# Patient Record
Sex: MALE | Race: WHITE | NOT HISPANIC OR LATINO
[De-identification: names, ages, dates, MRNs, and addresses within clinical notes are randomized per-mention and may not be internally consistent; named-entity substitution may affect disease eponyms.]

---

## 2018-03-15 ENCOUNTER — APPOINTMENT (OUTPATIENT)
Dept: GASTROENTEROLOGY | Facility: HOSPITAL | Age: 78
End: 2018-03-15

## 2018-03-15 ENCOUNTER — OUTPATIENT (OUTPATIENT)
Dept: OUTPATIENT SERVICES | Facility: HOSPITAL | Age: 78
LOS: 1 days | Discharge: HOME | End: 2018-03-15
Payer: MEDICARE

## 2018-03-15 DIAGNOSIS — D13.9 BENIGN NEOPLASM OF ILL-DEFINED SITES WITHIN THE DIGESTIVE SYSTEM: ICD-10-CM

## 2018-03-15 PROCEDURE — 45378 DIAGNOSTIC COLONOSCOPY: CPT | Mod: 52,GC

## 2018-03-15 PROCEDURE — 45378 DIAGNOSTIC COLONOSCOPY: CPT

## 2018-03-22 ENCOUNTER — FORM ENCOUNTER (OUTPATIENT)
Age: 78
End: 2018-03-22

## 2018-03-23 ENCOUNTER — APPOINTMENT (OUTPATIENT)
Dept: CT IMAGING | Facility: CLINIC | Age: 78
End: 2018-03-23
Payer: MEDICARE

## 2018-03-23 ENCOUNTER — OUTPATIENT (OUTPATIENT)
Dept: OUTPATIENT SERVICES | Facility: HOSPITAL | Age: 78
LOS: 1 days | End: 2018-03-23
Payer: MEDICARE

## 2018-03-23 ENCOUNTER — TRANSCRIPTION ENCOUNTER (OUTPATIENT)
Age: 78
End: 2018-03-23

## 2018-03-23 DIAGNOSIS — Z12.11 ENCOUNTER FOR SCREENING FOR MALIGNANT NEOPLASM OF COLON: ICD-10-CM

## 2018-03-23 PROCEDURE — 74261 CT COLONOGRAPHY DX: CPT | Mod: 26

## 2018-03-23 PROCEDURE — 74261 CT COLONOGRAPHY DX: CPT

## 2018-03-29 ENCOUNTER — MESSAGE (OUTPATIENT)
Age: 78
End: 2018-03-29

## 2018-07-11 ENCOUNTER — APPOINTMENT (OUTPATIENT)
Dept: GASTROENTEROLOGY | Facility: CLINIC | Age: 78
End: 2018-07-11
Payer: MEDICARE

## 2018-07-11 VITALS
BODY MASS INDEX: 29.09 KG/M2 | DIASTOLIC BLOOD PRESSURE: 72 MMHG | TEMPERATURE: 98.5 F | HEIGHT: 66 IN | RESPIRATION RATE: 16 BRPM | WEIGHT: 181 LBS | HEART RATE: 74 BPM | OXYGEN SATURATION: 98 % | SYSTOLIC BLOOD PRESSURE: 130 MMHG

## 2018-07-11 DIAGNOSIS — R10.13 EPIGASTRIC PAIN: ICD-10-CM

## 2018-07-11 PROCEDURE — 99214 OFFICE O/P EST MOD 30 MIN: CPT

## 2018-07-11 RX ORDER — RANITIDINE 150 MG/1
150 TABLET ORAL
Qty: 60 | Refills: 5 | Status: ACTIVE | COMMUNITY
Start: 2018-07-11 | End: 1900-01-01

## 2018-07-11 RX ORDER — SODIUM SULFATE, POTASSIUM SULFATE, MAGNESIUM SULFATE 17.5; 3.13; 1.6 G/ML; G/ML; G/ML
17.5-3.13-1.6 SOLUTION, CONCENTRATE ORAL
Qty: 1 | Refills: 0 | Status: DISCONTINUED | COMMUNITY
Start: 2018-01-22 | End: 2018-07-11

## 2023-09-27 ENCOUNTER — EMERGENCY (EMERGENCY)
Facility: HOSPITAL | Age: 83
LOS: 1 days | Discharge: HOME | End: 2023-09-27
Attending: EMERGENCY MEDICINE | Admitting: EMERGENCY MEDICINE
Payer: SELF-PAY

## 2023-09-27 VITALS
OXYGEN SATURATION: 99 % | HEIGHT: 67 IN | SYSTOLIC BLOOD PRESSURE: 127 MMHG | HEART RATE: 74 BPM | RESPIRATION RATE: 18 BRPM | TEMPERATURE: 98 F | DIASTOLIC BLOOD PRESSURE: 57 MMHG | WEIGHT: 179.9 LBS

## 2023-09-27 PROCEDURE — 70450 CT HEAD/BRAIN W/O DYE: CPT | Mod: MG

## 2023-09-27 PROCEDURE — 99284 EMERGENCY DEPT VISIT MOD MDM: CPT

## 2023-09-27 PROCEDURE — G1004: CPT

## 2023-09-27 PROCEDURE — 70450 CT HEAD/BRAIN W/O DYE: CPT | Mod: 26,MG

## 2023-09-27 PROCEDURE — 99284 EMERGENCY DEPT VISIT MOD MDM: CPT | Mod: 25

## 2023-09-27 PROCEDURE — 72125 CT NECK SPINE W/O DYE: CPT | Mod: 26,MG

## 2023-09-27 PROCEDURE — 72125 CT NECK SPINE W/O DYE: CPT | Mod: MG

## 2023-09-27 NOTE — ED ADULT NURSE NOTE - OBJECTIVE STATEMENT
Pt received in 7B, AOx3 and breathing unlabored on room air. Pt c/o hematoma to left sided forehead s/p mechanical fall at work. Pt went to urgent care after incident and was referred to ED due to pt being on eloquis. No neurological deficiencies noted. Pending dispo.

## 2023-09-27 NOTE — ED PROVIDER NOTE - PATIENT PORTAL LINK FT
You can access the FollowMyHealth Patient Portal offered by [facility] by registering at the following website: [email or web address]. By joining [employer]’s FollowMyHealth portal, you will also be able to view your health information using other applications (apps) compatible with our system.

## 2023-09-27 NOTE — ED PROVIDER NOTE - CLINICAL SUMMARY MEDICAL DECISION MAKING FREE TEXT BOX
MARCYATEL: 81 y/o male on ASA s/p fall earlier at work today, seen by [facility], and given tetanus and abrasion cleaned, pt here for CT scan, trip and fell at work over curb, no LOC, no neck pain, no visual changes, no numbness, no weakness, no extremity pain, ambulating without difficulty pertinent on exam , pt AxOx3, VSS, + hemtoma L side of forehead, PERRLA, neck: no midline tenderness, no back tenderness, no chest wall tenderness, Lungs CTA b/l, Cardiac RRR, Abdomen soft NT/ND, no focal neurologic deficit  Will get Head Ct, reassess, dispo pending on results of head CT

## 2023-09-27 NOTE — ED PROVIDER NOTE - OBJECTIVE STATEMENT
82 M on ASA 81 qd referred from urgent care for CT scan due to head injury. Had trip and fall today, hit forehead. No LOC. Denies neck/back pain, cp, sob, n/v, numbness, weakness. Ambulatory afterwards. Received tetanus at urgent care.

## 2023-09-27 NOTE — ED PROVIDER NOTE - NS ED ATTENDING STATEMENT MOD
This was a shared visit with the YADI. I reviewed and verified the documentation and independently performed the documented:

## 2023-09-27 NOTE — ED ADULT TRIAGE NOTE - CHIEF COMPLAINT QUOTE
82 yr old male c/o fall with head injury today at work at 1615 today. Was seen at urgent care and advised to come to the ED for CT scan. On ASA 81 daily. Was administered Tetanus booster at urgent care today.

## 2023-09-27 NOTE — ED PROVIDER NOTE - ATTENDING APP SHARED VISIT CONTRIBUTION OF CARE
Attending MD Barba;:   I personally have seen and examined this patient.  Physician assistant note reviewed and agree on plan of care and except where noted.  See MDM for details.

## 2023-09-27 NOTE — ED ADULT NURSE NOTE - NSFALLRISKINTERV_ED_ALL_ED

## 2025-01-02 ENCOUNTER — INPATIENT (INPATIENT)
Facility: HOSPITAL | Age: 85
LOS: 19 days | Discharge: HOME | DRG: 603 | End: 2025-01-22
Attending: HOSPITALIST | Admitting: HOSPITALIST
Payer: MEDICARE

## 2025-01-02 VITALS
SYSTOLIC BLOOD PRESSURE: 176 MMHG | HEART RATE: 71 BPM | DIASTOLIC BLOOD PRESSURE: 65 MMHG | WEIGHT: 169.98 LBS | OXYGEN SATURATION: 100 % | HEIGHT: 66 IN | TEMPERATURE: 97 F | RESPIRATION RATE: 16 BRPM

## 2025-01-02 DIAGNOSIS — I10 ESSENTIAL (PRIMARY) HYPERTENSION: ICD-10-CM

## 2025-01-02 DIAGNOSIS — L08.9 LOCAL INFECTION OF THE SKIN AND SUBCUTANEOUS TISSUE, UNSPECIFIED: ICD-10-CM

## 2025-01-02 DIAGNOSIS — S91.109A UNSPECIFIED OPEN WOUND OF UNSPECIFIED TOE(S) WITHOUT DAMAGE TO NAIL, INITIAL ENCOUNTER: ICD-10-CM

## 2025-01-02 DIAGNOSIS — R60.0 LOCALIZED EDEMA: ICD-10-CM

## 2025-01-02 DIAGNOSIS — H40.9 UNSPECIFIED GLAUCOMA: ICD-10-CM

## 2025-01-02 DIAGNOSIS — Z98.890 OTHER SPECIFIED POSTPROCEDURAL STATES: Chronic | ICD-10-CM

## 2025-01-02 DIAGNOSIS — F32.9 MAJOR DEPRESSIVE DISORDER, SINGLE EPISODE, UNSPECIFIED: ICD-10-CM

## 2025-01-02 DIAGNOSIS — D64.9 ANEMIA, UNSPECIFIED: ICD-10-CM

## 2025-01-02 DIAGNOSIS — Z29.9 ENCOUNTER FOR PROPHYLACTIC MEASURES, UNSPECIFIED: ICD-10-CM

## 2025-01-02 DIAGNOSIS — C50.919 MALIGNANT NEOPLASM OF UNSPECIFIED SITE OF UNSPECIFIED FEMALE BREAST: ICD-10-CM

## 2025-01-02 LAB
ALBUMIN SERPL ELPH-MCNC: 2.9 G/DL — LOW (ref 3.3–5)
ALP SERPL-CCNC: 65 U/L — SIGNIFICANT CHANGE UP (ref 40–120)
ALT FLD-CCNC: 19 U/L — SIGNIFICANT CHANGE UP (ref 12–78)
ANION GAP SERPL CALC-SCNC: 5 MMOL/L — SIGNIFICANT CHANGE UP (ref 5–17)
APPEARANCE UR: CLEAR — SIGNIFICANT CHANGE UP
APTT BLD: 34.4 SEC — SIGNIFICANT CHANGE UP (ref 24.5–35.6)
AST SERPL-CCNC: 23 U/L — SIGNIFICANT CHANGE UP (ref 15–37)
BASOPHILS # BLD AUTO: 0.02 K/UL — SIGNIFICANT CHANGE UP (ref 0–0.2)
BASOPHILS NFR BLD AUTO: 0.3 % — SIGNIFICANT CHANGE UP (ref 0–2)
BILIRUB SERPL-MCNC: 0.2 MG/DL — SIGNIFICANT CHANGE UP (ref 0.2–1.2)
BILIRUB UR-MCNC: NEGATIVE — SIGNIFICANT CHANGE UP
BUN SERPL-MCNC: 35 MG/DL — HIGH (ref 7–23)
CALCIUM SERPL-MCNC: 9.3 MG/DL — SIGNIFICANT CHANGE UP (ref 8.5–10.1)
CHLORIDE SERPL-SCNC: 117 MMOL/L — HIGH (ref 96–108)
CO2 SERPL-SCNC: 26 MMOL/L — SIGNIFICANT CHANGE UP (ref 22–31)
COLOR SPEC: YELLOW — SIGNIFICANT CHANGE UP
CREAT SERPL-MCNC: 1.7 MG/DL — HIGH (ref 0.5–1.3)
DIFF PNL FLD: NEGATIVE — SIGNIFICANT CHANGE UP
EGFR: 39 ML/MIN/1.73M2 — LOW
EOSINOPHIL # BLD AUTO: 0.12 K/UL — SIGNIFICANT CHANGE UP (ref 0–0.5)
EOSINOPHIL NFR BLD AUTO: 2 % — SIGNIFICANT CHANGE UP (ref 0–6)
ERYTHROCYTE [SEDIMENTATION RATE] IN BLOOD: 75 MM/HR — HIGH (ref 0–20)
GLUCOSE SERPL-MCNC: 93 MG/DL — SIGNIFICANT CHANGE UP (ref 70–99)
GLUCOSE UR QL: NEGATIVE MG/DL — SIGNIFICANT CHANGE UP
HCT VFR BLD CALC: 29.6 % — LOW (ref 39–50)
HGB BLD-MCNC: 9.2 G/DL — LOW (ref 13–17)
IMM GRANULOCYTES NFR BLD AUTO: 0.5 % — SIGNIFICANT CHANGE UP (ref 0–0.9)
INR BLD: 0.98 RATIO — SIGNIFICANT CHANGE UP (ref 0.85–1.16)
KETONES UR-MCNC: NEGATIVE MG/DL — SIGNIFICANT CHANGE UP
LACTATE SERPL-SCNC: 1.2 MMOL/L — SIGNIFICANT CHANGE UP (ref 0.7–2)
LEUKOCYTE ESTERASE UR-ACNC: ABNORMAL
LYMPHOCYTES # BLD AUTO: 0.62 K/UL — LOW (ref 1–3.3)
LYMPHOCYTES # BLD AUTO: 10.5 % — LOW (ref 13–44)
MCHC RBC-ENTMCNC: 28.8 PG — SIGNIFICANT CHANGE UP (ref 27–34)
MCHC RBC-ENTMCNC: 31.1 G/DL — LOW (ref 32–36)
MCV RBC AUTO: 92.8 FL — SIGNIFICANT CHANGE UP (ref 80–100)
MONOCYTES # BLD AUTO: 0.88 K/UL — SIGNIFICANT CHANGE UP (ref 0–0.9)
MONOCYTES NFR BLD AUTO: 14.9 % — HIGH (ref 2–14)
NEUTROPHILS # BLD AUTO: 4.22 K/UL — SIGNIFICANT CHANGE UP (ref 1.8–7.4)
NEUTROPHILS NFR BLD AUTO: 71.8 % — SIGNIFICANT CHANGE UP (ref 43–77)
NITRITE UR-MCNC: NEGATIVE — SIGNIFICANT CHANGE UP
NRBC # BLD: 0 /100 WBCS — SIGNIFICANT CHANGE UP (ref 0–0)
NRBC BLD-RTO: 0 /100 WBCS — SIGNIFICANT CHANGE UP (ref 0–0)
PH UR: 5 — SIGNIFICANT CHANGE UP (ref 5–8)
PLATELET # BLD AUTO: 151 K/UL — SIGNIFICANT CHANGE UP (ref 150–400)
POTASSIUM SERPL-MCNC: 3.7 MMOL/L — SIGNIFICANT CHANGE UP (ref 3.5–5.3)
POTASSIUM SERPL-SCNC: 3.7 MMOL/L — SIGNIFICANT CHANGE UP (ref 3.5–5.3)
PROT SERPL-MCNC: 7.1 G/DL — SIGNIFICANT CHANGE UP (ref 6–8.3)
PROT UR-MCNC: NEGATIVE MG/DL — SIGNIFICANT CHANGE UP
PROTHROM AB SERPL-ACNC: 11.5 SEC — SIGNIFICANT CHANGE UP (ref 9.9–13.4)
RBC # BLD: 3.19 M/UL — LOW (ref 4.2–5.8)
RBC # FLD: 16.3 % — HIGH (ref 10.3–14.5)
SODIUM SERPL-SCNC: 148 MMOL/L — HIGH (ref 135–145)
SP GR SPEC: 1.02 — SIGNIFICANT CHANGE UP (ref 1–1.03)
UROBILINOGEN FLD QL: 0.2 MG/DL — SIGNIFICANT CHANGE UP (ref 0.2–1)
WBC # BLD: 5.89 K/UL — SIGNIFICANT CHANGE UP (ref 3.8–10.5)
WBC # FLD AUTO: 5.89 K/UL — SIGNIFICANT CHANGE UP (ref 3.8–10.5)

## 2025-01-02 PROCEDURE — 93925 LOWER EXTREMITY STUDY: CPT | Mod: 26

## 2025-01-02 PROCEDURE — 93970 EXTREMITY STUDY: CPT | Mod: 26

## 2025-01-02 PROCEDURE — 76770 US EXAM ABDO BACK WALL COMP: CPT | Mod: 26

## 2025-01-02 PROCEDURE — 71045 X-RAY EXAM CHEST 1 VIEW: CPT | Mod: 26

## 2025-01-02 PROCEDURE — 99285 EMERGENCY DEPT VISIT HI MDM: CPT | Mod: FS

## 2025-01-02 PROCEDURE — 73630 X-RAY EXAM OF FOOT: CPT | Mod: 26,RT

## 2025-01-02 RX ORDER — METOPROLOL SUCCINATE 25 MG
50 TABLET, EXTENDED RELEASE 24 HR ORAL DAILY
Refills: 0 | Status: DISCONTINUED | OUTPATIENT
Start: 2025-01-02 | End: 2025-01-06

## 2025-01-02 RX ORDER — ESCITALOPRAM 10 MG/1
1 TABLET, FILM COATED ORAL
Refills: 0 | DISCHARGE

## 2025-01-02 RX ORDER — VANCOMYCIN HYDROCHLORIDE 50 MG/ML
1000 KIT ORAL ONCE
Refills: 0 | Status: COMPLETED | OUTPATIENT
Start: 2025-01-02 | End: 2025-01-02

## 2025-01-02 RX ORDER — PIPERACILLIN SODIUM AND TAZOBACTAM SODIUM 2; 250 G/50ML; MG/50ML
3.38 INJECTION, POWDER, FOR SOLUTION INTRAVENOUS ONCE
Refills: 0 | Status: COMPLETED | OUTPATIENT
Start: 2025-01-02 | End: 2025-01-02

## 2025-01-02 RX ORDER — CYANOCOBALAMIN (VITAMIN B-12) 1000MCG/ML
1 VIAL (ML) INJECTION
Refills: 0 | DISCHARGE

## 2025-01-02 RX ORDER — ACETAMINOPHEN, DIPHENHYDRAMINE HCL, PHENYLEPHRINE HCL 325; 25; 5 MG/1; MG/1; MG/1
3 TABLET ORAL AT BEDTIME
Refills: 0 | Status: DISCONTINUED | OUTPATIENT
Start: 2025-01-02 | End: 2025-01-06

## 2025-01-02 RX ORDER — ESCITALOPRAM 10 MG/1
10 TABLET, FILM COATED ORAL DAILY
Refills: 0 | Status: DISCONTINUED | OUTPATIENT
Start: 2025-01-02 | End: 2025-01-06

## 2025-01-02 RX ORDER — SODIUM CHLORIDE 9 G/ML
1000 INJECTION, SOLUTION INTRAVENOUS
Refills: 0 | Status: DISCONTINUED | OUTPATIENT
Start: 2025-01-02 | End: 2025-01-17

## 2025-01-02 RX ORDER — HEPARIN SODIUM,PORCINE 10000/ML
5000 VIAL (ML) INJECTION EVERY 8 HOURS
Refills: 0 | Status: DISCONTINUED | OUTPATIENT
Start: 2025-01-02 | End: 2025-01-05

## 2025-01-02 RX ORDER — FOLIC ACID 1 MG
1 TABLET ORAL DAILY
Refills: 0 | Status: DISCONTINUED | OUTPATIENT
Start: 2025-01-02 | End: 2025-01-06

## 2025-01-02 RX ORDER — TAMOXIFEN CITRATE 10 MG/1
20 TABLET, FILM COATED ORAL DAILY
Refills: 0 | Status: DISCONTINUED | OUTPATIENT
Start: 2025-01-02 | End: 2025-01-06

## 2025-01-02 RX ORDER — LATANOPROST 50 UG/ML
1 SOLUTION OPHTHALMIC AT BEDTIME
Refills: 0 | Status: DISCONTINUED | OUTPATIENT
Start: 2025-01-02 | End: 2025-01-06

## 2025-01-02 RX ORDER — METOPROLOL SUCCINATE 25 MG
1 TABLET, EXTENDED RELEASE 24 HR ORAL
Refills: 0 | DISCHARGE

## 2025-01-02 RX ORDER — CYANOCOBALAMIN (VITAMIN B-12) 1000MCG/ML
1000 VIAL (ML) INJECTION DAILY
Refills: 0 | Status: DISCONTINUED | OUTPATIENT
Start: 2025-01-02 | End: 2025-01-06

## 2025-01-02 RX ORDER — ACETAMINOPHEN 160 MG/5ML
650 SUSPENSION ORAL EVERY 6 HOURS
Refills: 0 | Status: DISCONTINUED | OUTPATIENT
Start: 2025-01-02 | End: 2025-01-06

## 2025-01-02 RX ORDER — AMMONIUM LACTATE 12 %
1 LOTION (GRAM) TOPICAL
Refills: 0 | Status: DISCONTINUED | OUTPATIENT
Start: 2025-01-02 | End: 2025-01-06

## 2025-01-02 RX ORDER — CEFEPIME HCL 1 G
2000 IV SOLUTION, PIGGYBACK, BOTTLE (EA) INTRAVENOUS EVERY 12 HOURS
Refills: 0 | Status: DISCONTINUED | OUTPATIENT
Start: 2025-01-02 | End: 2025-01-06

## 2025-01-02 RX ORDER — TAMOXIFEN CITRATE 10 MG/1
1 TABLET, FILM COATED ORAL
Refills: 0 | DISCHARGE

## 2025-01-02 RX ORDER — BACTERIOSTATIC SODIUM CHLORIDE 0.9 %
1000 VIAL (ML) INJECTION ONCE
Refills: 0 | Status: COMPLETED | OUTPATIENT
Start: 2025-01-02 | End: 2025-01-02

## 2025-01-02 RX ORDER — MUPIROCIN 2 G/100G
1 CREAM TOPICAL
Refills: 0 | Status: DISCONTINUED | OUTPATIENT
Start: 2025-01-02 | End: 2025-01-06

## 2025-01-02 RX ORDER — LATANOPROST 50 UG/ML
1 SOLUTION OPHTHALMIC
Refills: 0 | DISCHARGE

## 2025-01-02 RX ORDER — FOLIC ACID 1 MG
1 TABLET ORAL
Refills: 0 | DISCHARGE

## 2025-01-02 RX ADMIN — Medication 1000 MILLILITER(S): at 17:19

## 2025-01-02 RX ADMIN — Medication 5000 UNIT(S): at 23:01

## 2025-01-02 RX ADMIN — LATANOPROST 1 DROP(S): 50 SOLUTION OPHTHALMIC at 23:02

## 2025-01-02 RX ADMIN — PIPERACILLIN SODIUM AND TAZOBACTAM SODIUM 200 GRAM(S): 2; 250 INJECTION, POWDER, FOR SOLUTION INTRAVENOUS at 17:19

## 2025-01-02 RX ADMIN — VANCOMYCIN HYDROCHLORIDE 250 MILLIGRAM(S): KIT at 17:19

## 2025-01-02 NOTE — H&P ADULT - HISTORY OF PRESENT ILLNESS
Patient is an 85yo M with a PMH of L sided breast cancer (s/p lumpectomy, on Tamoxifen), HTN, Anemia, Aortic insufficiency, Carotid artery disease, GERD, Glaucoma, Hepatitis (2014),  who presents to the ED from Dr. Niko Jacques's office for a R toe infection. Patient notes that he noticed a R toe wound a few weeks ago. He completed a 10d course of Amoxicillin 500mg, but noticed worsening of the wound. He went ot his podiatrist today, as the wound was draining a pus like liquid. Podiatry rec he come to the hospital for IV abx and osteomyelitis workup. Patient feeling well in ED.    ED Course:  Vitals: /65, HR 71, T 97.3, RR 16 SpO2 100% on RA  Labs: ESR 75, Hgb 9.2, Na 148, Cl 117, BUN/Cr 35/1.70, eGFR 39  Given: IV Zosyn, IV Vancomycin, 1L NS bolus     Imaging:  Chest Xray: No acute chest finding.   R foot Xray: Possible erosion of the distal phalanx of the right great toe with associated soft tissue swelling suspicious for osteomyelitis. Patient is an 85yo M with a PMH of L sided breast cancer (s/p lumpectomy, on Tamoxifen), HTN, Anemia, Aortic insufficiency, Carotid artery disease, GERD, Glaucoma, Hepatitis (2014), Lower ext edema ,PVD who presents to the ED from Dr. Niko Jacques's office for a R toe infection. Patient notes that he noticed a R toe wound a few weeks ago. He completed a 10d course of Amoxicillin 500mg, but noticed worsening of the wound. He went ot his podiatrist today, as the wound was draining a pus like liquid. Podiatry rec he come to the hospital for IV abx and osteomyelitis workup. Patient feeling well in ED.    ED Course:  Vitals: /65, HR 71, T 97.3, RR 16 SpO2 100% on RA  Labs: ESR 75, Hgb 9.2, Na 148, Cl 117, BUN/Cr 35/1.70, eGFR 39  Given: IV Zosyn, IV Vancomycin, 1L NS bolus     Imaging:  Chest Xray: No acute chest finding.   R foot Xray: Possible erosion of the distal phalanx of the right great toe with associated soft tissue swelling suspicious for osteomyelitis.

## 2025-01-02 NOTE — H&P ADULT - MUSCULOSKELETAL
+R toe wound/ROM intact/no calf tenderness/normal gait details… +R toe wound/ROM intact/no joint swelling/no calf tenderness/normal gait/strength 5/5 bilateral upper extremities/strength 5/5 bilateral lower extremities

## 2025-01-02 NOTE — CONSULT NOTE ADULT - PROBLEM SELECTOR RECOMMENDATION 9
Chart reviewed and Patient evaluated  Discussed diagnosis and treatment with patient  There is concern for right hallux ulcer with infection, r/o OM  Obtained wound culture to be sent to Pathology  Xray reviewed, no obvious or acute fracture pathology, noted arthritic changes  MRI right forefoot ordered  Bactroban ordered to be applied to wound daily  Wound flushed with normal saline  Applied dry sterile dressing  Rec IV antibiotics, ID consult  Rec vascular consult  Weight bearing as tolerated b/l  Patient understands he may require surgical intervention, and is at risk to lose part of the toe, all of the toe, part of the foot, all of the foot.  Will allow demarcation of infective process  Podiatry will follow while in house.  Will discuss care plan  with all  Attendings   Thank you for consult Chart reviewed and Patient evaluated  Discussed diagnosis and treatment with patient  There is concern for right hallux ulcer with infection, r/o OM  Obtained wound culture to be sent to Pathology  Xray reviewed, no obvious or acute fracture pathology, noted arthritic changes  MRI right forefoot ordered  Bactroban ordered to be applied to wound daily  Wound flushed with normal saline  Applied dry sterile dressing  Rec IV antibiotics, ID consult  Rec vascular consult  Rec admit under hospitalist team  Weight bearing as tolerated b/l  Patient understands he may require surgical intervention, and is at risk to lose part of the toe, all of the toe, part of the foot, all of the foot.  Will allow demarcation of infective process  Podiatry will follow while in house.  Will discuss care plan  with all  Attendings   Thank you for consult

## 2025-01-02 NOTE — ED ADULT TRIAGE NOTE - PAIN RATING/NUMBER SCALE (0-10): ACTIVITY
Detail Level: Detailed Add 49209 Cpt? (Important Note: In 2017 The Use Of 22056 Is Being Tracked By Cms To Determine Future Global Period Reimbursement For Global Periods): yes 0 (no pain/absence of nonverbal indicators of pain)

## 2025-01-02 NOTE — H&P ADULT - PROBLEM SELECTOR PLAN 3
Patient with history of kidney disease. Unknown baseline. Follows Nephrologist from [place].   - BUN/Cr 35/1.70, eGFR 39  - Na 148, Cl 117  - S/p 1L NS bolus in ED   - F/u urine lytes   - Monitor daily CMP   - Avoid nephrotoxic meds when able   - Nephro Dr. Quiros consulted, f/u recs Patient with history of kidney disease. Possible STEPHANY on admission. Unknown baseline Cr. Follows Nephrologist from [place].   - BUN/Cr 35/1.70, eGFR 39  - Na 148, Cl 117  - S/p 1L NS bolus in ED   - Start D5 + 1/2NS IVF  - F/u urine lytes   - F/u renal sonogram  - Monitor daily CMP   - Avoid nephrotoxic meds when able   - Nephro Dr. Quiros consulted, f/u recs Patient with history of kidney disease. Possible STEPHANY on admission. Unknown baseline Cr. Follows Nephrologist from [place].   - BUN/Cr 35/1.70, eGFR 39  - Na 148, Cl 117  - S/p 1L NS bolus in ED   - Start D5 + 1/2NS IVF  - F/u urine lytes   - F/u renal sonogram  - Hold home Lasix in setting of Hypernatremia and possible STEPHANY   - Monitor daily CMP   - Avoid nephrotoxic meds when able   - Nephro Dr. Quiros consulted, f/u recs Patient with history of kidney disease. Possible STEPHANY on admission. Unknown baseline Cr. Follows Nephrologist from [place].   - BUN/Cr 35/1.70, eGFR 39  - Na 148, Cl 117  - S/p 1L NS bolus in ED   - Start D5 + 1/2NS IVF  - F/u urine lytes   - F/u renal sonogram  - Hold home Lasix in setting of Hypernatremia and possible STEPHANY -Renal dose meds   - Monitor daily CMP   - Avoid nephrotoxic meds when able   - Nephro Dr. Quiros consulted, f/u recs

## 2025-01-02 NOTE — ED PROVIDER NOTE - OBJECTIVE STATEMENT
84-year-old male with past medical history of breast cancer presents today from Dr. Niko Jacques's office due to a right toe infection.  Patient really simply just finished a course of antibiotics but is advised to come to the hospital due to a toe infection.  Patient has an ulcer to the right first toe.  Patient is experiencing redness and swelling.  Patient denies fever, numbness, weakness, diabetic history, chest pain, or any other complaints. PCP Eliud Sutherland.

## 2025-01-02 NOTE — H&P ADULT - PROBLEM SELECTOR PLAN 1
Patient with R toe wound suspicious for Osteomyelitis  - R foot Xray: Possible erosion of the distal phalanx of the right great toe with associated soft tissue swelling suspicious for osteomyelitis  - ESR 75  - S/p IV Zosyn, IV Vancomycin, 1L NS bolus x1 in ED  - Start IV Cefepime   - F/u MRI R foot  - F/u blood and wound cultures, CRP   - F/u MRSA/MSSA PCR --> if positive would continue IV Vancomycin   - Continue topical Bactroban to surrounding wound area, daily wound dressing changes   - Continue weight bearing as tolerated   - Podiatry Dr. Garcia consulted, f/u recs   - ID Dr. Castro consulted, f/u recs   - Vascular consulted, f/u recs Patient with R toe wound suspicious for Osteomyelitis  - R foot Xray: Possible erosion of the distal phalanx of the right great toe with associated soft tissue swelling suspicious for osteomyelitis  - ESR 75  - S/p IV Zosyn, IV Vancomycin, 1L NS bolus x1 in ED  - Start IV Cefepime 2 gm q 12 hrs -Renal dose   - F/u MRI R foot as per podiatry  - F/u blood and wound cultures, CRP   - F/u MRSA/MSSA PCR --> if positive would continue IV Vancomycin   - Continue topical Bactroban to surrounding wound area, daily wound dressing changes   - Continue weight bearing as tolerated   - Podiatry Dr. Garcia consulted, f/u recs   - ID Dr. Castro consulted, f/u recs -Abx   - Vascular consulted, f/u recs

## 2025-01-02 NOTE — H&P ADULT - NSICDXPASTMEDICALHX_GEN_ALL_CORE_FT
PAST MEDICAL HISTORY:  Anemia     Breast cancer     Glaucoma     HTN (hypertension)     Major depression      PAST MEDICAL HISTORY:  Anemia     Breast cancer     Chronic GERD     Glaucoma     H/O: glaucoma     HTN (hypertension)     Lower extremity edema     Major depression     PVD (peripheral vascular disease)

## 2025-01-02 NOTE — H&P ADULT - NEUROLOGICAL
negative normal/cranial nerves II-XII intact/sensation intact AAOx 4/normal/cranial nerves II-XII intact/sensation intact/deep reflexes intact/superficial reflexes intact

## 2025-01-02 NOTE — CONSULT NOTE ADULT - SUBJECTIVE AND OBJECTIVE BOX
S : 84y year old Male seen at bedside for right toe infection.  Pt states he cancelled his last two appointments with his podiatrist, and has been on antibiotics for 10 days with worsening infection.    Chief Complaint : Patient is a 84y old  Male who presents with a chief complaint of right toe infection  HPI : 84-year-old male with past medical history of breast cancer presents today from Dr. Niko Jacques's office due to a right toe infection.  Patient really simply just finished a course of antibiotics but is advised to come to the hospital due to a toe infection.  Patient has an ulcer to the right first toe.  Patient is experiencing redness and swelling.  Patient denies fever, numbness, weakness, diabetic history, chest pain, or any other complaints. PCP Eliud Sutherland.      Patient admits to  (-) Fevers, (-) Chills, (-) Nausea, (-) Vomiting, (-) Shortness of Breath      PMH:   PSH:    Allergies:No Known Allergies      Labs:                          9.2    5.89  )-----------( 151      ( 02 Jan 2025 17:20 )             29.6     WBC Trend  5.89 Date (01-02 @ 17:20)      Chem  01-02    148[H]  |  117[H]  |  35[H]  ----------------------------<  93  3.7   |  26  |  1.70[H]    Ca    9.3      02 Jan 2025 17:20    TPro  7.1  /  Alb  2.9[L]  /  TBili  0.2  /  DBili  x   /  AST  23  /  ALT  19  /  AlkPhos  65  01-02          T(F): 97.3 (01-02-25 @ 15:16), Max: 97.3 (01-02-25 @ 15:16)  HR: 71 (01-02-25 @ 15:16) (71 - 71)  BP: 176/65 (01-02-25 @ 15:16) (176/65 - 176/65)  RR: 16 (01-02-25 @ 15:16) (16 - 16)  SpO2: 100% (01-02-25 @ 15:16) (100% - 100%)  Wt(kg): --    O:   General: Pleasant  male NAD & AOX3.    Integument:  Skin warm, dry and supple bilateral.    Noted plantar right hallux ulcer, fibro-granular base, apx [id number], + edema, + erythema, minimal drainage, - hyperkeratotic base, +tracking, with noted cellulitic changes, clinical concern of OM  Vascular: Dorsalis Pedis and Posterior Tibial pulses non-palpable.  Capillary re-fill time less then 3 seconds digits 1-5 bilateral.  noted b/l lower leg edema, noted venous stasis b/l lower extremity  Neuro: Protective sensation intact to the level of the digits bilateral.  MSK: Muscle strength 4/5 all major muscle groups bilateral.

## 2025-01-02 NOTE — ED ADULT NURSE NOTE - NSFALLHARMRISKINTERV_ED_ALL_ED

## 2025-01-02 NOTE — H&P ADULT - GASTROINTESTINAL
negative normal/soft/nontender/nondistended/normal active bowel sounds details… normal/soft/nontender/nondistended/normal active bowel sounds/no guarding/no rigidity/no organomegaly/no masses palpable

## 2025-01-02 NOTE — ED ADULT TRIAGE NOTE - CHIEF COMPLAINT QUOTE
84 yr old male arrives to ED c/o right toe infection, edema/redness noted to leg. pt denies fever chills, chest pain or sob at this time. Karan NUNEZ

## 2025-01-02 NOTE — ED PROVIDER NOTE - CLINICAL SUMMARY MEDICAL DECISION MAKING FREE TEXT BOX
Patient is a 84-year-old male who presents to the emergency room with a chief complaint of toe pain.  Past medical history of breast cancer.  Presents from outside office with infection to the right toe first digit.  Patient notes ulcer redness and swelling to the toe progressively worsening.  Denies any fever nausea vomiting chest pain or shortness of breath.  On exam patient is lying in bed no acute distress.  Right foot first digit there is a 1 cm ulceration noted to the toe with surrounding redness and erythema.  Positive pedal pulse cap refill 3 to 4 seconds.  Patient is presenting to the emergency room with an infected right toe.  Will obtain screening septic workup imaging begin antibiotics consult with podiatry and monitor.  Patient will require admission for further workup and evaluation.  Independent review of chest x-ray reveals no acute infiltrate.  Independent review of right foot x-ray reveals erosive changes of the distal first toe concerning for possible osteomyelitis

## 2025-01-02 NOTE — PROGRESS NOTE ADULT - SUBJECTIVE AND OBJECTIVE BOX
Consulted for STEPHANY  Chart reviewed  Check urine indices  Renal sono  IVF trial with hypotonic fluid  Full consult note to follow, thank you

## 2025-01-02 NOTE — H&P ADULT - PROBLEM SELECTOR PLAN 2
No
Patient with a hx of Anemia of Chronic disease. On Folic acid, B12, and Procrit injections every other week (for Hgb <10.6). No active signs of bleeding. Unknown baseline Hgb  - Hgb 9.2  - Continue Folic acid and B12  - F/u iron studies in AM   - Monitor daily CBC  - Maintain active Type and screen   - Transfuse for Hgb <7

## 2025-01-02 NOTE — H&P ADULT - CARDIOVASCULAR
normal/regular rate and rhythm/S1 S2 present/no gallops/no rub/peripheral edema details… normal/regular rate and rhythm/S1 S2 present/no gallops/no rub/no murmur/no JVD/peripheral edema/pedal edema/vascular

## 2025-01-02 NOTE — H&P ADULT - PROBLEM SELECTOR PLAN 4
Chronic. On home Metoprolol 50mg and Lasix 80mg daily  - /65 on admission  - Continue home Metoprolol and Lasix daily   - Monitor routine hemodynamics Chronic. On home Metoprolol 50mg and Lasix 80mg daily  - /65 on admission  - Continue home Metoprolol   - Monitor routine hemodynamics Chronic. On home Metoprolol  XL 50mg and Lasix 80mg daily  - /65 on admission  - Continue home Metoprolol XL daily -Lasix on HOLD   - Monitor routine hemodynamics

## 2025-01-02 NOTE — H&P ADULT - NEGATIVE MUSCULOSKELETAL SYMPTOMS
no arthralgia/no myalgia/no muscle weakness no arthralgia/no joint swelling/no myalgia/no muscle weakness

## 2025-01-02 NOTE — H&P ADULT - ATTENDING COMMENTS
Patient is an 83yo M with a PMH of L sided breast cancer, HTN, Anemia, Aortic insufficiency, Carotid artery disease, GERD, Glaucoma, Hepatitis (2014) admitted for Rt foot big toe  wound & osteomyelitis workup.   Pt seen, examined, case & care plan d/w pt ,residents at detail.  Consults-  ID-DR Castro  Podiatry- Dr Garcia  Renal-DR Quiros  Hematology--Dr Denton  PO diet  AM labs   DVT ppx

## 2025-01-02 NOTE — H&P ADULT - ASSESSMENT
Patient is an 85yo M with a PMH of L sided breast cancer, HTN, Anemia, Aortic insufficiency, Carotid artery disease, GERD, Glaucoma, Hepatitis (2014) admitted for osteomyelitis workup.  Patient is an 83yo M with a PMH of L sided breast cancer, HTN, Anemia, Aortic insufficiency, Carotid artery disease, GERD, Glaucoma, Hepatitis (2014) admitted for Rt foot big toe  wound & osteomyelitis workup.

## 2025-01-02 NOTE — H&P ADULT - NSHPSOCIALHISTORY_GEN_ALL_CORE
Lives: At home with wife and son   ADLs: Mostly dependent  Ambulation: Independent   Diet: Regular   Alcohol Use: Socially, a few times a month   Tobacco Use: Former. Quit >60yrs ago   Recreational Drug Use: None

## 2025-01-02 NOTE — ED PROVIDER NOTE - DIFFERENTIAL DIAGNOSIS
Patient is presenting to the emergency room with an infected right toe.  Will obtain screening septic workup imaging begin antibiotics consult with podiatry and monitor.  Patient will require admission for further workup and evaluation. Differential Diagnosis

## 2025-01-02 NOTE — ED PROVIDER NOTE - WHICH SHOWED
Independent review of chest x-ray reveals no acute infiltrate.  Independent review of right foot x-ray reveals erosive changes of the distal first toe concerning for possible osteomyelitis

## 2025-01-02 NOTE — ED PROVIDER NOTE - PHYSICAL EXAMINATION
Constitutional: Awake, Alert, non-toxic. NAD. Well appearing, well nourished.   HEAD: Normocephalic, atraumatic.   EYES: EOM intact, conjunctiva and sclera are clear bilaterally.   ENT: No rhinorrhea, patent, mucous membranes pink/moist, no drooling or stridor.   NECK: Supple, non-tender  CARDIOVASCULAR: Normal S1, S2; regular rate and rhythm.  RESPIRATORY: Normal respiratory effort; breath sounds CTAB, no wheezes, rhonchi, or rales. Speaking in full sentences. No accessory muscle use.   EXTREMITIES: Full passive and active ROM in all extremities; (+) right toe erythema and swelling, (+) right 1st toe 1 cm ulcer, ankle non-tender to palpation; distal pulses palpable and symmetric  SKIN: Warm, dry; good skin turgor, no apparent lesions or rashes, no ecchymosis, brisk capillary refill.  NEURO: A&O x3. Sensory and motor functions are grossly intact. Speech is normal. Appearance and judgement seem appropriate for gender and age.

## 2025-01-02 NOTE — H&P ADULT - PROBLEM SELECTOR PLAN 5
Chronic. On Lasix 80mg daily. Patient denies any hx of CHF. No evidence of volume overload on exam. Follows Cardio Dr. Bland  - Chest Xray: No acute chest finding.  - Continue home Lasix   - F/u TTE   - F/u b/l LE doppler Chronic. On Lasix 80mg daily. Patient denies any hx of CHF. No evidence of volume overload on exam. Follows Cardio Dr. Bland  - Chest Xray: No acute chest finding.  - Hold home Lasix in setting of Hypernatremia and possible STEPHANY --> if Cr improves, or patient develops fluid overload, can consider restarting Lasix   - F/u TTE   - F/u b/l LE doppler Chronic. On Lasix 80mg daily. Patient denies any hx of CHF. No evidence of volume overload on exam. Follows Cardio Dr. Bland  - Chest Xray: No acute chest finding.  - Hold home Lasix in setting of Hypernatremia and possible STEPHANY --> if Cr improves, or patient develops fluid overload, can consider restarting Lasix  for b/l lower ext edema   - F/u TTE   - F/u b/l LE doppler

## 2025-01-02 NOTE — H&P ADULT - PROBLEM SELECTOR PLAN 6
Patient with recent diagnosis of L sided breast cancer, s/p lumpectomy. On Tamoxifen 20mg daily. Follows Heme/Onc Dr. Barba  - Continue Tamoxifen

## 2025-01-02 NOTE — H&P ADULT - SKIN
+b/l LE vascular skin changes/warm and dry +b/l LE vascular  stasis skin changes/warm and dry/no rashes

## 2025-01-03 ENCOUNTER — RESULT REVIEW (OUTPATIENT)
Age: 85
End: 2025-01-03

## 2025-01-03 DIAGNOSIS — M86.179 OTHER ACUTE OSTEOMYELITIS, UNSPECIFIED ANKLE AND FOOT: ICD-10-CM

## 2025-01-03 LAB
A1C WITH ESTIMATED AVERAGE GLUCOSE RESULT: 5.2 % — SIGNIFICANT CHANGE UP (ref 4–5.6)
ALBUMIN SERPL ELPH-MCNC: 2.3 G/DL — LOW (ref 3.3–5)
ALP SERPL-CCNC: 59 U/L — SIGNIFICANT CHANGE UP (ref 40–120)
ALT FLD-CCNC: 14 U/L — SIGNIFICANT CHANGE UP (ref 12–78)
ANION GAP SERPL CALC-SCNC: 3 MMOL/L — LOW (ref 5–17)
AST SERPL-CCNC: 15 U/L — SIGNIFICANT CHANGE UP (ref 15–37)
BASOPHILS # BLD AUTO: 0 K/UL — SIGNIFICANT CHANGE UP (ref 0–0.2)
BASOPHILS NFR BLD AUTO: 0 % — SIGNIFICANT CHANGE UP (ref 0–2)
BILIRUB SERPL-MCNC: 0.3 MG/DL — SIGNIFICANT CHANGE UP (ref 0.2–1.2)
BUN SERPL-MCNC: 30 MG/DL — HIGH (ref 7–23)
CALCIUM SERPL-MCNC: 8.5 MG/DL — SIGNIFICANT CHANGE UP (ref 8.5–10.1)
CHLORIDE SERPL-SCNC: 119 MMOL/L — HIGH (ref 96–108)
CO2 SERPL-SCNC: 25 MMOL/L — SIGNIFICANT CHANGE UP (ref 22–31)
CREAT SERPL-MCNC: 1.6 MG/DL — HIGH (ref 0.5–1.3)
CRP SERPL-MCNC: 26 MG/L — HIGH
EGFR: 42 ML/MIN/1.73M2 — LOW
EOSINOPHIL # BLD AUTO: 0.32 K/UL — SIGNIFICANT CHANGE UP (ref 0–0.5)
EOSINOPHIL NFR BLD AUTO: 7 % — HIGH (ref 0–6)
ESTIMATED AVERAGE GLUCOSE: 103 MG/DL — SIGNIFICANT CHANGE UP (ref 68–114)
FERRITIN SERPL-MCNC: 129 NG/ML — SIGNIFICANT CHANGE UP (ref 30–400)
FOLATE SERPL-MCNC: 12.9 NG/ML — SIGNIFICANT CHANGE UP
GLUCOSE SERPL-MCNC: 106 MG/DL — HIGH (ref 70–99)
HCT VFR BLD CALC: 25.6 % — LOW (ref 39–50)
HGB BLD-MCNC: 7.9 G/DL — LOW (ref 13–17)
IRON SATN MFR SERPL: 13 % — LOW (ref 16–55)
IRON SATN MFR SERPL: 27 UG/DL — LOW (ref 45–165)
LYMPHOCYTES # BLD AUTO: 0.69 K/UL — LOW (ref 1–3.3)
LYMPHOCYTES # BLD AUTO: 15 % — SIGNIFICANT CHANGE UP (ref 13–44)
MAGNESIUM SERPL-MCNC: 2.2 MG/DL — SIGNIFICANT CHANGE UP (ref 1.6–2.6)
MCHC RBC-ENTMCNC: 28.8 PG — SIGNIFICANT CHANGE UP (ref 27–34)
MCHC RBC-ENTMCNC: 30.9 G/DL — LOW (ref 32–36)
MCV RBC AUTO: 93.4 FL — SIGNIFICANT CHANGE UP (ref 80–100)
MONOCYTES # BLD AUTO: 0.73 K/UL — SIGNIFICANT CHANGE UP (ref 0–0.9)
MONOCYTES NFR BLD AUTO: 16 % — HIGH (ref 2–14)
NEUTROPHILS # BLD AUTO: 2.83 K/UL — SIGNIFICANT CHANGE UP (ref 1.8–7.4)
NEUTROPHILS NFR BLD AUTO: 62 % — SIGNIFICANT CHANGE UP (ref 43–77)
NRBC # BLD: SIGNIFICANT CHANGE UP /100 WBCS (ref 0–0)
NRBC BLD-RTO: SIGNIFICANT CHANGE UP /100 WBCS (ref 0–0)
PHOSPHATE SERPL-MCNC: 2.1 MG/DL — LOW (ref 2.5–4.5)
PLATELET # BLD AUTO: 141 K/UL — LOW (ref 150–400)
POTASSIUM SERPL-MCNC: 3.9 MMOL/L — SIGNIFICANT CHANGE UP (ref 3.5–5.3)
POTASSIUM SERPL-SCNC: 3.9 MMOL/L — SIGNIFICANT CHANGE UP (ref 3.5–5.3)
PROT SERPL-MCNC: 5.9 G/DL — LOW (ref 6–8.3)
RBC # BLD: 2.74 M/UL — LOW (ref 4.2–5.8)
RBC # FLD: 16.6 % — HIGH (ref 10.3–14.5)
SODIUM SERPL-SCNC: 147 MMOL/L — HIGH (ref 135–145)
TIBC SERPL-MCNC: 210 UG/DL — LOW (ref 220–430)
TRANSFERRIN SERPL-MCNC: 156 MG/DL — LOW (ref 200–360)
UIBC SERPL-MCNC: 183 UG/DL — SIGNIFICANT CHANGE UP (ref 110–370)
VIT B12 SERPL-MCNC: 502 PG/ML — SIGNIFICANT CHANGE UP (ref 232–1245)
WBC # BLD: 4.57 K/UL — SIGNIFICANT CHANGE UP (ref 3.8–10.5)
WBC # FLD AUTO: 4.57 K/UL — SIGNIFICANT CHANGE UP (ref 3.8–10.5)

## 2025-01-03 PROCEDURE — 99222 1ST HOSP IP/OBS MODERATE 55: CPT

## 2025-01-03 PROCEDURE — 93922 UPR/L XTREMITY ART 2 LEVELS: CPT | Mod: 26

## 2025-01-03 PROCEDURE — G0545: CPT

## 2025-01-03 PROCEDURE — 73718 MRI LOWER EXTREMITY W/O DYE: CPT | Mod: 26,RT

## 2025-01-03 PROCEDURE — 93010 ELECTROCARDIOGRAM REPORT: CPT

## 2025-01-03 RX ORDER — TIMOLOL MALEATE 5 MG/ML
1 SOLUTION OPHTHALMIC
Refills: 0 | Status: DISCONTINUED | OUTPATIENT
Start: 2025-01-03 | End: 2025-01-06

## 2025-01-03 RX ORDER — SODIUM CHLORIDE 9 G/ML
1000 INJECTION, SOLUTION INTRAVENOUS
Refills: 0 | Status: DISCONTINUED | OUTPATIENT
Start: 2025-01-03 | End: 2025-01-04

## 2025-01-03 RX ADMIN — SODIUM CHLORIDE 75 MILLILITER(S): 9 INJECTION, SOLUTION INTRAVENOUS at 00:57

## 2025-01-03 RX ADMIN — Medication 50 MILLIGRAM(S): at 06:55

## 2025-01-03 RX ADMIN — Medication 5000 UNIT(S): at 21:26

## 2025-01-03 RX ADMIN — TAMOXIFEN CITRATE 20 MILLIGRAM(S): 10 TABLET, FILM COATED ORAL at 11:27

## 2025-01-03 RX ADMIN — Medication 100 MILLIGRAM(S): at 06:56

## 2025-01-03 RX ADMIN — ESCITALOPRAM 10 MILLIGRAM(S): 10 TABLET, FILM COATED ORAL at 11:26

## 2025-01-03 RX ADMIN — Medication 1000 MICROGRAM(S): at 11:26

## 2025-01-03 RX ADMIN — Medication 1 MILLIGRAM(S): at 11:26

## 2025-01-03 RX ADMIN — SODIUM CHLORIDE 75 MILLILITER(S): 9 INJECTION, SOLUTION INTRAVENOUS at 17:20

## 2025-01-03 RX ADMIN — Medication 1 APPLICATION(S): at 06:56

## 2025-01-03 RX ADMIN — Medication 100 MILLIGRAM(S): at 17:20

## 2025-01-03 RX ADMIN — Medication 1 APPLICATION(S): at 17:21

## 2025-01-03 RX ADMIN — LATANOPROST 1 DROP(S): 50 SOLUTION OPHTHALMIC at 21:26

## 2025-01-03 RX ADMIN — Medication 5000 UNIT(S): at 13:34

## 2025-01-03 RX ADMIN — MUPIROCIN 1 APPLICATION(S): 2 CREAM TOPICAL at 11:26

## 2025-01-03 RX ADMIN — Medication 5000 UNIT(S): at 06:55

## 2025-01-03 NOTE — CARE COORDINATION ASSESSMENT. - OTHER PERTINENT DISCHARGE PLANNING INFORMATION:
CM met with the patient at the bedside and explained role of CM and transition planning. Patient verbalized understanding. Patient stated he lives with his spouse in a townhouse. No stairs outside; flight of stairs to the bedroom. Patient stated he is independent with ADL's/ambulation/medication management PTA. Patient stated he does not own DME. Denies home care services PTA. CM provided direct contact/resource folder and remains available.

## 2025-01-03 NOTE — PROGRESS NOTE ADULT - ASSESSMENT
Briefly this is a 85yo M with a PMH of L sided breast cancer (s/p lumpectomy, on Tamoxifen), HTN, Anemia, Aortic insufficiency, Carotid artery disease, GERD, Glaucoma, Hepatitis (2014), Lower ext edema ,PVD, who presented to the ED from Dr. Niko Jacques's office for a R toe infection. MRI concerning for osteomyelitis of R 1st toe. He has no fevers and no leukocytosis. Skin changes on lower legs appear concerning for venous stasis. Podiatry following.     R 1st toe ulcer w/ underlying osteomyelitis  Plan to continue w/ cefepime pending cultures    Will resume care from [facility] tomorrow    Dr. Samuel More covering service 1/4-1/5; I will resume care 1/6  Infectious Diseases will follow. Please call with any questions.  Yen Glez M.D.  [facility] [phone]  For after 5 P.M. and weekends, please call [phone]  Available on [employer] TEAMS -- *PREFERRED*

## 2025-01-03 NOTE — PROGRESS NOTE ADULT - PROBLEM SELECTOR PLAN 1
Chart reviewed and Patient evaluated  Discussed diagnosis and treatment with patient  There is concern for right hallux ulcer with infection possible OM  Pending wound cx  MRI shows OM right great toe  Bactroban ordered to be applied to wound daily  Wound flushed with normal saline  Applied dry sterile dressing  Appreciates vascular consult, will follow  Continue IV abx and follow ID  Rec admit under hospitalist team  Weight bearing as tolerated b/l  Patient understands he may require surgical intervention, and is at risk to lose part of the toe, all of the toe, part of the foot, all of the foot.  Will allow demarcation of infective process  Podiatry will follow while in house.  Will discuss care plan  with all  Attendings

## 2025-01-03 NOTE — CONSULT NOTE ADULT - SUBJECTIVE AND OBJECTIVE BOX
[facility]                             Nephrology and Hypertension                             Niles Montano                                          [phone]     Patient is a 84y old  Male who presents with a chief complaint of Osteomyelitis (2025 13:05)       HPI:  Patient is an 83yo M with a PMH of L sided breast cancer (s/p lumpectomy, on Tamoxifen), HTN, Anemia, Aortic insufficiency, Carotid artery disease, GERD, Glaucoma, Hepatitis (), Lower ext edema ,PVD who presents to the ED from Dr. Niko Jacques's office for a R toe infection. Patient notes that he noticed a R toe wound a few weeks ago. He completed a 10d course of Amoxicillin 500mg, but noticed worsening of the wound. He went ot his podiatrist today, as the wound was draining a pus like liquid. Podiatry rec he come to the hospital for IV abx and osteomyelitis workup. Patient feeling well in ED.  States he is urinating well.  No N/V/SOB.  Has not seen nephrologist in the past.        PAST MEDICAL & SURGICAL HISTORY:  Anemia      HTN (hypertension)      Breast cancer      Glaucoma      Major depression      PVD (peripheral vascular disease)      Lower extremity edema      Chronic GERD      H/O: glaucoma      S/P lumpectomy, left breast           FAMILY HISTORY:  [place]    Social History:Non smoker    MEDICATIONS  (STANDING):  ammonium lactate 12% Lotion 1 Application(s) Topical two times a day  cefepime   IVPB 2000 milliGRAM(s) IV Intermittent every 12 hours  cyanocobalamin 1000 MICROGram(s) Oral daily  dextrose 5% + sodium chloride 0.45%. 1000 milliLiter(s) (75 mL/Hr) IV Continuous <Continuous>  escitalopram 10 milliGRAM(s) Oral daily  folic acid 1 milliGRAM(s) Oral daily  heparin   Injectable 5000 Unit(s) SubCutaneous every 8 hours  influenza  Vaccine (HIGH DOSE) 0.5 milliLiter(s) IntraMuscular once  latanoprost 0.005% Ophthalmic Solution 1 Drop(s) Both EYES at bedtime  metoprolol succinate ER 50 milliGRAM(s) Oral daily  mupirocin 2% Ointment 1 Application(s) Topical <User Schedule>  tamoxifen 20 milliGRAM(s) Oral daily    MEDICATIONS  (PRN):  acetaminophen     Tablet .. 650 milliGRAM(s) Oral every 6 hours PRN Temp greater or equal to 38C (100.4F), Mild Pain (1 - 3)  melatonin 3 milliGRAM(s) Oral at bedtime PRN Insomnia   Meds reviewed    Allergies    No Known Allergies    Intolerances         REVIEW OF SYSTEMS:    Review of Systems:   Constitutional: Denies fatigue  HEENT: Denies headaches and dizziness  Respiratory: denies SOB, cough, or wheezing  Cardiovascular: denies CP, palpitations  Gastrointestinal: Denies nausea, denies vomiting, diarrhea, constipation, abdominal pain, or bloody stools  Genitourinary: denies painful urination, increased frequency, urgency, or bloody urine  Skin: denies rashes or itching  Musculoskeletal: denies muscle aches, joint swelling  Neurologic: Denies generalized weakness, denies loss of sensation, numbness, or tingling      Vital Signs Last 24 Hrs  T(C): 36.3 (2025 11:34), Max: 36.5 (2025 00:21)  T(F): 97.3 (2025 11:34), Max: 97.7 (2025 00:21)  HR: 59 (2025 11:34) (59 - 71)  BP: 165/73 (2025 11:34) (149/73 - 176/65)  BP(mean): --  RR: 18 (2025 11:34) (16 - 18)  SpO2: 97% (2025 11:34) (96% - 100%)    Parameters below as of 2025 11:34  Patient On (Oxygen Delivery Method): room air      Daily Height in cm: 167.64 (2025 15:16)    Daily Weight in k (2025 04:59)    PHYSICAL EXAM:    GENERAL: NAD  HEAD:  Atraumatic, Normocephalic  EYES: EOMI, conjunctiva and sclera clear  ENMT: No Drainage from nares, No drainage from ears  NERVOUS SYSTEM:  Awake and Alert  CHEST/LUNG: Clear to percussion bilaterally  EXTREMITIES:  No Edema  SKIN: No rashes No obvious ecchymosis      LABS:                        7.9    4.57  )-----------( 141      ( 2025 07:25 )             25.6     01-03    147[H]  |  119[H]  |  30[H]  ----------------------------<  106[H]  3.9   |  25  |  1.60[H]    Ca    8.5      2025 07:25  Phos  2.1       Mg     2.2         TPro  5.9[L]  /  Alb  2.3[L]  /  TBili  0.3  /  DBili  x   /  AST  15  /  ALT  14  /  AlkPhos  59      PT/INR - ( 2025 17:20 )   PT: 11.5 sec;   INR: 0.98 ratio         PTT - ( 2025 17:20 )  PTT:34.4 sec  Urinalysis Basic - ( 2025 07:25 )    Color: x / Appearance: x / SG: x / pH: x  Gluc: 106 mg/dL / Ketone: x  / Bili: x / Urobili: x   Blood: x / Protein: x / Nitrite: x   Leuk Esterase: x / RBC: x / WBC x   Sq Epi: x / Non Sq Epi: x / Bacteria: x      Magnesium: 2.2 mg/dL ( @ 07:25)  Phosphorus: 2.1 mg/dL ( @ 07:25)          RADIOLOGY & ADDITIONAL TESTS:

## 2025-01-03 NOTE — CONSULT NOTE ADULT - SUBJECTIVE AND OBJECTIVE BOX
[facility]  INFECTIOUS DISEASES - [address], [place] [address]  Tel: [phone]     Fax: [phone]  =======================================================    [facility]-[id number]  DAYA MATHIS     CC: Patient is a 84y old  Male who presents with a chief complaint of R toe ulcer    HPI:  Patient is an 83yo M with a PMH of L sided breast cancer (s/p lumpectomy, on Tamoxifen), HTN, Anemia, Aortic insufficiency, Carotid artery disease, GERD, Glaucoma, Hepatitis (2014), Lower ext edema ,PVD, who presented to the ED from Dr. Niko Jacques's office for a R toe infection. Patient notes that he noticed a R toe wound a few weeks ago. He completed a 10d course of Amoxicillin 500mg, but noticed worsening of the wound. He went ot his podiatrist today, as the wound was draining a pus like liquid. Podiatry rec he come to the hospital for IV abx and osteomyelitis workup. Patient feeling well in ED. He denies any pain, fevers, chills, SOB, nausea or diarrhea.      PAST MEDICAL & SURGICAL HISTORY:  Anemia      HTN (hypertension)      Breast cancer      Glaucoma      Major depression      PVD (peripheral vascular disease)      Lower extremity edema      Chronic GERD      H/O: glaucoma      S/P lumpectomy, left breast          Social Hx:     FAMILY HISTORY:      Allergies    No Known Allergies    Intolerances        Antibiotics:  MEDICATIONS  (STANDING):  ammonium lactate 12% Lotion 1 Application(s) Topical two times a day  cefepime   IVPB 2000 milliGRAM(s) IV Intermittent every 12 hours  cyanocobalamin 1000 MICROGram(s) Oral daily  dextrose 5% + sodium chloride 0.45%. 1000 milliLiter(s) (75 mL/Hr) IV Continuous <Continuous>  escitalopram 10 milliGRAM(s) Oral daily  folic acid 1 milliGRAM(s) Oral daily  heparin   Injectable 5000 Unit(s) SubCutaneous every 8 hours  influenza  Vaccine (HIGH DOSE) 0.5 milliLiter(s) IntraMuscular once  latanoprost 0.005% Ophthalmic Solution 1 Drop(s) Both EYES at bedtime  metoprolol succinate ER 50 milliGRAM(s) Oral daily  mupirocin 2% Ointment 1 Application(s) Topical <User Schedule>  tamoxifen 20 milliGRAM(s) Oral daily    MEDICATIONS  (PRN):  acetaminophen     Tablet .. 650 milliGRAM(s) Oral every 6 hours PRN Temp greater or equal to 38C (100.4F), Mild Pain (1 - 3)  melatonin 3 milliGRAM(s) Oral at bedtime PRN Insomnia       REVIEW OF SYSTEMS:  CONSTITUTIONAL:  No Fever or chills  HEENT:  No sore throat or runny nose.  CARDIOVASCULAR:  No chest pain or SOB.  RESPIRATORY:  No cough, shortness of breath  GASTROINTESTINAL:  No nausea, vomiting or diarrhea.  GENITOURINARY:  No dysuria, frequency or urgency  MUSCULOSKELETAL:  no joint aches, no muscle pain  SKIN: + R 1st toe wound  NEUROLOGIC:  No headache or dizziness  PSYCHIATRIC:  No disorder of thought or mood.    Physical Exam:  Vital Signs Last 24 Hrs  T(C): 36.3 (03 Jan 2025 11:34), Max: 36.5 (03 Jan 2025 00:21)  T(F): 97.3 (03 Jan 2025 11:34), Max: 97.7 (03 Jan 2025 00:21)  HR: 59 (03 Jan 2025 11:34) (59 - 71)  BP: 165/73 (03 Jan 2025 11:34) (149/73 - 176/65)  BP(mean): --  RR: 18 (03 Jan 2025 11:34) (16 - 18)  SpO2: 97% (03 Jan 2025 11:34) (96% - 100%)    Parameters below as of 03 Jan 2025 11:34  Patient On (Oxygen Delivery Method): room air      Height (cm): 167.6 (01-02 @ 15:16)  Weight (kg): 77.1 (01-02 @ 15:16)  BMI (kg/m2): 27.4 (01-02 @ 15:16)  BSA (m2): 1.87 (01-02 @ 15:16)  GEN: NAD  HEENT: normocephalic and atraumatic.   NECK: Supple.   LUNGS: Normal respriatory effort  HEART: Regular rate and rhythm   ABDOMEN: Soft, nontender, and nondistended.    EXTREMITIES: bilateral lower leg edema R>L, chronic appearing redness and skin changes on both lower legs  NEUROLOGIC: Answering questions appropriately  PSYCHIATRIC: Appropriate affect .  SKIN: No ulceration or induration present.    Labs:  01-03    147[H]  |  119[H]  |  30[H]  ----------------------------<  106[H]  3.9   |  25  |  1.60[H]    Ca    8.5      03 Jan 2025 07:25  Phos  2.1     01-03  Mg     2.2     01-03    TPro  5.9[L]  /  Alb  2.3[L]  /  TBili  0.3  /  DBili  x   /  AST  15  /  ALT  14  /  AlkPhos  59  01-03                          7.9    4.57  )-----------( 141      ( 03 Jan 2025 07:25 )             25.6     PT/INR - ( 02 Jan 2025 17:20 )   PT: 11.5 sec;   INR: 0.98 ratio         PTT - ( 02 Jan 2025 17:20 )  PTT:34.4 sec  Urinalysis Basic - ( 03 Jan 2025 07:25 )    Color: x / Appearance: x / SG: x / pH: x  Gluc: 106 mg/dL / Ketone: x  / Bili: x / Urobili: x   Blood: x / Protein: x / Nitrite: x   Leuk Esterase: x / RBC: x / WBC x   Sq Epi: x / Non Sq Epi: x / Bacteria: x      LIVER FUNCTIONS - ( 03 Jan 2025 07:25 )  Alb: 2.3 g/dL / Pro: 5.9 g/dL / ALK PHOS: 59 U/L / ALT: 14 U/L / AST: 15 U/L / GGT: x                   C-Reactive Protein: 26 mg/L (01-02-25 @ 17:20)    Sedimentation Rate, Erythrocyte: 75 mm/hr (01-02-25 @ 17:20)        RECENT CULTURES:        All imaging and other data have been reviewed.    < from: MR Foot No Cont, Right (01.03.25 @ 10:43) >  FINDINGS:    Soft tissues: There is irregularity of the skin at the plantar surface of   the great toe at the level of the IP joint with generalized adjacent soft   tissue edema. Subcutaneous edema is present at the dorsal surface of the   foot at the level of the metatarsals and phalanges. There is no drainable   fluid collection.    Osseous structures: Prominent bone marrow edema pattern is present   throughout the proximal phalanx and distal phalanx of the great toe with   corresponding there are degrees of abnormal signal on T1-weighted   imaging. More patchy bone marrow edema is present at the head of the   first metatarsal with corresponding low [id number] which may represent   arthritic changes versus erosions. There is a focal area of high T2 and   intermediate to low T1 signal involving the base of the third and second   metatarsals (series 5, images 10 through 21). There are additional   scattered subchondral edematous changes involving the navicular cuneiform   and cuboid are felt to be secondary to arthritis.    Muscles and tendons: There is scattered feathery muscle edema throughout   the forefoot. There is no evidence of tendon tear      IMPRESSION:    MRI of the right foot demonstrates ulceration at the plantar surface of   the great toe distally with underlying bone marrow edema pattern and   abnormal T1 signal within the distal and proximal phalanx consistent with   osteomyelitis. Edematous changes at the head of the first metatarsal is   indeterminate and may represent osteoarthrosis versus reactive changes,   although underlying osteomyelitis is not excluded. There is no soft   tissue drainable fluid collection.    Bone marrow edema pattern at the base of the third through fifth   metatarsals with corresponding abnormal T1 signal is indeterminate.   Findings may be related to reactive/stress changes versus arthritic   changes versus infectious process.          --- End of Report ---    < end of copied text >

## 2025-01-03 NOTE — CARE COORDINATION ASSESSMENT. - ASSESSMENT CONCERNS TO BE ADDRESSED
Per EMR: 83yo M with a PMH HTN, Anemia, Aortic insufficiency, Carotid artery disease, GERD, Glaucoma, Hepatitis (2014) admitted for right foot big toe wound & osteomyelitis workup./discharge planning

## 2025-01-03 NOTE — CARE COORDINATION ASSESSMENT. - TRANSPORTATION UTILIZED PRIOR TO ADMISSION
Caller: Dorothy Zavala    Relationship: Self    Best call back number:      Requested Prescriptions:   Requested Prescriptions     Pending Prescriptions Disp Refills    QUEtiapine (SEROquel) 25 MG tablet 90 tablet 0     Sig: Take 1 tablet by mouth Every Night.    meloxicam (Mobic) 7.5 MG tablet 30 tablet 3     Sig: Take 1 tablet by mouth Daily.        Pharmacy where request should be sent: [facility] #[id number] - [place], [place] - [address] [place] [address] - [phone] [facility] [phone] FX     Last office visit with prescribing clinician: Visit date not found   Last telemedicine visit with prescribing clinician: Visit date not found   Next office visit with prescribing clinician: 10/10/2023     Additional details provided by patient: PATIENT IS OUT OF BOTH MEDICATIONS    Does the patient have less than a 3 day supply:  [x] Yes  [] No      Esteban Huggins Rep   10/02/23 10:41 EDT         
Patient stated his wife or son transports him to medical appointment.

## 2025-01-03 NOTE — PROGRESS NOTE ADULT - ASSESSMENT
84M with HTN, PAD, breast ca, CKD admitted with rt great toe osteomyelitis  MRI noted  IV ABX  plan for angio on monday  cardio consulted for clearance  podiatry, vascular and ID following    CKD/Hypernatremia  creatinine at about baseline  nephrology following  IVF    HTN  continue metoprolol    Breast ca  continue tamoxifen    edema  elevate

## 2025-01-03 NOTE — CARE COORDINATION ASSESSMENT. - NSCAREPROVIDERS_GEN_ALL_CORE_FT
CARE PROVIDERS:  Accepting Physician: Sukh Glez  Administration: Feliz Taylor  Admitting: Sukh Glez  Attending: Sukh Glez  Cardiology Technician: Denise Shelton  Case Management: Erika Romano  Consultant: Balwinder Quiros  Consultant: Milo Del Toro  Consultant: Herman Garcia  Consultant: Kalpana Hardy  Consultant: Zach Soliz  Consultant: Kristofer Retana  Consultant: Venkata Perez  Consultant: Marina Infante  Consultant: Tammy Littlejohn  ED ACP: Filippo Whittaker  ED Attending: Dee Judge ED Nurse: Jolly Ayon  Nurse: Kapil Wood  Nurse: Manohar Cedillo  Nurse: Feliz Mireles  Ordered: Doctor, Unknown  Outpatient Provider: Obed Avery  Override: Cassandra Dunaway  Override: Feliz Mireles  PCA/Nursing Assistant: Iraida Arrieta  Physical Therapy: Lawrence Tabor  Primary Team: Sukh Glez  Primary Team: Filippo Whittaker  Primary Team: Lula Kaur  Primary Team: Ivette Mcallister  Registered Dietitian: Maude Jane  Respiratory Therapy: Marina Fitzpatrick  Respiratory Therapy: Gaetano Oliva  : Malaika Aleman

## 2025-01-03 NOTE — CONSULT NOTE ADULT - NS ATTEND AMEND GEN_ALL_CORE FT
84-year-old male with nonhealing wound of the right great toe.  Patient has been seen by podiatry.  He had ABIs showing mild disease bilaterally.  On physical exam, he has audible right DP and PT signals however nonpalpable pulses.  He is on antibiotics for lower extremity cellulitis as well.  He has elevated creatinine.  Given presence of nonhealing wound with nonpalpable pedal pulses, my recommendation would be to proceed with a right lower extremity angiogram with possible intervention.  The patient will need medical optimization and possible renal consult.  We discussed all risks and benefits associated with the procedure and the patient is agreeable to proceed.  Plan to proceed with right lower extremity angiogram on Monday.

## 2025-01-03 NOTE — CONSULT NOTE ADULT - ASSESSMENT
85yo M with a PMH of L sided breast cancer (s/p lumpectomy, on Tamoxifen), HTN, Anemia, Aortic insufficiency, Carotid artery disease, GERD, Glaucoma, Hepatitis (2014), Lower ext edema ,PVD, who presented to the ED from Dr. Niko Jacques's office for a R toe infection. MRI concerning for osteomyelitis of R 1st toe. He has no fevers and no leukocytosis. Skin changes on lower legs appear concerning for venous stasis. Podiatry following.     #R 1st toe osteomyelitis  #R 1st toe ulcer    -continue cefepime 2g IV q12h (renally dosed)  -blood and wound cultures pending  -Vascular evaluation  -wound care  -leg elevation    Thank you for courtesy of this consult. I will be covered by Dr. Prem Wheatley this weekend, 1/4-1/5/25.    Discussed with the primary team.     Tammy Littlejohn MD  Division of Infectious Diseases   Cell [phone] between 8am and 6pm   After 6pm and weekends please call ID service at [phone].     55 minutes spent on total encounter assessing patient, examination, chart review, counseling and coordinating care by the attending physician/nurse/care manager.    83yo M with a PMH of L sided breast cancer (s/p lumpectomy, on Tamoxifen), HTN, Anemia, Aortic insufficiency, Carotid artery disease, GERD, Glaucoma, Hepatitis (2014), Lower ext edema ,PVD, who presented to the ED from Dr. Niko Jacques's office for a R toe infection. MRI concerning for osteomyelitis of R 1st toe. He has no fevers and no leukocytosis. Skin changes on lower legs appear concerning for venous stasis. Podiatry following.     #R 1st toe osteomyelitis  #R 1st toe ulcer    -continue cefepime 2g IV q12h (renally dosed)  -blood and wound cultures pending  -Vascular evaluation  -wound care  -leg elevation    Thank you for courtesy of this consult. [facility] will take over starting tomorrow, 1/4/25.    Discussed with the primary team.     Tammy Littlejohn MD  Division of Infectious Diseases   Cell [phone] between 8am and 6pm   After 6pm and weekends please call ID service at [phone].     55 minutes spent on total encounter assessing patient, examination, chart review, counseling and coordinating care by the attending physician/nurse/care manager.

## 2025-01-03 NOTE — CONSULT NOTE ADULT - SUBJECTIVE AND OBJECTIVE BOX
HPI:  Patient is an 83yo M with a PMH of L sided breast cancer (s/p lumpectomy, on Tamoxifen), HTN, Anemia, Aortic insufficiency, Carotid artery disease, GERD, Glaucoma, Hepatitis (), Lower ext edema ,PVD who presents to the ED from Dr. Niko Jacques's office for a R toe infection. Patient notes that he noticed a R toe wound a few weeks ago. He completed a 10d course of Amoxicillin 500mg, but noticed worsening of the wound. He went ot his podiatrist today, as the wound was draining a pus like liquid. Podiatry rec he come to the hospital for IV abx and osteomyelitis workup. Patient feeling well in ED.    Interval HPI:  Patient seen and examined at bedside, history confirmed as written above.     ED Course:  Vitals: /65, HR 71, T 97.3, RR 16 SpO2 100% on RA  Labs: ESR 75, Hgb 9.2, Na 148, Cl 117, BUN/Cr 35/1.70, eGFR 39  Given: IV Zosyn, IV Vancomycin, 1L NS bolus     Imaging:  Chest Xray: No acute chest finding.   R foot Xray: Possible erosion of the distal phalanx of the right great toe with associated soft tissue swelling suspicious for osteomyelitis. (2025 19:34)      PAST MEDICAL & SURGICAL HISTORY:  Anemia      HTN (hypertension)      Breast cancer      Glaucoma      Major depression      PVD (peripheral vascular disease)      Lower extremity edema      Chronic GERD      H/O: glaucoma      S/P lumpectomy, left breast          REVIEW OF SYSTEMS      General:	    Skin/Breast:  	  Ophthalmologic:  	  ENMT:	    Respiratory and Thorax:  	  Cardiovascular:	    Gastrointestinal:	    Genitourinary:	    Musculoskeletal:	    Neurological:	    Psychiatric:	    Hematology/Lymphatics:	    Endocrine:	    Allergic/Immunologic:	    MEDICATIONS  (STANDING):  ammonium lactate 12% Lotion 1 Application(s) Topical two times a day  cefepime   IVPB 2000 milliGRAM(s) IV Intermittent every 12 hours  cyanocobalamin 1000 MICROGram(s) Oral daily  dextrose 5% + sodium chloride 0.45%. 1000 milliLiter(s) (75 mL/Hr) IV Continuous <Continuous>  escitalopram 10 milliGRAM(s) Oral daily  folic acid 1 milliGRAM(s) Oral daily  heparin   Injectable 5000 Unit(s) SubCutaneous every 8 hours  latanoprost 0.005% Ophthalmic Solution 1 Drop(s) Both EYES at bedtime  metoprolol succinate ER 50 milliGRAM(s) Oral daily  mupirocin 2% Ointment 1 Application(s) Topical <User Schedule>  tamoxifen 20 milliGRAM(s) Oral daily    MEDICATIONS  (PRN):  acetaminophen     Tablet .. 650 milliGRAM(s) Oral every 6 hours PRN Temp greater or equal to 38C (100.4F), Mild Pain (1 - 3)  melatonin 3 milliGRAM(s) Oral at bedtime PRN Insomnia      Allergies    No Known Allergies    Intolerances        SOCIAL HISTORY:    FAMILY HISTORY:      Vital Signs Last 24 Hrs  T(C): 36.3 (2025 23:09), Max: 36.3 (2025 15:16)  T(F): 97.4 (2025 23:09), Max: 97.4 (2025 23:09)  HR: 70 (2025 23:09) (70 - 71)  BP: 160/65 (2025 23:09) (160/65 - 176/65)  BP(mean): --  RR: 18 (2025 23:09) (16 - 18)  SpO2: 97% (2025 23:09) (97% - 100%)    Parameters below as of 2025 23:09  Patient On (Oxygen Delivery Method): room air        PHYSICAL EXAM:      Constitutional:    Eyes:    ENMT:    Neck:    Breasts:    Back:    Respiratory:    Cardiovascular:    Gastrointestinal:    Genitourinary:    Rectal:    Extremities:    Vascular:    Neurological:    Skin:    Lymph Nodes:    Musculoskeletal:    Psychiatric:        LABS:                        9.2    5.89  )-----------( 151      ( 2025 17:20 )             29.6     01-02    148[H]  |  117[H]  |  35[H]  ----------------------------<  93  3.7   |  26  |  1.70[H]    Ca    9.3      2025 17:20    TPro  7.1  /  Alb  2.9[L]  /  TBili  0.2  /  DBili  x   /  AST  23  /  ALT  19  /  AlkPhos  65  01-02    PT/INR - ( 2025 17:20 )   PT: 11.5 sec;   INR: 0.98 ratio         PTT - ( 2025 17:20 )  PTT:34.4 sec  Urinalysis Basic - ( 2025 23:20 )    Color: Yellow / Appearance: Clear / S.016 / pH: x  Gluc: x / Ketone: Negative mg/dL  / Bili: Negative / Urobili: 0.2 mg/dL   Blood: x / Protein: Negative mg/dL / Nitrite: Negative   Leuk Esterase: Small / RBC: 2 /HPF / WBC 4 /HPF   Sq Epi: x / Non Sq Epi: x / Bacteria: x        RADIOLOGY & ADDITIONAL STUDIES: HPI:  Patient is an 85yo M with a PMH of L sided breast cancer (s/p lumpectomy, on Tamoxifen), HTN, Anemia, Aortic insufficiency, Carotid artery disease, GERD, Glaucoma, Hepatitis (), Lower ext edema ,PVD who presents to the ED from Dr. Niko Jacques's office for a R toe infection. Patient notes that he noticed a R toe wound a few weeks ago. He completed a 10d course of Amoxicillin 500mg, but noticed worsening of the wound. He went ot his podiatrist today, as the wound was draining a pus like liquid. Podiatry rec he come to the hospital for IV abx and osteomyelitis workup. Patient feeling well in ED.    Interval HPI:  Patient seen and examined at bedside, history confirmed as written above. Patient reports that he noticed an injury a few weeks ago and was sent to the hospital today from his podiatrist for non healing wound s/p oral abx. He denies any fever or chills, pain to the wound site but ambulatory. Reports that he does not see a vascular doctor though he was supposed to make an appointment per his PCP. Denies any history of vascular intervention.       PAST MEDICAL & SURGICAL HISTORY:  Anemia    HTN (hypertension)    Breast cancer    Glaucoma    Major depression    PVD (peripheral vascular disease)    Lower extremity edema    Chronic GERD    H/O: glaucoma    S/P lumpectomy, left breast      REVIEW OF SYSTEMS  Head: denies headaches, dizziness & lightheadedness  Eyes: denies changes in vision, eye pain, double vision & eye discharge  Ears: denies changes in hearing & ear discharge  Nose: denies rhinorrhea  Mouth: denies bleeding gums & sore tongue & sore throat  Neck: denies swollen lymph nodes   Respiratory: denies SOB, cough, sputum production, wheezing  Cardiac: denies CP & irregular heart beat  Abdominal: denies abdominal pain, + in bowel movements  : denies dysuria, frequent urination, hematuria  Musculoskeletal: denies joint pain & muscle pain  Neuro: denies involuntary muscle movements  Psych: no depression, no anxiety     MEDICATIONS  (STANDING):  ammonium lactate 12% Lotion 1 Application(s) Topical two times a day  cefepime   IVPB 2000 milliGRAM(s) IV Intermittent every 12 hours  cyanocobalamin 1000 MICROGram(s) Oral daily  dextrose 5% + sodium chloride 0.45%. 1000 milliLiter(s) (75 mL/Hr) IV Continuous <Continuous>  escitalopram 10 milliGRAM(s) Oral daily  folic acid 1 milliGRAM(s) Oral daily  heparin   Injectable 5000 Unit(s) SubCutaneous every 8 hours  latanoprost 0.005% Ophthalmic Solution 1 Drop(s) Both EYES at bedtime  metoprolol succinate ER 50 milliGRAM(s) Oral daily  mupirocin 2% Ointment 1 Application(s) Topical <User Schedule>  tamoxifen 20 milliGRAM(s) Oral daily    MEDICATIONS  (PRN):  acetaminophen     Tablet .. 650 milliGRAM(s) Oral every 6 hours PRN Temp greater or equal to 38C (100.4F), Mild Pain (1 - 3)  melatonin 3 milliGRAM(s) Oral at bedtime PRN Insomnia      Allergies  No Known Allergies      Vital Signs Last 24 Hrs  T(C): 36.3 (2025 23:09), Max: 36.3 (2025 15:16)  T(F): 97.4 (2025 23:09), Max: 97.4 (2025 23:09)  HR: 70 (2025 23:09) (70 - 71)  BP: 160/65 (2025 23:09) (160/65 - 176/65)  BP(mean): --  RR: 18 (2025 23:09) (16 - 18)  SpO2: 97% (2025 23:09) (97% - 100%)    Parameters below as of 2025 23:09  Patient On (Oxygen Delivery Method): room air        PHYSICAL EXAM:  Constitutional: AAOx3, no acute distress  HEENT: NCAT, airway patent  Cardiovascular: RRR, pulses present bilaterally  Respiratory: nonlabored breathing  Gastrointestinal: abdomen soft, nontender, non distended  Neuro: no focal deficits  Extremities: Bilateral edema with trophic changes, palpable DP pulses, R hallux with weeping purulent wound       LABS:                        9.2    5.89  )-----------( 151      ( 2025 17:20 )             29.6     01-02    148[H]  |  117[H]  |  35[H]  ----------------------------<  93  3.7   |  26  |  1.70[H]    Ca    9.3      2025 17:20    TPro  7.1  /  Alb  2.9[L]  /  TBili  0.2  /  DBili  x   /  AST  23  /  ALT  19  /  AlkPhos  65      PT/INR - ( 2025 17:20 )   PT: 11.5 sec;   INR: 0.98 ratio         PTT - ( 2025 17:20 )  PTT:34.4 sec  Urinalysis Basic - ( 2025 23:20 )    Color: Yellow / Appearance: Clear / S.016 / pH: x  Gluc: x / Ketone: Negative mg/dL  / Bili: Negative / Urobili: 0.2 mg/dL   Blood: x / Protein: Negative mg/dL / Nitrite: Negative   Leuk Esterase: Small / RBC: 2 /HPF / WBC 4 /HPF   Sq Epi: x / Non Sq Epi: x / Bacteria: x      RADIOLOGY & ADDITIONAL STUDIES:  < from: US Duplex Venous Lower Ext Complete, Bilateral (25 @ 22:55) >  ACC: [id number] EXAM:  US DPLX LWR EXT VEINS COMPL BI   ORDERED BY:   GUILLERMINA OCAMPO     PROCEDURE DATE:  2025          INTERPRETATION:  CLINICAL HISTORY: Bilateral lower extremity edema    COMPARISON: None.    TECHNIQUE: Grayscale color and Doppler examination of the bilateral lower   extremity deep venous systems.    FINDINGS:    Sonographic evaluation of the bilateral common femoral veins, superficial   femoral veins and popliteal veins shows normal flow, compressibility,   respiratory variation and augmentation.  No calf vein thrombosis.    IMPRESSION:    No evidence for acute DVT in the bilateral lower extremity deep venous   systems.    --- End of Report ---            MAMADOU STEVENSON MD; Attending Radiologist  This document has been electronically signed. 2025 11:03PM    < end of copied text >

## 2025-01-03 NOTE — PATIENT PROFILE ADULT - FALL HARM RISK - HARM RISK INTERVENTIONS
Assistance with ambulation/Assistance OOB with selected safe patient handling equipment/Communicate Risk of Fall with Harm to all staff/Discuss with provider need for PT consult/Monitor gait and stability/Reinforce activity limits and safety measures with patient and family/Tailored Fall Risk Interventions/Visual Cue: Yellow wristband and red socks/Bed in lowest position, wheels locked, appropriate side rails in place/Call bell, personal items and telephone in reach/Instruct patient to call for assistance before getting out of bed or chair/Non-slip footwear when patient is out of bed/[place] to call system/Physically safe environment - no spills, clutter or unnecessary equipment/Purposeful Proactive Rounding/Room/bathroom lighting operational, light cord in reach

## 2025-01-03 NOTE — PROGRESS NOTE ADULT - SUBJECTIVE AND OBJECTIVE BOX
Patient is a 84y old  Male who presents with a chief complaint of Osteomyelitis (03 Jan 2025 15:21)        INTERVAL HPI/OVERNIGHT EVENTS:   no complaints  pt seen and examined         Vital Signs Last 24 Hrs  T(C): 36.3 (03 Jan 2025 11:34), Max: 36.5 (03 Jan 2025 00:21)  T(F): 97.3 (03 Jan 2025 11:34), Max: 97.7 (03 Jan 2025 00:21)  HR: 59 (03 Jan 2025 11:34) (59 - 70)  BP: 165/73 (03 Jan 2025 11:34) (149/73 - 165/73)  BP(mean): --  RR: 18 (03 Jan 2025 11:34) (18 - 18)  SpO2: 97% (03 Jan 2025 11:34) (96% - 97%)    Parameters below as of 03 Jan 2025 11:34  Patient On (Oxygen Delivery Method): room air        acetaminophen     Tablet .. 650 milliGRAM(s) Oral every 6 hours PRN  ammonium lactate 12% Lotion 1 Application(s) Topical two times a day  cefepime   IVPB 2000 milliGRAM(s) IV Intermittent every 12 hours  cyanocobalamin 1000 MICROGram(s) Oral daily  dextrose 5% + sodium chloride 0.45%. 1000 milliLiter(s) IV Continuous <Continuous>  dextrose 5%. 1000 milliLiter(s) IV Continuous <Continuous>  escitalopram 10 milliGRAM(s) Oral daily  folic acid 1 milliGRAM(s) Oral daily  heparin   Injectable 5000 Unit(s) SubCutaneous every 8 hours  influenza  Vaccine (HIGH DOSE) 0.5 milliLiter(s) IntraMuscular once  latanoprost 0.005% Ophthalmic Solution 1 Drop(s) Both EYES at bedtime  melatonin 3 milliGRAM(s) Oral at bedtime PRN  metoprolol succinate ER 50 milliGRAM(s) Oral daily  mupirocin 2% Ointment 1 Application(s) Topical <User Schedule>  tamoxifen 20 milliGRAM(s) Oral daily      PHYSICAL EXAM:  GENERAL: NAD   EYES: conjunctiva and sclera clear  ENMT: Moist mucous membranes  NECK: Supple, No JVD, Normal thyroid  CHEST/LUNG: non labored, cta b/l  HEART: Regular rate and rhythm; No murmurs, rubs, or gallops  ABDOMEN: Soft, Nontender, Nondistended; Bowel sounds present  EXTREMITIES:  No clubbing, no cyanosis, + edema  LYMPH: No lymphadenopathy noted  SKIN: No rashes or lesions  NEURO: no new focal deficits    Consultant(s) Notes Reviewed:  [x ] YES  [ ] NO  Care Discussed with Consultants/Other Providers [ x] YES  [ ] NO    LABS:                        7.9    4.57  )-----------( 141      ( 03 Jan 2025 07:25 )             25.6     01-03    147[H]  |  119[H]  |  30[H]  ----------------------------<  106[H]  3.9   |  25  |  1.60[H]    Ca    8.5      03 Jan 2025 07:25  Phos  2.1     01-03  Mg     2.2     01-03    TPro  5.9[L]  /  Alb  2.3[L]  /  TBili  0.3  /  DBili  x   /  AST  15  /  ALT  14  /  AlkPhos  59  01-03    PT/INR - ( 02 Jan 2025 17:20 )   PT: 11.5 sec;   INR: 0.98 ratio         PTT - ( 02 Jan 2025 17:20 )  PTT:34.4 sec  Urinalysis Basic - ( 03 Jan 2025 07:25 )    Color: x / Appearance: x / SG: x / pH: x  Gluc: 106 mg/dL / Ketone: x  / Bili: x / Urobili: x   Blood: x / Protein: x / Nitrite: x   Leuk Esterase: x / RBC: x / WBC x   Sq Epi: x / Non Sq Epi: x / Bacteria: x      CAPILLARY BLOOD GLUCOSE            Urinalysis Basic - ( 03 Jan 2025 07:25 )    Color: x / Appearance: x / SG: x / pH: x  Gluc: 106 mg/dL / Ketone: x  / Bili: x / Urobili: x   Blood: x / Protein: x / Nitrite: x   Leuk Esterase: x / RBC: x / WBC x   Sq Epi: x / Non Sq Epi: x / Bacteria: x          RADIOLOGY & ADDITIONAL TESTS:    Imaging Personally Reviewed  Reviewed consultants input

## 2025-01-03 NOTE — CONSULT NOTE ADULT - ASSESSMENT
STEPHANY on Likely CKD  Hypernatermia  Renal Mass  HTN  PAD    -Baseline Creatinine Unknown but given age and co-morbidities likely CKD  -STEPHANY Likely 2/2 Decreased EABV  -Check Urine lytes  -Check UA  -Renal US with solid mass,  will need CT or MRI, at this time favor MRI with elevated creatinine   -Change IVF to D5W, hypernatremia improving  -Vascular consult-right lower extremity angiogram planned for monday, mod risk for VLADIMIR

## 2025-01-03 NOTE — PROGRESS NOTE ADULT - SUBJECTIVE AND OBJECTIVE BOX
PROGRESS NOTE   Patient is a 84y old  Male who presents with a chief complaint of Osteomyelitis (03 Jan 2025 00:05)      HPI:  Patient is an 85yo M with a PMH of L sided breast cancer (s/p lumpectomy, on Tamoxifen), HTN, Anemia, Aortic insufficiency, Carotid artery disease, GERD, Glaucoma, Hepatitis (2014), Lower ext edema ,PVD who presents to the ED from Dr. Niko Jacques's office for a R toe infection. Patient notes that he noticed a R toe wound a few weeks ago. He completed a 10d course of Amoxicillin 500mg, but noticed worsening of the wound. He went ot his podiatrist today, as the wound was draining a pus like liquid. Podiatry rec he come to the hospital for IV abx and osteomyelitis workup. Patient feeling well in ED.    ED Course:  Vitals: /65, HR 71, T 97.3, RR 16 SpO2 100% on RA  Labs: ESR 75, Hgb 9.2, Na 148, Cl 117, BUN/Cr 35/1.70, eGFR 39  Given: IV Zosyn, IV Vancomycin, 1L NS bolus     Imaging:  Chest Xray: No acute chest finding.   R foot Xray: Possible erosion of the distal phalanx of the right great toe with associated soft tissue swelling suspicious for osteomyelitis. (02 Jan 2025 19:34)      Vital Signs Last 24 Hrs  T(C): 36.3 (03 Jan 2025 11:34), Max: 36.5 (03 Jan 2025 00:21)  T(F): 97.3 (03 Jan 2025 11:34), Max: 97.7 (03 Jan 2025 00:21)  HR: 59 (03 Jan 2025 11:34) (59 - 71)  BP: 165/73 (03 Jan 2025 11:34) (149/73 - 176/65)  BP(mean): --  RR: 18 (03 Jan 2025 11:34) (16 - 18)  SpO2: 97% (03 Jan 2025 11:34) (96% - 100%)    Parameters below as of 03 Jan 2025 11:34  Patient On (Oxygen Delivery Method): room air                              7.9    4.57  )-----------( 141      ( 03 Jan 2025 07:25 )             25.6               01-03    147[H]  |  119[H]  |  30[H]  ----------------------------<  106[H]  3.9   |  25  |  1.60[H]    Ca    8.5      03 Jan 2025 07:25  Phos  2.1     01-03  Mg     2.2     01-03    TPro  5.9[L]  /  Alb  2.3[L]  /  TBili  0.3  /  DBili  x   /  AST  15  /  ALT  14  /  AlkPhos  59  01-03    Physical examination  General: Pleasant  male NAD & AOX3.    Integument:  Skin warm, dry and supple bilateral.    Noted plantar right hallux ulcer, fibro-granular base, apx [id number], + edema, + erythema, minimal drainage, - hyperkeratotic base, +tracking, with noted cellulitic changes, clinical concern of OM  Vascular: Dorsalis Pedis and Posterior Tibial pulses non-palpable.  Capillary re-fill time less then 3 seconds digits 1-5 bilateral.  noted b/l lower leg edema, noted venous stasis b/l lower extremity  Neuro: Protective sensation intact to the level of the digits bilateral.  MSK: Muscle strength 4/5 all major muscle groups bilateral.

## 2025-01-03 NOTE — CARE COORDINATION ASSESSMENT. - NSDCPLANREVIEW_GEN_ALL_CORE
Spoke to the patient's daughter and explained role of CM and transition planning. CM provided direct contact information/Caregiver/Patient

## 2025-01-03 NOTE — CARE COORDINATION ASSESSMENT. - NSPASTMEDSURGHISTORY_GEN_ALL_CORE_FT
PAST MEDICAL & SURGICAL HISTORY:  H/O: glaucoma      Chronic GERD      Lower extremity edema      PVD (peripheral vascular disease)      Major depression      Glaucoma      Breast cancer      HTN (hypertension)      Anemia      S/P lumpectomy, left breast

## 2025-01-03 NOTE — PATIENT PROFILE ADULT - NSPROHMSYMPCOND_GEN_A_NUR
depression, HTN, anemia, Hx of L sided Breast CA/behavioral health/cancer/cardiovascular/hematologic

## 2025-01-04 LAB
-  AMOXICILLIN/CLAVULANIC ACID: SIGNIFICANT CHANGE UP
-  AMPICILLIN/SULBACTAM: SIGNIFICANT CHANGE UP
-  AMPICILLIN: SIGNIFICANT CHANGE UP
-  AZTREONAM: SIGNIFICANT CHANGE UP
-  AZTREONAM: SIGNIFICANT CHANGE UP
-  CEFAZOLIN: SIGNIFICANT CHANGE UP
-  CEFEPIME: SIGNIFICANT CHANGE UP
-  CEFEPIME: SIGNIFICANT CHANGE UP
-  CEFOXITIN: SIGNIFICANT CHANGE UP
-  CEFTAZIDIME: SIGNIFICANT CHANGE UP
-  CEFTRIAXONE: SIGNIFICANT CHANGE UP
-  CIPROFLOXACIN: SIGNIFICANT CHANGE UP
-  CIPROFLOXACIN: SIGNIFICANT CHANGE UP
-  ERTAPENEM: SIGNIFICANT CHANGE UP
-  GENTAMICIN: SIGNIFICANT CHANGE UP
-  IMIPENEM: SIGNIFICANT CHANGE UP
-  LEVOFLOXACIN: SIGNIFICANT CHANGE UP
-  LEVOFLOXACIN: SIGNIFICANT CHANGE UP
-  MEROPENEM: SIGNIFICANT CHANGE UP
-  MEROPENEM: SIGNIFICANT CHANGE UP
-  PIPERACILLIN/TAZOBACTAM: SIGNIFICANT CHANGE UP
-  PIPERACILLIN/TAZOBACTAM: SIGNIFICANT CHANGE UP
-  TOBRAMYCIN: SIGNIFICANT CHANGE UP
-  TRIMETHOPRIM/SULFAMETHOXAZOLE: SIGNIFICANT CHANGE UP
ANION GAP SERPL CALC-SCNC: 7 MMOL/L — SIGNIFICANT CHANGE UP (ref 5–17)
BUN SERPL-MCNC: 28 MG/DL — HIGH (ref 7–23)
CALCIUM SERPL-MCNC: 8.5 MG/DL — SIGNIFICANT CHANGE UP (ref 8.5–10.1)
CHLORIDE SERPL-SCNC: 117 MMOL/L — HIGH (ref 96–108)
CO2 SERPL-SCNC: 21 MMOL/L — LOW (ref 22–31)
CREAT SERPL-MCNC: 1.7 MG/DL — HIGH (ref 0.5–1.3)
CULTURE RESULTS: ABNORMAL
EGFR: 39 ML/MIN/1.73M2 — LOW
GLUCOSE SERPL-MCNC: 116 MG/DL — HIGH (ref 70–99)
HCT VFR BLD CALC: 25.9 % — LOW (ref 39–50)
HGB BLD-MCNC: 7.9 G/DL — LOW (ref 13–17)
MCHC RBC-ENTMCNC: 29 PG — SIGNIFICANT CHANGE UP (ref 27–34)
MCHC RBC-ENTMCNC: 30.5 G/DL — LOW (ref 32–36)
MCV RBC AUTO: 95.2 FL — SIGNIFICANT CHANGE UP (ref 80–100)
METHOD TYPE: SIGNIFICANT CHANGE UP
METHOD TYPE: SIGNIFICANT CHANGE UP
NRBC # BLD: 0 /100 WBCS — SIGNIFICANT CHANGE UP (ref 0–0)
NRBC BLD-RTO: 0 /100 WBCS — SIGNIFICANT CHANGE UP (ref 0–0)
ORGANISM # SPEC MICROSCOPIC CNT: ABNORMAL
ORGANISM # SPEC MICROSCOPIC CNT: ABNORMAL
ORGANISM # SPEC MICROSCOPIC CNT: SIGNIFICANT CHANGE UP
PLATELET # BLD AUTO: 159 K/UL — SIGNIFICANT CHANGE UP (ref 150–400)
POTASSIUM SERPL-MCNC: 3.8 MMOL/L — SIGNIFICANT CHANGE UP (ref 3.5–5.3)
POTASSIUM SERPL-SCNC: 3.8 MMOL/L — SIGNIFICANT CHANGE UP (ref 3.5–5.3)
RBC # BLD: 2.72 M/UL — LOW (ref 4.2–5.8)
RBC # FLD: 16.8 % — HIGH (ref 10.3–14.5)
SODIUM SERPL-SCNC: 145 MMOL/L — SIGNIFICANT CHANGE UP (ref 135–145)
SPECIMEN SOURCE: SIGNIFICANT CHANGE UP
WBC # BLD: 5.9 K/UL — SIGNIFICANT CHANGE UP (ref 3.8–10.5)
WBC # FLD AUTO: 5.9 K/UL — SIGNIFICANT CHANGE UP (ref 3.8–10.5)

## 2025-01-04 RX ADMIN — Medication 5000 UNIT(S): at 05:01

## 2025-01-04 RX ADMIN — Medication 50 MILLIGRAM(S): at 05:07

## 2025-01-04 RX ADMIN — MUPIROCIN 1 APPLICATION(S): 2 CREAM TOPICAL at 12:37

## 2025-01-04 RX ADMIN — Medication 1000 MICROGRAM(S): at 12:38

## 2025-01-04 RX ADMIN — TAMOXIFEN CITRATE 20 MILLIGRAM(S): 10 TABLET, FILM COATED ORAL at 12:37

## 2025-01-04 RX ADMIN — TIMOLOL MALEATE 1 DROP(S): 5 SOLUTION OPHTHALMIC at 05:01

## 2025-01-04 RX ADMIN — SODIUM CHLORIDE 75 MILLILITER(S): 9 INJECTION, SOLUTION INTRAVENOUS at 12:37

## 2025-01-04 RX ADMIN — Medication 1 APPLICATION(S): at 05:01

## 2025-01-04 RX ADMIN — LATANOPROST 1 DROP(S): 50 SOLUTION OPHTHALMIC at 21:23

## 2025-01-04 RX ADMIN — Medication 100 MILLIGRAM(S): at 05:02

## 2025-01-04 RX ADMIN — Medication 1 MILLIGRAM(S): at 12:37

## 2025-01-04 RX ADMIN — TIMOLOL MALEATE 1 DROP(S): 5 SOLUTION OPHTHALMIC at 18:02

## 2025-01-04 RX ADMIN — SODIUM CHLORIDE 75 MILLILITER(S): 9 INJECTION, SOLUTION INTRAVENOUS at 21:30

## 2025-01-04 RX ADMIN — Medication 5000 UNIT(S): at 21:22

## 2025-01-04 RX ADMIN — Medication 1 APPLICATION(S): at 18:01

## 2025-01-04 RX ADMIN — ESCITALOPRAM 10 MILLIGRAM(S): 10 TABLET, FILM COATED ORAL at 12:37

## 2025-01-04 RX ADMIN — Medication 100 MILLIGRAM(S): at 18:02

## 2025-01-04 RX ADMIN — Medication 5000 UNIT(S): at 14:10

## 2025-01-04 NOTE — CONSULT NOTE ADULT - ASSESSMENT
83y/o seen at [facility]. Sitting in chair. Grandson at bedside  History HTN, anemia, AI, PVD, GERD, glaucoma, edema, CRI, depression  S/P left breast cancer treated with lumpectomy and tamoxifen    Admitted for right great toe infection-rule out osteomyelitis  No recent, significant cardiac symptoms    Impression  Right great toe infection-osteomyelitis    Plan:  - Continue Furosemide-80mg OD                  Metoprolol succinate-50mg OD  - 1/3/25 Echocardiogram with normal LV. Mild AS. Mild Pulmonary HTN-see above  - Lower arterial doppler study compatible with B/L stenosis-see above  - Right foot MRI consistent with osteomyelitis  - On Cefepime  - Follow labs  - Limit salt   - Keep legs elevated  - Monitor I&O and daily weights  - Consider support hose  - Patient is considered at intermediate risk for cardiac events for proposed lower extremity arteriogram scheduled for 1/6/25 and possible podiatric surgery deemed necessary  - Being followed by ID, Podiatry, C-V surgery, Nephrology

## 2025-01-04 NOTE — PROGRESS NOTE ADULT - SUBJECTIVE AND OBJECTIVE BOX
a/w CONRAD HART    SUBJECTIVE:  Patient seen and examined at bedside this morning, denies any acute complaints, Denies any fever, chills, nausea, vomiting, pending angio on Monday.     MEDICATIONS  (STANDING):  ammonium lactate 12% Lotion 1 Application(s) Topical two times a day  cefepime   IVPB 2000 milliGRAM(s) IV Intermittent every 12 hours  cyanocobalamin 1000 MICROGram(s) Oral daily  dextrose 5% + sodium chloride 0.45%. 1000 milliLiter(s) (75 mL/Hr) IV Continuous <Continuous>  dextrose 5%. 1000 milliLiter(s) (75 mL/Hr) IV Continuous <Continuous>  escitalopram 10 milliGRAM(s) Oral daily  folic acid 1 milliGRAM(s) Oral daily  heparin   Injectable 5000 Unit(s) SubCutaneous every 8 hours  influenza  Vaccine (HIGH DOSE) 0.5 milliLiter(s) IntraMuscular once  latanoprost 0.005% Ophthalmic Solution 1 Drop(s) Both EYES at bedtime  metoprolol succinate ER 50 milliGRAM(s) Oral daily  mupirocin 2% Ointment 1 Application(s) Topical <User Schedule>  tamoxifen 20 milliGRAM(s) Oral daily  timolol 0.5% Solution 1 Drop(s) Both EYES two times a day    MEDICATIONS  (PRN):  acetaminophen     Tablet .. 650 milliGRAM(s) Oral every 6 hours PRN Temp greater or equal to 38C (100.4F), Mild Pain (1 - 3)  melatonin 3 milliGRAM(s) Oral at bedtime PRN Insomnia      Vital Signs Last 24 Hrs  T(C): 37.7 (04 Jan 2025 05:05), Max: 37.7 (04 Jan 2025 05:05)  T(F): 99.8 (04 Jan 2025 05:05), Max: 99.8 (04 Jan 2025 05:05)  HR: 70 (04 Jan 2025 05:05) (59 - 70)  BP: 159/67 (04 Jan 2025 05:05) (141/54 - 165/73)  BP(mean): --  RR: 18 (04 Jan 2025 05:05) (18 - 18)  SpO2: 94% (04 Jan 2025 05:05) (94% - 97%)    Parameters below as of 04 Jan 2025 05:05  Patient On (Oxygen Delivery Method): room air        PHYSICAL EXAM:  Constitutional: AAOx3, no acute distress  HEENT: NCAT, airway patent  Cardiovascular: RRR, pulses present bilaterally  Respiratory: nonlabored breathing  Gastrointestinal: abdomen soft, nontender, non distended  Neuro: no focal deficits  Extremities: Bilateral edema with trophic changes, palpable DP pulses, R hallux with weeping purulent wound       I&O's Detail      LABS:  [pending]      Radiology:  < from: [facility] Physiol Extremity Lower 1-2 Level, BI (01.03.25 @ 10:09) >  CC: [id number] EXAM:  PHYSIOL EXTREM LOW 1-2 LEV BI   ORDERED BY: GUILLERMINA OCAMPO     PROCEDURE DATE:  01/03/2025          INTERPRETATION:  Clinical indication: Vascular workup    COMPARISON: 1/2/2025    FINDINGS: There are biphasic waveforms at the ankles. Segmental pressures   and pulse volume recordings were not obtained at the thighs and calves.    Right SUMMER = 0.90  Left SUMMER = 0.96    IMPRESSION: Limited exam. ABIs compatible with mild bilateral peripheral   arterial disease.    --- End ofReport ---            CARLITOS DE LA ROSA MD; Attending Radiologist  This document has been electronically signed. Marlo  3 2025  1:01PM    < end of copied text >

## 2025-01-04 NOTE — PROGRESS NOTE ADULT - SUBJECTIVE AND OBJECTIVE BOX
[facility]                             Nephrology and Hypertension                             Rusosweta Montano                                          [phone]     Patient is a 84y old  Male who presents with a chief complaint of Osteomyelitis (03 Jan 2025 13:05)       HPI:  Patient is an 85yo M with a PMH of L sided breast cancer (s/p lumpectomy, on Tamoxifen), HTN, Anemia, Aortic insufficiency, Carotid artery disease, GERD, Glaucoma, Hepatitis (2014), Lower ext edema ,PVD who presents to the ED from Dr. Niko Jacques's office for a R toe infection. Patient notes that he noticed a R toe wound a few weeks ago. He completed a 10d course of Amoxicillin 500mg, but noticed worsening of the wound. He went ot his podiatrist today, as the wound was draining a pus like liquid. Podiatry rec he come to the hospital for IV abx and osteomyelitis workup. Patient feeling well in ED.  States he is urinating well.  No N/V/SOB.  Has not seen nephrologist in the past.      NO N/V/SOB    PAST MEDICAL & SURGICAL HISTORY:  Anemia      HTN (hypertension)      Breast cancer      Glaucoma      Major depression      PVD (peripheral vascular disease)      Lower extremity edema      Chronic GERD      H/O: glaucoma      S/P lumpectomy, left breast           FAMILY HISTORY:  [place]    Social History:Non smoker    MEDICATIONS  (STANDING):  ammonium lactate 12% Lotion 1 Application(s) Topical two times a day  cefepime   IVPB 2000 milliGRAM(s) IV Intermittent every 12 hours  cyanocobalamin 1000 MICROGram(s) Oral daily  dextrose 5% + sodium chloride 0.45%. 1000 milliLiter(s) (75 mL/Hr) IV Continuous <Continuous>  escitalopram 10 milliGRAM(s) Oral daily  folic acid 1 milliGRAM(s) Oral daily  heparin   Injectable 5000 Unit(s) SubCutaneous every 8 hours  influenza  Vaccine (HIGH DOSE) 0.5 milliLiter(s) IntraMuscular once  latanoprost 0.005% Ophthalmic Solution 1 Drop(s) Both EYES at bedtime  metoprolol succinate ER 50 milliGRAM(s) Oral daily  mupirocin 2% Ointment 1 Application(s) Topical <User Schedule>  tamoxifen 20 milliGRAM(s) Oral daily    MEDICATIONS  (PRN):  acetaminophen     Tablet .. 650 milliGRAM(s) Oral every 6 hours PRN Temp greater or equal to 38C (100.4F), Mild Pain (1 - 3)  melatonin 3 milliGRAM(s) Oral at bedtime PRN Insomnia   Meds reviewed    Allergies    No Known Allergies    Intolerances         REVIEW OF SYSTEMS:    as above      ICU Vital Signs Last 24 Hrs  T(C): 36.9 (04 Jan 2025 19:37), Max: 37.7 (04 Jan 2025 05:05)  T(F): 98.4 (04 Jan 2025 19:37), Max: 99.8 (04 Jan 2025 05:05)  HR: 62 (04 Jan 2025 19:37) (62 - 70)  BP: 165/69 (04 Jan 2025 19:37) (136/52 - 165/69)  BP(mean): --  ABP: --  ABP(mean): --  RR: 18 (04 Jan 2025 19:37) (18 - 18)  SpO2: 95% (04 Jan 2025 19:37) (94% - 95%)    O2 Parameters below as of 04 Jan 2025 19:37  Patient On (Oxygen Delivery Method): room air            PHYSICAL EXAM:    GENERAL: NAD  HEAD:  Atraumatic, Normocephalic  EYES: EOMI, conjunctiva and sclera clear  ENMT: No Drainage from nares, No drainage from ears  NERVOUS SYSTEM:  Awake and Alert  CHEST/LUNG: Clear to percussion bilaterally  EXTREMITIES:  No Edema  SKIN: No rashes No obvious ecchymosis      LABS:                                   7.9    5.90  )-----------( 159      ( 04 Jan 2025 09:16 )             25.9     01-04    145  |  117[H]  |  28[H]  ----------------------------<  116[H]  3.8   |  21[L]  |  1.70[H]    Ca    8.5      04 Jan 2025 09:16  Phos  2.1     01-03  Mg     2.2     01-03    TPro  5.9[L]  /  Alb  2.3[L]  /  TBili  0.3  /  DBili  x   /  AST  15  /  ALT  14  /  AlkPhos  59  01-03      Urinalysis Basic - ( 04 Jan 2025 09:16 )    Color: x / Appearance: x / SG: x / pH: x  Gluc: 116 mg/dL / Ketone: x  / Bili: x / Urobili: x   Blood: x / Protein: x / Nitrite: x   Leuk Esterase: x / RBC: x / WBC x   Sq Epi: x / Non Sq Epi: x / Bacteria: x                RADIOLOGY & ADDITIONAL TESTS:

## 2025-01-04 NOTE — PROGRESS NOTE ADULT - ASSESSMENT
STEPHANY on Likely CKD  Hypernatermia  Renal Mass  HTN  PAD    -Baseline Creatinine Unknown but given age and co-morbidities likely CKD  -STEPHANY Likely 2/2 Decreased EABV  -Check Urine lytes  -Check UA  -Renal US with solid mass,  will need CT or MRI, at this time favor MRI with elevated creatinine   -IVF to D5W, hypernatremia improving, Will DC   -Vascular consult-right lower extremity angiogram planned for monday, mod risk for VLADIMIR

## 2025-01-04 NOTE — PROGRESS NOTE ADULT - PROBLEM SELECTOR PLAN 1
Chart reviewed and Patient evaluated  Discussed diagnosis and treatment with patient  There is concern for right hallux ulcer with infection and OM  Wound culture results reveal Pseudo. A  MRI shows OM right great toe  Bactroban to be applied daily with dressing  Wound flushed with normal saline  Applied dry sterile dressing  Appreciates vascular consult, plan for Angio Monday  Continue IV abx and follow ID  Weight bearing as tolerated b/l  Patient understands he may require surgical intervention, and is at risk to lose part of the toe, all of the toe, part of the foot, all of the foot. Tentatively plan for surgical intervention pending results from Angiogram  Will cont to allow demarcation of infective process  Podiatry will follow while in house.  Will discuss care plan  with all  Attendings.

## 2025-01-04 NOTE — PROGRESS NOTE ADULT - SUBJECTIVE AND OBJECTIVE BOX
Patient is a 84y old  Male who presents with a chief complaint of Osteomyelitis (04 Jan 2025 14:18)        INTERVAL HPI/OVERNIGHT EVENTS:   no complaints  pt seen and examined         Vital Signs Last 24 Hrs  T(C): 36.8 (04 Jan 2025 13:01), Max: 37.7 (04 Jan 2025 05:05)  T(F): 98.3 (04 Jan 2025 13:01), Max: 99.8 (04 Jan 2025 05:05)  HR: 68 (04 Jan 2025 13:01) (65 - 70)  BP: 136/52 (04 Jan 2025 13:01) (136/52 - 159/67)  BP(mean): --  RR: 18 (04 Jan 2025 13:01) (18 - 18)  SpO2: 95% (04 Jan 2025 13:01) (94% - 97%)    Parameters below as of 04 Jan 2025 13:01  Patient On (Oxygen Delivery Method): room air        acetaminophen     Tablet .. 650 milliGRAM(s) Oral every 6 hours PRN  ammonium lactate 12% Lotion 1 Application(s) Topical two times a day  cefepime   IVPB 2000 milliGRAM(s) IV Intermittent every 12 hours  cyanocobalamin 1000 MICROGram(s) Oral daily  dextrose 5% + sodium chloride 0.45%. 1000 milliLiter(s) IV Continuous <Continuous>  dextrose 5%. 1000 milliLiter(s) IV Continuous <Continuous>  escitalopram 10 milliGRAM(s) Oral daily  folic acid 1 milliGRAM(s) Oral daily  heparin   Injectable 5000 Unit(s) SubCutaneous every 8 hours  influenza  Vaccine (HIGH DOSE) 0.5 milliLiter(s) IntraMuscular once  latanoprost 0.005% Ophthalmic Solution 1 Drop(s) Both EYES at bedtime  melatonin 3 milliGRAM(s) Oral at bedtime PRN  metoprolol succinate ER 50 milliGRAM(s) Oral daily  mupirocin 2% Ointment 1 Application(s) Topical <User Schedule>  tamoxifen 20 milliGRAM(s) Oral daily  timolol 0.5% Solution 1 Drop(s) Both EYES two times a day      PHYSICAL EXAM:  GENERAL: NAD   EYES: conjunctiva and sclera clear  ENMT: Moist mucous membranes  NECK: Supple, No JVD, Normal thyroid  CHEST/LUNG: non labored, cta b/l  HEART: Regular rate and rhythm; No murmurs, rubs, or gallops  ABDOMEN: Soft, Nontender, Nondistended; Bowel sounds present  EXTREMITIES:  No clubbing, no cyanosis, no edema  LYMPH: No lymphadenopathy noted  SKIN: No rashes or lesions  NEURO: no new focal deficits    Consultant(s) Notes Reviewed:  [x ] YES  [ ] NO  Care Discussed with Consultants/Other Providers [ x] YES  [ ] NO    LABS:                        7.9    5.90  )-----------( 159      ( 04 Jan 2025 09:16 )             25.9     01-04    145  |  117[H]  |  28[H]  ----------------------------<  116[H]  3.8   |  21[L]  |  1.70[H]    Ca    8.5      04 Jan 2025 09:16  Phos  2.1     01-03  Mg     2.2     01-03    TPro  5.9[L]  /  Alb  2.3[L]  /  TBili  0.3  /  DBili  x   /  AST  15  /  ALT  14  /  AlkPhos  59  01-03      Urinalysis Basic - ( 04 Jan 2025 09:16 )    Color: x / Appearance: x / SG: x / pH: x  Gluc: 116 mg/dL / Ketone: x  / Bili: x / Urobili: x   Blood: x / Protein: x / Nitrite: x   Leuk Esterase: x / RBC: x / WBC x   Sq Epi: x / Non Sq Epi: x / Bacteria: x      CAPILLARY BLOOD GLUCOSE            Urinalysis Basic - ( 04 Jan 2025 09:16 )    Color: x / Appearance: x / SG: x / pH: x  Gluc: 116 mg/dL / Ketone: x  / Bili: x / Urobili: x   Blood: x / Protein: x / Nitrite: x   Leuk Esterase: x / RBC: x / WBC x   Sq Epi: x / Non Sq Epi: x / Bacteria: x        Culture - Wound Aerobic (collected 02 Jan 2025 18:30)  Source: .Other  Final Report (04 Jan 2025 15:14):    Few Pseudomonas aeruginosa    Rare Serratia marcescens    Commensal aleyda consistent with body site  Organism: Pseudomonas aeruginosa  Serratia marcescens (04 Jan 2025 15:14)  Organism: Serratia marcescens (04 Jan 2025 15:14)  Organism: Pseudomonas aeruginosa (04 Jan 2025 15:14)    Culture - Blood (collected 02 Jan 2025 17:20)  Source: .Blood BLOOD  Preliminary Report (04 Jan 2025 01:01):    No growth at 24 hours    Culture - Blood (collected 02 Jan 2025 17:20)  Source: .Blood BLOOD  Preliminary Report (04 Jan 2025 01:01):    No growth at 24 hours        RADIOLOGY & ADDITIONAL TESTS:    Imaging Personally Reviewed  Reviewed consultants input

## 2025-01-04 NOTE — PROGRESS NOTE ADULT - ASSESSMENT
85 yo male, presenting with a non healing R toe wound, refractory to oral antibiotics, concerning for osteomyelitis. May require podiatric intervention, vascular consulted for adequate perfusion.    Plan:  - IV abx  - daily DSD with local wound care  - rest of care per primary team   - Angiogram on Monday

## 2025-01-04 NOTE — CONSULT NOTE ADULT - SUBJECTIVE AND OBJECTIVE BOX
CARDIOLOGY CONSULT NOTE    Patient is a 84y Male with a known history of :  Toe infection [L08.9]    Open wound of toe of right foot [S91.109A]    Anemia [D64.9]    Peripheral edema [R60.0]    Breast cancer [C50.919]    Glaucoma [H40.9]    Need for prophylactic measure [Z29.9]    Major depression [F32.9]    HTN (hypertension) [I10]    Chronic kidney disease (CKD) [N18.9]    Osteomyelitis of ankle or foot, acute [M86.179]      HPI:  Patient is an 83yo M with a PMH of L sided breast cancer (s/p lumpectomy, on Tamoxifen), HTN, Anemia, Aortic insufficiency, Carotid artery disease, GERD, Glaucoma, Hepatitis (2014), Lower ext edema ,PVD who presents to the ED from Dr. Niko Jacques's office for a R toe infection. Patient notes that he noticed a R toe wound a few weeks ago. He completed a 10d course of Amoxicillin 500mg, but noticed worsening of the wound. He went ot his podiatrist today, as the wound was draining a pus like liquid. Podiatry rec he come to the hospital for IV abx and osteomyelitis workup. Patient feeling well in ED.    ED Course:  Vitals: /65, HR 71, T 97.3, RR 16 SpO2 100% on RA  Labs: ESR 75, Hgb 9.2, Na 148, Cl 117, BUN/Cr 35/1.70, eGFR 39  Given: IV Zosyn, IV Vancomycin, 1L NS bolus     Imaging:  Chest Xray: No acute chest finding.   R foot Xray: Possible erosion of the distal phalanx of the right great toe with associated soft tissue swelling suspicious for osteomyelitis. (02 Jan 2025 19:34)      REVIEW OF SYSTEMS:    CONSTITUTIONAL: No fever, weight loss, or fatigue  EYES: No eye pain, visual disturbances, or discharge  ENMT:  No difficulty hearing, tinnitus, vertigo; No sinus or throat pain  NECK: No pain or stiffness  BREASTS: No pain, masses, or nipple discharge  RESPIRATORY: No cough, wheezing, chills or hemoptysis; No shortness of breath  CARDIOVASCULAR: No chest pain, palpitations, dizziness, or leg swelling  GASTROINTESTINAL: No abdominal or epigastric pain. No nausea, vomiting, or hematemesis; No diarrhea or constipation. No melena or hematochezia.  GENITOURINARY: No dysuria, frequency, hematuria, or incontinence  NEUROLOGICAL: No headaches, memory loss, loss of strength, numbness, or tremors  SKIN: No itching, burning, rashes, or lesions   LYMPH NODES: No enlarged glands  ENDOCRINE: No heat or cold intolerance; No hair loss  MUSCULOSKELETAL: No joint pain or swelling; No muscle, back, or extremity pain  PSYCHIATRIC: No depression, anxiety, mood swings, or difficulty sleeping  HEME/LYMPH: No easy bruising, or bleeding gums  ALLERGY AND IMMUNOLOGIC: No hives or eczema    MEDICATIONS  (STANDING):  ammonium lactate 12% Lotion 1 Application(s) Topical two times a day  cefepime   IVPB 2000 milliGRAM(s) IV Intermittent every 12 hours  cyanocobalamin 1000 MICROGram(s) Oral daily  dextrose 5% + sodium chloride 0.45%. 1000 milliLiter(s) (75 mL/Hr) IV Continuous <Continuous>  dextrose 5%. 1000 milliLiter(s) (75 mL/Hr) IV Continuous <Continuous>  escitalopram 10 milliGRAM(s) Oral daily  folic acid 1 milliGRAM(s) Oral daily  heparin   Injectable 5000 Unit(s) SubCutaneous every 8 hours  influenza  Vaccine (HIGH DOSE) 0.5 milliLiter(s) IntraMuscular once  latanoprost 0.005% Ophthalmic Solution 1 Drop(s) Both EYES at bedtime  metoprolol succinate ER 50 milliGRAM(s) Oral daily  mupirocin 2% Ointment 1 Application(s) Topical <User Schedule>  tamoxifen 20 milliGRAM(s) Oral daily  timolol 0.5% Solution 1 Drop(s) Both EYES two times a day    MEDICATIONS  (PRN):  acetaminophen     Tablet .. 650 milliGRAM(s) Oral every 6 hours PRN Temp greater or equal to 38C (100.4F), Mild Pain (1 - 3)  melatonin 3 milliGRAM(s) Oral at bedtime PRN Insomnia      ALLERGIES: No Known Allergies      FAMILY HISTORY:      Social History:  Alochol:   Smoking:   Drug Use:   Marital Status:     I&O's Detail      PHYSICAL EXAMINATION:  -----------------------------  T(C): 37.7 (01-04-25 @ 05:05), Max: 37.7 (01-04-25 @ 05:05)  HR: 70 (01-04-25 @ 05:05) (59 - 70)  BP: 159/67 (01-04-25 @ 05:05) (141/54 - 165/73)  RR: 18 (01-04-25 @ 05:05) (18 - 18)  SpO2: 94% (01-04-25 @ 05:05) (94% - 97%)  Wt(kg): --        Constitutional: well developed, normal appearance, well groomed, well nourished, no deformities and no acute distress.   Eyes: the conjunctiva exhibited no abnormalities and the eyelids demonstrated no xanthelasmas.   HEENT: normal oral mucosa, no oral pallor and no oral cyanosis.   Neck: normal jugular venous A waves present, normal jugular venous V waves present and no jugular venous najera A waves.   Pulmonary: no respiratory distress, normal respiratory rhythm and effort, no accessory muscle use and lungs were clear to auscultation bilaterally.   Cardiovascular: heart rate and rhythm were normal, decreased S1 and S2 and no gallop, rub, heave or thrill are present. II/VI systolic murmur, loudest at mitral area   Musculoskeletal: the gait could not be assessed.   Extremities: no clubbing of the fingernails, no localized cyanosis, no petechial hemorrhages and no ischemic changes. B/L 1-2 Plus edema R>L  Skin: normal skin color and pigmentation, no rash, no venous stasis, no skin lesions, no skin ulcer and no xanthoma was observed.   Psychiatric: oriented to person, place, and time, the affect was normal, the mood was normal and not feeling anxious.     LABS:   --------  01-03    147[H]  |  119[H]  |  30[H]  ----------------------------<  106[H]  3.9   |  25  |  1.60[H]    Ca    8.5      03 Jan 2025 07:25  Phos  2.1     01-03  Mg     2.2     01-03    TPro  5.9[L]  /  Alb  2.3[L]  /  TBili  0.3  /  DBili  x   /  AST  15  /  ALT  14  /  AlkPhos  59  01-03                         7.9    5.90  )-----------( 159      ( 04 Jan 2025 09:16 )             25.9     PT/INR - ( 02 Jan 2025 17:20 )   PT: 11.5 sec;   INR: 0.98 ratio         PTT - ( 02 Jan 2025 17:20 )  PTT:34.4 sec        Culture Results:   Few Pseudomonas aeruginosa  Rare Serratia marcescens  Commensal aleyda consistent with body site *!* (01-02 @ 18:30)  Culture Results:   No growth at 24 hours (01-02 @ 17:20)  Culture Results:   No growth at 24 hours (01-02 @ 17:20)    01-02 @ 18:30    Organism --   Gram Stain Blood -- Gram Stain --  Specimen Source .Other  Culture-Blood --    01-02 @ 17:20    Organism --   Gram Stain Blood -- Gram Stain --  Specimen Source .Blood BLOOD  Culture-Blood --        RADIOLOGY:  -----------------    < from: TTE W or WO Ultrasound Enhancing Agent (01.03.25 @ 20:31) >    TRANSTHORACIC ECHOCARDIOGRAM REPORT  ________________________________________________________________________________                                      _______       Pt. Name:       DAYA MATHIS Study Date:    1/3/2025  MRN:            [id number]        YOB: 1940  Accession #:    [id number]       Age:           84 years  Account#:       [id number]      Gender:        M  Heart Rate:                     Height:        66.14 in (168.00 cm)  Rhythm:                         Weight:        169.75 lb (77.00 kg)  Blood Pressure: 165/73 mmHg     BSA/BMI:       1.87 m² / 27.28 kg/m²  ________________________________________________________________________________________  Referring Physician:    [id number] Sukh Glez  Interpreting Physician: Marin Covington  Primary Sonographer:    Fazal Maciel    CPT:               ECHO TTE WO CON COMP W DOPP - 55948.m  Indication(s):     Edema, unspecified - R60.9  Procedure:         Transthoracic echocardiogram with 2-D, M-mode and complete                  spectral and color flow Doppler.  Ordering Location: [facility]  Admission Status:  Inpatient  Study Information: Image quality for this study is good.    _______________________________________________________________________________________     CONCLUSIONS:      1. Left ventricular wall thickness is mildly increased. Left ventricular systolic function is normal with an ejection fraction of 70 % by Alvarez's method of disks.   2. Mild aortic stenosis.   3. Estimated pulmonary artery systolic pressure is 45 mmHg, consistent with mild pulmonary hypertension.    ________________________________________________________________________________________  FINDINGS:     Left Ventricle:  The left ventricular cavity is normal in size. Left ventricular wall thickness is mildly increased. Left ventricular systolic function is normal with a calculated ejection fraction of 70 % by the Alvarez's biplane method of disks. There is mild (grade 1) left ventricular diastolic dysfunction.     Right Ventricle:  The right ventricular cavity is normal in size and right ventricular systolic function is normal.     Left Atrium:  The left atrium is mildly dilated with an indexed volume of 34.09 ml/m².     Right Atrium:  The right atrium is normal in size.     Interatrial Septum:  The interatrial septum appears intact.     Aortic Valve:  There is mild aortic stenosis. The peak transaortic velocity is 2.19 m/s, peak transaortic gradient is 19.2 mmHg and mean transaortic gradient is 10.0 mmHg with anLVOT/aortic valve VTI ratio of 0.52. The aortic valve area is estimated at 2.74 cm² by the continuity equation. There is mild aortic regurgitation. AI VMax is 3.71 m/s. AI pressure half time is 403 msec. AI slope is 2.69 m/s².     Mitral Valve:  There is calcification of the mitral valve annulus. There is mild mitral regurgitation.     Tricuspid Valve:  Structurally normal tricuspid valve with normal leaflet excursion. There is mild tricuspid regurgitation. Estimated pulmonary artery systolic pressure is 45 mmHg, consistent with mild pulmonary hypertension.     Pulmonic Valve:  The pulmonic valve was not well visualized. There is trace pulmonic regurgitation.     Aorta:  The aortic root at the sinuses of Valsalva is normal in size, measuring 3.95 cm (indexed 2.11 cm/m²).     Pericardium:  No pericardial effusion seen.     Systemic Veins:  The inferior vena cava is dilated measuring 2.22 cm in diameter, (dilated >2.1cm) with normal inspiratory collapse (normal >50%) consistent with mildly elevated right atrial pressure (~8, range 5-10mmHg).  ____________________________________________________________________  QUANTITATIVE DATA:  Left Ventricle Measurements: (Indexed to BSA)     IVSd (2D):   1.3 cm  LVPWd (2D):  1.2 cm  LVIDd (2D):  4.4 cm  LVIDs (2D):  2.6 cm  LV Mass:     202 g  108.2 g/m²  LV Vol d, MOD A2C: 70.0 ml  37.46 ml/m²  LV Vol d, MOD A4C: 101.0 ml 54.06 ml/m²  LV Vol d, MOD BP:  84.8 ml  45.38 ml/m²  LV Vol s, MOD A2C: 24.0 ml  12.85 ml/m²  LV Vol s, MOD A4C: 25.7 ml  13.75 ml/m²  LV Vol s, MOD BP:  25.4 ml  13.57 ml/m²  LVOT SV MOD BP:    59.4 ml  LV EF% MOD BP:     70 %     MV E Vmax:    0.82 m/s  MV A Vmax:    1.08 m/s  MV E/A:       0.76  e' lateral:   7.07 cm/s  e' medial:    7.18 cm/s  E/e' lateral: 11.57  E/e' medial:  11.39  E/e' Average: 11.48  MV DT:        311 msec    Aorta Measurements: (Normal range) (Indexed to BSA)     Ao Root d 3.95 cm (3.1 - 3.7 cm) 2.11 cm/m²            Left Atrium Measurements: (Indexed to BSA)  LA Diam 2D: 4.00 cm         Right Ventricle Measurements: Right Atrial Measurements:     RV Base (RVID1):  4.3 cm      RA Vol s, MOD A4C  47.2 ml  RV Mid (RVID2):   3.8 cm      RA Area s, MOD A4C 17.9 cm²  RV Major (RVID3): 7.5 cm       LVOT / RVOT/ Qp/Qs Data: (Indexed to BSA)  LVOT Diameter:  2.60 cm  LVOT Area:      5.31 cm²  LVOT Vmax:      1.06 m/s  LVOT Vmn:       0.747 m/s  LVOT VTI:       28.10 cm  LVOT peak grad: 4 mmHg  LVOT mean grad: 2.5 mmHg  LVOT SV:        149.2 ml  79.85 ml/m²    Aortic Valve Measurements:  AV Vmax:                2.2 m/s  AV Peak Gradient:       19.2 mmHg  AV Mean Gradient:       10.0 mmHg  AV VTI:                 54.5 cm  AV VTI Ratio:           0.52  AoV EOA, Contin:        2.74 cm²  AoV EOA, Contin i:      1.47 cm²/m²  AoV DimensionlessIndex 0.52  AR Vmax                 3.71 m/s  AR PHT                  403 msec  AR Dale                2.69 m/s²    Mitral Valve Measurements:     MV E Vmax: 0.8 m/s  MV A Vmax: 1.1 m/s  MV E/A:    0.8       Tricuspid Valve Measurements:     TR Vmax:          3.1 m/s  TR Peak Gradient: 37.5 mmHg  RA Pressure:      8 mmHg  PASP:             45 mmHg    ________________________________________________________________________________________  Electronically signed on 1/4/2025 at 9:09:29 AM by Marin Covington         *** Final ***    < end of copied text >    < from: US Duplex Arterial Lower Ext Compl, Bilateral (01.02.25 @ 22:54) >    ACC: [id number] EXAM:  US DPLX LWR EXT ARTS COMPL BI   ORDERED BY: GUILLERMINA OCAMPO     PROCEDURE DATE:  01/02/2025          INTERPRETATION:  US LOWER EXTREMITY ARTERIAL DUPLEX COMPLETE BILATERAL    CLINICAL INDICATION:  Peripheral artery disease with bilateral leg   swelling    COMPARISON: None    Real-time sonography of the bilateral lower extremity arterial system was   performed using a high-resolution linear array transducer and including   color and spectral Doppler.    Mild to moderate bilateral multifocal plaque. No soft tissue   abnormalities are demonstrated in either leg. Flow phase patterns and   peak systolic velocity measurements (in cm/s) were observed as follows:    RIGHT:  CFA: Biphasic; 126  Proximal SFA: Monophasic; 220  Mid SFA: Monophasic; 188  Distal SFA: Monophasic;  241  Popliteal: Monophasic;  156, 108  Anterior tibial: Monophasic; 152, 74, 70  Posterior tibial: Monophasic; 37, 36, 72  Peroneal: Monophasic;  168, 73, 86  Dorsalis pedis: Tardus parvus;  35    LEFT:  CFA: Biphasic; 131  Proximal SFA: Biphasic; 333  Mid SFA: Biphasic; 133  Distal SFA: Biphasic; 175  Popliteal: Monophasic;  105, 148  Anterior tibial: Monophasic; 172, 158, 79  Posterior tibial: Monophasic; 57, 23, 16  Peroneal: Monophasic; 142, 74, 103  Dorsalis pedis: Monophasic;  58    IMPRESSION:  *  50-75% stenosis of the proximal and distal right SFA.  *  Mild right proximal anterior tibial and peroneal stenosis.  *  50-75% left proximal SFA stenosis  *  Stenoses suggested at the proximalleft anterior tibial and peroneal   arteries.    --- End of Report ---            JACKSON LOPEZ MD; Attending Radiologist  This document has been electronically signed. Marlo  3 2025 10:20PM    < end of copied text >    ECG: Sinus bradycardia-57/minute, RBBB-no acute changes

## 2025-01-04 NOTE — PROGRESS NOTE ADULT - SUBJECTIVE AND OBJECTIVE BOX
PROGRESS NOTE   Patient is a 84y old  Male who presents with a chief complaint of Osteomyelitis (04 Jan 2025 10:44)      HPI:  Patient is an 85yo M with a PMH of L sided breast cancer (s/p lumpectomy, on Tamoxifen), HTN, Anemia, Aortic insufficiency, Carotid artery disease, GERD, Glaucoma, Hepatitis (2014), Lower ext edema ,PVD who presents to the ED from Dr. Niko Jacques's office for a R toe infection. Patient notes that he noticed a R toe wound a few weeks ago. He completed a 10d course of Amoxicillin 500mg, but noticed worsening of the wound. He went ot his podiatrist today, as the wound was draining a pus like liquid. Podiatry rec he come to the hospital for IV abx and osteomyelitis workup. Patient feeling well in ED.    ED Course:  Vitals: /65, HR 71, T 97.3, RR 16 SpO2 100% on RA  Labs: ESR 75, Hgb 9.2, Na 148, Cl 117, BUN/Cr 35/1.70, eGFR 39  Given: IV Zosyn, IV Vancomycin, 1L NS bolus     Imaging:  Chest Xray: No acute chest finding.   R foot Xray: Possible erosion of the distal phalanx of the right great toe with associated soft tissue swelling suspicious for osteomyelitis. (02 Jan 2025 19:34)      Vital Signs Last 24 Hrs  T(C): 36.8 (04 Jan 2025 13:01), Max: 37.7 (04 Jan 2025 05:05)  T(F): 98.3 (04 Jan 2025 13:01), Max: 99.8 (04 Jan 2025 05:05)  HR: 68 (04 Jan 2025 13:01) (65 - 70)  BP: 136/52 (04 Jan 2025 13:01) (136/52 - 159/67)  BP(mean): --  RR: 18 (04 Jan 2025 13:01) (18 - 18)  SpO2: 95% (04 Jan 2025 13:01) (94% - 97%)    Parameters below as of 04 Jan 2025 13:01  Patient On (Oxygen Delivery Method): room air                              7.9    5.90  )-----------( 159      ( 04 Jan 2025 09:16 )             25.9               01-04    145  |  117[H]  |  28[H]  ----------------------------<  116[H]  3.8   |  21[L]  |  1.70[H]    Ca    8.5      04 Jan 2025 09:16  Phos  2.1     01-03  Mg     2.2     01-03    TPro  5.9[L]  /  Alb  2.3[L]  /  TBili  0.3  /  DBili  x   /  AST  15  /  ALT  14  /  AlkPhos  59  01-03      Physical examination  General: Pleasant  male NAD & AOX3.    Integument:  Skin warm, dry and supple bilateral.    Noted plantar right hallux ulcer, fibro-granular base, apx [id number], reduced edema, reduced erythema, minimal drainage, - hyperkeratotic base, +tracking, with noted cellulitic changes, Consistent with OM  Vascular: Dorsalis Pedis and Posterior Tibial pulses non-palpable.  Capillary re-fill time less then 3 seconds digits 1-5 bilateral.  noted b/l lower leg edema, noted venous stasis b/l lower extremity  Neuro: Protective sensation intact to the level of the digits bilateral.  MSK: Muscle strength 4/5 all major muscle groups bilateral.    MRI Foot    IMPRESSION:  MRI of the right foot demonstrates ulceration at the plantar surface of the great toe distally with underlying bone marrow edema pattern and abnormal T1 signal within the distal and proximal phalanx consistent with osteomyelitis. Edematous changes at the head of the first metatarsal is indeterminate and may represent osteoarthrosis versus reactive changes, although underlying osteomyelitis is not excluded. There is no soft tissue drainable fluid collection.  Bone marrow edema pattern at the base of the third through fifth metatarsals with corresponding abnormal T1 signal is indeterminate. Findings may be related to reactive/stress changes versus arthritic changes versus infectious process.

## 2025-01-04 NOTE — PROGRESS NOTE ADULT - ASSESSMENT
84M with HTN, PAD, breast ca, CKD admitted with rt great toe osteomyelitis  MRI noted  IV ABX  plan for angio on monday  cardio consulted for clearance  podiatry, vascular and ID following  medically acceptable for procedure    CKD/Hypernatremia  creatinine at about baseline  nephrology following  IVF    HTN  continue metoprolol    Breast ca  continue tamoxifen    edema  elevate

## 2025-01-05 LAB
ANION GAP SERPL CALC-SCNC: 4 MMOL/L — LOW (ref 5–17)
BUN SERPL-MCNC: 28 MG/DL — HIGH (ref 7–23)
CALCIUM SERPL-MCNC: 8.2 MG/DL — LOW (ref 8.5–10.1)
CHLORIDE SERPL-SCNC: 116 MMOL/L — HIGH (ref 96–108)
CO2 SERPL-SCNC: 25 MMOL/L — SIGNIFICANT CHANGE UP (ref 22–31)
CREAT ?TM UR-MCNC: 63 MG/DL — SIGNIFICANT CHANGE UP
CREAT SERPL-MCNC: 1.6 MG/DL — HIGH (ref 0.5–1.3)
EGFR: 42 ML/MIN/1.73M2 — LOW
GLUCOSE SERPL-MCNC: 95 MG/DL — SIGNIFICANT CHANGE UP (ref 70–99)
HCT VFR BLD CALC: 24.7 % — LOW (ref 39–50)
HGB BLD-MCNC: 7.6 G/DL — LOW (ref 13–17)
MCHC RBC-ENTMCNC: 28.7 PG — SIGNIFICANT CHANGE UP (ref 27–34)
MCHC RBC-ENTMCNC: 30.8 G/DL — LOW (ref 32–36)
MCV RBC AUTO: 93.2 FL — SIGNIFICANT CHANGE UP (ref 80–100)
NRBC # BLD: 0 /100 WBCS — SIGNIFICANT CHANGE UP (ref 0–0)
NRBC BLD-RTO: 0 /100 WBCS — SIGNIFICANT CHANGE UP (ref 0–0)
OSMOLALITY UR: 491 MOSM/KG — SIGNIFICANT CHANGE UP (ref 50–1200)
PLATELET # BLD AUTO: 143 K/UL — LOW (ref 150–400)
POTASSIUM SERPL-MCNC: 4.1 MMOL/L — SIGNIFICANT CHANGE UP (ref 3.5–5.3)
POTASSIUM SERPL-SCNC: 4.1 MMOL/L — SIGNIFICANT CHANGE UP (ref 3.5–5.3)
POTASSIUM UR-SCNC: 29.3 MMOL/L — SIGNIFICANT CHANGE UP
PROT ?TM UR-MCNC: 11 MG/DL — SIGNIFICANT CHANGE UP (ref 0–12)
PROT/CREAT UR-RTO: 0.2 RATIO — SIGNIFICANT CHANGE UP (ref 0–0.2)
RBC # BLD: 2.65 M/UL — LOW (ref 4.2–5.8)
RBC # FLD: 16.8 % — HIGH (ref 10.3–14.5)
SODIUM SERPL-SCNC: 145 MMOL/L — SIGNIFICANT CHANGE UP (ref 135–145)
SODIUM UR-SCNC: 117 MMOL/L — SIGNIFICANT CHANGE UP
UUN UR-MCNC: 532 MG/DL — SIGNIFICANT CHANGE UP
WBC # BLD: 5.95 K/UL — SIGNIFICANT CHANGE UP (ref 3.8–10.5)
WBC # FLD AUTO: 5.95 K/UL — SIGNIFICANT CHANGE UP (ref 3.8–10.5)

## 2025-01-05 RX ORDER — BACTERIOSTATIC SODIUM CHLORIDE 0.9 %
1000 VIAL (ML) INJECTION
Refills: 0 | Status: DISCONTINUED | OUTPATIENT
Start: 2025-01-06 | End: 2025-01-06

## 2025-01-05 RX ADMIN — Medication 100 MILLIGRAM(S): at 05:55

## 2025-01-05 RX ADMIN — Medication 5000 UNIT(S): at 13:30

## 2025-01-05 RX ADMIN — Medication 1 APPLICATION(S): at 18:04

## 2025-01-05 RX ADMIN — Medication 1 APPLICATION(S): at 05:56

## 2025-01-05 RX ADMIN — Medication 5000 UNIT(S): at 21:16

## 2025-01-05 RX ADMIN — TIMOLOL MALEATE 1 DROP(S): 5 SOLUTION OPHTHALMIC at 18:04

## 2025-01-05 RX ADMIN — MUPIROCIN 1 APPLICATION(S): 2 CREAM TOPICAL at 12:43

## 2025-01-05 RX ADMIN — Medication 1 MILLIGRAM(S): at 12:42

## 2025-01-05 RX ADMIN — Medication 50 MILLIGRAM(S): at 05:55

## 2025-01-05 RX ADMIN — Medication 5000 UNIT(S): at 05:55

## 2025-01-05 RX ADMIN — TIMOLOL MALEATE 1 DROP(S): 5 SOLUTION OPHTHALMIC at 05:56

## 2025-01-05 RX ADMIN — TAMOXIFEN CITRATE 20 MILLIGRAM(S): 10 TABLET, FILM COATED ORAL at 12:42

## 2025-01-05 RX ADMIN — ESCITALOPRAM 10 MILLIGRAM(S): 10 TABLET, FILM COATED ORAL at 12:43

## 2025-01-05 RX ADMIN — Medication 1000 MICROGRAM(S): at 12:42

## 2025-01-05 RX ADMIN — LATANOPROST 1 DROP(S): 50 SOLUTION OPHTHALMIC at 21:16

## 2025-01-05 RX ADMIN — Medication 100 MILLIGRAM(S): at 18:05

## 2025-01-05 NOTE — PROGRESS NOTE ADULT - SUBJECTIVE AND OBJECTIVE BOX
[facility]                             Nephrology and Hypertension                             [place]                                          [phone]     Patient is a 84y old  Male who presents with a chief complaint of Osteomyelitis (03 Jan 2025 13:05)       HPI:  Patient is an 85yo M with a PMH of L sided breast cancer (s/p lumpectomy, on Tamoxifen), HTN, Anemia, Aortic insufficiency, Carotid artery disease, GERD, Glaucoma, Hepatitis (2014), Lower ext edema ,PVD who presents to the ED from Dr. Niko Jacques's office for a R toe infection. Patient notes that he noticed a R toe wound a few weeks ago. He completed a 10d course of Amoxicillin 500mg, but noticed worsening of the wound. He went ot his podiatrist today, as the wound was draining a pus like liquid. Podiatry rec he come to the hospital for IV abx and osteomyelitis workup. Patient feeling well in ED.  States he is urinating well.  No N/V/SOB.  Has not seen nephrologist in the past.      NO N/V/SOB    PAST MEDICAL & SURGICAL HISTORY:  Anemia      HTN (hypertension)      Breast cancer      Glaucoma      Major depression      PVD (peripheral vascular disease)      Lower extremity edema      Chronic GERD      H/O: glaucoma      S/P lumpectomy, left breast           FAMILY HISTORY:  [place]    Social History:Non smoker    MEDICATIONS  (STANDING):  ammonium lactate 12% Lotion 1 Application(s) Topical two times a day  cefepime   IVPB 2000 milliGRAM(s) IV Intermittent every 12 hours  cyanocobalamin 1000 MICROGram(s) Oral daily  dextrose 5% + sodium chloride 0.45%. 1000 milliLiter(s) (75 mL/Hr) IV Continuous <Continuous>  escitalopram 10 milliGRAM(s) Oral daily  folic acid 1 milliGRAM(s) Oral daily  heparin   Injectable 5000 Unit(s) SubCutaneous every 8 hours  influenza  Vaccine (HIGH DOSE) 0.5 milliLiter(s) IntraMuscular once  latanoprost 0.005% Ophthalmic Solution 1 Drop(s) Both EYES at bedtime  metoprolol succinate ER 50 milliGRAM(s) Oral daily  mupirocin 2% Ointment 1 Application(s) Topical <User Schedule>  tamoxifen 20 milliGRAM(s) Oral daily    MEDICATIONS  (PRN):  acetaminophen     Tablet .. 650 milliGRAM(s) Oral every 6 hours PRN Temp greater or equal to 38C (100.4F), Mild Pain (1 - 3)  melatonin 3 milliGRAM(s) Oral at bedtime PRN Insomnia   Meds reviewed    Allergies    No Known Allergies    Intolerances         REVIEW OF SYSTEMS:    as above    ICU Vital Signs Last 24 Hrs  T(C): 36.7 (05 Jan 2025 19:12), Max: 37 (05 Jan 2025 04:17)  T(F): 98 (05 Jan 2025 19:12), Max: 98.6 (05 Jan 2025 04:17)  HR: 66 (05 Jan 2025 19:12) (58 - 66)  BP: 135/62 (05 Jan 2025 19:12) (115/53 - 165/69)  BP(mean): --  ABP: --  ABP(mean): --  RR: 19 (05 Jan 2025 19:12) (18 - 19)  SpO2: 97% (05 Jan 2025 19:12) (95% - 97%)    O2 Parameters below as of 05 Jan 2025 19:12  Patient On (Oxygen Delivery Method): room air                PHYSICAL EXAM:    GENERAL: NAD  HEAD:  Atraumatic, Normocephalic  EYES: EOMI, conjunctiva and sclera clear  ENMT: No Drainage from nares, No drainage from ears  NERVOUS SYSTEM:  Awake and Alert  CHEST/LUNG: Clear to percussion bilaterally  EXTREMITIES:  No Edema  SKIN: No rashes No obvious ecchymosis      LABS:                                    7.6    5.95  )-----------( 143      ( 05 Jan 2025 07:15 )             24.7     01-05    145  |  116[H]  |  28[H]  ----------------------------<  95  4.1   |  25  |  1.60[H]    Ca    8.2[L]      05 Jan 2025 07:15        Urinalysis Basic - ( 05 Jan 2025 07:15 )    Color: x / Appearance: x / SG: x / pH: x  Gluc: 95 mg/dL / Ketone: x  / Bili: x / Urobili: x   Blood: x / Protein: x / Nitrite: x   Leuk Esterase: x / RBC: x / WBC x   Sq Epi: x / Non Sq Epi: x / Bacteria: x              RADIOLOGY & ADDITIONAL TESTS:

## 2025-01-05 NOTE — PROGRESS NOTE ADULT - ASSESSMENT
85y/o seen at [facility]. Sitting in chair. Grandson at bedside  History HTN, anemia, AI, PVD, GERD, glaucoma, edema, CRI, depression  S/P left breast cancer treated with lumpectomy and tamoxifen    Admitted for right great toe infection-rule out osteomyelitis  No recent, significant cardiac symptoms    1/5/25  Seen at [facility]  Lying flat, sleeping comfortably    Impression  Right great toe infection-osteomyelitis    Plan:  - Continue Furosemide-80mg OD                  Metoprolol succinate-50mg OD  - 1/3/25 Echocardiogram with normal LV. Mild AS. Mild Pulmonary HTN  - Lower arterial doppler study compatible with B/L stenosis  - Right foot MRI consistent with osteomyelitis  - On Cefepime  - Follow labs  - Limit salt   - Keep legs elevated  - Monitor I&O and daily weights  - Consider support hose  - Patient is considered at intermediate risk for cardiac events for proposed lower extremity arteriogram scheduled for 1/6/25 and possible podiatric surgery deemed necessary  - Being followed by ID, Podiatry, C-V surgery, Nephrology

## 2025-01-05 NOTE — PROGRESS NOTE ADULT - SUBJECTIVE AND OBJECTIVE BOX
PROGRESS NOTE   Patient is a 84y old  Male who presents with a chief complaint of Osteomyelitis (05 Jan 2025 10:26)      HPI:  Patient is an 83yo M with a PMH of L sided breast cancer (s/p lumpectomy, on Tamoxifen), HTN, Anemia, Aortic insufficiency, Carotid artery disease, GERD, Glaucoma, Hepatitis (2014), Lower ext edema ,PVD who presents to the ED from Dr. Niko Jacques's office for a R toe infection. Patient notes that he noticed a R toe wound a few weeks ago. He completed a 10d course of Amoxicillin 500mg, but noticed worsening of the wound. He went ot his podiatrist today, as the wound was draining a pus like liquid. Podiatry rec he come to the hospital for IV abx and osteomyelitis workup. Patient feeling well in ED.    ED Course:  Vitals: /65, HR 71, T 97.3, RR 16 SpO2 100% on RA  Labs: ESR 75, Hgb 9.2, Na 148, Cl 117, BUN/Cr 35/1.70, eGFR 39  Given: IV Zosyn, IV Vancomycin, 1L NS bolus     Imaging:  Chest Xray: No acute chest finding.   R foot Xray: Possible erosion of the distal phalanx of the right great toe with associated soft tissue swelling suspicious for osteomyelitis. (02 Jan 2025 19:34)      Vital Signs Last 24 Hrs  T(C): 36.9 (05 Jan 2025 13:48), Max: 37 (05 Jan 2025 04:17)  T(F): 98.4 (05 Jan 2025 13:48), Max: 98.6 (05 Jan 2025 04:17)  HR: 58 (05 Jan 2025 13:48) (58 - 62)  BP: 115/53 (05 Jan 2025 13:48) (115/53 - 165/69)  BP(mean): --  RR: 19 (05 Jan 2025 13:48) (18 - 19)  SpO2: 96% (05 Jan 2025 13:48) (95% - 96%)    Parameters below as of 05 Jan 2025 13:48  Patient On (Oxygen Delivery Method): room air                              7.6    5.95  )-----------( 143      ( 05 Jan 2025 07:15 )             24.7               01-05    145  |  116[H]  |  28[H]  ----------------------------<  95  4.1   |  25  |  1.60[H]    Ca    8.2[L]      05 Jan 2025 07:15        PHYSICAL EXAM  General: Pleasant  male NAD & AOX3.    Integument:  Skin warm, dry and supple bilateral.    Noted plantar right hallux ulcer, fibro-granular base, apx [id number], reduced edema, reduced erythema, minimal drainage, - hyperkeratotic base, +tracking, with noted cellulitic changes, Consistent with OM  Vascular: Dorsalis Pedis and Posterior Tibial pulses non-palpable.  Capillary re-fill time less then 3 seconds digits 1-5 bilateral.  noted b/l lower leg edema, noted venous stasis b/l lower extremity  Neuro: Protective sensation intact to the level of the digits bilateral.  MSK: Muscle strength 4/5 all major muscle groups bilateral.    MRI Foot    IMPRESSION:  MRI of the right foot demonstrates ulceration at the plantar surface of the great toe distally with underlying bone marrow edema pattern and abnormal T1 signal within the distal and proximal phalanx consistent with osteomyelitis. Edematous changes at the head of the first metatarsal is indeterminate and may represent osteoarthrosis versus reactive changes, although underlying osteomyelitis is not excluded. There is no soft tissue drainable fluid collection.  Bone marrow edema pattern at the base of the third through fifth metatarsals with corresponding abnormal T1 signal is indeterminate. Findings may be related to reactive/stress changes versus arthritic changes versus infectious process.

## 2025-01-05 NOTE — PROGRESS NOTE ADULT - SUBJECTIVE AND OBJECTIVE BOX
Patient is a 84y Male with a known history of :  Toe infection [L08.9]    Open wound of toe of right foot [S91.109A]    Anemia [D64.9]    Peripheral edema [R60.0]    Breast cancer [C50.919]    Glaucoma [H40.9]    Need for prophylactic measure [Z29.9]    Major depression [F32.9]    HTN (hypertension) [I10]    Chronic kidney disease (CKD) [N18.9]    Osteomyelitis of ankle or foot, acute [M86.179]      HPI:  Patient is an 83yo M with a PMH of L sided breast cancer (s/p lumpectomy, on Tamoxifen), HTN, Anemia, Aortic insufficiency, Carotid artery disease, GERD, Glaucoma, Hepatitis (2014), Lower ext edema ,PVD who presents to the ED from Dr. Niko Jacques's office for a R toe infection. Patient notes that he noticed a R toe wound a few weeks ago. He completed a 10d course of Amoxicillin 500mg, but noticed worsening of the wound. He went ot his podiatrist today, as the wound was draining a pus like liquid. Podiatry rec he come to the hospital for IV abx and osteomyelitis workup. Patient feeling well in ED.    ED Course:  Vitals: /65, HR 71, T 97.3, RR 16 SpO2 100% on RA  Labs: ESR 75, Hgb 9.2, Na 148, Cl 117, BUN/Cr 35/1.70, eGFR 39  Given: IV Zosyn, IV Vancomycin, 1L NS bolus     Imaging:  Chest Xray: No acute chest finding.   R foot Xray: Possible erosion of the distal phalanx of the right great toe with associated soft tissue swelling suspicious for osteomyelitis. (02 Jan 2025 19:34)      REVIEW OF SYSTEMS:    CONSTITUTIONAL: No fever, weight loss, or fatigue  EYES: No eye pain, visual disturbances, or discharge  ENMT:  No difficulty hearing, tinnitus, vertigo; No sinus or throat pain  NECK: No pain or stiffness  BREASTS: No pain, masses, or nipple discharge  RESPIRATORY: No cough, wheezing, chills or hemoptysis; No shortness of breath  CARDIOVASCULAR: No chest pain, palpitations, dizziness, or leg swelling  GASTROINTESTINAL: No abdominal or epigastric pain. No nausea, vomiting, or hematemesis; No diarrhea or constipation. No melena or hematochezia.  GENITOURINARY: No dysuria, frequency, hematuria, or incontinence  NEUROLOGICAL: No headaches, memory loss, loss of strength, numbness, or tremors  SKIN: No itching, burning, rashes, or lesions   LYMPH NODES: No enlarged glands  ENDOCRINE: No heat or cold intolerance; No hair loss  MUSCULOSKELETAL: No joint pain or swelling; No muscle, back, or extremity pain  PSYCHIATRIC: No depression, anxiety, mood swings, or difficulty sleeping  HEME/LYMPH: No easy bruising, or bleeding gums  ALLERGY AND IMMUNOLOGIC: No hives or eczema    MEDICATIONS  (STANDING):  ammonium lactate 12% Lotion 1 Application(s) Topical two times a day  cefepime   IVPB 2000 milliGRAM(s) IV Intermittent every 12 hours  cyanocobalamin 1000 MICROGram(s) Oral daily  dextrose 5% + sodium chloride 0.45%. 1000 milliLiter(s) (75 mL/Hr) IV Continuous <Continuous>  escitalopram 10 milliGRAM(s) Oral daily  folic acid 1 milliGRAM(s) Oral daily  heparin   Injectable 5000 Unit(s) SubCutaneous every 8 hours  influenza  Vaccine (HIGH DOSE) 0.5 milliLiter(s) IntraMuscular once  latanoprost 0.005% Ophthalmic Solution 1 Drop(s) Both EYES at bedtime  metoprolol succinate ER 50 milliGRAM(s) Oral daily  mupirocin 2% Ointment 1 Application(s) Topical <User Schedule>  tamoxifen 20 milliGRAM(s) Oral daily  timolol 0.5% Solution 1 Drop(s) Both EYES two times a day    MEDICATIONS  (PRN):  acetaminophen     Tablet .. 650 milliGRAM(s) Oral every 6 hours PRN Temp greater or equal to 38C (100.4F), Mild Pain (1 - 3)  melatonin 3 milliGRAM(s) Oral at bedtime PRN Insomnia      ALLERGIES: No Known Allergies      FAMILY HISTORY:      Social history:  Alochol:   Smoking:   Drug Use:   Marital Status:     PHYSICAL EXAMINATION:  -----------------------------  T(C): 37 (01-05-25 @ 04:17), Max: 37 (01-05-25 @ 04:17)  HR: 62 (01-05-25 @ 04:17) (62 - 68)  BP: 162/70 (01-05-25 @ 04:17) (136/52 - 165/69)  RR: 19 (01-05-25 @ 04:17) (18 - 19)  SpO2: 96% (01-05-25 @ 04:17) (95% - 96%)  Wt(kg): --    01-04 @ 07:01  -  01-05 @ 07:00  --------------------------------------------------------  IN:    dextrose 5%: 300 mL  Total IN: 300 mL    OUT:  Total OUT: 0 mL    Total NET: 300 mL            Constitutional: well developed, normal appearance, well groomed, well nourished, no deformities and no acute distress.   Eyes: the conjunctiva exhibited no abnormalities and the eyelids demonstrated no xanthelasmas.   HEENT: normal oral mucosa, no oral pallor and no oral cyanosis.   Neck: normal jugular venous A waves present, normal jugular venous V waves present and no jugular venous najera A waves.   Pulmonary: no respiratory distress, normal respiratory rhythm and effort, no accessory muscle use and lungs were clear to auscultation bilaterally. Anteriorly  Cardiovascular: heart rate and rhythm were normal, decreased S1 and S2 and no gallop, rub, heave or thrill are present. II/VI systolic murmur  Musculoskeletal: the gait could not be assessed.   Extremities: no clubbing of the fingernails, no localized cyanosis, no petechial hemorrhages and no ischemic changes. B/L 1-Plus edema, red R>L  Skin: normal skin color and pigmentation, no rash, no venous stasis, no skin lesions, no skin ulcer and no xanthoma was observed.       LABS:   --------  01-04    145  |  117[H]  |  28[H]  ----------------------------<  116[H]  3.8   |  21[L]  |  1.70[H]    Ca    8.5      04 Jan 2025 09:16                           7.9    5.90  )-----------( 159      ( 04 Jan 2025 09:16 )             25.9                   Radiology:

## 2025-01-05 NOTE — PROGRESS NOTE ADULT - ASSESSMENT
85 yo male, presenting with a non healing R toe wound, refractory to oral antibiotics, concerning for osteomyelitis. May require podiatric intervention, vascular consulted for adequate perfusion.    Plan:  - RLE angio on Monday 1/6  - appreciate Cardio/medical clearances preop  - appreciate Nephrology input for Cr optimization preop  - IV abx  - daily DSD with local wound care  - rest of care per primary team

## 2025-01-05 NOTE — PROGRESS NOTE ADULT - ASSESSMENT
STEPHANY on Likely CKD  Hypernatermia  Renal Mass  HTN  PAD    -Baseline Creatinine Unknown but given age and co-morbidities likely CKD  -STEPHANY Likely 2/2 Decreased EABV  -Renal US with solid mass,  will need CT or MRI, at this time favor MRI with elevated creatinine   -S/P IVF with D5W for hypernatremia   -Vascular consult-right lower extremity angiogram planned for monday, mod risk for VLADIMIR  -Will restart IVF to help prevent VLADIMIR  -Creatinine improving

## 2025-01-05 NOTE — CASE MANAGEMENT PROGRESS NOTE - NSCMPROGRESSNOTE_GEN_ALL_CORE
Pt remains acute receiving IV antibiotics. Physical Therapist recommended a Walker, CM met with patient, pt is in agreement with CM ordering Walker pending insurance authorization. CM notified MD script left on chart for him to sign.

## 2025-01-05 NOTE — PROGRESS NOTE ADULT - SUBJECTIVE AND OBJECTIVE BOX
Patient is a 84y old  Male who presents with a chief complaint of Osteomyelitis (05 Jan 2025 15:31)        INTERVAL HPI/OVERNIGHT EVENTS:   no complaints  pt seen and examined         Vital Signs Last 24 Hrs  T(C): 36.9 (05 Jan 2025 13:48), Max: 37 (05 Jan 2025 04:17)  T(F): 98.4 (05 Jan 2025 13:48), Max: 98.6 (05 Jan 2025 04:17)  HR: 58 (05 Jan 2025 13:48) (58 - 62)  BP: 115/53 (05 Jan 2025 13:48) (115/53 - 165/69)  BP(mean): --  RR: 19 (05 Jan 2025 13:48) (18 - 19)  SpO2: 96% (05 Jan 2025 13:48) (95% - 96%)    Parameters below as of 05 Jan 2025 13:48  Patient On (Oxygen Delivery Method): room air        acetaminophen     Tablet .. 650 milliGRAM(s) Oral every 6 hours PRN  ammonium lactate 12% Lotion 1 Application(s) Topical two times a day  cefepime   IVPB 2000 milliGRAM(s) IV Intermittent every 12 hours  cyanocobalamin 1000 MICROGram(s) Oral daily  dextrose 5% + sodium chloride 0.45%. 1000 milliLiter(s) IV Continuous <Continuous>  escitalopram 10 milliGRAM(s) Oral daily  folic acid 1 milliGRAM(s) Oral daily  heparin   Injectable 5000 Unit(s) SubCutaneous every 8 hours  influenza  Vaccine (HIGH DOSE) 0.5 milliLiter(s) IntraMuscular once  latanoprost 0.005% Ophthalmic Solution 1 Drop(s) Both EYES at bedtime  melatonin 3 milliGRAM(s) Oral at bedtime PRN  metoprolol succinate ER 50 milliGRAM(s) Oral daily  mupirocin 2% Ointment 1 Application(s) Topical <User Schedule>  tamoxifen 20 milliGRAM(s) Oral daily  timolol 0.5% Solution 1 Drop(s) Both EYES two times a day      PHYSICAL EXAM:  GENERAL: NAD   EYES: conjunctiva and sclera clear  ENMT: Moist mucous membranes  NECK: Supple, No JVD, Normal thyroid  CHEST/LUNG: non labored, cta b/l  HEART: Regular rate and rhythm; No murmurs, rubs, or gallops  ABDOMEN: Soft, Nontender, Nondistended; Bowel sounds present  EXTREMITIES:  No clubbing, no cyanosis, no edema  LYMPH: No lymphadenopathy noted  SKIN: No rashes or lesions  NEURO: no new focal deficits    Consultant(s) Notes Reviewed:  [x ] YES  [ ] NO  Care Discussed with Consultants/Other Providers [ x] YES  [ ] NO    LABS:                        7.6    5.95  )-----------( 143      ( 05 Jan 2025 07:15 )             24.7     01-05    145  |  116[H]  |  28[H]  ----------------------------<  95  4.1   |  25  |  1.60[H]    Ca    8.2[L]      05 Jan 2025 07:15        Urinalysis Basic - ( 05 Jan 2025 07:15 )    Color: x / Appearance: x / SG: x / pH: x  Gluc: 95 mg/dL / Ketone: x  / Bili: x / Urobili: x   Blood: x / Protein: x / Nitrite: x   Leuk Esterase: x / RBC: x / WBC x   Sq Epi: x / Non Sq Epi: x / Bacteria: x      CAPILLARY BLOOD GLUCOSE            Urinalysis Basic - ( 05 Jan 2025 07:15 )    Color: x / Appearance: x / SG: x / pH: x  Gluc: 95 mg/dL / Ketone: x  / Bili: x / Urobili: x   Blood: x / Protein: x / Nitrite: x   Leuk Esterase: x / RBC: x / WBC x   Sq Epi: x / Non Sq Epi: x / Bacteria: x        Culture - Wound Aerobic (collected 02 Jan 2025 18:30)  Source: .Other  Final Report (04 Jan 2025 15:14):    Few Pseudomonas aeruginosa    Rare Serratia marcescens    Commensal aleyda consistent with body site  Organism: Pseudomonas aeruginosa  Serratia marcescens (04 Jan 2025 15:14)  Organism: Serratia marcescens (04 Jan 2025 15:14)  Organism: Pseudomonas aeruginosa (04 Jan 2025 15:14)        RADIOLOGY & ADDITIONAL TESTS:    Imaging Personally Reviewed  Reviewed consultants input

## 2025-01-05 NOTE — PROGRESS NOTE ADULT - SUBJECTIVE AND OBJECTIVE BOX
VASCULAR SURGERY PA NOTE ON BEHALF OF DR. Macias::    S: Patient seen and examined at bedside.   No acute events overnight.  Patient has no new complaints this AM.  Denies fevers, chills, chest pain, SOB, palpitations.      MEDICATIONS:  acetaminophen     Tablet .. 650 milliGRAM(s) Oral every 6 hours PRN  ammonium lactate 12% Lotion 1 Application(s) Topical two times a day  cefepime   IVPB 2000 milliGRAM(s) IV Intermittent every 12 hours  cyanocobalamin 1000 MICROGram(s) Oral daily  dextrose 5% + sodium chloride 0.45%. 1000 milliLiter(s) IV Continuous <Continuous>  escitalopram 10 milliGRAM(s) Oral daily  folic acid 1 milliGRAM(s) Oral daily  heparin   Injectable 5000 Unit(s) SubCutaneous every 8 hours  influenza  Vaccine (HIGH DOSE) 0.5 milliLiter(s) IntraMuscular once  latanoprost 0.005% Ophthalmic Solution 1 Drop(s) Both EYES at bedtime  melatonin 3 milliGRAM(s) Oral at bedtime PRN  metoprolol succinate ER 50 milliGRAM(s) Oral daily  mupirocin 2% Ointment 1 Application(s) Topical <User Schedule>  tamoxifen 20 milliGRAM(s) Oral daily  timolol 0.5% Solution 1 Drop(s) Both EYES two times a day      O:  Vital Signs Last 24 Hrs  T(C): 37 (05 Jan 2025 04:17), Max: 37 (05 Jan 2025 04:17)  T(F): 98.6 (05 Jan 2025 04:17), Max: 98.6 (05 Jan 2025 04:17)  HR: 62 (05 Jan 2025 04:17) (62 - 68)  BP: 162/70 (05 Jan 2025 04:17) (136/52 - 165/69)  BP(mean): --  RR: 19 (05 Jan 2025 04:17) (18 - 19)  SpO2: 96% (05 Jan 2025 04:17) (95% - 96%)    Parameters below as of 05 Jan 2025 04:17  Patient On (Oxygen Delivery Method): room air        I&O SUMMARY:    01-04-25 @ 07:01  -  01-05-25 @ 07:00  --------------------------------------------------------  IN: 300 mL / OUT: 0 mL / NET: 300 mL        PHYSICAL EXAM:  Lungs: CTA bilat without W/R/R  Card: S1S2  Abd: Soft, NT, ND.  +BS x 4.  No rebound/guarding.    Ext: Calves soft, NT, without edema bilat.  Legs warm  touch, grossly move LE.      LABS:                        7.6    5.95  )-----------( 143      ( 05 Jan 2025 07:15 )             24.7     01-05    145  |  116[H]  |  28[H]  ----------------------------<  95  4.1   |  25  |  1.60[H]    Ca    8.2[L]      05 Jan 2025 07:15

## 2025-01-06 ENCOUNTER — TRANSCRIPTION ENCOUNTER (OUTPATIENT)
Age: 85
End: 2025-01-06

## 2025-01-06 LAB
ANION GAP SERPL CALC-SCNC: 3 MMOL/L — LOW (ref 5–17)
ANION GAP SERPL CALC-SCNC: 4 MMOL/L — LOW (ref 5–17)
BUN SERPL-MCNC: 29 MG/DL — HIGH (ref 7–23)
BUN SERPL-MCNC: 30 MG/DL — HIGH (ref 7–23)
CALCIUM SERPL-MCNC: 8.2 MG/DL — LOW (ref 8.5–10.1)
CALCIUM SERPL-MCNC: 8.5 MG/DL — SIGNIFICANT CHANGE UP (ref 8.5–10.1)
CHLORIDE SERPL-SCNC: 117 MMOL/L — HIGH (ref 96–108)
CHLORIDE SERPL-SCNC: 119 MMOL/L — HIGH (ref 96–108)
CO2 SERPL-SCNC: 23 MMOL/L — SIGNIFICANT CHANGE UP (ref 22–31)
CO2 SERPL-SCNC: 25 MMOL/L — SIGNIFICANT CHANGE UP (ref 22–31)
CREAT SERPL-MCNC: 1.4 MG/DL — HIGH (ref 0.5–1.3)
CREAT SERPL-MCNC: 1.7 MG/DL — HIGH (ref 0.5–1.3)
EGFR: 39 ML/MIN/1.73M2 — LOW
EGFR: 49 ML/MIN/1.73M2 — LOW
GLUCOSE SERPL-MCNC: 120 MG/DL — HIGH (ref 70–99)
GLUCOSE SERPL-MCNC: 125 MG/DL — HIGH (ref 70–99)
HCT VFR BLD CALC: 28.3 % — LOW (ref 39–50)
HCT VFR BLD CALC: 29.1 % — LOW (ref 39–50)
HGB BLD-MCNC: 8.8 G/DL — LOW (ref 13–17)
HGB BLD-MCNC: 9 G/DL — LOW (ref 13–17)
INR BLD: 1.03 RATIO — SIGNIFICANT CHANGE UP (ref 0.85–1.16)
MAGNESIUM SERPL-MCNC: 2.1 MG/DL — SIGNIFICANT CHANGE UP (ref 1.6–2.6)
MCHC RBC-ENTMCNC: 28.8 PG — SIGNIFICANT CHANGE UP (ref 27–34)
MCHC RBC-ENTMCNC: 28.9 PG — SIGNIFICANT CHANGE UP (ref 27–34)
MCHC RBC-ENTMCNC: 30.9 G/DL — LOW (ref 32–36)
MCHC RBC-ENTMCNC: 31.1 G/DL — LOW (ref 32–36)
MCV RBC AUTO: 93.1 FL — SIGNIFICANT CHANGE UP (ref 80–100)
MCV RBC AUTO: 93.3 FL — SIGNIFICANT CHANGE UP (ref 80–100)
MRSA PCR RESULT.: SIGNIFICANT CHANGE UP
NRBC # BLD: 0 /100 WBCS — SIGNIFICANT CHANGE UP (ref 0–0)
NRBC # BLD: 0 /100 WBCS — SIGNIFICANT CHANGE UP (ref 0–0)
NRBC BLD-RTO: 0 /100 WBCS — SIGNIFICANT CHANGE UP (ref 0–0)
NRBC BLD-RTO: 0 /100 WBCS — SIGNIFICANT CHANGE UP (ref 0–0)
PHOSPHATE SERPL-MCNC: 2.6 MG/DL — SIGNIFICANT CHANGE UP (ref 2.5–4.5)
PLATELET # BLD AUTO: 167 K/UL — SIGNIFICANT CHANGE UP (ref 150–400)
PLATELET # BLD AUTO: 172 K/UL — SIGNIFICANT CHANGE UP (ref 150–400)
POTASSIUM SERPL-MCNC: 3.8 MMOL/L — SIGNIFICANT CHANGE UP (ref 3.5–5.3)
POTASSIUM SERPL-MCNC: 4.4 MMOL/L — SIGNIFICANT CHANGE UP (ref 3.5–5.3)
POTASSIUM SERPL-SCNC: 3.8 MMOL/L — SIGNIFICANT CHANGE UP (ref 3.5–5.3)
POTASSIUM SERPL-SCNC: 4.4 MMOL/L — SIGNIFICANT CHANGE UP (ref 3.5–5.3)
PROTHROM AB SERPL-ACNC: 12 SEC — SIGNIFICANT CHANGE UP (ref 9.9–13.4)
RBC # BLD: 3.04 M/UL — LOW (ref 4.2–5.8)
RBC # BLD: 3.12 M/UL — LOW (ref 4.2–5.8)
RBC # FLD: 17.2 % — HIGH (ref 10.3–14.5)
RBC # FLD: 17.3 % — HIGH (ref 10.3–14.5)
S AUREUS DNA NOSE QL NAA+PROBE: DETECTED
SODIUM SERPL-SCNC: 145 MMOL/L — SIGNIFICANT CHANGE UP (ref 135–145)
SODIUM SERPL-SCNC: 146 MMOL/L — HIGH (ref 135–145)
WBC # BLD: 5.76 K/UL — SIGNIFICANT CHANGE UP (ref 3.8–10.5)
WBC # BLD: 8.78 K/UL — SIGNIFICANT CHANGE UP (ref 3.8–10.5)
WBC # FLD AUTO: 5.76 K/UL — SIGNIFICANT CHANGE UP (ref 3.8–10.5)
WBC # FLD AUTO: 8.78 K/UL — SIGNIFICANT CHANGE UP (ref 3.8–10.5)

## 2025-01-06 PROCEDURE — 75625 CONTRAST EXAM ABDOMINL AORTA: CPT | Mod: 26

## 2025-01-06 PROCEDURE — 36140 INTRO NDL ICATH UPR/LXTR ART: CPT | Mod: 59,RT

## 2025-01-06 PROCEDURE — 37232: CPT | Mod: RT

## 2025-01-06 PROCEDURE — 37228: CPT | Mod: RT

## 2025-01-06 PROCEDURE — 75710 ARTERY X-RAYS ARM/LEG: CPT | Mod: 26,59,RT

## 2025-01-06 DEVICE — GUIDEWIRE ADVANTAGE .014INX300CM: Type: IMPLANTABLE DEVICE | Site: RIGHT | Status: FUNCTIONAL

## 2025-01-06 DEVICE — CATH SUPP TRAILBLAZER .035X135CM: Type: IMPLANTABLE DEVICE | Site: RIGHT | Status: FUNCTIONAL

## 2025-01-06 DEVICE — GWIRE BENT STRT 0.035INX150CM: Type: IMPLANTABLE DEVICE | Site: RIGHT | Status: FUNCTIONAL

## 2025-01-06 DEVICE — INTRO SHEATH ANSEL 5FR 90CM: Type: IMPLANTABLE DEVICE | Site: RIGHT | Status: FUNCTIONAL

## 2025-01-06 DEVICE — CATH DIAG TRIALBLAZER .014IN 150CM: Type: IMPLANTABLE DEVICE | Site: RIGHT | Status: FUNCTIONAL

## 2025-01-06 DEVICE — GWIRE ROSEN 0.035INX260CM: Type: IMPLANTABLE DEVICE | Site: RIGHT | Status: FUNCTIONAL

## 2025-01-06 DEVICE — MICRO-INTRODUCER KIT 5FR 40CM NITINOL TC TIP 7CM ECHOGENIC REGULAR: Type: IMPLANTABLE DEVICE | Site: RIGHT | Status: FUNCTIONAL

## 2025-01-06 DEVICE — IMPLANTABLE DEVICE: Type: IMPLANTABLE DEVICE | Site: RIGHT | Status: FUNCTIONAL

## 2025-01-06 DEVICE — GUIDEWIRE RADIFOCUS GLIDEWIRE STANDARD ANGLED TIP 0.035" X 260CM: Type: IMPLANTABLE DEVICE | Site: RIGHT | Status: FUNCTIONAL

## 2025-01-06 DEVICE — CATH OMNI FLSH 0.035IN 5FRX65: Type: IMPLANTABLE DEVICE | Site: RIGHT | Status: FUNCTIONAL

## 2025-01-06 RX ORDER — ONDANSETRON 4 MG/1
4 TABLET, ORALLY DISINTEGRATING ORAL ONCE
Refills: 0 | Status: DISCONTINUED | OUTPATIENT
Start: 2025-01-06 | End: 2025-01-06

## 2025-01-06 RX ORDER — ACETAMINOPHEN 160 MG/5ML
1000 SUSPENSION ORAL ONCE
Refills: 0 | Status: DISCONTINUED | OUTPATIENT
Start: 2025-01-06 | End: 2025-01-06

## 2025-01-06 RX ORDER — ROSUVASTATIN CALCIUM 10 MG/1
20 TABLET, FILM COATED ORAL AT BEDTIME
Refills: 0 | Status: DISCONTINUED | OUTPATIENT
Start: 2025-01-06 | End: 2025-01-06

## 2025-01-06 RX ORDER — SODIUM CHLORIDE 9 G/ML
1000 INJECTION, SOLUTION INTRAVENOUS
Refills: 0 | Status: DISCONTINUED | OUTPATIENT
Start: 2025-01-06 | End: 2025-01-07

## 2025-01-06 RX ORDER — ROSUVASTATIN CALCIUM 10 MG/1
20 TABLET, FILM COATED ORAL AT BEDTIME
Refills: 0 | Status: DISCONTINUED | OUTPATIENT
Start: 2025-01-06 | End: 2025-01-15

## 2025-01-06 RX ORDER — FOLIC ACID 1 MG
1 TABLET ORAL DAILY
Refills: 0 | Status: DISCONTINUED | OUTPATIENT
Start: 2025-01-06 | End: 2025-01-15

## 2025-01-06 RX ORDER — TRIMETHOBENZAMIDE HCL 250 MG
200 CAPSULE ORAL ONCE
Refills: 0 | Status: COMPLETED | OUTPATIENT
Start: 2025-01-06 | End: 2025-01-06

## 2025-01-06 RX ORDER — ACETAMINOPHEN, DIPHENHYDRAMINE HCL, PHENYLEPHRINE HCL 325; 25; 5 MG/1; MG/1; MG/1
3 TABLET ORAL AT BEDTIME
Refills: 0 | Status: DISCONTINUED | OUTPATIENT
Start: 2025-01-06 | End: 2025-01-15

## 2025-01-06 RX ORDER — HYDROMORPHONE HYDROCHLORIDE 4 MG/ML
0.5 INJECTION, SOLUTION INTRAMUSCULAR; INTRAVENOUS; SUBCUTANEOUS
Refills: 0 | Status: DISCONTINUED | OUTPATIENT
Start: 2025-01-06 | End: 2025-01-06

## 2025-01-06 RX ORDER — ACETAMINOPHEN 160 MG/5ML
1000 SUSPENSION ORAL ONCE
Refills: 0 | Status: COMPLETED | OUTPATIENT
Start: 2025-01-07 | End: 2025-01-07

## 2025-01-06 RX ORDER — HYDROMORPHONE HYDROCHLORIDE 4 MG/ML
0.5 INJECTION, SOLUTION INTRAMUSCULAR; INTRAVENOUS; SUBCUTANEOUS EVERY 6 HOURS
Refills: 0 | Status: DISCONTINUED | OUTPATIENT
Start: 2025-01-06 | End: 2025-01-06

## 2025-01-06 RX ORDER — ACETAMINOPHEN 160 MG/5ML
1000 SUSPENSION ORAL ONCE
Refills: 0 | Status: COMPLETED | OUTPATIENT
Start: 2025-01-06 | End: 2025-01-06

## 2025-01-06 RX ORDER — METOPROLOL SUCCINATE 25 MG
50 TABLET, EXTENDED RELEASE 24 HR ORAL DAILY
Refills: 0 | Status: DISCONTINUED | OUTPATIENT
Start: 2025-01-07 | End: 2025-01-15

## 2025-01-06 RX ORDER — CYANOCOBALAMIN (VITAMIN B-12) 1000MCG/ML
1000 VIAL (ML) INJECTION DAILY
Refills: 0 | Status: DISCONTINUED | OUTPATIENT
Start: 2025-01-06 | End: 2025-01-15

## 2025-01-06 RX ORDER — LATANOPROST 50 UG/ML
1 SOLUTION OPHTHALMIC AT BEDTIME
Refills: 0 | Status: DISCONTINUED | OUTPATIENT
Start: 2025-01-06 | End: 2025-01-15

## 2025-01-06 RX ORDER — SODIUM CHLORIDE 9 G/ML
1000 INJECTION, SOLUTION INTRAVENOUS
Refills: 0 | Status: DISCONTINUED | OUTPATIENT
Start: 2025-01-06 | End: 2025-01-06

## 2025-01-06 RX ORDER — CEFEPIME HCL 1 G
2000 IV SOLUTION, PIGGYBACK, BOTTLE (EA) INTRAVENOUS EVERY 12 HOURS
Refills: 0 | Status: DISCONTINUED | OUTPATIENT
Start: 2025-01-06 | End: 2025-01-15

## 2025-01-06 RX ORDER — MUPIROCIN 2 G/100G
1 CREAM TOPICAL
Refills: 0 | Status: DISCONTINUED | OUTPATIENT
Start: 2025-01-06 | End: 2025-01-15

## 2025-01-06 RX ORDER — ESCITALOPRAM 10 MG/1
10 TABLET, FILM COATED ORAL DAILY
Refills: 0 | Status: DISCONTINUED | OUTPATIENT
Start: 2025-01-06 | End: 2025-01-15

## 2025-01-06 RX ORDER — HYDROMORPHONE HYDROCHLORIDE 4 MG/ML
0.2 INJECTION, SOLUTION INTRAMUSCULAR; INTRAVENOUS; SUBCUTANEOUS EVERY 6 HOURS
Refills: 0 | Status: DISCONTINUED | OUTPATIENT
Start: 2025-01-06 | End: 2025-01-06

## 2025-01-06 RX ORDER — TIMOLOL MALEATE 5 MG/ML
1 SOLUTION OPHTHALMIC
Refills: 0 | Status: DISCONTINUED | OUTPATIENT
Start: 2025-01-06 | End: 2025-01-15

## 2025-01-06 RX ORDER — AMMONIUM LACTATE 12 %
1 LOTION (GRAM) TOPICAL
Refills: 0 | Status: DISCONTINUED | OUTPATIENT
Start: 2025-01-06 | End: 2025-01-15

## 2025-01-06 RX ADMIN — MUPIROCIN 1 APPLICATION(S): 2 CREAM TOPICAL at 21:51

## 2025-01-06 RX ADMIN — Medication 200 MILLIGRAM(S): at 03:40

## 2025-01-06 RX ADMIN — Medication 50 MILLIGRAM(S): at 05:57

## 2025-01-06 RX ADMIN — Medication 75 MILLIGRAM(S): at 15:41

## 2025-01-06 RX ADMIN — TIMOLOL MALEATE 1 DROP(S): 5 SOLUTION OPHTHALMIC at 17:44

## 2025-01-06 RX ADMIN — ACETAMINOPHEN, DIPHENHYDRAMINE HCL, PHENYLEPHRINE HCL 3 MILLIGRAM(S): 325; 25; 5 TABLET ORAL at 21:51

## 2025-01-06 RX ADMIN — Medication 1 APPLICATION(S): at 17:45

## 2025-01-06 RX ADMIN — Medication 100 MILLIGRAM(S): at 17:43

## 2025-01-06 RX ADMIN — ACETAMINOPHEN 1000 MILLIGRAM(S): 160 SUSPENSION ORAL at 22:30

## 2025-01-06 RX ADMIN — TIMOLOL MALEATE 1 DROP(S): 5 SOLUTION OPHTHALMIC at 06:22

## 2025-01-06 RX ADMIN — ROSUVASTATIN CALCIUM 20 MILLIGRAM(S): 10 TABLET, FILM COATED ORAL at 21:51

## 2025-01-06 RX ADMIN — Medication 100 MILLIGRAM(S): at 05:56

## 2025-01-06 RX ADMIN — ACETAMINOPHEN 400 MILLIGRAM(S): 160 SUSPENSION ORAL at 21:50

## 2025-01-06 RX ADMIN — SODIUM CHLORIDE 75 MILLILITER(S): 9 INJECTION, SOLUTION INTRAVENOUS at 15:38

## 2025-01-06 RX ADMIN — LATANOPROST 1 DROP(S): 50 SOLUTION OPHTHALMIC at 21:51

## 2025-01-06 RX ADMIN — Medication 1 APPLICATION(S): at 06:22

## 2025-01-06 RX ADMIN — ACETAMINOPHEN 400 MILLIGRAM(S): 160 SUSPENSION ORAL at 18:01

## 2025-01-06 NOTE — PROGRESS NOTE ADULT - SUBJECTIVE AND OBJECTIVE BOX
PROGRESS NOTE   Patient is a 84y old  Male who presents with a chief complaint of Osteomyelitis (06 Jan 2025 08:20)      HPI:  Patient is an 85yo M with a PMH of L sided breast cancer (s/p lumpectomy, on Tamoxifen), HTN, Anemia, Aortic insufficiency, Carotid artery disease, GERD, Glaucoma, Hepatitis (2014), Lower ext edema ,PVD who presents to the ED from Dr. Niko Jacques's office for a R toe infection. Patient notes that he noticed a R toe wound a few weeks ago. He completed a 10d course of Amoxicillin 500mg, but noticed worsening of the wound. He went ot his podiatrist today, as the wound was draining a pus like liquid. Podiatry rec he come to the hospital for IV abx and osteomyelitis workup. Patient feeling well in ED.    ED Course:  Vitals: /65, HR 71, T 97.3, RR 16 SpO2 100% on RA  Labs: ESR 75, Hgb 9.2, Na 148, Cl 117, BUN/Cr 35/1.70, eGFR 39  Given: IV Zosyn, IV Vancomycin, 1L NS bolus     Imaging:  Chest Xray: No acute chest finding.   R foot Xray: Possible erosion of the distal phalanx of the right great toe with associated soft tissue swelling suspicious for osteomyelitis. (02 Jan 2025 19:34)      Vital Signs Last 24 Hrs  T(C): 36.4 (06 Jan 2025 06:35), Max: 36.9 (05 Jan 2025 13:48)  T(F): 97.5 (06 Jan 2025 06:35), Max: 98.4 (05 Jan 2025 13:48)  HR: 61 (06 Jan 2025 06:35) (58 - 66)  BP: 163/57 (06 Jan 2025 06:35) (115/53 - 166/69)  BP(mean): --  RR: 18 (06 Jan 2025 06:35) (18 - 20)  SpO2: 90% (06 Jan 2025 06:35) (90% - 97%)    Parameters below as of 06 Jan 2025 06:35  Patient On (Oxygen Delivery Method): room air                              9.0    8.78  )-----------( 172      ( 06 Jan 2025 03:40 )             29.1               01-06    145  |  117[H]  |  29[H]  ----------------------------<  120[H]  4.4   |  25  |  1.70[H]    Ca    8.5      06 Jan 2025 03:40  Phos  2.6     01-06  Mg     2.1     01-06        PHYSICAL EXAM  General: Pleasant  male NAD & AOX3.    Integument:  Skin warm, dry and supple bilateral.    Noted plantar right hallux ulcer, fibro-granular base, apx [id number], reduced edema, reduced erythema, minimal drainage, - hyperkeratotic base, +tracking, with noted cellulitic changes, Consistent with OM  Vascular: Dorsalis Pedis and Posterior Tibial pulses non-palpable.  Capillary re-fill time less then 3 seconds digits 1-5 bilateral.  noted b/l lower leg edema, noted venous stasis b/l lower extremity  Neuro: Protective sensation intact to the level of the digits bilateral.  MSK: Muscle strength 4/5 all major muscle groups bilateral.    MRI Foot    IMPRESSION:  MRI of the right foot demonstrates ulceration at the plantar surface of the great toe distally with underlying bone marrow edema pattern and abnormal T1 signal within the distal and proximal phalanx consistent with osteomyelitis. Edematous changes at the head of the first metatarsal is indeterminate and may represent osteoarthrosis versus reactive changes, although underlying osteomyelitis is not excluded. There is no soft tissue drainable fluid collection.  Bone marrow edema pattern at the base of the third through fifth metatarsals with corresponding abnormal T1 signal is indeterminate. Findings may be related to reactive/stress changes versus arthritic changes versus infectious process.

## 2025-01-06 NOTE — PROGRESS NOTE ADULT - SUBJECTIVE AND OBJECTIVE BOX
Post Operative Note  Patient: DAYA MATHIS 84y (1940) Male   MRN: [id number]  Location: [facility] 308 D1  Visit: 01-02-25 Inpatient  Date: 01-06-25 @ 18:04    Procedure: S/P Right angioplasty, peroneal and anterior tibial plasty    Subjective:   Patient seen and examined four hours after procedure, which he tolerated well. He denies pain, nausea, vomiting, chest pain or shortness of breath. His only complaint is of thirst. He remains NPO at this time.    Objective:  Vitals: T(F): 98.1 (01-06-25 @ 17:28), Max: 98.7 (01-06-25 @ 10:30)  HR: 68 (01-06-25 @ 17:58)  BP: 131/56 (01-06-25 @ 17:58) (115/54 - 166/69)  RR: 18 (01-06-25 @ 17:58)  SpO2: 97% (01-06-25 @ 17:58)  Vent Settings:     In:   01-05-25 @ 07:01  -  01-06-25 @ 07:00  --------------------------------------------------------  IN: 290 mL    01-06-25 @ 07:01  -  01-06-25 @ 18:04  --------------------------------------------------------  IN: 275 mL      IV Fluids: cyanocobalamin 1000 MICROGram(s) Oral daily  dextrose 5% + sodium chloride 0.45%. 1000 milliLiter(s) (75 mL/Hr) IV Continuous <Continuous>  folic acid 1 milliGRAM(s) Oral daily  lactated ringers. 1000 milliLiter(s) (75 mL/Hr) IV Continuous <Continuous>  lactated ringers. 1000 milliLiter(s) (75 mL/Hr) IV Continuous <Continuous>      Out:   01-05-25 @ 07:01  -  01-06-25 @ 07:00  --------------------------------------------------------  OUT: 0 mL    01-06-25 @ 07:01  -  01-06-25 @ 18:04  --------------------------------------------------------  OUT: 0 mL      EBL:     Voided Urine:   01-05-25 @ 07:01  -  01-06-25 @ 07:00  --------------------------------------------------------  OUT: 0 mL    01-06-25 @ 07:01  -  01-06-25 @ 18:04  --------------------------------------------------------  OUT: 0 mL          GENERAL:  Well-nourished, well-developed Male lying comfortably in bed in NAD.  CARDIO:  Regular rate and rhythm.  RESPIRATORY:  Nonlabored breathing, no accessory muscle use.  ABDOMEN:  Soft, nondistended, nontender. Bilateral inguinal masses are present, left greater than right. The incision site is soft, clean dry and intact.  EXTREMITIES: Dopplerable PT and DP signals bilaterally. Feet are warm and well perfused.  SKIN:  No jaundice, pallor, or cyanosis  NEURO:  A&O x 3  Surgical dressings clean, dry, intact.    Medications: [Standing]  acetaminophen   IVPB .. 1000 milliGRAM(s) IV Intermittent once  acetaminophen   IVPB .. 1000 milliGRAM(s) IV Intermittent once  ammonium lactate 12% Lotion 1 Application(s) Topical two times a day  cefepime   IVPB 2000 milliGRAM(s) IV Intermittent every 12 hours  clopidogrel Tablet 75 milliGRAM(s) Oral daily  cyanocobalamin 1000 MICROGram(s) Oral daily  dextrose 5% + sodium chloride 0.45%. 1000 milliLiter(s) IV Continuous <Continuous>  escitalopram 10 milliGRAM(s) Oral daily  folic acid 1 milliGRAM(s) Oral daily  HYDROmorphone  Injectable 0.5 milliGRAM(s) IV Push every 10 minutes PRN  HYDROmorphone  Injectable 0.2 milliGRAM(s) IV Push every 6 hours PRN  HYDROmorphone  Injectable 0.5 milliGRAM(s) IV Push every 6 hours PRN  influenza  Vaccine (HIGH DOSE) 0.5 milliLiter(s) IntraMuscular once  lactated ringers. 1000 milliLiter(s) IV Continuous <Continuous>  lactated ringers. 1000 milliLiter(s) IV Continuous <Continuous>  latanoprost 0.005% Ophthalmic Solution 1 Drop(s) Both EYES at bedtime  melatonin 3 milliGRAM(s) Oral at bedtime PRN  metoprolol succinate ER 50 milliGRAM(s) Oral daily  mupirocin 2% Ointment 1 Application(s) Topical <User Schedule>  ondansetron Injectable 4 milliGRAM(s) IV Push once PRN  rosuvastatin 20 milliGRAM(s) Oral at bedtime  timolol 0.5% Solution 1 Drop(s) Both EYES two times a day    Medications: [PRN]  acetaminophen   IVPB .. 1000 milliGRAM(s) IV Intermittent once  acetaminophen   IVPB .. 1000 milliGRAM(s) IV Intermittent once  ammonium lactate 12% Lotion 1 Application(s) Topical two times a day  cefepime   IVPB 2000 milliGRAM(s) IV Intermittent every 12 hours  clopidogrel Tablet 75 milliGRAM(s) Oral daily  cyanocobalamin 1000 MICROGram(s) Oral daily  dextrose 5% + sodium chloride 0.45%. 1000 milliLiter(s) IV Continuous <Continuous>  escitalopram 10 milliGRAM(s) Oral daily  folic acid 1 milliGRAM(s) Oral daily  HYDROmorphone  Injectable 0.5 milliGRAM(s) IV Push every 10 minutes PRN  HYDROmorphone  Injectable 0.2 milliGRAM(s) IV Push every 6 hours PRN  HYDROmorphone  Injectable 0.5 milliGRAM(s) IV Push every 6 hours PRN  influenza  Vaccine (HIGH DOSE) 0.5 milliLiter(s) IntraMuscular once  lactated ringers. 1000 milliLiter(s) IV Continuous <Continuous>  lactated ringers. 1000 milliLiter(s) IV Continuous <Continuous>  latanoprost 0.005% Ophthalmic Solution 1 Drop(s) Both EYES at bedtime  melatonin 3 milliGRAM(s) Oral at bedtime PRN  metoprolol succinate ER 50 milliGRAM(s) Oral daily  mupirocin 2% Ointment 1 Application(s) Topical <User Schedule>  ondansetron Injectable 4 milliGRAM(s) IV Push once PRN  rosuvastatin 20 milliGRAM(s) Oral at bedtime  timolol 0.5% Solution 1 Drop(s) Both EYES two times a day    Labs:                        8.8    5.76  )-----------( 167      ( 06 Jan 2025 17:00 )             28.3     01-06    145  |  117[H]  |  29[H]  ----------------------------<  120[H]  4.4   |  25  |  1.70[H]    Ca    8.5      06 Jan 2025 03:40  Phos  2.6     01-06  Mg     2.1     01-06      PT/INR - ( 06 Jan 2025 03:40 )   PT: 12.0 sec;   INR: 1.03 ratio               Imaging:  No post-op imaging studies

## 2025-01-06 NOTE — PROGRESS NOTE ADULT - SUBJECTIVE AND OBJECTIVE BOX
Patient is a 84y old  Male who presents with a chief complaint of Osteomyelitis (06 Jan 2025 18:04)        INTERVAL HPI/OVERNIGHT EVENTS:   no complaints  pt seen and examined         Vital Signs Last 24 Hrs  T(C): 36.6 (06 Jan 2025 20:28), Max: 37.1 (06 Jan 2025 10:30)  T(F): 97.9 (06 Jan 2025 20:28), Max: 98.7 (06 Jan 2025 10:30)  HR: 56 (06 Jan 2025 20:28) (56 - 70)  BP: 121/55 (06 Jan 2025 20:28) (115/54 - 166/69)  BP(mean): --  RR: 16 (06 Jan 2025 20:28) (15 - 20)  SpO2: 99% (06 Jan 2025 20:28) (90% - 100%)    Parameters below as of 06 Jan 2025 19:28  Patient On (Oxygen Delivery Method): room air        acetaminophen   IVPB .. 1000 milliGRAM(s) IV Intermittent once  acetaminophen   IVPB .. 1000 milliGRAM(s) IV Intermittent once  ammonium lactate 12% Lotion 1 Application(s) Topical two times a day  cefepime   IVPB 2000 milliGRAM(s) IV Intermittent every 12 hours  clopidogrel Tablet 75 milliGRAM(s) Oral daily  cyanocobalamin 1000 MICROGram(s) Oral daily  dextrose 5% + sodium chloride 0.45%. 1000 milliLiter(s) IV Continuous <Continuous>  escitalopram 10 milliGRAM(s) Oral daily  folic acid 1 milliGRAM(s) Oral daily  HYDROmorphone  Injectable 0.5 milliGRAM(s) IV Push every 10 minutes PRN  HYDROmorphone  Injectable 0.2 milliGRAM(s) IV Push every 6 hours PRN  HYDROmorphone  Injectable 0.5 milliGRAM(s) IV Push every 6 hours PRN  influenza  Vaccine (HIGH DOSE) 0.5 milliLiter(s) IntraMuscular once  lactated ringers. 1000 milliLiter(s) IV Continuous <Continuous>  lactated ringers. 1000 milliLiter(s) IV Continuous <Continuous>  latanoprost 0.005% Ophthalmic Solution 1 Drop(s) Both EYES at bedtime  melatonin 3 milliGRAM(s) Oral at bedtime PRN  metoprolol succinate ER 50 milliGRAM(s) Oral daily  mupirocin 2% Ointment 1 Application(s) Topical <User Schedule>  ondansetron Injectable 4 milliGRAM(s) IV Push once PRN  rosuvastatin 20 milliGRAM(s) Oral at bedtime  timolol 0.5% Solution 1 Drop(s) Both EYES two times a day      PHYSICAL EXAM:  GENERAL: NAD   EYES: conjunctiva and sclera clear  ENMT: Moist mucous membranes  NECK: Supple, No JVD, Normal thyroid  CHEST/LUNG: non labored, cta b/l  HEART: Regular rate and rhythm; No murmurs, rubs, or gallops  ABDOMEN: Soft, Nontender, Nondistended; Bowel sounds present  EXTREMITIES:  No clubbing, no cyanosis, no edema  LYMPH: No lymphadenopathy noted  SKIN: No rashes or lesions  NEURO: no new focal deficits    Consultant(s) Notes Reviewed:  [x ] YES  [ ] NO  Care Discussed with Consultants/Other Providers [ x] YES  [ ] NO    LABS:                        8.8    5.76  )-----------( 167      ( 06 Jan 2025 17:00 )             28.3     01-06    146[H]  |  119[H]  |  30[H]  ----------------------------<  125[H]  3.8   |  23  |  1.40[H]    Ca    8.2[L]      06 Jan 2025 17:00  Phos  2.6     01-06  Mg     2.1     01-06      PT/INR - ( 06 Jan 2025 03:40 )   PT: 12.0 sec;   INR: 1.03 ratio           Urinalysis Basic - ( 06 Jan 2025 17:00 )    Color: x / Appearance: x / SG: x / pH: x  Gluc: 125 mg/dL / Ketone: x  / Bili: x / Urobili: x   Blood: x / Protein: x / Nitrite: x   Leuk Esterase: x / RBC: x / WBC x   Sq Epi: x / Non Sq Epi: x / Bacteria: x      CAPILLARY BLOOD GLUCOSE            Urinalysis Basic - ( 06 Jan 2025 17:00 )    Color: x / Appearance: x / SG: x / pH: x  Gluc: 125 mg/dL / Ketone: x  / Bili: x / Urobili: x   Blood: x / Protein: x / Nitrite: x   Leuk Esterase: x / RBC: x / WBC x   Sq Epi: x / Non Sq Epi: x / Bacteria: x          RADIOLOGY & ADDITIONAL TESTS:    Imaging Personally Reviewed  Reviewed consultants input

## 2025-01-06 NOTE — PROGRESS NOTE ADULT - ASSESSMENT
Assessment:  84yMale patient S/P RLE angiogram, anterior tibial and peroneal plasty for peripheral arterial disease. Recovering well. His pain is controlled. He has dopplerable signals in his PT and DP bilaterally. There is a large inguinal mass just below the site of vascular access in the R groin, however the site itself is c/d/i and soft to palpation. This mass is favored more likely to be chronic inguinal hernia than hematoma, however will continue to monitor for signs of expansion.    Plan:  - Keep NPO until hematoma is definitively ruled out  - Continue to monitor for expansion of L groin mass  - Continue to monitor lower extremity vascular exam  - Pain control  - Zofran PRN  - Incentive spirometry  - OOB as tolerated  - SCDs  - Follow up AM labs  - Will continue to monitor

## 2025-01-06 NOTE — PROGRESS NOTE ADULT - ASSESSMENT
STEPHANY on Likely CKD  Hypernatermia  Renal Mass  HTN  PAD    -Baseline Creatinine Unknown but given age and co-morbidities likely CKD  -STEPHANY Likely 2/2 Decreased EABV  -Renal US with solid mass,  will need CT or MRI, at this time favor MRI with elevated creatinine   -S/P IVF with D5W for hypernatremia   -Vascular consult-right lower extremity angiogram planned for monday, mod risk for VLADIMIR  -Discussed risks of contrast upto and including dialysis with patient, he understands risks and willing to proceed  -Will restart IVF to help prevent VLADIMIR  -Creatinine stabilizing

## 2025-01-06 NOTE — PRE-OP CHECKLIST - PATIENT'S PERSONAL PROPERTY REMOVED
1 ring, 1 watch, 1 necklace -->son; 1 pair eyeglasses/glasses/jewelry 1 ring, 1 watch, 1 necklace -->son; 1 pair eyeglasses / pt unable to remove wedding band, waiver given by OR RN/glasses/jewelry

## 2025-01-06 NOTE — CHART NOTE - NSCHARTNOTEFT_GEN_A_CORE
Called by RN for patient complaining of loose stools. Patient seen and examined at bedside. Pt has had uncontrollable diarrhea (loose stools) for past hour.       T(C): 36.3 (01-06-25 @ 05:03), Max: 36.9 (01-05-25 @ 13:48)  HR: 62 (01-06-25 @ 05:03) (58 - 66)  BP: 151/62 (01-06-25 @ 05:03) (115/53 - 166/69)  RR: 18 (01-06-25 @ 05:03) (18 - 20)  SpO2: 93% (01-06-25 @ 05:03) (93% - 97%)  Wt(kg): --    Physical Exam:  Gen: well appearing, NAD  Cardio: regular rate and rhythm, +s1s2, no murmurs, rubs, or gallops  Pulm: CTA b/l, no wheezes, rales or rhonchi  Abdomen: soft, nontender, nondistended, +BS x4 quadrants, no guarding      Assessment/Plan  83yo Male with uncontrollable diarrhea  -Repeat vitals ordered  -F/u GI PCR  -F/u Stool culture  Continue to monitor.  RN to notify with any changes.

## 2025-01-06 NOTE — PROVIDER CONTACT NOTE (OTHER) - ASSESSMENT
pt was nauseous, refused medication to relieve nausea. pt had brown emesis with food noted. pt scheduled for angiogram today.

## 2025-01-06 NOTE — PROGRESS NOTE ADULT - SUBJECTIVE AND OBJECTIVE BOX
Chief Complaint: Toe infection    Interval Events: No events overnight.    Review of Systems:  General: No fevers, chills, weight gain  Skin: No rashes, color changes  Cardiovascular: No chest pain, orthopnea  Respiratory: No shortness of breath, cough  Gastrointestinal: No nausea, abdominal pain  Genitourinary: No incontinence, pain with urination  Musculoskeletal: No pain, swelling, decreased range of motion  Neurological: No headache, weakness  Psychiatric: No depression, anxiety  Endocrine: No weight gain, increased thirst  All other systems are comprehensively negative.    Physical Exam:  Vitals:        Vital Signs Last 24 Hrs  T(C): 37.1 (06 Jan 2025 10:30), Max: 37.1 (06 Jan 2025 10:30)  T(F): 98.7 (06 Jan 2025 10:30), Max: 98.7 (06 Jan 2025 10:30)  HR: 70 (06 Jan 2025 10:30) (58 - 70)  BP: 147/65 (06 Jan 2025 10:30) (115/53 - 166/69)  BP(mean): --  RR: 18 (06 Jan 2025 10:30) (18 - 20)  SpO2: 93% (06 Jan 2025 10:30) (90% - 97%)  Parameters below as of 06 Jan 2025 10:30  Patient On (Oxygen Delivery Method): room air  General: NAD  HEENT: MMM  Neck: No JVD, no carotid bruit  Lungs: CTAB  CV: RRR, nl S1/S2, no M/R/G  Abdomen: S/NT/ND, +BS  Extremities: No LE edema, no cyanosis  Neuro: AAOx3, non-focal  Skin: No rash    Labs:                        9.0    8.78  )-----------( 172      ( 06 Jan 2025 03:40 )             29.1     01-06    145  |  117[H]  |  29[H]  ----------------------------<  120[H]  4.4   |  25  |  1.70[H]    Ca    8.5      06 Jan 2025 03:40  Phos  2.6     01-06  Mg     2.1     01-06          PT/INR - ( 06 Jan 2025 03:40 )   PT: 12.0 sec;   INR: 1.03 ratio             ECG/Telemetry: Baseline artifact, sinus bradycardia, RBBB

## 2025-01-06 NOTE — PROGRESS NOTE ADULT - ASSESSMENT
84M with HTN, PAD, breast ca, CKD admitted with rt great toe osteomyelitis  MRI noted  IV ABX  podiatry, vascular and ID following  s/p angio    CKD/Hypernatremia  creatinine at about baseline  nephrology following  IVF    HTN  continue metoprolol    Breast ca  continue tamoxifen    edema  elevate

## 2025-01-06 NOTE — PROGRESS NOTE ADULT - NS ATTEND AMEND GEN_ALL_CORE FT
PVD with nonhealing right great toe wound.  Plan for angiogram today.  Creatinine stable around 1.7.  All risk and benefits associated with the procedure were discussed in detail with the patient and his son at the bedside.

## 2025-01-06 NOTE — PROGRESS NOTE ADULT - PROBLEM SELECTOR PLAN 1
Chart reviewed and Patient evaluated  Discussed diagnosis and treatment with patient  There is concern for right hallux ulcer with infection and OM  Wound culture results reveal Pseudo. A  MRI shows OM right great toe  Bactroban to be applied daily with dressing  Wound flushed with normal saline  Applied dry sterile dressing  Appreciates vascular consult, plan for angio today 1/6/25  Continue IV abx and follow ID  Weight bearing as tolerated b/l  Patient understands he may require surgical intervention, and is at risk to lose part of the toe, all of the toe, part of the foot, all of the foot. Tentatively plan for surgical intervention pending results from Angiogram  Will cont to allow demarcation of infective process  Podiatry will follow while in house.  Will discuss care plan  with all  Attendings.

## 2025-01-06 NOTE — PROGRESS NOTE ADULT - SUBJECTIVE AND OBJECTIVE BOX
VASCULAR SURGERY PA NOTE:    S: Patient seen and examined at bedside. Patient has no new complaints this AM. Denies fever, chills, chest pain, SOB, palpitations.     MEDICATIONS:  acetaminophen     Tablet .. 650 milliGRAM(s) Oral every 6 hours PRN  ammonium lactate 12% Lotion 1 Application(s) Topical two times a day  cefepime   IVPB 2000 milliGRAM(s) IV Intermittent every 12 hours  cyanocobalamin 1000 MICROGram(s) Oral daily  dextrose 5% + sodium chloride 0.45%. 1000 milliLiter(s) IV Continuous <Continuous>  escitalopram 10 milliGRAM(s) Oral daily  folic acid 1 milliGRAM(s) Oral daily  influenza  Vaccine (HIGH DOSE) 0.5 milliLiter(s) IntraMuscular once  latanoprost 0.005% Ophthalmic Solution 1 Drop(s) Both EYES at bedtime  melatonin 3 milliGRAM(s) Oral at bedtime PRN  metoprolol succinate ER 50 milliGRAM(s) Oral daily  mupirocin 2% Ointment 1 Application(s) Topical <User Schedule>  sodium chloride 0.9%. 1000 milliLiter(s) IV Continuous <Continuous>  tamoxifen 20 milliGRAM(s) Oral daily  timolol 0.5% Solution 1 Drop(s) Both EYES two times a day      O:  Vital Signs Last 24 Hrs  T(C): 36.4 (06 Jan 2025 06:35), Max: 36.9 (05 Jan 2025 13:48)  T(F): 97.5 (06 Jan 2025 06:35), Max: 98.4 (05 Jan 2025 13:48)  HR: 61 (06 Jan 2025 06:35) (58 - 66)  BP: 163/57 (06 Jan 2025 06:35) (115/53 - 166/69)  BP(mean): --  RR: 18 (06 Jan 2025 06:35) (18 - 20)  SpO2: 90% (06 Jan 2025 06:35) (90% - 97%)    Parameters below as of 06 Jan 2025 06:35  Patient On (Oxygen Delivery Method): room air        I&O SUMMARY:    01-05-25 @ 07:01  -  01-06-25 @ 07:00  --------------------------------------------------------  IN: 290 mL / OUT: 0 mL / NET: 290 mL        PHYSICAL EXAM:  Lungs: CTA bilat without W/R/R  Card: S1S2  Abd: Soft, NT, ND.  +BS x 4.  No rebound/guarding.    Ext: Calves soft, NT, without edema bilat. RLE dressings C/D/I. Legs warm to touch, grossly moving LE. Right DP faintly palpable. Right sided PT/DP Pulses dopplerable.     LABS:                        9.0    8.78  )-----------( 172      ( 06 Jan 2025 03:40 )             29.1     01-06    145  |  117[H]  |  29[H]  ----------------------------<  120[H]  4.4   |  25  |  1.70[H]    Ca    8.5      06 Jan 2025 03:40  Phos  2.6     01-06  Mg     2.1     01-06      PT/INR - ( 06 Jan 2025 03:40 )   PT: 12.0 sec;   INR: 1.03 ratio           Assessment and Plan:  Assessment:  85 yo male, presenting with a non healing R toe wound, refractory to oral antibiotics, concerning for osteomyelitis. May require podiatric intervention, Vascular consulted for adequate perfusion. Osteomyelitis noted on R foot MRI.     Plan:  - RLE Angio today  - NPO  - Appreciate cardio/medical clearance preop  - Appreciate nephrology input for Cr optimization preop  - IV Abx  - Daily DSD with local wound care  - Rest of care per primary team

## 2025-01-06 NOTE — CHART NOTE - NSCHARTNOTEFT_GEN_A_CORE
Called by RN for patient complaining of nausea starting an hour ago and then an episode of emesis. Pt endorses it is larger than a cup and it is dark brown in color. This is his first vomiting episode. Pt denies any nausea in the room    T(C): 36.7 (01-05-25 @ 19:12), Max: 37 (01-05-25 @ 04:17)  HR: 66 (01-05-25 @ 19:12) (58 - 66)  BP: 135/62 (01-05-25 @ 19:12) (115/53 - 162/70)  RR: 19 (01-05-25 @ 19:12) (19 - 19)  SpO2: 97% (01-05-25 @ 19:12) (96% - 97%)  Wt(kg): --    Physical Exam:  Gen: well appearing, NAD  Abdomen: soft, nontender, nondistended, +BS x4 quadrants, no guarding    Assessment/Plan  85yo Male w/ episode of nausea and vomiting  -Recheck vitals  -Not giving Zofran d/t nausea resolved when in the room Called by RN for patient complaining of nausea starting an hour ago and then an episode of emesis. Pt endorses it is larger than a cup and it is dark brown in color. This is his first vomiting episode. Pt denies any nausea in the room    T(C): 36.7 (01-05-25 @ 19:12), Max: 37 (01-05-25 @ 04:17)  HR: 66 (01-05-25 @ 19:12) (58 - 66)  BP: 135/62 (01-05-25 @ 19:12) (115/53 - 162/70)  RR: 19 (01-05-25 @ 19:12) (19 - 19)  SpO2: 97% (01-05-25 @ 19:12) (96% - 97%)  Wt(kg): --    Physical Exam:  Gen: well appearing, NAD  Abdomen: soft, nontender, nondistended, +BS x4 quadrants, no guarding    Assessment/Plan  83yo Male w/ episode of nausea and vomiting  -Recheck vitals  -Not giving Zofran d/t nausea resolved when in the room    ADDENDUM  Pt had 3 cups of emesis in the room  Pt given dose of tigan. Called by RN for patient complaining of nausea starting an hour ago and then an episode of emesis. Pt endorses it is larger than a cup and it is dark brown in color. This is his first vomiting episode. Pt denies any nausea in the room    T(C): 36.7 (01-05-25 @ 19:12), Max: 37 (01-05-25 @ 04:17)  HR: 66 (01-05-25 @ 19:12) (58 - 66)  BP: 135/62 (01-05-25 @ 19:12) (115/53 - 162/70)  RR: 19 (01-05-25 @ 19:12) (19 - 19)  SpO2: 97% (01-05-25 @ 19:12) (96% - 97%)  Wt(kg): --    Physical Exam:  Gen: well appearing, NAD  Abdomen: soft, nontender, nondistended, +BS x4 quadrants, no guarding    Assessment/Plan  83yo Male w/ episode of nausea and vomiting  -Recheck vitals  -Not giving Zofran d/t nausea resolved when in the room    ADDENDUM  Pt had 3 cups of dark brown emesis in the room  Pt given dose of Tigan.  STAT H/H ordered

## 2025-01-06 NOTE — PROGRESS NOTE ADULT - SUBJECTIVE AND OBJECTIVE BOX
[facility]  RHEA Brand Y. Patel, S. Shah, [facility]  [phone]    Name: DAYA MATHIS  Age: 84y  Gender: Male  MRN: [id number]    Interval History:  Patient seen and examined at bedside in PACU   Notes reviewed    Antibiotics:  cefepime   IVPB 2000 milliGRAM(s) IV Intermittent every 12 hours      Medications:  acetaminophen   IVPB .. 1000 milliGRAM(s) IV Intermittent once  acetaminophen   IVPB .. 1000 milliGRAM(s) IV Intermittent once  acetaminophen   IVPB .. 1000 milliGRAM(s) IV Intermittent once  ammonium lactate 12% Lotion 1 Application(s) Topical two times a day  cefepime   IVPB 2000 milliGRAM(s) IV Intermittent every 12 hours  clopidogrel Tablet 75 milliGRAM(s) Oral daily  cyanocobalamin 1000 MICROGram(s) Oral daily  dextrose 5% + sodium chloride 0.45%. 1000 milliLiter(s) IV Continuous <Continuous>  escitalopram 10 milliGRAM(s) Oral daily  folic acid 1 milliGRAM(s) Oral daily  HYDROmorphone  Injectable 0.2 milliGRAM(s) IV Push every 6 hours PRN  HYDROmorphone  Injectable 0.5 milliGRAM(s) IV Push every 6 hours PRN  HYDROmorphone  Injectable 0.5 milliGRAM(s) IV Push every 10 minutes PRN  influenza  Vaccine (HIGH DOSE) 0.5 milliLiter(s) IntraMuscular once  lactated ringers. 1000 milliLiter(s) IV Continuous <Continuous>  lactated ringers. 1000 milliLiter(s) IV Continuous <Continuous>  latanoprost 0.005% Ophthalmic Solution 1 Drop(s) Both EYES at bedtime  melatonin 3 milliGRAM(s) Oral at bedtime PRN  metoprolol succinate ER 50 milliGRAM(s) Oral daily  mupirocin 2% Ointment 1 Application(s) Topical <User Schedule>  ondansetron Injectable 4 milliGRAM(s) IV Push once PRN  rosuvastatin 20 milliGRAM(s) Oral at bedtime  timolol 0.5% Solution 1 Drop(s) Both EYES two times a day      Review of Systems:  unable to obtain    Allergies: No Known Allergies    For details regarding the patient's past medical history, social history, family history, and other miscellaneous elements, please refer the initial infectious diseases consultation and/or the admitting history and physical examination for this admission.    Objective:  Vitals:   T(C): 36.6 (01-06-25 @ 15:28), Max: 37.1 (01-06-25 @ 10:30)  HR: 57 (01-06-25 @ 16:58) (57 - 70)  BP: 125/55 (01-06-25 @ 16:58) (124/59 - 166/69)  RR: 16 (01-06-25 @ 16:58) (15 - 20)  SpO2: 95% (01-06-25 @ 16:58) (90% - 100%)    Physical Examination:  General: no acute distress  HEENT: NC/AT, EOMI  Cardio: RRR  Abd: soft, NT, ND  Ext: no edema or cyanosis  Skin: warm, dry, no visible rash      Laboratory Studies:  CBC:                       9.0    8.78  )-----------( 172      ( 06 Jan 2025 03:40 )             29.1     CMP: 01-06    145  |  117[H]  |  29[H]  ----------------------------<  120[H]  4.4   |  25  |  1.70[H]    Ca    8.5      06 Jan 2025 03:40  Phos  2.6     01-06  Mg     2.1     01-06        Urinalysis Basic - ( 06 Jan 2025 03:40 )    Color: x / Appearance: x / SG: x / pH: x  Gluc: 120 mg/dL / Ketone: x  / Bili: x / Urobili: x   Blood: x / Protein: x / Nitrite: x   Leuk Esterase: x / RBC: x / WBC x   Sq Epi: x / Non Sq Epi: x / Bacteria: x        Microbiology: reviewed    Culture - Wound Aerobic (collected 01-02-25 @ 18:30)  Source: .Other  Final Report (01-04-25 @ 15:14):    Few Pseudomonas aeruginosa    Rare Serratia marcescens    Commensal aleyda consistent with body site  Organism: Pseudomonas aeruginosa  Serratia marcescens (01-04-25 @ 15:14)  Organism: Serratia marcescens (01-04-25 @ 15:14)      Method Type: DANIEL      -  Amoxicillin/Clavulanic Acid: R >16/8      -  Ampicillin: R >16 These ampicillin results predict results for amoxicillin      -  Ampicillin/Sulbactam: R >16/8      -  Aztreonam: S <=4      -  Cefazolin: R >16      -  Cefepime: S <=2      -  Cefoxitin: R 16      -  Ceftriaxone: S <=1      -  Ciprofloxacin: R 1      -  Ertapenem: S <=0.5      -  Gentamicin: S <=2      -  Levofloxacin: I 1      -  Meropenem: S <=1      -  Piperacillin/Tazobactam: S <=8      -  Tobramycin: S <=2      -  Trimethoprim/Sulfamethoxazole: S <=0.5/9.5  Organism: Pseudomonas aeruginosa (01-04-25 @ 15:14)      Method Type: DANIEL      -  Aztreonam: S <=4      -  Cefepime: S 8      -  Ceftazidime: S 4      -  Ciprofloxacin: S 0.5      -  Imipenem: S 2      -  Levofloxacin: S 1      -  Meropenem: S <=1      -  Piperacillin/Tazobactam: S <=8    Culture - Blood (collected 01-02-25 @ 17:20)  Source: .Blood BLOOD  Preliminary Report (01-06-25 @ 01:00):    No growth at 72 Hours    Culture - Blood (collected 01-02-25 @ 17:20)  Source: .Blood BLOOD  Preliminary Report (01-06-25 @ 01:00):    No growth at 72 Hours          Radiology: reviewed

## 2025-01-06 NOTE — PROGRESS NOTE ADULT - ASSESSMENT
85yo M with a PMH of L sided breast cancer (s/p lumpectomy, on Tamoxifen), HTN, Anemia, Aortic insufficiency, Carotid artery disease, GERD, Glaucoma, Hepatitis (2014), Lower ext edema ,PVD, who presented to the ED from Dr. Niko Jacques's office for a R toe infection. MRI concerning for osteomyelitis of R 1st toe. He has no fevers and no leukocytosis. Skin changes on lower legs appear concerning for venous stasis. Podiatry following.     R 1st toe ulcer w/ underlying osteomyelitis  - afebrile, no leukocytosis  - BCx NGTD  - Culture - Wound Aerobic (collected 01-02-25 @ 18:30)    Few Pseudomonas aeruginosa    Rare Serratia marcescens  1/6 s/p RLE angio    Recommendations:   C/w cefepime  F/u podiatry recs re: intervention   Trend temps/WBC  Further recs to follow pending above    Infectious Diseases will follow. Please call with any questions.  Yen Glez M.D.  Available on [employer] TEAMS -- *PREFERRED*  [facility] [phone]  For after 5 P.M. and weekends, please call [phone]        Will resume care from [facility] tomorrow    Dr. Samuel More covering service 1/4-1/5; I will resume care 1/6  Infectious Diseases will follow. Please call with any questions.  Yen Glez M.D.  [facility] [phone]  For after 5 P.M. and weekends, please call [phone]  Available on [employer] TEAMS -- *PREFERRED*   85yo M with a PMH of L sided breast cancer (s/p lumpectomy, on Tamoxifen), HTN, Anemia, Aortic insufficiency, Carotid artery disease, GERD, Glaucoma, Hepatitis (2014), Lower ext edema ,PVD, who presented to the ED from Dr. Niko Jacques's office for a R toe infection. MRI concerning for osteomyelitis of R 1st toe. He has no fevers and no leukocytosis. Skin changes on lower legs appear concerning for venous stasis. Podiatry following.     R 1st toe ulcer w/ underlying osteomyelitis  - afebrile, no leukocytosis  - BCx NGTD  - Culture - Wound Aerobic (collected 01-02-25 @ 18:30)    Few Pseudomonas aeruginosa    Rare Serratia marcescens  1/6 s/p RLE angio    Recommendations:   C/w cefepime  F/u podiatry recs re: intervention   Trend temps/WBC  Further recs to follow pending above    Infectious Diseases will follow. Please call with any questions.  Yen Glez M.D.  Available on [employer] TEAMS -- *[facility]*  [facility] [phone]  For after 5 P.M. and weekends, please call [phone]

## 2025-01-06 NOTE — PROGRESS NOTE ADULT - SUBJECTIVE AND OBJECTIVE BOX
[facility]                             Nephrology and Hypertension                             [place]                                          [phone]     Patient is a 84y old  Male who presents with a chief complaint of Osteomyelitis (03 Jan 2025 13:05)       HPI:  Patient is an 85yo M with a PMH of L sided breast cancer (s/p lumpectomy, on Tamoxifen), HTN, Anemia, Aortic insufficiency, Carotid artery disease, GERD, Glaucoma, Hepatitis (2014), Lower ext edema ,PVD who presents to the ED from Dr. Niko Jacques's office for a R toe infection. Patient notes that he noticed a R toe wound a few weeks ago. He completed a 10d course of Amoxicillin 500mg, but noticed worsening of the wound. He went ot his podiatrist today, as the wound was draining a pus like liquid. Podiatry rec he come to the hospital for IV abx and osteomyelitis workup. Patient feeling well in ED.  States he is urinating well.  No N/V/SOB.  Has not seen nephrologist in the past.      NO N/V/SOB    PAST MEDICAL & SURGICAL HISTORY:  Anemia      HTN (hypertension)      Breast cancer      Glaucoma      Major depression      PVD (peripheral vascular disease)      Lower extremity edema      Chronic GERD      H/O: glaucoma      S/P lumpectomy, left breast           FAMILY HISTORY:  NC    Social History:Non smoker    MEDICATIONS  (STANDING):  ammonium lactate 12% Lotion 1 Application(s) Topical two times a day  cefepime   IVPB 2000 milliGRAM(s) IV Intermittent every 12 hours  cyanocobalamin 1000 MICROGram(s) Oral daily  dextrose 5% + sodium chloride 0.45%. 1000 milliLiter(s) (75 mL/Hr) IV Continuous <Continuous>  escitalopram 10 milliGRAM(s) Oral daily  folic acid 1 milliGRAM(s) Oral daily  heparin   Injectable 5000 Unit(s) SubCutaneous every 8 hours  influenza  Vaccine (HIGH DOSE) 0.5 milliLiter(s) IntraMuscular once  latanoprost 0.005% Ophthalmic Solution 1 Drop(s) Both EYES at bedtime  metoprolol succinate ER 50 milliGRAM(s) Oral daily  mupirocin 2% Ointment 1 Application(s) Topical <User Schedule>  tamoxifen 20 milliGRAM(s) Oral daily    MEDICATIONS  (PRN):  acetaminophen     Tablet .. 650 milliGRAM(s) Oral every 6 hours PRN Temp greater or equal to 38C (100.4F), Mild Pain (1 - 3)  melatonin 3 milliGRAM(s) Oral at bedtime PRN Insomnia   Meds reviewed    Allergies    No Known Allergies    Intolerances         REVIEW OF SYSTEMS:    as above    ICU Vital Signs Last 24 Hrs  T(C): 36.6 (06 Jan 2025 14:28), Max: 37.1 (06 Jan 2025 10:30)  T(F): 97.8 (06 Jan 2025 14:28), Max: 98.7 (06 Jan 2025 10:30)  HR: 62 (06 Jan 2025 15:13) (60 - 70)  BP: 129/53 (06 Jan 2025 15:13) (129/53 - 166/69)  BP(mean): --  ABP: --  ABP(mean): --  RR: 15 (06 Jan 2025 15:13) (15 - 20)  SpO2: 97% (06 Jan 2025 15:13) (90% - 100%)    O2 Parameters below as of 06 Jan 2025 15:13  Patient On (Oxygen Delivery Method): nasal cannula  O2 Flow (L/min): 2              PHYSICAL EXAM:    GENERAL: NAD  HEAD:  Atraumatic, Normocephalic  EYES: EOMI, conjunctiva and sclera clear  ENMT: No Drainage from nares, No drainage from ears  NERVOUS SYSTEM:  Awake and Alert  CHEST/LUNG: Clear to percussion bilaterally  EXTREMITIES:  No Edema  SKIN: No rashes No obvious ecchymosis      LABS:                                               9.0    8.78  )-----------( 172      ( 06 Jan 2025 03:40 )             29.1     01-06    145  |  117[H]  |  29[H]  ----------------------------<  120[H]  4.4   |  25  |  1.70[H]    Ca    8.5      06 Jan 2025 03:40  Phos  2.6     01-06  Mg     2.1     01-06      PT/INR - ( 06 Jan 2025 03:40 )   PT: 12.0 sec;   INR: 1.03 ratio           Urinalysis Basic - ( 06 Jan 2025 03:40 )    Color: x / Appearance: x / SG: x / pH: x  Gluc: 120 mg/dL / Ketone: x  / Bili: x / Urobili: x   Blood: x / Protein: x / Nitrite: x   Leuk Esterase: x / RBC: x / WBC x   Sq Epi: x / Non Sq Epi: x / Bacteria: x

## 2025-01-06 NOTE — PROGRESS NOTE ADULT - ASSESSMENT
The patient is an 84 year old male with a history of HTN, anemia, GERD, aortic regurgitation, CKD, breast cancer, PAD who is admitted with toe infection.    Plan:  - ECG with sinus rhythm and RBBB  - Echo with normal LV systolic function, mild aortic stenosis, mild pulmonary hypertension  - Monitor hemoglobin  - CKD - Cr remains stable in the 1.6-1.7 range  - Continue metoprolol succinate 50 mg daily  - Start rosuvastatin 20 mg daily  - If no contraindication, start aspirin 81 mg daily  - The patient remains optimized from a cardiac standpoint to proceed for low risk peripheral angiogram and intermediate risk podiatry surgery

## 2025-01-06 NOTE — SOCIAL WORK PROGRESS NOTE - NSSWPROGRESSNOTE_GEN_ALL_CORE
SW met with patient for depression screen.  Pt is depressed due to being in the hospital and not being able to drive but he denies general depression.  He is alert and oriented.  Mood appears bright.  SW to follow for any additional needs.

## 2025-01-07 LAB
ANION GAP SERPL CALC-SCNC: 5 MMOL/L — SIGNIFICANT CHANGE UP (ref 5–17)
BASOPHILS # BLD AUTO: 0 K/UL — SIGNIFICANT CHANGE UP (ref 0–0.2)
BASOPHILS NFR BLD AUTO: 0 % — SIGNIFICANT CHANGE UP (ref 0–2)
BUN SERPL-MCNC: 29 MG/DL — HIGH (ref 7–23)
CALCIUM SERPL-MCNC: 8 MG/DL — LOW (ref 8.5–10.1)
CHLORIDE SERPL-SCNC: 119 MMOL/L — HIGH (ref 96–108)
CO2 SERPL-SCNC: 23 MMOL/L — SIGNIFICANT CHANGE UP (ref 22–31)
CREAT SERPL-MCNC: 1.5 MG/DL — HIGH (ref 0.5–1.3)
EGFR: 45 ML/MIN/1.73M2 — LOW
EOSINOPHIL # BLD AUTO: 0.02 K/UL — SIGNIFICANT CHANGE UP (ref 0–0.5)
EOSINOPHIL NFR BLD AUTO: 0.4 % — SIGNIFICANT CHANGE UP (ref 0–6)
GLUCOSE SERPL-MCNC: 109 MG/DL — HIGH (ref 70–99)
HCT VFR BLD CALC: 27.1 % — LOW (ref 39–50)
HGB BLD-MCNC: 8.4 G/DL — LOW (ref 13–17)
IMM GRANULOCYTES NFR BLD AUTO: 0.2 % — SIGNIFICANT CHANGE UP (ref 0–0.9)
LYMPHOCYTES # BLD AUTO: 0.59 K/UL — LOW (ref 1–3.3)
LYMPHOCYTES # BLD AUTO: 12.1 % — LOW (ref 13–44)
MAGNESIUM SERPL-MCNC: 2.1 MG/DL — SIGNIFICANT CHANGE UP (ref 1.6–2.6)
MCHC RBC-ENTMCNC: 29.3 PG — SIGNIFICANT CHANGE UP (ref 27–34)
MCHC RBC-ENTMCNC: 31 G/DL — LOW (ref 32–36)
MCV RBC AUTO: 94.4 FL — SIGNIFICANT CHANGE UP (ref 80–100)
MONOCYTES # BLD AUTO: 0.63 K/UL — SIGNIFICANT CHANGE UP (ref 0–0.9)
MONOCYTES NFR BLD AUTO: 13 % — SIGNIFICANT CHANGE UP (ref 2–14)
NEUTROPHILS # BLD AUTO: 3.61 K/UL — SIGNIFICANT CHANGE UP (ref 1.8–7.4)
NEUTROPHILS NFR BLD AUTO: 74.3 % — SIGNIFICANT CHANGE UP (ref 43–77)
NRBC # BLD: 0 /100 WBCS — SIGNIFICANT CHANGE UP (ref 0–0)
NRBC BLD-RTO: 0 /100 WBCS — SIGNIFICANT CHANGE UP (ref 0–0)
PHOSPHATE SERPL-MCNC: 3.8 MG/DL — SIGNIFICANT CHANGE UP (ref 2.5–4.5)
PLATELET # BLD AUTO: 168 K/UL — SIGNIFICANT CHANGE UP (ref 150–400)
POTASSIUM SERPL-MCNC: 4.1 MMOL/L — SIGNIFICANT CHANGE UP (ref 3.5–5.3)
POTASSIUM SERPL-SCNC: 4.1 MMOL/L — SIGNIFICANT CHANGE UP (ref 3.5–5.3)
RBC # BLD: 2.87 M/UL — LOW (ref 4.2–5.8)
RBC # FLD: 17.4 % — HIGH (ref 10.3–14.5)
SODIUM SERPL-SCNC: 147 MMOL/L — HIGH (ref 135–145)
WBC # BLD: 4.86 K/UL — SIGNIFICANT CHANGE UP (ref 3.8–10.5)
WBC # FLD AUTO: 4.86 K/UL — SIGNIFICANT CHANGE UP (ref 3.8–10.5)

## 2025-01-07 RX ORDER — ASPIRIN 81 MG/1
81 TABLET, COATED ORAL DAILY
Refills: 0 | Status: DISCONTINUED | OUTPATIENT
Start: 2025-01-07 | End: 2025-01-15

## 2025-01-07 RX ORDER — SODIUM CHLORIDE 9 G/ML
1000 INJECTION, SOLUTION INTRAVENOUS
Refills: 0 | Status: DISCONTINUED | OUTPATIENT
Start: 2025-01-07 | End: 2025-01-09

## 2025-01-07 RX ADMIN — Medication 1000 MICROGRAM(S): at 11:12

## 2025-01-07 RX ADMIN — SODIUM CHLORIDE 75 MILLILITER(S): 9 INJECTION, SOLUTION INTRAVENOUS at 13:55

## 2025-01-07 RX ADMIN — MUPIROCIN 1 APPLICATION(S): 2 CREAM TOPICAL at 07:42

## 2025-01-07 RX ADMIN — ACETAMINOPHEN 400 MILLIGRAM(S): 160 SUSPENSION ORAL at 10:27

## 2025-01-07 RX ADMIN — Medication 75 MILLIGRAM(S): at 11:12

## 2025-01-07 RX ADMIN — ACETAMINOPHEN 1000 MILLIGRAM(S): 160 SUSPENSION ORAL at 04:00

## 2025-01-07 RX ADMIN — Medication 1 APPLICATION(S): at 17:42

## 2025-01-07 RX ADMIN — ESCITALOPRAM 10 MILLIGRAM(S): 10 TABLET, FILM COATED ORAL at 11:12

## 2025-01-07 RX ADMIN — TIMOLOL MALEATE 1 DROP(S): 5 SOLUTION OPHTHALMIC at 17:39

## 2025-01-07 RX ADMIN — ACETAMINOPHEN, DIPHENHYDRAMINE HCL, PHENYLEPHRINE HCL 3 MILLIGRAM(S): 325; 25; 5 TABLET ORAL at 21:08

## 2025-01-07 RX ADMIN — ASPIRIN 81 MILLIGRAM(S): 81 TABLET, COATED ORAL at 11:12

## 2025-01-07 RX ADMIN — LATANOPROST 1 DROP(S): 50 SOLUTION OPHTHALMIC at 21:07

## 2025-01-07 RX ADMIN — Medication 1 MILLIGRAM(S): at 11:13

## 2025-01-07 RX ADMIN — TIMOLOL MALEATE 1 DROP(S): 5 SOLUTION OPHTHALMIC at 05:13

## 2025-01-07 RX ADMIN — Medication 100 MILLIGRAM(S): at 17:39

## 2025-01-07 RX ADMIN — ROSUVASTATIN CALCIUM 20 MILLIGRAM(S): 10 TABLET, FILM COATED ORAL at 21:06

## 2025-01-07 RX ADMIN — Medication 1 APPLICATION(S): at 05:14

## 2025-01-07 RX ADMIN — ACETAMINOPHEN 1000 MILLIGRAM(S): 160 SUSPENSION ORAL at 11:01

## 2025-01-07 RX ADMIN — ACETAMINOPHEN 400 MILLIGRAM(S): 160 SUSPENSION ORAL at 03:21

## 2025-01-07 RX ADMIN — Medication 100 MILLIGRAM(S): at 05:13

## 2025-01-07 RX ADMIN — SODIUM CHLORIDE 75 MILLILITER(S): 9 INJECTION, SOLUTION INTRAVENOUS at 21:06

## 2025-01-07 RX ADMIN — Medication 50 MILLIGRAM(S): at 05:14

## 2025-01-07 NOTE — PROGRESS NOTE ADULT - ASSESSMENT
83yo M with a PMH of L sided breast cancer (s/p lumpectomy, on Tamoxifen), HTN, Anemia, Aortic insufficiency, Carotid artery disease, GERD, Glaucoma, Hepatitis (2014), Lower ext edema ,PVD, who presented to the ED from Dr. Niko Jacques's office for a R toe infection. MRI concerning for osteomyelitis of R 1st toe. He has no fevers and no leukocytosis. Skin changes on lower legs appear concerning for venous stasis. Podiatry following.     R 1st toe ulcer w/ underlying osteomyelitis  - afebrile, no leukocytosis  - BCx NGTD  - Culture - Wound Aerobic (collected 01-02-25 @ 18:30)    Few Pseudomonas aeruginosa    Rare Serratia marcescens  1/6 s/p RLE angio    Recommendations:   C/w cefepime  F/u podiatry recs re: intervention   Trend temps/WBC  Further recs to follow pending above    Infectious Diseases will follow. Please call with any questions.  Yen Glez M.D.  Available on [employer] TEAMS -- *[facility]*  [facility] [phone]  For after 5 P.M. and weekends, please call [phone]

## 2025-01-07 NOTE — PROGRESS NOTE ADULT - ASSESSMENT
STEPHANY on CKD  Hypernatermia  Renal Mass  HTN  PAD    -Baseline Creatinine Unknown but given age and co-morbidities will have CKD  -STEPHANY Likely 2/2 Decreased EABV  -Renal US with solid mass,  will need CT or MRI, at this time favor MRI with elevated creatinine   -S/P IVF with D5W for hypernatremia; will restart due to worsened hypernatremia again  -S/p LE Angio; no evidence of VLADIMIR at this time  -Creatinine improving

## 2025-01-07 NOTE — CASE MANAGEMENT PROGRESS NOTE - NSCMPROGRESSNOTE_GEN_ALL_CORE
Discussed patient in interdisciplinary rounds.  Patient is s/p angio, pending potential podiatry procedure.  Remains on IV cefepime at present.  Referral was sent to [facility] for rolling walker pending acceptance.  Will monitor for needs and remain available.

## 2025-01-07 NOTE — PROGRESS NOTE ADULT - SUBJECTIVE AND OBJECTIVE BOX
Chief Complaint: Toe infection    Interval Events: No events overnight.    Review of Systems:  General: No fevers, chills, weight gain  Skin: No rashes, color changes  Cardiovascular: No chest pain, orthopnea  Respiratory: No shortness of breath, cough  Gastrointestinal: No nausea, abdominal pain  Genitourinary: No incontinence, pain with urination  Musculoskeletal: No pain, swelling, decreased range of motion  Neurological: No headache, weakness  Psychiatric: No depression, anxiety  Endocrine: No weight gain, increased thirst  All other systems are comprehensively negative.    Physical Exam:  Vital Signs Last 24 Hrs  T(C): 36.8 (07 Jan 2025 05:00), Max: 37.1 (06 Jan 2025 10:30)  T(F): 98.3 (07 Jan 2025 05:00), Max: 98.7 (06 Jan 2025 10:30)  HR: 62 (07 Jan 2025 05:00) (56 - 70)  BP: 128/51 (07 Jan 2025 05:00) (115/54 - 152/51)  BP(mean): --  RR: 18 (07 Jan 2025 05:00) (15 - 18)  SpO2: 93% (07 Jan 2025 05:00) (93% - 100%)  Parameters below as of 07 Jan 2025 05:00  Patient On (Oxygen Delivery Method): room air  General: NAD  HEENT: MMM  Neck: No JVD, no carotid bruit  Lungs: CTAB  CV: RRR, nl S1/S2, no M/R/G  Abdomen: S/NT/ND, +BS  Extremities: No LE edema, no cyanosis  Neuro: AAOx3, non-focal  Skin: No rash    Labs:             01-06    146[H]  |  119[H]  |  30[H]  ----------------------------<  125[H]  3.8   |  23  |  1.40[H]    Ca    8.2[L]      06 Jan 2025 17:00  Phos  2.6     01-06  Mg     2.1     01-06                          8.4    4.86  )-----------( 168      ( 07 Jan 2025 08:30 )             27.1       ECG/Telemetry: Baseline artifact, sinus bradycardia, RBBB

## 2025-01-07 NOTE — PROGRESS NOTE ADULT - ASSESSMENT
Encounter Date: 5/14/2023       History     Chief Complaint   Patient presents with    packing removal     Pt comes to the er via pov with c/o needing her packing to be removed. Pt stated that she came to the er last Thursday for a boil on her bottom and was told to come back and have the packing removed.      45-year-old female presents for a wound check.  Patient was seen in the ER a couple of days ago and diagnosed with an abscess.  She had an incision and drainage performed with packing placed.  The abscess was noted to the top of the buttocks, pilonidal region..  She reports compliance with her antibiotics.  No fever or chills.  No nausea.  Pain improving.    Review of patient's allergies indicates:   Allergen Reactions    Metformin      Diarrhea on metformin XR     Pneumococcal 23-abimbola ps vaccine      Past Medical History:   Diagnosis Date    Atrial fibrillation     Blood clot associated with vein wall inflammation     not dvt    Cardiomyopathy     Normal cors on cath 11/2017    CHF (congestive heart failure)     DM (diabetes mellitus) 9/19/2013    Hyperlipidemia     Hypertension     Psoriasis     Sleep apnea      Past Surgical History:   Procedure Laterality Date    CARDIAC CATHETERIZATION      COLONOSCOPY      COLONOSCOPY N/A 3/9/2022    Procedure: COLONOSCOPY;  Surgeon: Vielka Burrell MD;  Location: [facility];  Service: Endoscopy;  Laterality: N/A;    DILATION AND CURETTAGE OF UTERUS      ESOPHAGOGASTRODUODENOSCOPY N/A 5/9/2019    Procedure: EGD (ESOPHAGOGASTRODUODENOSCOPY);  Surgeon: Vielka Burrell MD;  Location: [facility];  Service: Endoscopy;  Laterality: N/A;    TRANSFORAMINAL EPIDURAL INJECTION OF STEROID N/A 1/6/2022    Procedure: Transforaminal ANKITA L4/L5 L5/S1;  Surgeon: Jed Yeager MD;  Location: [facility];  Service: Pain Management;  Laterality: N/A;    TRANSFORAMINAL EPIDURAL INJECTION OF STEROID Right 12/1/2022    Procedure: INJECTION, STEROID, EPIDURAL, TRANSFORAMINAL  APPROACH, RIGHT L4/L5 & L5/S1 CONTRAST;  Surgeon: Jed Yeager MD;  Location: [facility]IN [facility];  Service: Pain Management;  Laterality: Right;    TRANSFORAMINAL EPIDURAL INJECTION OF STEROID Right 4/14/2023    Procedure: INJECTION, STEROID, EPIDURAL, TRANSFORAMINAL APPROACH RIGHT S1;  Surgeon: Jed Yeager MD;  Location: [facility]IN [facility];  Service: Pain Management;  Laterality: Right;  3/14 AUTH     Family History   Problem Relation Age of Onset    Hypertension Mother     Hypertension Father     Diabetes Father     Diabetes Maternal Grandmother     Diabetes Paternal Grandmother     Breast cancer Neg Hx     Colon cancer Neg Hx     Ovarian cancer Neg Hx      Social History     Tobacco Use    Smoking status: Never    Smokeless tobacco: Never    Tobacco comments:     smokes cigars on occasion   Substance Use Topics    Alcohol use: Not Currently     Comment: occasional    Drug use: Not Currently     Types: Marijuana     Comment: occ     Review of Systems   Constitutional:  Negative for fever.   HENT:  Negative for sore throat.    Respiratory:  Negative for shortness of breath.    Cardiovascular:  Negative for chest pain.   Gastrointestinal:  Negative for nausea.   Genitourinary:  Negative for dysuria.   Musculoskeletal:  Negative for back pain.   Skin:  Positive for wound. Negative for rash.   Neurological:  Negative for weakness.   Hematological:  Does not bruise/bleed easily.     Physical Exam     Initial Vitals [05/14/23 2334]   BP Pulse Resp Temp SpO2   122/81 95 16 98.2 °F (36.8 °C) 95 %      MAP       --         Physical Exam    Constitutional: Vital signs are normal. She appears well-developed and well-nourished.   HENT:   Head: Normocephalic and atraumatic.   Right Ear: Hearing normal.   Left Ear: Hearing normal.   Eyes: Conjunctivae are normal.   Cardiovascular:  Normal rate and regular rhythm.           Abdominal: Abdomen is soft. Bowel sounds are normal.   Musculoskeletal:         General: Normal range of  motion.     Neurological: She is alert and oriented to person, place, and time.   Skin: Skin is warm and intact.   Examination of the region reveals that the packing is already gone.  There is still a small amount of drainage from the wound.  No surrounding redness, no tenderness on exam   Psychiatric: She has a normal mood and affect. Her speech is normal and behavior is normal. Cognition and memory are normal.       ED Course   Procedures  Labs Reviewed - No data to display       Imaging Results    None          Medications - No data to display  Medical Decision Making:   History:   Old Medical Records: I decided to obtain old medical records.  Old Records Summarized: records from clinic visits.  Initial Assessment:   45-year-old female to the ER for a wound check  Differential Diagnosis:   Abscess, pilonidal cyst, cellulitis  ED Management:  Plan:   Packing has already been removed.    The area appears to be healing based on prior documentation.  There is no surrounding erythema.  No signs of cellulitis.  No malodor.  The wound is still open with mild drainage noted.  It is not tender on exam.  Patient advised to continue and complete her full course of antibiotics, follow-up as scheduled and return to the ED as needed.                        Clinical Impression:   Final diagnoses:  [Z51.89] Wound check, abscess (Primary)  [L02.91] Abscess        ED Disposition Condition    Discharge Stable          ED Prescriptions    None       Follow-up Information       Follow up With Specialties Details Why Contact Info    Gil Leal MD Family Medicine   [address] [place] [address]  [phone]               Francois Aguilar PA-C  05/15/23 0000     83 yo male, presenting with a non healing R toe wound, refractory to oral antibiotics, concerning for osteomyelitis.  PVD with nonhealing right great toe wound.  S/P RLE angio/AT and peroneal angioplasty  POD #1  Doing well    Cont Plavix  Add ASA 81 mg po daily  Cont statin  Pt optimized from vascular perspective for podiatric intervention as indicated  Groin access; No heavy lifting, driving, sex, tub baths, swimming, or any activity that submerges the lower half of the body in water for 48 hours.    Change the bandaid after 24 hours and every 24 hours after that.  Keep the puncture site dry and covered with a bandaid until a scab forms.    Observe the site frequently.  If bleeding or a large lump (the size of a golf ball or bigger) occurs lie flat, apply continuous direct pressure just above the puncture site for at least 10 minutes, and notify your physician immediately.  If the bleeding cannot be controlled, call 911 immediately for assistance.  Notify your physician of pain, swelling or any drainage.    Follow up outpatient with vascular surgery   Will sign off; reconsult prn  Discussed with Dr Khan

## 2025-01-07 NOTE — PROGRESS NOTE ADULT - SUBJECTIVE AND OBJECTIVE BOX
Patient is a 84y old  Male who presents with a chief complaint of Osteomyelitis (07 Jan 2025 13:39)      INTERVAL HPI/OVERNIGHT EVENTS: noted  pt seen and examined this am   events noted  feels well      Vital Signs Last 24 Hrs  T(C): 36.4 (07 Jan 2025 12:23), Max: 36.8 (07 Jan 2025 05:00)  T(F): 97.5 (07 Jan 2025 12:23), Max: 98.3 (07 Jan 2025 05:00)  HR: 54 (07 Jan 2025 12:23) (54 - 62)  BP: 122/43 (07 Jan 2025 12:23) (121/55 - 136/58)  BP(mean): --  RR: 17 (07 Jan 2025 12:23) (16 - 18)  SpO2: 96% (07 Jan 2025 12:23) (93% - 99%)    Parameters below as of 07 Jan 2025 12:23  Patient On (Oxygen Delivery Method): room air        ammonium lactate 12% Lotion 1 Application(s) Topical two times a day  aspirin enteric coated 81 milliGRAM(s) Oral daily  cefepime   IVPB 2000 milliGRAM(s) IV Intermittent every 12 hours  clopidogrel Tablet 75 milliGRAM(s) Oral daily  cyanocobalamin 1000 MICROGram(s) Oral daily  dextrose 5% + sodium chloride 0.45%. 1000 milliLiter(s) IV Continuous <Continuous>  dextrose 5%. 1000 milliLiter(s) IV Continuous <Continuous>  escitalopram 10 milliGRAM(s) Oral daily  folic acid 1 milliGRAM(s) Oral daily  HYDROmorphone  Injectable 0.2 milliGRAM(s) IV Push every 6 hours PRN  HYDROmorphone  Injectable 0.5 milliGRAM(s) IV Push every 6 hours PRN  influenza  Vaccine (HIGH DOSE) 0.5 milliLiter(s) IntraMuscular once  latanoprost 0.005% Ophthalmic Solution 1 Drop(s) Both EYES at bedtime  melatonin 3 milliGRAM(s) Oral at bedtime PRN  metoprolol succinate ER 50 milliGRAM(s) Oral daily  mupirocin 2% Ointment 1 Application(s) Topical <User Schedule>  rosuvastatin 20 milliGRAM(s) Oral at bedtime  timolol 0.5% Solution 1 Drop(s) Both EYES two times a day      PHYSICAL EXAM:  GENERAL: NAD,   EYES: conjunctiva and sclera clear  ENMT: Moist mucous membranes  NECK: Supple, No JVD, Normal thyroid  CHEST/LUNG: non labored, cta b/l  HEART: Regular rate and rhythm; No murmurs, rubs, or gallops  ABDOMEN: Soft, Nontender, Nondistended; Bowel sounds present  EXTREMITIES:  2+ Peripheral Pulses, No clubbing, cyanosis, or edema  LYMPH: No lymphadenopathy noted  SKIN: No rashes or lesions    Consultant(s) Notes Reviewed:  [x ] YES  [ ] NO  Care Discussed with Consultants/Other Providers [ x] YES  [ ] NO    LABS:                        8.4    4.86  )-----------( 168      ( 07 Jan 2025 08:30 )             27.1     01-07    147[H]  |  119[H]  |  29[H]  ----------------------------<  109[H]  4.1   |  23  |  1.50[H]    Ca    8.0[L]      07 Jan 2025 08:30  Phos  3.8     01-07  Mg     2.1     01-07      PT/INR - ( 06 Jan 2025 03:40 )   PT: 12.0 sec;   INR: 1.03 ratio           Urinalysis Basic - ( 07 Jan 2025 08:30 )    Color: x / Appearance: x / SG: x / pH: x  Gluc: 109 mg/dL / Ketone: x  / Bili: x / Urobili: x   Blood: x / Protein: x / Nitrite: x   Leuk Esterase: x / RBC: x / WBC x   Sq Epi: x / Non Sq Epi: x / Bacteria: x      CAPILLARY BLOOD GLUCOSE            Urinalysis Basic - ( 07 Jan 2025 08:30 )    Color: x / Appearance: x / SG: x / pH: x  Gluc: 109 mg/dL / Ketone: x  / Bili: x / Urobili: x   Blood: x / Protein: x / Nitrite: x   Leuk Esterase: x / RBC: x / WBC x   Sq Epi: x / Non Sq Epi: x / Bacteria: x          RADIOLOGY & ADDITIONAL TESTS:    Imaging Personally Reviewed:  [x ] YES  [ ] NO

## 2025-01-07 NOTE — PROGRESS NOTE ADULT - SUBJECTIVE AND OBJECTIVE BOX
[facility]  [facility], [facility], [facility]  [phone]    Name: DAYA MATHIS  Age: 84y  Gender: Male  MRN: [id number]    Interval History:  Patient seen and examined at bedside  No acute overnight events. Afebrile  Feeling better  Notes reviewed    Antibiotics:  cefepime   IVPB 2000 milliGRAM(s) IV Intermittent every 12 hours      Medications:  ammonium lactate 12% Lotion 1 Application(s) Topical two times a day  aspirin enteric coated 81 milliGRAM(s) Oral daily  cefepime   IVPB 2000 milliGRAM(s) IV Intermittent every 12 hours  clopidogrel Tablet 75 milliGRAM(s) Oral daily  cyanocobalamin 1000 MICROGram(s) Oral daily  dextrose 5% + sodium chloride 0.45%. 1000 milliLiter(s) IV Continuous <Continuous>  dextrose 5%. 1000 milliLiter(s) IV Continuous <Continuous>  escitalopram 10 milliGRAM(s) Oral daily  folic acid 1 milliGRAM(s) Oral daily  HYDROmorphone  Injectable 0.2 milliGRAM(s) IV Push every 6 hours PRN  HYDROmorphone  Injectable 0.5 milliGRAM(s) IV Push every 6 hours PRN  influenza  Vaccine (HIGH DOSE) 0.5 milliLiter(s) IntraMuscular once  latanoprost 0.005% Ophthalmic Solution 1 Drop(s) Both EYES at bedtime  melatonin 3 milliGRAM(s) Oral at bedtime PRN  metoprolol succinate ER 50 milliGRAM(s) Oral daily  mupirocin 2% Ointment 1 Application(s) Topical <User Schedule>  rosuvastatin 20 milliGRAM(s) Oral at bedtime  timolol 0.5% Solution 1 Drop(s) Both EYES two times a day      Review of Systems:  A 10-point review of systems was obtained.   Review of systems otherwise negative except as previously noted.    Allergies: No Known Allergies    For details regarding the patient's past medical history, social history, family history, and other miscellaneous elements, please refer the initial infectious diseases consultation and/or the admitting history and physical examination for this admission.    Objective:  Vitals:   T(C): 36.4 (01-07-25 @ 12:23), Max: 36.8 (01-07-25 @ 05:00)  HR: 54 (01-07-25 @ 12:23) (54 - 68)  BP: 122/43 (01-07-25 @ 12:23) (115/54 - 140/55)  RR: 17 (01-07-25 @ 12:23) (15 - 18)  SpO2: 96% (01-07-25 @ 12:23) (93% - 100%)    Physical Examination:  General: no acute distress  HEENT: NC/AT, EOMI  Cardio: S1, S2 heard, RRR, no murmurs  Resp: breath sounds heard bilaterally  Abd: soft, NT, ND  Ext: no edema or cyanosis  Skin: warm, dry, no visible rash      Laboratory Studies:  CBC:                       8.4    4.86  )-----------( 168      ( 07 Jan 2025 08:30 )             27.1     CMP: 01-07    147[H]  |  119[H]  |  29[H]  ----------------------------<  109[H]  4.1   |  23  |  1.50[H]    Ca    8.0[L]      07 Jan 2025 08:30  Phos  3.8     01-07  Mg     2.1     01-07        Urinalysis Basic - ( 07 Jan 2025 08:30 )    Color: x / Appearance: x / SG: x / pH: x  Gluc: 109 mg/dL / Ketone: x  / Bili: x / Urobili: x   Blood: x / Protein: x / Nitrite: x   Leuk Esterase: x / RBC: x / WBC x   Sq Epi: x / Non Sq Epi: x / Bacteria: x        Microbiology: reviewed    Culture - Wound Aerobic (collected 01-02-25 @ 18:30)  Source: .Other  Final Report (01-04-25 @ 15:14):    Few Pseudomonas aeruginosa    Rare Serratia marcescens    Commensal aleyda consistent with body site  Organism: Pseudomonas aeruginosa  Serratia marcescens (01-04-25 @ 15:14)  Organism: Serratia marcescens (01-04-25 @ 15:14)      Method Type: DANIEL      -  Amoxicillin/Clavulanic Acid: R >16/8      -  Ampicillin: R >16 These ampicillin results predict results for amoxicillin      -  Ampicillin/Sulbactam: R >16/8      -  Aztreonam: S <=4      -  Cefazolin: R >16      -  Cefepime: S <=2      -  Cefoxitin: R 16      -  Ceftriaxone: S <=1      -  Ciprofloxacin: R 1      -  Ertapenem: S <=0.5      -  Gentamicin: S <=2      -  Levofloxacin: I 1      -  Meropenem: S <=1      -  Piperacillin/Tazobactam: S <=8      -  Tobramycin: S <=2      -  Trimethoprim/Sulfamethoxazole: S <=0.5/9.5  Organism: Pseudomonas aeruginosa (01-04-25 @ 15:14)      Method Type: DANIEL      -  Aztreonam: S <=4      -  Cefepime: S 8      -  Ceftazidime: S 4      -  Ciprofloxacin: S 0.5      -  Imipenem: S 2      -  Levofloxacin: S 1      -  Meropenem: S <=1      -  Piperacillin/Tazobactam: S <=8    Culture - Blood (collected 01-02-25 @ 17:20)  Source: .Blood BLOOD  Preliminary Report (01-07-25 @ 01:09):    No growth at 4 days    Culture - Blood (collected 01-02-25 @ 17:20)  Source: .Blood BLOOD  Preliminary Report (01-07-25 @ 01:09):    No growth at 4 days          Radiology: reviewed

## 2025-01-07 NOTE — PROGRESS NOTE ADULT - SUBJECTIVE AND OBJECTIVE BOX
Patient is a 84y old  Male who presents with a chief complaint of Osteomyelitis (06 Jan 2025 20:32)    Patient seen and examined at bedside. Patient is s/p Right LE angioplasty POD 1. Patient denies pain at the groin site or affected leg. Denies numbness, tingling, or weakness of the RLE. Patient offers no new complaints. Denies chest pain, SOB, nausea, vomiting.     cefepime   IVPB 2000  aspirin enteric coated 81  cefepime   IVPB 2000  clopidogrel Tablet 75  metoprolol succinate ER 50    Allergies    No Known Allergies    Intolerances        Vital Signs Last 24 Hrs  T(C): 36.8 (07 Jan 2025 05:00), Max: 36.8 (07 Jan 2025 05:00)  T(F): 98.3 (07 Jan 2025 05:00), Max: 98.3 (07 Jan 2025 05:00)  HR: 62 (07 Jan 2025 05:00) (56 - 68)  BP: 128/51 (07 Jan 2025 05:00) (115/54 - 152/51)  BP(mean): --  RR: 18 (07 Jan 2025 05:00) (15 - 18)  SpO2: 93% (07 Jan 2025 05:00) (93% - 100%)    Parameters below as of 07 Jan 2025 05:00  Patient On (Oxygen Delivery Method): room air      I&O's Detail    06 Jan 2025 07:01  -  07 Jan 2025 07:00  --------------------------------------------------------  IN:    IV PiggyBack: 150 mL    Lactated Ringers: 375 mL  Total IN: 525 mL    OUT:    Voided (mL): 900 mL  Total OUT: 900 mL    Total NET: -375 mL          Physical Exam:  General: NAD, resting comfortably in bed  Pulmonary: Nonlabored breathing, no respiratory distress  Cardiovascular: NSR  Abdominal: soft, NT/ND  Extremities: Left groin without hematoma or ecchymosis. Trace edema RLE, right great toe wound with pink fibrinous base without periwound erythema or edema. Hyperkeratotic border. L inguinal hernia appreciated  Pulses:   Right:                                                                          Left:  FEM [x ]2+ [ ]1+ [ ]doppler                                             FEM [x ]2+ [ ]1+ [ ]doppler    POP [ ]2+ [x ]1+ [ ]doppler                                             POP [x ]2+ [ ]1+ [ ]doppler    DP [ ]2+ [ ]1+ [x ]doppler                                                DP [ ]2+ [ ]1+ [x ]doppler  PT[ ]2+ [ ]1+ [x ]doppler                                                  PT [ ]2+ [ ]1+ [ ]doppler      LABS:                        8.4    4.86  )-----------( 168      ( 07 Jan 2025 08:30 )             27.1     01-07    147[H]  |  119[H]  |  29[H]  ----------------------------<  109[H]  4.1   |  23  |  1.50[H]    Ca    8.0[L]      07 Jan 2025 08:30  Phos  3.8     01-07  Mg     2.1     01-07      PT/INR - ( 06 Jan 2025 03:40 )   PT: 12.0 sec;   INR: 1.03 ratio           CAPILLARY BLOOD GLUCOSE        Radiology and Additional Studies:    < from: MR Foot No Cont, Right (01.03.25 @ 10:43) >    ACC: [id number] EXAM:  MR FOOT RT   ORDERED BY: LOTTIE PIERRE     PROCEDURE DATE:  01/03/2025          INTERPRETATION:  MRI of the right foot    TECHNIQUE: Multiplanar multisequence MRI of the right foot without   contrast    INDICATION: Ulcer with concern for osteomyelitis.    COMPARISON: Radiograph of the right foot performed 1/2/2025.    FINDINGS:    Soft tissues: There is irregularity of the skin at the plantar surface of   the great toe at the level of the IP joint with generalized adjacent soft   tissue edema. Subcutaneous edema is present at the dorsal surface of the   foot at the level of the metatarsals and phalanges. There is no drainable   fluid collection.    Osseous structures: Prominent bone marrow edema pattern is present   throughout the proximal phalanx and distal phalanx of the great toe with   corresponding there are degrees of abnormal signal on T1-weighted   imaging. More patchy bone marrow edema is present at the head of the   first metatarsal with corresponding low [id number] which may represent   arthritic changes versus erosions. There is a focal area of high T2 and   intermediate to low T1 signal involving the base of the third and second   metatarsals (series 5, images 10 through 21). There are additional   scattered subchondral edematous changes involving the navicular cuneiform   and cuboid are felt to be secondary to arthritis.    Muscles and tendons: There is scattered feathery muscle edema throughout   the forefoot. There is no evidence of tendon tear      IMPRESSION:    MRI of the right foot demonstrates ulceration at the plantar surface of   the great toe distally with underlying bone marrow edema pattern and   abnormal T1 signal within the distal and proximal phalanx consistent with   osteomyelitis. Edematous changes at the head of the first metatarsal is   indeterminate and may represent osteoarthrosis versus reactive changes,   although underlying osteomyelitis is not excluded. There is no soft   tissue drainable fluid collection.    Bone marrow edema pattern at the base of the third through fifth   metatarsals with corresponding abnormal T1 signal is indeterminate.   Findings may be related to reactive/stress changes versus arthritic   changes versus infectious process.      < end of copied text >

## 2025-01-07 NOTE — PROGRESS NOTE ADULT - PROBLEM SELECTOR PLAN 1
Chart reviewed and Patient evaluated  Discussed diagnosis and treatment with patient  There is concern for right hallux ulcer with infection and OM  Wound culture results reveal Pseudo. A  MRI shows OM right great toe  Bactroban to be applied daily with dressing  Wound flushed with normal saline  Applied dry sterile dressing  Appreciates vascular consult, angiogram done 1/6/2025  Continue IV abx and follow ID  Weight bearing as tolerated b/l  Patient understands he may require surgical intervention, and is at risk to lose part of the toe, all of the toe, part of the foot, all of the foot.   Will cont to allow demarcation of infective process. Discussed all the plan with patient today. The patient has requested additional time to consider the option of toe amputation.   Will discuss care plan  with all  Attendings.

## 2025-01-07 NOTE — PROGRESS NOTE ADULT - SUBJECTIVE AND OBJECTIVE BOX
PROGRESS NOTE   Patient is a 84y old  Male who presents with a chief complaint of Osteomyelitis (07 Jan 2025 10:50)      HPI:  Patient is an 85yo M with a PMH of L sided breast cancer (s/p lumpectomy, on Tamoxifen), HTN, Anemia, Aortic insufficiency, Carotid artery disease, GERD, Glaucoma, Hepatitis (2014), Lower ext edema ,PVD who presents to the ED from Dr. Niko Jacques's office for a R toe infection. Patient notes that he noticed a R toe wound a few weeks ago. He completed a 10d course of Amoxicillin 500mg, but noticed worsening of the wound. He went ot his podiatrist today, as the wound was draining a pus like liquid. Podiatry rec he come to the hospital for IV abx and osteomyelitis workup. Patient feeling well in ED.    ED Course:  Vitals: /65, HR 71, T 97.3, RR 16 SpO2 100% on RA  Labs: ESR 75, Hgb 9.2, Na 148, Cl 117, BUN/Cr 35/1.70, eGFR 39  Given: IV Zosyn, IV Vancomycin, 1L NS bolus     Imaging:  Chest Xray: No acute chest finding.   R foot Xray: Possible erosion of the distal phalanx of the right great toe with associated soft tissue swelling suspicious for osteomyelitis. (02 Jan 2025 19:34)      Vital Signs Last 24 Hrs  T(C): 36.8 (07 Jan 2025 05:00), Max: 36.8 (07 Jan 2025 05:00)  T(F): 98.3 (07 Jan 2025 05:00), Max: 98.3 (07 Jan 2025 05:00)  HR: 62 (07 Jan 2025 05:00) (56 - 68)  BP: 128/51 (07 Jan 2025 05:00) (115/54 - 140/55)  BP(mean): --  RR: 18 (07 Jan 2025 05:00) (15 - 18)  SpO2: 93% (07 Jan 2025 05:00) (93% - 100%)    Parameters below as of 07 Jan 2025 05:00  Patient On (Oxygen Delivery Method): room air                              8.4    4.86  )-----------( 168      ( 07 Jan 2025 08:30 )             27.1               01-07    147[H]  |  119[H]  |  29[H]  ----------------------------<  109[H]  4.1   |  23  |  1.50[H]    Ca    8.0[L]      07 Jan 2025 08:30  Phos  3.8     01-07  Mg     2.1     01-07        PHYSICAL EXAM  General: Pleasant  male NAD & AOX3.    Integument:  Skin warm, dry and supple bilateral.    Noted plantar right hallux ulcer, fibro-granular base, apx [id number], reduced edema, reduced erythema, minimal drainage, - hyperkeratotic base, +tracking, with noted cellulitic changes, Consistent with OM  Vascular: Dorsalis Pedis and Posterior Tibial pulses non-palpable.  Capillary re-fill time less then 3 seconds digits 1-5 bilateral.  noted b/l lower leg edema, noted venous stasis b/l lower extremity  Neuro: Protective sensation intact to the level of the digits bilateral.  MSK: Muscle strength 4/5 all major muscle groups bilateral.

## 2025-01-07 NOTE — PROGRESS NOTE ADULT - ASSESSMENT
The patient is an 84 year old male with a history of HTN, anemia, GERD, aortic regurgitation, CKD, breast cancer, PAD who is admitted with toe infection.    Plan:  - ECG with sinus rhythm and RBBB  - Echo with normal LV systolic function, mild aortic stenosis, mild pulmonary hypertension  - Monitor hemoglobin  - CKD - Cr remains stable in the 1.6-1.7 range  - Continue metoprolol succinate 50 mg daily  - Continue rosuvastatin 20 mg daily  - Continue clopidogrel 75 mg daily  - If no contraindication, start aspirin 81 mg daily  - The patient remains optimized from a cardiac standpoint to proceed for intermediate risk podiatry surgery if that is the plan

## 2025-01-07 NOTE — PROGRESS NOTE ADULT - SUBJECTIVE AND OBJECTIVE BOX
[facility]                             Nephrology and Hypertension                             [place]                                          [phone]     Patient is a 84y old  Male who presents with a chief complaint of Osteomyelitis (03 Jan 2025 13:05)       HPI:  Patient is an 85yo M with a PMH of L sided breast cancer (s/p lumpectomy, on Tamoxifen), HTN, Anemia, Aortic insufficiency, Carotid artery disease, GERD, Glaucoma, Hepatitis (2014), Lower ext edema ,PVD who presents to the ED from Dr. Niko Jacques's office for a R toe infection. Patient notes that he noticed a R toe wound a few weeks ago. He completed a 10d course of Amoxicillin 500mg, but noticed worsening of the wound. He went ot his podiatrist today, as the wound was draining a pus like liquid. Podiatry rec he come to the hospital for IV abx and osteomyelitis workup. Patient feeling well in ED.  States he is urinating well.  No N/V/SOB.  Has not seen nephrologist in the past.      NO N/V/SOB    PAST MEDICAL & SURGICAL HISTORY:  Anemia      HTN (hypertension)      Breast cancer      Glaucoma      Major depression      PVD (peripheral vascular disease)      Lower extremity edema      Chronic GERD      H/O: glaucoma      S/P lumpectomy, left breast           FAMILY HISTORY:  [place]    Social History:Non smoker    MEDICATIONS  (STANDING):  ammonium lactate 12% Lotion 1 Application(s) Topical two times a day  cefepime   IVPB 2000 milliGRAM(s) IV Intermittent every 12 hours  cyanocobalamin 1000 MICROGram(s) Oral daily  dextrose 5% + sodium chloride 0.45%. 1000 milliLiter(s) (75 mL/Hr) IV Continuous <Continuous>  escitalopram 10 milliGRAM(s) Oral daily  folic acid 1 milliGRAM(s) Oral daily  heparin   Injectable 5000 Unit(s) SubCutaneous every 8 hours  influenza  Vaccine (HIGH DOSE) 0.5 milliLiter(s) IntraMuscular once  latanoprost 0.005% Ophthalmic Solution 1 Drop(s) Both EYES at bedtime  metoprolol succinate ER 50 milliGRAM(s) Oral daily  mupirocin 2% Ointment 1 Application(s) Topical <User Schedule>  tamoxifen 20 milliGRAM(s) Oral daily    MEDICATIONS  (PRN):  acetaminophen     Tablet .. 650 milliGRAM(s) Oral every 6 hours PRN Temp greater or equal to 38C (100.4F), Mild Pain (1 - 3)  melatonin 3 milliGRAM(s) Oral at bedtime PRN Insomnia   Meds reviewed    Allergies    No Known Allergies    Intolerances         REVIEW OF SYSTEMS:    as above    Vital Signs Last 24 Hrs  T(C): 36.8 (07 Jan 2025 05:00), Max: 36.8 (07 Jan 2025 05:00)  T(F): 98.3 (07 Jan 2025 05:00), Max: 98.3 (07 Jan 2025 05:00)  HR: 62 (07 Jan 2025 05:00) (56 - 68)  BP: 128/51 (07 Jan 2025 05:00) (115/54 - 140/55)  BP(mean): --  RR: 18 (07 Jan 2025 05:00) (15 - 18)  SpO2: 93% (07 Jan 2025 05:00) (93% - 100%)    Parameters below as of 07 Jan 2025 05:00  Patient On (Oxygen Delivery Method): room air            PHYSICAL EXAM:    GENERAL: NAD  HEAD:  Atraumatic, Normocephalic  EYES: EOMI, conjunctiva and sclera clear  ENMT: No Drainage from nares, No drainage from ears  NERVOUS SYSTEM:  Awake and Alert  CHEST/LUNG: Clear to percussion bilaterally  EXTREMITIES:  No Edema  SKIN: No rashes No obvious ecchymosis      LABS:                                                      8.4    4.86  )-----------( 168      ( 07 Jan 2025 08:30 )             27.1     01-07    147[H]  |  119[H]  |  29[H]  ----------------------------<  109[H]  4.1   |  23  |  1.50[H]    Ca    8.0[L]      07 Jan 2025 08:30  Phos  3.8     01-07  Mg     2.1     01-07      PT/INR - ( 06 Jan 2025 03:40 )   PT: 12.0 sec;   INR: 1.03 ratio           Urinalysis Basic - ( 07 Jan 2025 08:30 )    Color: x / Appearance: x / SG: x / pH: x  Gluc: 109 mg/dL / Ketone: x  / Bili: x / Urobili: x   Blood: x / Protein: x / Nitrite: x   Leuk Esterase: x / RBC: x / WBC x   Sq Epi: x / Non Sq Epi: x / Bacteria: x

## 2025-01-08 LAB
ANION GAP SERPL CALC-SCNC: 6 MMOL/L — SIGNIFICANT CHANGE UP (ref 5–17)
BUN SERPL-MCNC: 38 MG/DL — HIGH (ref 7–23)
CALCIUM SERPL-MCNC: 8.6 MG/DL — SIGNIFICANT CHANGE UP (ref 8.5–10.1)
CHLORIDE SERPL-SCNC: 116 MMOL/L — HIGH (ref 96–108)
CO2 SERPL-SCNC: 21 MMOL/L — LOW (ref 22–31)
CREAT SERPL-MCNC: 1.6 MG/DL — HIGH (ref 0.5–1.3)
CULTURE RESULTS: SIGNIFICANT CHANGE UP
CULTURE RESULTS: SIGNIFICANT CHANGE UP
EC EAEA GENE STL QL NAA+PROBE: DETECTED
EGFR: 42 ML/MIN/1.73M2 — LOW
GI PCR PANEL: DETECTED
GLUCOSE SERPL-MCNC: 88 MG/DL — SIGNIFICANT CHANGE UP (ref 70–99)
HCT VFR BLD CALC: 27 % — LOW (ref 39–50)
HGB BLD-MCNC: 8.4 G/DL — LOW (ref 13–17)
MCHC RBC-ENTMCNC: 29 PG — SIGNIFICANT CHANGE UP (ref 27–34)
MCHC RBC-ENTMCNC: 31.1 G/DL — LOW (ref 32–36)
MCV RBC AUTO: 93.1 FL — SIGNIFICANT CHANGE UP (ref 80–100)
NOROVIRUS GI+II RNA STL QL NAA+NON-PROBE: DETECTED
NRBC # BLD: 0 /100 WBCS — SIGNIFICANT CHANGE UP (ref 0–0)
NRBC BLD-RTO: 0 /100 WBCS — SIGNIFICANT CHANGE UP (ref 0–0)
PLATELET # BLD AUTO: 171 K/UL — SIGNIFICANT CHANGE UP (ref 150–400)
POTASSIUM SERPL-MCNC: 3.8 MMOL/L — SIGNIFICANT CHANGE UP (ref 3.5–5.3)
POTASSIUM SERPL-SCNC: 3.8 MMOL/L — SIGNIFICANT CHANGE UP (ref 3.5–5.3)
RBC # BLD: 2.9 M/UL — LOW (ref 4.2–5.8)
RBC # FLD: 17.2 % — HIGH (ref 10.3–14.5)
SODIUM SERPL-SCNC: 143 MMOL/L — SIGNIFICANT CHANGE UP (ref 135–145)
SPECIMEN SOURCE: SIGNIFICANT CHANGE UP
SPECIMEN SOURCE: SIGNIFICANT CHANGE UP
WBC # BLD: 6.51 K/UL — SIGNIFICANT CHANGE UP (ref 3.8–10.5)
WBC # FLD AUTO: 6.51 K/UL — SIGNIFICANT CHANGE UP (ref 3.8–10.5)

## 2025-01-08 RX ORDER — TRIMETHOBENZAMIDE HCL 250 MG
200 CAPSULE ORAL EVERY 8 HOURS
Refills: 0 | Status: DISCONTINUED | OUTPATIENT
Start: 2025-01-08 | End: 2025-01-15

## 2025-01-08 RX ORDER — ONDANSETRON 4 MG/1
4 TABLET, ORALLY DISINTEGRATING ORAL EVERY 8 HOURS
Refills: 0 | Status: DISCONTINUED | OUTPATIENT
Start: 2025-01-08 | End: 2025-01-08

## 2025-01-08 RX ORDER — TRIMETHOBENZAMIDE HCL 250 MG
200 CAPSULE ORAL ONCE
Refills: 0 | Status: COMPLETED | OUTPATIENT
Start: 2025-01-08 | End: 2025-01-08

## 2025-01-08 RX ADMIN — Medication 100 MILLIGRAM(S): at 17:55

## 2025-01-08 RX ADMIN — ESCITALOPRAM 10 MILLIGRAM(S): 10 TABLET, FILM COATED ORAL at 12:53

## 2025-01-08 RX ADMIN — ROSUVASTATIN CALCIUM 20 MILLIGRAM(S): 10 TABLET, FILM COATED ORAL at 22:48

## 2025-01-08 RX ADMIN — Medication 1 APPLICATION(S): at 05:12

## 2025-01-08 RX ADMIN — Medication 1 APPLICATION(S): at 17:57

## 2025-01-08 RX ADMIN — Medication 50 MILLIGRAM(S): at 05:11

## 2025-01-08 RX ADMIN — TIMOLOL MALEATE 1 DROP(S): 5 SOLUTION OPHTHALMIC at 17:57

## 2025-01-08 RX ADMIN — ACETAMINOPHEN, DIPHENHYDRAMINE HCL, PHENYLEPHRINE HCL 3 MILLIGRAM(S): 325; 25; 5 TABLET ORAL at 22:49

## 2025-01-08 RX ADMIN — Medication 200 MILLIGRAM(S): at 04:04

## 2025-01-08 RX ADMIN — Medication 1 MILLIGRAM(S): at 12:53

## 2025-01-08 RX ADMIN — ASPIRIN 81 MILLIGRAM(S): 81 TABLET, COATED ORAL at 12:53

## 2025-01-08 RX ADMIN — MUPIROCIN 1 APPLICATION(S): 2 CREAM TOPICAL at 08:30

## 2025-01-08 RX ADMIN — TIMOLOL MALEATE 1 DROP(S): 5 SOLUTION OPHTHALMIC at 05:12

## 2025-01-08 RX ADMIN — Medication 1000 MICROGRAM(S): at 12:53

## 2025-01-08 RX ADMIN — Medication 75 MILLIGRAM(S): at 12:53

## 2025-01-08 RX ADMIN — Medication 100 MILLIGRAM(S): at 05:12

## 2025-01-08 RX ADMIN — LATANOPROST 1 DROP(S): 50 SOLUTION OPHTHALMIC at 22:49

## 2025-01-08 NOTE — CONSULT NOTE ADULT - ASSESSMENT
Nausea/vomiting  Diarrhea    Recommendations:  - GI PCR sent, f/u with results  - Tigan IM q8h PRN for nausea, has prolonged QTc on recent EKG  - Monitor GI function and stools  - Diet as tolerated  - To follow      I reviewed the overnight course of events on the unit, re-confirming the patient history. I discussed the care with the patient  Differential diagnosis and plan of care discussed with patient after the evaluation  35 minutes spent on total encounter of which more than fifty percent of the encounter was spent counseling and/or coordinating care by the attending physician.

## 2025-01-08 NOTE — CONSULT NOTE ADULT - SUBJECTIVE AND OBJECTIVE BOX
[facility]    Dagoberto Colunga NP    [address] [place] [address] [phone]      Chief Complaint:  Patient is a 84y old  Male who presents with a chief complaint of Osteomyelitis     HPI:  Patient is an 83yo M with a PMH of L sided breast cancer (s/p lumpectomy, on Tamoxifen), HTN, Anemia, Aortic insufficiency, Carotid artery disease, GERD, Glaucoma, Hepatitis (2014), Lower ext edema ,PVD who presents to the ED from Dr. Niko Jacques's office for a R toe infection. Patient notes that he noticed a R toe wound a few weeks ago. He completed a 10d course of Amoxicillin 500mg, but noticed worsening of the wound. He went ot his podiatrist today, as the wound was draining a pus like liquid. Podiatry rec he come to the hospital for IV abx and osteomyelitis workup. Patient feeling well in ED.    Interval HPI:  Patient seen and examined at bedside, was actively vomiting brown liquids mixed with ingested foods. Discussed and confirmed history above. Hospital course noted for RLE angio/AT and peroneal angioplasty, started on Plavix/ASA.   He c/o acute onset of nausea, vomiting and diarrhea that started overnight. He reports multiple emesis of brown liquid mixed with ingested foods. He denies fevers, chills or abdominal pain. No recent travel or sick contacts. As per nursing staff, he had large loose brown BM this morning.       PAST MEDICAL & SURGICAL HISTORY:  Anemia      HTN (hypertension)      Breast cancer      Glaucoma      Major depression      PVD (peripheral vascular disease)      Lower extremity edema      Chronic GERD      H/O: glaucoma      S/P lumpectomy, left breast          REVIEW OF SYSTEMS:   General: Negative  HEENT: Negative  CV: Negative  Respiratory: Negative  GI: See HPI  : Negative  MSK: Negative  Hematologic: Negative  Skin: Negative    MEDICATIONS:   MEDICATIONS  (STANDING):  ammonium lactate 12% Lotion 1 Application(s) Topical two times a day  aspirin enteric coated 81 milliGRAM(s) Oral daily  cefepime   IVPB 2000 milliGRAM(s) IV Intermittent every 12 hours  clopidogrel Tablet 75 milliGRAM(s) Oral daily  cyanocobalamin 1000 MICROGram(s) Oral daily  dextrose 5% + sodium chloride 0.45%. 1000 milliLiter(s) (75 mL/Hr) IV Continuous <Continuous>  dextrose 5%. 1000 milliLiter(s) (75 mL/Hr) IV Continuous <Continuous>  escitalopram 10 milliGRAM(s) Oral daily  folic acid 1 milliGRAM(s) Oral daily  influenza  Vaccine (HIGH DOSE) 0.5 milliLiter(s) IntraMuscular once  latanoprost 0.005% Ophthalmic Solution 1 Drop(s) Both EYES at bedtime  metoprolol succinate ER 50 milliGRAM(s) Oral daily  mupirocin 2% Ointment 1 Application(s) Topical <User Schedule>  rosuvastatin 20 milliGRAM(s) Oral at bedtime  timolol 0.5% Solution 1 Drop(s) Both EYES two times a day    MEDICATIONS  (PRN):  HYDROmorphone  Injectable 0.2 milliGRAM(s) IV Push every 6 hours PRN Moderate Pain (4 - 6)  HYDROmorphone  Injectable 0.5 milliGRAM(s) IV Push every 6 hours PRN Severe Pain (7 - 10)  melatonin 3 milliGRAM(s) Oral at bedtime PRN Insomnia  ondansetron  IVPB 4 milliGRAM(s) IV Intermittent every 8 hours PRN Nausea and/or Vomiting          DIET:  Diet, DASH/TLC:   Sodium & Cholesterol Restricted (01-07-25 @ 08:20) [Active]          ALLERGIES:   Allergies    No Known Allergies    Intolerances        VITAL SIGNS:   Vital Signs Last 24 Hrs  T(C): 36.2 (08 Jan 2025 04:08), Max: 36.2 (08 Jan 2025 04:08)  T(F): 97.2 (08 Jan 2025 04:08), Max: 97.2 (08 Jan 2025 04:08)  HR: 57 (08 Jan 2025 04:08) (53 - 60)  BP: 169/69 (08 Jan 2025 04:08) (131/50 - 169/69)  BP(mean): --  RR: 18 (08 Jan 2025 04:08) (17 - 18)  SpO2: 96% (08 Jan 2025 04:08) (93% - 96%)    Parameters below as of 08 Jan 2025 04:08  Patient On (Oxygen Delivery Method): room air      I&O's Summary    07 Jan 2025 07:01  -  08 Jan 2025 07:00  --------------------------------------------------------  IN: 2170 mL / OUT: 300 mL / NET: 1870 mL        PHYSICAL EXAM:   GENERAL:  No acute distress  HEENT:  NC/AT  CHEST:  No increased effort  HEART:  Regular rate  ABDOMEN:  Soft, non-tender, non-distended  EXTREMITIES: No cyanosis  SKIN:  Warm, dry  NEURO:  Calm, cooperative    LABS:                        8.4    6.51  )-----------( 171      ( 08 Jan 2025 10:45 )             27.0     Hemoglobin: 8.4 g/dL (01-08-25 @ 10:45)  Hemoglobin: 8.4 g/dL (01-07-25 @ 08:30)  Hemoglobin: 8.8 g/dL (01-06-25 @ 17:00)  Hemoglobin: 9.0 g/dL (01-06-25 @ 03:40)    01-08    143  |  116[H]  |  38[H]  ----------------------------<  88  3.8   |  21[L]  |  1.60[H]    Ca    8.6      08 Jan 2025 10:45  Phos  3.8     01-07  Mg     2.1     01-07                                                RADIOLOGY & ADDITIONAL STUDIES:

## 2025-01-08 NOTE — PROVIDER CONTACT NOTE (OTHER) - REASON
Patient having loose bowels with dark emesis
pt vomitted light brownish large amount
pt vomited
pt has diarrhea

## 2025-01-08 NOTE — PROGRESS NOTE ADULT - SUBJECTIVE AND OBJECTIVE BOX
[facility]                             Nephrology and Hypertension                             [place]                                          [phone]     Patient is a 84y old  Male who presents with a chief complaint of Osteomyelitis (03 Jan 2025 13:05)       HPI:  Patient is an 85yo M with a PMH of L sided breast cancer (s/p lumpectomy, on Tamoxifen), HTN, Anemia, Aortic insufficiency, Carotid artery disease, GERD, Glaucoma, Hepatitis (2014), Lower ext edema ,PVD who presents to the ED from Dr. Niko Jacques's office for a R toe infection. Patient notes that he noticed a R toe wound a few weeks ago. He completed a 10d course of Amoxicillin 500mg, but noticed worsening of the wound. He went ot his podiatrist today, as the wound was draining a pus like liquid. Podiatry rec he come to the hospital for IV abx and osteomyelitis workup. Patient feeling well in ED.  States he is urinating well.  No N/V/SOB.  Has not seen nephrologist in the past.      NO N/V/SOB    PAST MEDICAL & SURGICAL HISTORY:  Anemia      HTN (hypertension)      Breast cancer      Glaucoma      Major depression      PVD (peripheral vascular disease)      Lower extremity edema      Chronic GERD      H/O: glaucoma      S/P lumpectomy, left breast           FAMILY HISTORY:  [place]    Social History:Non smoker    MEDICATIONS  (STANDING):  ammonium lactate 12% Lotion 1 Application(s) Topical two times a day  aspirin enteric coated 81 milliGRAM(s) Oral daily  cefepime   IVPB 2000 milliGRAM(s) IV Intermittent every 12 hours  clopidogrel Tablet 75 milliGRAM(s) Oral daily  cyanocobalamin 1000 MICROGram(s) Oral daily  dextrose 5% + sodium chloride 0.45%. 1000 milliLiter(s) (75 mL/Hr) IV Continuous <Continuous>  dextrose 5%. 1000 milliLiter(s) (75 mL/Hr) IV Continuous <Continuous>  escitalopram 10 milliGRAM(s) Oral daily  folic acid 1 milliGRAM(s) Oral daily  influenza  Vaccine (HIGH DOSE) 0.5 milliLiter(s) IntraMuscular once  latanoprost 0.005% Ophthalmic Solution 1 Drop(s) Both EYES at bedtime  metoprolol succinate ER 50 milliGRAM(s) Oral daily  mupirocin 2% Ointment 1 Application(s) Topical <User Schedule>  rosuvastatin 20 milliGRAM(s) Oral at bedtime  timolol 0.5% Solution 1 Drop(s) Both EYES two times a day    MEDICATIONS  (PRN):  HYDROmorphone  Injectable 0.2 milliGRAM(s) IV Push every 6 hours PRN Moderate Pain (4 - 6)  HYDROmorphone  Injectable 0.5 milliGRAM(s) IV Push every 6 hours PRN Severe Pain (7 - 10)  melatonin 3 milliGRAM(s) Oral at bedtime PRN Insomnia  trimethobenzamide Injectable 200 milliGRAM(s) IntraMuscular every 8 hours PRN Nausea      Allergies    No Known Allergies    Intolerances         REVIEW OF SYSTEMS:    as above    Vital Signs Last 24 Hrs  T(C): 36.2 (08 Jan 2025 04:08), Max: 36.2 (08 Jan 2025 04:08)  T(F): 97.2 (08 Jan 2025 04:08), Max: 97.2 (08 Jan 2025 04:08)  HR: 57 (08 Jan 2025 04:08) (53 - 60)  BP: 169/69 (08 Jan 2025 04:08) (131/50 - 169/69)  BP(mean): --  RR: 18 (08 Jan 2025 04:08) (17 - 18)  SpO2: 96% (08 Jan 2025 04:08) (93% - 96%)    Parameters below as of 08 Jan 2025 04:08  Patient On (Oxygen Delivery Method): room air          PHYSICAL EXAM:    GENERAL: NAD  HEAD:  Atraumatic, Normocephalic  EYES: EOMI, conjunctiva and sclera clear  ENMT: No Drainage from nares, No drainage from ears  NERVOUS SYSTEM:  Awake and Alert  CHEST/LUNG: Clear to percussion bilaterally  EXTREMITIES:  No Edema  SKIN: No rashes No obvious ecchymosis      LABS:                                      8.4    6.51  )-----------( 171      ( 08 Jan 2025 10:45 )             27.0     01-08    143  |  116[H]  |  38[H]  ----------------------------<  88  3.8   |  21[L]  |  1.60[H]    Ca    8.6      08 Jan 2025 10:45  Phos  3.8     01-07  Mg     2.1     01-07        Urinalysis Basic - ( 08 Jan 2025 10:45 )    Color: x / Appearance: x / SG: x / pH: x  Gluc: 88 mg/dL / Ketone: x  / Bili: x / Urobili: x   Blood: x / Protein: x / Nitrite: x   Leuk Esterase: x / RBC: x / WBC x   Sq Epi: x / Non Sq Epi: x / Bacteria: x

## 2025-01-08 NOTE — CONSULT NOTE ADULT - CONSULT REQUESTED DATE/TIME
03-Jan-2025 14:49
02-Jan-2025 18:28
03-Jan-2025 00:06
04-Jan-2025 10:45
08-Jan-2025 13:15
03-Jan-2025 13:05

## 2025-01-08 NOTE — CHART NOTE - NSCHARTNOTEFT_GEN_A_CORE
RN called for pt w/ positive GI PCR and EAEC in stool. Pt was placed on contact isolation precautions.

## 2025-01-08 NOTE — CHART NOTE - NSCHARTNOTEFT_GEN_A_CORE
RN called w/ pt c/o of 3 episodes of emesis. Pt seen at bedside. Pt reports after dinner he started feeling nauseous and he vomited three times. He states it is not bloody/red in color. Pt was given STAT dose of Tigan. Stool culture and GI PCR from prior were not obtained yet.

## 2025-01-08 NOTE — PROVIDER CONTACT NOTE (OTHER) - SITUATION
Patient awake and alert, Vomiting a small amount dark colored emesis and having loose BMs.
pt had uncontrollable diarrhea
pt vomitting large amount light brownish like indigested food. service intern called and awared. pt awake and alert
pt had 1 episode of emesis

## 2025-01-08 NOTE — PROGRESS NOTE ADULT - SUBJECTIVE AND OBJECTIVE BOX
PROGRESS NOTE   Patient is a 84y old  Male who presents with a chief complaint of Osteomyelitis (07 Jan 2025 18:33)      HPI:  Patient is an 83yo M with a PMH of L sided breast cancer (s/p lumpectomy, on Tamoxifen), HTN, Anemia, Aortic insufficiency, Carotid artery disease, GERD, Glaucoma, Hepatitis (2014), Lower ext edema ,PVD who presents to the ED from Dr. Niko Jacques's office for a R toe infection. Patient notes that he noticed a R toe wound a few weeks ago. He completed a 10d course of Amoxicillin 500mg, but noticed worsening of the wound. He went ot his podiatrist today, as the wound was draining a pus like liquid. Podiatry rec he come to the hospital for IV abx and osteomyelitis workup. Patient feeling well in ED.    ED Course:  Vitals: /65, HR 71, T 97.3, RR 16 SpO2 100% on RA  Labs: ESR 75, Hgb 9.2, Na 148, Cl 117, BUN/Cr 35/1.70, eGFR 39  Given: IV Zosyn, IV Vancomycin, 1L NS bolus     Imaging:  Chest Xray: No acute chest finding.   R foot Xray: Possible erosion of the distal phalanx of the right great toe with associated soft tissue swelling suspicious for osteomyelitis. (02 Jan 2025 19:34)      Vital Signs Last 24 Hrs  T(C): 36.2 (08 Jan 2025 04:08), Max: 36.4 (07 Jan 2025 12:23)  T(F): 97.2 (08 Jan 2025 04:08), Max: 97.5 (07 Jan 2025 12:23)  HR: 57 (08 Jan 2025 04:08) (53 - 60)  BP: 169/69 (08 Jan 2025 04:08) (122/43 - 169/69)  BP(mean): --  RR: 18 (08 Jan 2025 04:08) (17 - 18)  SpO2: 96% (08 Jan 2025 04:08) (93% - 96%)    Parameters below as of 08 Jan 2025 04:08  Patient On (Oxygen Delivery Method): room air                              8.4    4.86  )-----------( 168      ( 07 Jan 2025 08:30 )             27.1               01-07    147[H]  |  119[H]  |  29[H]  ----------------------------<  109[H]  4.1   |  23  |  1.50[H]    Ca    8.0[L]      07 Jan 2025 08:30  Phos  3.8     01-07  Mg     2.1     01-07        PHYSICAL EXAM  General: Pleasant  male NAD & AOX3.    Integument:  Skin warm, dry and supple bilateral.    Noted plantar right hallux ulcer, fibro-granular base, apx [id number], reduced edema, reduced erythema, minimal drainage, - hyperkeratotic base, +tracking, with noted cellulitic changes, Consistent with OM  Vascular: Dorsalis Pedis and Posterior Tibial pulses non-palpable.  Capillary re-fill time less then 3 seconds digits 1-5 bilateral.  noted b/l lower leg edema, noted venous stasis b/l lower extremity  Neuro: Protective sensation intact to the level of the digits bilateral.  MSK: Muscle strength 4/5 all major muscle groups bilateral.

## 2025-01-08 NOTE — PROGRESS NOTE ADULT - ASSESSMENT
The patient is an 84 year old male with a history of HTN, anemia, GERD, aortic regurgitation, CKD, breast cancer, PAD who is admitted with toe infection.    Plan:  - ECG with sinus rhythm and RBBB  - Echo with normal LV systolic function, mild aortic stenosis, mild pulmonary hypertension  - Monitor hemoglobin  - CKD - Cr remains stable in the 1.4-1.7 range  - Continue metoprolol succinate 50 mg daily  - Continue rosuvastatin 20 mg daily  - Continue clopidogrel 75 mg daily  - Continue aspirin 81 mg daily  - Vomiting - GI work-up as indicated  - The patient remains optimized from a cardiac standpoint to proceed for intermediate risk podiatry surgery if that is the plan

## 2025-01-08 NOTE — PROGRESS NOTE ADULT - SUBJECTIVE AND OBJECTIVE BOX
Chief Complaint: Toe infection    Interval Events: No events overnight.    Review of Systems:  General: No fevers, chills, weight gain  Skin: No rashes, color changes  Cardiovascular: No chest pain, orthopnea  Respiratory: No shortness of breath, cough  Gastrointestinal: No nausea, abdominal pain  Genitourinary: No incontinence, pain with urination  Musculoskeletal: No pain, swelling, decreased range of motion  Neurological: No headache, weakness  Psychiatric: No depression, anxiety  Endocrine: No weight gain, increased thirst  All other systems are comprehensively negative.    Physical Exam:  Vital Signs Last 24 Hrs  T(C): 36.2 (08 Jan 2025 04:08), Max: 36.4 (07 Jan 2025 12:23)  T(F): 97.2 (08 Jan 2025 04:08), Max: 97.5 (07 Jan 2025 12:23)  HR: 57 (08 Jan 2025 04:08) (53 - 60)  BP: 169/69 (08 Jan 2025 04:08) (122/43 - 169/69)  BP(mean): --  RR: 18 (08 Jan 2025 04:08) (17 - 18)  SpO2: 96% (08 Jan 2025 04:08) (93% - 96%)  Parameters below as of 08 Jan 2025 04:08  Patient On (Oxygen Delivery Method): room air  General: NAD  HEENT: MMM  Neck: No JVD, no carotid bruit  Lungs: CTAB  CV: RRR, nl S1/S2, no M/R/G  Abdomen: S/NT/ND, +BS  Extremities: No LE edema, no cyanosis  Neuro: AAOx3, non-focal  Skin: No rash    Labs:             01-07    147[H]  |  119[H]  |  29[H]  ----------------------------<  109[H]  4.1   |  23  |  1.50[H]    Ca    8.0[L]      07 Jan 2025 08:30  Phos  3.8     01-07  Mg     2.1     01-07                          8.4    4.86  )-----------( 168      ( 07 Jan 2025 08:30 )             27.1       ECG/Telemetry: Baseline artifact, sinus bradycardia, RBBB

## 2025-01-08 NOTE — PROGRESS NOTE ADULT - PROBLEM SELECTOR PLAN 1
Chart reviewed and Patient evaluated  Discussed diagnosis and treatment with patient  There is concern for right hallux ulcer with infection and OM  Wound culture results reveal Pseudo. A  MRI shows OM right great toe  Cont to apply Bactroban daily with dressing  Wound flushed with normal saline  Applied dry sterile dressing  Appreciates vascular consult, angiogram done 1/6/2025  Continue IV abx and follow ID  Weight bearing as tolerated b/l  Patient understands he may require surgical intervention, and is at risk to lose part of the toe, all of the toe, part of the foot, all of the foot.   Will cont to allow demarcation of infective process. Discussed all the plan with patient today. The patient has states he still wants to talk to his family regarding medical decision making, requested additional time to consider the option of toe amputation vs conservative care.   Will discuss care plan  with all  Attendings Chart reviewed and Patient evaluated  There is concern for right hallux ulcer with infection and OM  Wound culture results reveal Pseudo. A  MRI shows OM right great toe  Cont to apply Bactroban daily with dressing  Wound flushed with normal saline  Applied dry sterile dressing  Appreciates vascular consult, angiogram, angioplasty done 1/6/2025  Continue IV abx and follow ID  Will cont to allow demarcation of infective process.   Discussed findings and condition with patient and patient's family at length today. The patient and family understand patient has osteomyelitis, and is at risk to lose part of the toe, all of the toe, part of the foot, all of the foot.   Patient and family have chosen conservative treatment of IV abx and to delay surgery at this time.  Patient understands if his symptoms do not remit, a more aggressive surgical procedure may be necessary in the future.  Patient and family confirm understanding and would like to proceed with abx only at this time.  Will discuss care plan  with all  Attendings Chart reviewed and Patient evaluated  There is concern for right hallux ulcer with infection and OM  Wound culture results reveal Pseudo. A  MRI shows OM right great toe  Cont to apply Bactroban daily with dressing  Wound flushed with normal saline  Applied dry sterile dressing  Appreciates vascular consult, angiogram, angioplasty done 1/6/2025  Continue IV abx and follow ID  Will cont to allow demarcation of infective process.   Discussed findings and condition with patient and patient's family at length today. The patient and family understand patient has osteomyelitis, and is at risk to lose part of the toe, all of the toe, part of the foot, all of the foot.   Patient and family have chosen conservative treatment of IV abx and to delay surgery at this time.  Patient understands if his symptoms do not remit, a more aggressive surgical procedure may be necessary in the future.  Patient and family confirm understanding and would like to proceed with abx only at this time understanding that delaying surgery may produce negative sequale.   Will discuss care plan  with all  Attendings

## 2025-01-08 NOTE — PROGRESS NOTE ADULT - SUBJECTIVE AND OBJECTIVE BOX
Patient is a 84y old  Male who presents with a chief complaint of Osteomyelitis (08 Jan 2025 14:04)      INTERVAL HPI/OVERNIGHT EVENTS: noted  pt seen and examined this am   events noted  +n/v diarrhea      Vital Signs Last 24 Hrs  T(C): 36.9 (08 Jan 2025 12:38), Max: 36.9 (08 Jan 2025 12:38)  T(F): 98.5 (08 Jan 2025 12:38), Max: 98.5 (08 Jan 2025 12:38)  HR: 63 (08 Jan 2025 12:38) (53 - 63)  BP: 167/74 (08 Jan 2025 12:38) (131/50 - 169/69)  BP(mean): --  RR: 18 (08 Jan 2025 12:38) (17 - 18)  SpO2: 98% (08 Jan 2025 12:38) (93% - 98%)    Parameters below as of 08 Jan 2025 12:38  Patient On (Oxygen Delivery Method): room air        ammonium lactate 12% Lotion 1 Application(s) Topical two times a day  aspirin enteric coated 81 milliGRAM(s) Oral daily  cefepime   IVPB 2000 milliGRAM(s) IV Intermittent every 12 hours  clopidogrel Tablet 75 milliGRAM(s) Oral daily  cyanocobalamin 1000 MICROGram(s) Oral daily  dextrose 5% + sodium chloride 0.45%. 1000 milliLiter(s) IV Continuous <Continuous>  dextrose 5%. 1000 milliLiter(s) IV Continuous <Continuous>  escitalopram 10 milliGRAM(s) Oral daily  folic acid 1 milliGRAM(s) Oral daily  HYDROmorphone  Injectable 0.2 milliGRAM(s) IV Push every 6 hours PRN  HYDROmorphone  Injectable 0.5 milliGRAM(s) IV Push every 6 hours PRN  influenza  Vaccine (HIGH DOSE) 0.5 milliLiter(s) IntraMuscular once  latanoprost 0.005% Ophthalmic Solution 1 Drop(s) Both EYES at bedtime  melatonin 3 milliGRAM(s) Oral at bedtime PRN  metoprolol succinate ER 50 milliGRAM(s) Oral daily  mupirocin 2% Ointment 1 Application(s) Topical <User Schedule>  rosuvastatin 20 milliGRAM(s) Oral at bedtime  timolol 0.5% Solution 1 Drop(s) Both EYES two times a day  trimethobenzamide Injectable 200 milliGRAM(s) IntraMuscular every 8 hours PRN      PHYSICAL EXAM:  GENERAL: NAD,   EYES: conjunctiva and sclera clear  ENMT: Moist mucous membranes  NECK: Supple, No JVD, Normal thyroid  CHEST/LUNG: non labored, cta b/l  HEART: Regular rate and rhythm; No murmurs, rubs, or gallops  ABDOMEN: Soft, Nontender, Nondistended; Bowel sounds present  EXTREMITIES:  2+ Peripheral Pulses, No clubbing, cyanosis, or edema  LYMPH: No lymphadenopathy noted  SKIN: No rashes or lesions    Consultant(s) Notes Reviewed:  [x ] YES  [ ] NO  Care Discussed with Consultants/Other Providers [ x] YES  [ ] NO    LABS:                        8.4    6.51  )-----------( 171      ( 08 Jan 2025 10:45 )             27.0     01-08    143  |  116[H]  |  38[H]  ----------------------------<  88  3.8   |  21[L]  |  1.60[H]    Ca    8.6      08 Jan 2025 10:45  Phos  3.8     01-07  Mg     2.1     01-07        Urinalysis Basic - ( 08 Jan 2025 10:45 )    Color: x / Appearance: x / SG: x / pH: x  Gluc: 88 mg/dL / Ketone: x  / Bili: x / Urobili: x   Blood: x / Protein: x / Nitrite: x   Leuk Esterase: x / RBC: x / WBC x   Sq Epi: x / Non Sq Epi: x / Bacteria: x      CAPILLARY BLOOD GLUCOSE            Urinalysis Basic - ( 08 Jan 2025 10:45 )    Color: x / Appearance: x / SG: x / pH: x  Gluc: 88 mg/dL / Ketone: x  / Bili: x / Urobili: x   Blood: x / Protein: x / Nitrite: x   Leuk Esterase: x / RBC: x / WBC x   Sq Epi: x / Non Sq Epi: x / Bacteria: x          RADIOLOGY & ADDITIONAL TESTS:    Imaging Personally Reviewed:  [x ] YES  [ ] NO

## 2025-01-08 NOTE — PROGRESS NOTE ADULT - ASSESSMENT
84M with HTN, PAD, breast ca, CKD admitted with rt great toe osteomyelitis  MRI noted  IV ABX  podiatry, vascular and ID following  s/p angio    #n/v diarrhea- GI pcr+ noro virus and ecoli sps  supporive care  GI fu  ID fu    CKD/Hypernatremia  creatinine at about baseline  nephrology following  IVF as per renal    HTN  continue metoprolol    Breast ca  continue tamoxifen    edema  elevate LE    left msg on phone w son Marco  to call back      [facility]   [phone]

## 2025-01-08 NOTE — PROGRESS NOTE ADULT - ASSESSMENT
STEPHANY on CKD  Hypernatremia  Renal Mass  HTN  PAD  Vomiting    -Baseline Creatinine Unknown but given age and co-morbidities will have CKD  -STEPHANY Likely 2/2 Decreased EABV  -Renal US with solid mass,  will need CT or MRI, at this time favor MRI with elevated creatinine   -S/P IVF with D5W for hypernatremia; will restart due to worsened hypernatremia again  -S/p LE Angio; no evidence of VLADIMIR at this time  -Creatinine stabilized; likely baseline; will monitor  -GI eval noted

## 2025-01-09 LAB
ANION GAP SERPL CALC-SCNC: 8 MMOL/L — SIGNIFICANT CHANGE UP (ref 5–17)
BUN SERPL-MCNC: 33 MG/DL — HIGH (ref 7–23)
CALCIUM SERPL-MCNC: 8.7 MG/DL — SIGNIFICANT CHANGE UP (ref 8.5–10.1)
CHLORIDE SERPL-SCNC: 115 MMOL/L — HIGH (ref 96–108)
CO2 SERPL-SCNC: 21 MMOL/L — LOW (ref 22–31)
CREAT SERPL-MCNC: 1.5 MG/DL — HIGH (ref 0.5–1.3)
EGFR: 45 ML/MIN/1.73M2 — LOW
GLUCOSE SERPL-MCNC: 84 MG/DL — SIGNIFICANT CHANGE UP (ref 70–99)
HCT VFR BLD CALC: 29 % — LOW (ref 39–50)
HGB BLD-MCNC: 9 G/DL — LOW (ref 13–17)
MCHC RBC-ENTMCNC: 29.1 PG — SIGNIFICANT CHANGE UP (ref 27–34)
MCHC RBC-ENTMCNC: 31 G/DL — LOW (ref 32–36)
MCV RBC AUTO: 93.9 FL — SIGNIFICANT CHANGE UP (ref 80–100)
NRBC # BLD: 0 /100 WBCS — SIGNIFICANT CHANGE UP (ref 0–0)
NRBC BLD-RTO: 0 /100 WBCS — SIGNIFICANT CHANGE UP (ref 0–0)
PLATELET # BLD AUTO: 191 K/UL — SIGNIFICANT CHANGE UP (ref 150–400)
POTASSIUM SERPL-MCNC: 3.8 MMOL/L — SIGNIFICANT CHANGE UP (ref 3.5–5.3)
POTASSIUM SERPL-SCNC: 3.8 MMOL/L — SIGNIFICANT CHANGE UP (ref 3.5–5.3)
RBC # BLD: 3.09 M/UL — LOW (ref 4.2–5.8)
RBC # FLD: 17.2 % — HIGH (ref 10.3–14.5)
SODIUM SERPL-SCNC: 144 MMOL/L — SIGNIFICANT CHANGE UP (ref 135–145)
WBC # BLD: 6.07 K/UL — SIGNIFICANT CHANGE UP (ref 3.8–10.5)
WBC # FLD AUTO: 6.07 K/UL — SIGNIFICANT CHANGE UP (ref 3.8–10.5)

## 2025-01-09 PROCEDURE — 93010 ELECTROCARDIOGRAM REPORT: CPT

## 2025-01-09 RX ORDER — AZITHROMYCIN DIHYDRATE 500 MG/1
500 TABLET, FILM COATED ORAL EVERY 24 HOURS
Refills: 0 | Status: COMPLETED | OUTPATIENT
Start: 2025-01-09 | End: 2025-01-11

## 2025-01-09 RX ADMIN — MUPIROCIN 1 APPLICATION(S): 2 CREAM TOPICAL at 08:13

## 2025-01-09 RX ADMIN — Medication 1 MILLIGRAM(S): at 11:09

## 2025-01-09 RX ADMIN — Medication 1000 MICROGRAM(S): at 11:10

## 2025-01-09 RX ADMIN — ESCITALOPRAM 10 MILLIGRAM(S): 10 TABLET, FILM COATED ORAL at 11:10

## 2025-01-09 RX ADMIN — LATANOPROST 1 DROP(S): 50 SOLUTION OPHTHALMIC at 21:27

## 2025-01-09 RX ADMIN — Medication 1 APPLICATION(S): at 06:50

## 2025-01-09 RX ADMIN — Medication 50 MILLIGRAM(S): at 06:44

## 2025-01-09 RX ADMIN — AZITHROMYCIN DIHYDRATE 500 MILLIGRAM(S): 500 TABLET, FILM COATED ORAL at 15:02

## 2025-01-09 RX ADMIN — TIMOLOL MALEATE 1 DROP(S): 5 SOLUTION OPHTHALMIC at 06:44

## 2025-01-09 RX ADMIN — Medication 75 MILLIGRAM(S): at 11:10

## 2025-01-09 RX ADMIN — ROSUVASTATIN CALCIUM 20 MILLIGRAM(S): 10 TABLET, FILM COATED ORAL at 21:27

## 2025-01-09 RX ADMIN — Medication 100 MILLIGRAM(S): at 17:18

## 2025-01-09 RX ADMIN — Medication 1 APPLICATION(S): at 17:29

## 2025-01-09 RX ADMIN — Medication 200 MILLIGRAM(S): at 02:31

## 2025-01-09 RX ADMIN — ASPIRIN 81 MILLIGRAM(S): 81 TABLET, COATED ORAL at 11:10

## 2025-01-09 RX ADMIN — Medication 100 MILLIGRAM(S): at 06:45

## 2025-01-09 RX ADMIN — TIMOLOL MALEATE 1 DROP(S): 5 SOLUTION OPHTHALMIC at 17:18

## 2025-01-09 NOTE — DIETITIAN INITIAL EVALUATION ADULT - PERTINENT MEDS FT
MEDICATIONS  (STANDING):  ammonium lactate 12% Lotion 1 Application(s) Topical two times a day  aspirin enteric coated 81 milliGRAM(s) Oral daily  cefepime   IVPB 2000 milliGRAM(s) IV Intermittent every 12 hours  clopidogrel Tablet 75 milliGRAM(s) Oral daily  cyanocobalamin 1000 MICROGram(s) Oral daily  dextrose 5% + sodium chloride 0.45%. 1000 milliLiter(s) (75 mL/Hr) IV Continuous <Continuous>  dextrose 5%. 1000 milliLiter(s) (75 mL/Hr) IV Continuous <Continuous>  escitalopram 10 milliGRAM(s) Oral daily  folic acid 1 milliGRAM(s) Oral daily  influenza  Vaccine (HIGH DOSE) 0.5 milliLiter(s) IntraMuscular once  latanoprost 0.005% Ophthalmic Solution 1 Drop(s) Both EYES at bedtime  metoprolol succinate ER 50 milliGRAM(s) Oral daily  mupirocin 2% Ointment 1 Application(s) Topical <User Schedule>  rosuvastatin 20 milliGRAM(s) Oral at bedtime  timolol 0.5% Solution 1 Drop(s) Both EYES two times a day    MEDICATIONS  (PRN):  HYDROmorphone  Injectable 0.2 milliGRAM(s) IV Push every 6 hours PRN Moderate Pain (4 - 6)  HYDROmorphone  Injectable 0.5 milliGRAM(s) IV Push every 6 hours PRN Severe Pain (7 - 10)  melatonin 3 milliGRAM(s) Oral at bedtime PRN Insomnia  trimethobenzamide Injectable 200 milliGRAM(s) IntraMuscular every 8 hours PRN Nausea

## 2025-01-09 NOTE — PROGRESS NOTE ADULT - SUBJECTIVE AND OBJECTIVE BOX
Patient is a 84y old  Male who presents with a chief complaint of Osteomyelitis (09 Jan 2025 14:44)      INTERVAL HPI/OVERNIGHT EVENTS: noted  pt seen and examined this am   events noted  feels well      Vital Signs Last 24 Hrs  T(C): 36.3 (09 Jan 2025 11:42), Max: 37.2 (08 Jan 2025 20:13)  T(F): 97.4 (09 Jan 2025 11:42), Max: 98.9 (08 Jan 2025 20:13)  HR: 53 (09 Jan 2025 11:42) (53 - 65)  BP: 178/71 (09 Jan 2025 11:42) (148/66 - 196/77)  BP(mean): --  RR: 18 (09 Jan 2025 11:42) (18 - 18)  SpO2: 100% (09 Jan 2025 11:42) (93% - 100%)    Parameters below as of 09 Jan 2025 11:42  Patient On (Oxygen Delivery Method): room air        ammonium lactate 12% Lotion 1 Application(s) Topical two times a day  aspirin enteric coated 81 milliGRAM(s) Oral daily  azithromycin   Tablet 500 milliGRAM(s) Oral every 24 hours  cefepime   IVPB 2000 milliGRAM(s) IV Intermittent every 12 hours  clopidogrel Tablet 75 milliGRAM(s) Oral daily  cyanocobalamin 1000 MICROGram(s) Oral daily  dextrose 5% + sodium chloride 0.45%. 1000 milliLiter(s) IV Continuous <Continuous>  escitalopram 10 milliGRAM(s) Oral daily  folic acid 1 milliGRAM(s) Oral daily  HYDROmorphone  Injectable 0.2 milliGRAM(s) IV Push every 6 hours PRN  HYDROmorphone  Injectable 0.5 milliGRAM(s) IV Push every 6 hours PRN  influenza  Vaccine (HIGH DOSE) 0.5 milliLiter(s) IntraMuscular once  latanoprost 0.005% Ophthalmic Solution 1 Drop(s) Both EYES at bedtime  melatonin 3 milliGRAM(s) Oral at bedtime PRN  metoprolol succinate ER 50 milliGRAM(s) Oral daily  mupirocin 2% Ointment 1 Application(s) Topical <User Schedule>  rosuvastatin 20 milliGRAM(s) Oral at bedtime  timolol 0.5% Solution 1 Drop(s) Both EYES two times a day  trimethobenzamide Injectable 200 milliGRAM(s) IntraMuscular every 8 hours PRN      PHYSICAL EXAM:  GENERAL: NAD,   EYES: conjunctiva and sclera clear  ENMT: Moist mucous membranes  NECK: Supple, No JVD, Normal thyroid  CHEST/LUNG: non labored, cta b/l  HEART: Regular rate and rhythm; No murmurs, rubs, or gallops  ABDOMEN: Soft, Nontender, Nondistended; Bowel sounds present  EXTREMITIES:  2+ Peripheral Pulses, No clubbing, cyanosis, or edema  LYMPH: No lymphadenopathy noted  SKIN: No rashes or lesions    Consultant(s) Notes Reviewed:  [x ] YES  [ ] NO  Care Discussed with Consultants/Other Providers [ x] YES  [ ] NO    LABS:                        9.0    6.07  )-----------( 191      ( 09 Jan 2025 07:40 )             29.0     01-09    144  |  115[H]  |  33[H]  ----------------------------<  84  3.8   |  21[L]  |  1.50[H]    Ca    8.7      09 Jan 2025 07:40        Urinalysis Basic - ( 09 Jan 2025 07:40 )    Color: x / Appearance: x / SG: x / pH: x  Gluc: 84 mg/dL / Ketone: x  / Bili: x / Urobili: x   Blood: x / Protein: x / Nitrite: x   Leuk Esterase: x / RBC: x / WBC x   Sq Epi: x / Non Sq Epi: x / Bacteria: x      CAPILLARY BLOOD GLUCOSE            Urinalysis Basic - ( 09 Jan 2025 07:40 )    Color: x / Appearance: x / SG: x / pH: x  Gluc: 84 mg/dL / Ketone: x  / Bili: x / Urobili: x   Blood: x / Protein: x / Nitrite: x   Leuk Esterase: x / RBC: x / WBC x   Sq Epi: x / Non Sq Epi: x / Bacteria: x        Culture - Stool (collected 08 Jan 2025 04:40)  Source: .Stool  Preliminary Report (09 Jan 2025 10:23):    No enteric pathogens to date: Final culture pending        RADIOLOGY & ADDITIONAL TESTS:    Imaging Personally Reviewed:  [x ] YES  [ ] NO

## 2025-01-09 NOTE — DIETITIAN INITIAL EVALUATION ADULT - PROBLEM SELECTOR PLAN 2
Patient with a hx of Anemia of Chronic disease. On Folic acid, B12, and Procrit injections every other week (for Hgb <10.6). No active signs of bleeding. Unknown baseline Hgb  - Hgb 9.2  - Continue Folic acid and B12  - F/u iron studies in AM   - Monitor daily CBC  - Maintain active Type and screen   - Transfuse for Hgb <7

## 2025-01-09 NOTE — DIETITIAN INITIAL EVALUATION ADULT - OTHER INFO
Pt is a "83 yo M with HTN, PAD, breast ca, CKD admitted with rt great toe osteomyelitis."    Visited pt at bedside this am. Pt reports fair appetite/intake. Observed pt consume ~75% of breakfast meal this am. PO intakes % per nursing documentation. Tolerating diet well. No food allergies. No chewing/swallowing difficulties. Pt denies N/V currently. Last episode of emesis yesterday 1/8. Diarrhea now improved per pt report. Pt w/ positive GI PCR and EAEC in stool. Pt reports UBW of ~175#. Bedscale wt of 172# obtained. 2+ BL ankle/foot edema noted. Skin: R toe ulcer, stage I to BL heels. Pt currently on DASH/TLC diet. Declined diet education at this time. RD remains available and will continue to follow-up.

## 2025-01-09 NOTE — PROGRESS NOTE ADULT - PROBLEM SELECTOR PLAN 1
Chart reviewed and Patient evaluated  There is concern for right hallux ulcer with infection and OM.  Wound culture results reveal Pseudo. A  MRI shows OM right great toe  Cont to apply Bactroban daily with dressing  Wound flushed with normal saline  Applied dry sterile dressing  Appreciates vascular consult, angiogram, angioplasty done 1/6/2025  Continue IV abx and follow ID  Will cont to allow demarcation of infective process.   Discussed findings and condition with patient and patient's family at length. The patient and family understand patient has osteomyelitis, and is at risk to lose part of the toe, all of the toe, part of the foot, all of the foot.   Patient and family have chosen conservative treatment of IV abx and to delay surgery at this time.  Patient understands if his symptoms do not remit, a more aggressive surgical procedure may be necessary in the future.  Patient and family confirm understanding and would like to proceed with abx only at this time understanding that delaying surgery may produce negative sequale.   Continued medical management per primary and GI teams.  Will discuss care plan  with all  Attendings.

## 2025-01-09 NOTE — DIETITIAN INITIAL EVALUATION ADULT - PROBLEM SELECTOR PLAN 1
Patient with R toe wound suspicious for Osteomyelitis  - R foot Xray: Possible erosion of the distal phalanx of the right great toe with associated soft tissue swelling suspicious for osteomyelitis  - ESR 75  - S/p IV Zosyn, IV Vancomycin, 1L NS bolus x1 in ED  - Start IV Cefepime 2 gm q 12 hrs -Renal dose   - F/u MRI R foot as per podiatry  - F/u blood and wound cultures, CRP   - F/u MRSA/MSSA PCR --> if positive would continue IV Vancomycin   - Continue topical Bactroban to surrounding wound area, daily wound dressing changes   - Continue weight bearing as tolerated   - Podiatry Dr. Garcia consulted, f/u recs   - ID Dr. Castro consulted, f/u recs -Abx   - Vascular consulted, f/u recs

## 2025-01-09 NOTE — DIETITIAN INITIAL EVALUATION ADULT - PERTINENT LABORATORY DATA
01-09    144  |  115[H]  |  33[H]  ----------------------------<  84  3.8   |  21[L]  |  1.50[H]    Ca    8.7      09 Jan 2025 07:40    A1C with Estimated Average Glucose Result: 5.2 % (01-03-25 @ 07:25)

## 2025-01-09 NOTE — DIETITIAN INITIAL EVALUATION ADULT - ETIOLOGY
related to increased demand for nutrient (protein, micronutrients) related to alteration in GI tract function

## 2025-01-09 NOTE — DIETITIAN INITIAL EVALUATION ADULT - ADD RECOMMEND
1) Continue current diet as tolerated; emphasis on HBV protein sources, will honor food preferences as able to optimize po intake/tolerance  2) Recommend mighty shake BID (220kcal, 6gm protein per 4 oz serving)  3) Recommend MVI/minerals daily and Vit C 500mg daily  4) Monitor po intake, diet tolerance, weight trends, labs, GI function, skin integrity

## 2025-01-09 NOTE — PROGRESS NOTE ADULT - ASSESSMENT
84M with HTN, PAD, breast ca, CKD admitted with rt great toe osteomyelitis  MRI noted  IV ABX  podiatry, vascular and ID following  s/p angio    #n/v diarrhea- GI pcr+ noro virus and ecoli sps  supportive care  GI fu  ID fu- zithromax    CKD/Hypernatremia  creatinine at about baseline  nephrology following  IVF as per renal    HTN  continue metoprolol    Breast ca  continue tamoxifen    edema  elevate LE      [facility]   [phone]

## 2025-01-09 NOTE — PROGRESS NOTE ADULT - SUBJECTIVE AND OBJECTIVE BOX
[facility]  RHEA Brand Y. Patel, S. Shah, G. [facility]  [phone]    Name: DAYA MATHIS  Age: 84y  Gender: Male  MRN: [id number]    Interval History:  No acute overnight events.   Positive for norovirus  Notes reviewed    Antibiotics:  cefepime   IVPB 2000 milliGRAM(s) IV Intermittent every 12 hours      Medications:  ammonium lactate 12% Lotion 1 Application(s) Topical two times a day  aspirin enteric coated 81 milliGRAM(s) Oral daily  cefepime   IVPB 2000 milliGRAM(s) IV Intermittent every 12 hours  clopidogrel Tablet 75 milliGRAM(s) Oral daily  cyanocobalamin 1000 MICROGram(s) Oral daily  dextrose 5% + sodium chloride 0.45%. 1000 milliLiter(s) IV Continuous <Continuous>  escitalopram 10 milliGRAM(s) Oral daily  folic acid 1 milliGRAM(s) Oral daily  HYDROmorphone  Injectable 0.2 milliGRAM(s) IV Push every 6 hours PRN  HYDROmorphone  Injectable 0.5 milliGRAM(s) IV Push every 6 hours PRN  influenza  Vaccine (HIGH DOSE) 0.5 milliLiter(s) IntraMuscular once  latanoprost 0.005% Ophthalmic Solution 1 Drop(s) Both EYES at bedtime  melatonin 3 milliGRAM(s) Oral at bedtime PRN  metoprolol succinate ER 50 milliGRAM(s) Oral daily  mupirocin 2% Ointment 1 Application(s) Topical <User Schedule>  rosuvastatin 20 milliGRAM(s) Oral at bedtime  timolol 0.5% Solution 1 Drop(s) Both EYES two times a day  trimethobenzamide Injectable 200 milliGRAM(s) IntraMuscular every 8 hours PRN      Review of Systems:  A 10-point review of systems was obtained.   Review of systems otherwise negative except as previously noted.    Allergies: No Known Allergies    For details regarding the patient's past medical history, social history, family history, and other miscellaneous elements, please refer the initial infectious diseases consultation and/or the admitting history and physical examination for this admission.    Objective:  Vitals:   T(C): 36.3 (01-09-25 @ 11:42), Max: 37.2 (01-08-25 @ 20:13)  HR: 53 (01-09-25 @ 11:42) (53 - 65)  BP: 178/71 (01-09-25 @ 11:42) (148/66 - 196/77)  RR: 18 (01-09-25 @ 11:42) (18 - 18)  SpO2: 100% (01-09-25 @ 11:42) (93% - 100%)    Physical Examination:  General: no acute distress  HEENT: NC/AT, EOMI  Cardio: RRR  Resp: breath sounds heard bilaterally  Abd: soft, NT, ND  Ext: foot in dressing  Skin: warm, dry, no visible rash      Laboratory Studies:  CBC:                       9.0    6.07  )-----------( 191      ( 09 Jan 2025 07:40 )             29.0     CMP: 01-09    144  |  115[H]  |  33[H]  ----------------------------<  84  3.8   |  21[L]  |  1.50[H]    Ca    8.7      09 Jan 2025 07:40        Urinalysis Basic - ( 09 Jan 2025 07:40 )    Color: x / Appearance: x / SG: x / pH: x  Gluc: 84 mg/dL / Ketone: x  / Bili: x / Urobili: x   Blood: x / Protein: x / Nitrite: x   Leuk Esterase: x / RBC: x / WBC x   Sq Epi: x / Non Sq Epi: x / Bacteria: x        Microbiology: reviewed    Culture - Stool (collected 01-08-25 @ 04:40)  Source: .Stool  Preliminary Report (01-09-25 @ 10:23):    No enteric pathogens to date: Final culture pending    Culture - Wound Aerobic (collected 01-02-25 @ 18:30)  Source: .Other  Final Report (01-04-25 @ 15:14):    Few Pseudomonas aeruginosa    Rare Serratia marcescens    Commensal aleyda consistent with body site  Organism: Pseudomonas aeruginosa  Serratia marcescens (01-04-25 @ 15:14)  Organism: Serratia marcescens (01-04-25 @ 15:14)      -  Levofloxacin: I 1      -  Tobramycin: S <=2      -  Aztreonam: S <=4      -  Gentamicin: S <=2      -  Cefazolin: R >16      -  Cefepime: S <=2      -  Piperacillin/Tazobactam: S <=8      -  Ciprofloxacin: R 1      -  Ceftriaxone: S <=1      -  Ampicillin: R >16 These ampicillin results predict results for amoxicillin      Method Type: DANIEL      -  Meropenem: S <=1      -  Ampicillin/Sulbactam: R >16/8      -  Cefoxitin: R 16      -  Amoxicillin/Clavulanic Acid: R >16/8      -  Trimethoprim/Sulfamethoxazole: S <=0.5/9.5      -  Ertapenem: S <=0.5  Organism: Pseudomonas aeruginosa (01-04-25 @ 15:14)      -  Levofloxacin: S 1      -  Aztreonam: S <=4      -  Cefepime: S 8      -  Piperacillin/Tazobactam: S <=8      -  Ciprofloxacin: S 0.5      -  Imipenem: S 2      Method Type: DANIEL      -  Meropenem: S <=1      -  Ceftazidime: S 4    Culture - Blood (collected 01-02-25 @ 17:20)  Source: .Blood BLOOD  Final Report (01-08-25 @ 01:00):    No growth at 5 days    Culture - Blood (collected 01-02-25 @ 17:20)  Source: .Blood BLOOD  Final Report (01-08-25 @ 01:00):    No growth at 5 days          Radiology: reviewed

## 2025-01-09 NOTE — PROGRESS NOTE ADULT - SUBJECTIVE AND OBJECTIVE BOX
Chief Complaint: Toe infection    Interval Events: No events overnight.    Review of Systems:  General: No fevers, chills, weight gain  Skin: No rashes, color changes  Cardiovascular: No chest pain, orthopnea  Respiratory: No shortness of breath, cough  Gastrointestinal: No nausea, abdominal pain  Genitourinary: No incontinence, pain with urination  Musculoskeletal: No pain, swelling, decreased range of motion  Neurological: No headache, weakness  Psychiatric: No depression, anxiety  Endocrine: No weight gain, increased thirst  All other systems are comprehensively negative.    Physical Exam:  Vital Signs Last 24 Hrs  T(C): 37.1 (09 Jan 2025 04:39), Max: 37.2 (08 Jan 2025 20:13)  T(F): 98.7 (09 Jan 2025 04:39), Max: 98.9 (08 Jan 2025 20:13)  HR: 62 (09 Jan 2025 04:39) (62 - 65)  BP: 191/68 (09 Jan 2025 04:39) (148/66 - 196/77)  BP(mean): --  RR: 18 (09 Jan 2025 04:39) (18 - 18)  SpO2: 94% (09 Jan 2025 04:39) (93% - 98%)  Parameters below as of 09 Jan 2025 04:39  Patient On (Oxygen Delivery Method): room air  General: NAD  HEENT: MMM  Neck: No JVD, no carotid bruit  Lungs: CTAB  CV: RRR, nl S1/S2, no M/R/G  Abdomen: S/NT/ND, +BS  Extremities: No LE edema, no cyanosis  Neuro: AAOx3, non-focal  Skin: No rash    Labs:             01-08    143  |  116[H]  |  38[H]  ----------------------------<  88  3.8   |  21[L]  |  1.60[H]    Ca    8.6      08 Jan 2025 10:45                          8.4    6.51  )-----------( 171      ( 08 Jan 2025 10:45 )             27.0       ECG/Telemetry: Baseline artifact, sinus bradycardia, RBBB

## 2025-01-09 NOTE — PROGRESS NOTE ADULT - SUBJECTIVE AND OBJECTIVE BOX
[facility]                             Nephrology and Hypertension                             Niles Montano                                          [phone]     Patient is a 84y old  Male who presents with a chief complaint of Osteomyelitis (03 Jan 2025 13:05)       HPI:  Patient is an 85yo M with a PMH of L sided breast cancer (s/p lumpectomy, on Tamoxifen), HTN, Anemia, Aortic insufficiency, Carotid artery disease, GERD, Glaucoma, Hepatitis (2014), Lower ext edema ,PVD who presents to the ED from Dr. Niko Jacques's office for a R toe infection. Patient notes that he noticed a R toe wound a few weeks ago. He completed a 10d course of Amoxicillin 500mg, but noticed worsening of the wound. He went ot his podiatrist today, as the wound was draining a pus like liquid. Podiatry rec he come to the hospital for IV abx and osteomyelitis workup. Patient feeling well in ED.  States he is urinating well.  No N/V/SOB.  Has not seen nephrologist in the past.      Seen and examined at bedside, did not have any new complaints, no sob, no pain, no dizziness.     PAST MEDICAL & SURGICAL HISTORY:  Anemia      HTN (hypertension)      Breast cancer      Glaucoma      Major depression      PVD (peripheral vascular disease)      Lower extremity edema      Chronic GERD      H/O: glaucoma      S/P lumpectomy, left breast           FAMILY HISTORY:  [place]    Social History:Non smoker    MEDICATIONS  (STANDING):  ammonium lactate 12% Lotion 1 Application(s) Topical two times a day  aspirin enteric coated 81 milliGRAM(s) Oral daily  cefepime   IVPB 2000 milliGRAM(s) IV Intermittent every 12 hours  clopidogrel Tablet 75 milliGRAM(s) Oral daily  cyanocobalamin 1000 MICROGram(s) Oral daily  dextrose 5% + sodium chloride 0.45%. 1000 milliLiter(s) (75 mL/Hr) IV Continuous <Continuous>  dextrose 5%. 1000 milliLiter(s) (75 mL/Hr) IV Continuous <Continuous>  escitalopram 10 milliGRAM(s) Oral daily  folic acid 1 milliGRAM(s) Oral daily  influenza  Vaccine (HIGH DOSE) 0.5 milliLiter(s) IntraMuscular once  latanoprost 0.005% Ophthalmic Solution 1 Drop(s) Both EYES at bedtime  metoprolol succinate ER 50 milliGRAM(s) Oral daily  mupirocin 2% Ointment 1 Application(s) Topical <User Schedule>  rosuvastatin 20 milliGRAM(s) Oral at bedtime  timolol 0.5% Solution 1 Drop(s) Both EYES two times a day    MEDICATIONS  (PRN):  HYDROmorphone  Injectable 0.2 milliGRAM(s) IV Push every 6 hours PRN Moderate Pain (4 - 6)  HYDROmorphone  Injectable 0.5 milliGRAM(s) IV Push every 6 hours PRN Severe Pain (7 - 10)  melatonin 3 milliGRAM(s) Oral at bedtime PRN Insomnia  trimethobenzamide Injectable 200 milliGRAM(s) IntraMuscular every 8 hours PRN Nausea      Allergies    No Known Allergies    Intolerances         REVIEW OF SYSTEMS:    as above    Vital Signs Last 24 Hrs  T(C): 36.3 (09 Jan 2025 11:42), Max: 37.2 (08 Jan 2025 20:13)  T(F): 97.4 (09 Jan 2025 11:42), Max: 98.9 (08 Jan 2025 20:13)  HR: 53 (09 Jan 2025 11:42) (53 - 65)  BP: 178/71 (09 Jan 2025 11:42) (148/66 - 196/77)  BP(mean): --  RR: 18 (09 Jan 2025 11:42) (18 - 18)  SpO2: 100% (09 Jan 2025 11:42) (93% - 100%)    Parameters below as of 09 Jan 2025 11:42  Patient On (Oxygen Delivery Method): room air      PHYSICAL EXAM:    GENERAL: NAD  HEAD:  Atraumatic, Normocephalic  EYES: EOMI, conjunctiva and sclera clear  ENMT: No Drainage from nares, No drainage from ears  NERVOUS SYSTEM:  Awake and Alert  CHEST/LUNG: Clear to percussion bilaterally  EXTREMITIES:  No Edema  SKIN: No rashes No obvious ecchymosis      LABS:                          9.0    6.07  )-----------( 191      ( 09 Jan 2025 07:40 )             29.0     01-09    144  |  115[H]  |  33[H]  ----------------------------<  84  3.8   |  21[L]  |  1.50[H]    Ca    8.7      09 Jan 2025 07:40          Urinalysis Basic - ( 09 Jan 2025 07:40 )    Color: x / Appearance: x / SG: x / pH: x  Gluc: 84 mg/dL / Ketone: x  / Bili: x / Urobili: x   Blood: x / Protein: x / Nitrite: x   Leuk Esterase: x / RBC: x / WBC x   Sq Epi: x / Non Sq Epi: x / Bacteria: x

## 2025-01-09 NOTE — DIETITIAN INITIAL EVALUATION ADULT - NSICDXPASTMEDICALHX_GEN_ALL_CORE_FT
PAST MEDICAL HISTORY:  Anemia     Breast cancer     Chronic GERD     Glaucoma     H/O: glaucoma     HTN (hypertension)     Lower extremity edema     Major depression     PVD (peripheral vascular disease)

## 2025-01-09 NOTE — PROGRESS NOTE ADULT - ASSESSMENT
The patient is an 84 year old male with a history of HTN, anemia, GERD, aortic regurgitation, CKD, breast cancer, PAD who is admitted with toe infection.    Plan:  - ECG with sinus rhythm and RBBB  - Echo with normal LV systolic function, mild aortic stenosis, mild pulmonary hypertension  - Monitor hemoglobin  - CKD - Cr remains stable in the 1.4-1.7 range  - Continue metoprolol succinate 50 mg daily  - Continue rosuvastatin 20 mg daily  - Continue clopidogrel 75 mg daily  - Continue aspirin 81 mg daily  - Vomiting - norovirus positive  - The patient remains optimized from a cardiac standpoint to proceed for intermediate risk podiatry surgery if that is the plan

## 2025-01-09 NOTE — DIETITIAN INITIAL EVALUATION ADULT - SIGNS/SYMPTOMS
as evidenced by worsening R toe open wound with infection and OM.  as evidenced by N/V, diarrhea, GI PCR+ norovirus and ecoli sps.

## 2025-01-09 NOTE — DIETITIAN INITIAL EVALUATION ADULT - PROBLEM SELECTOR PLAN 3
Patient with history of kidney disease. Possible STEPHANY on admission. Unknown baseline Cr. Follows Nephrologist from [place].   - BUN/Cr 35/1.70, eGFR 39  - Na 148, Cl 117  - S/p 1L NS bolus in ED   - Start D5 + 1/2NS IVF  - F/u urine lytes   - F/u renal sonogram  - Hold home Lasix in setting of Hypernatremia and possible STEPHANY -Renal dose meds   - Monitor daily CMP   - Avoid nephrotoxic meds when able   - Nephro Dr. Quiros consulted, f/u recs

## 2025-01-09 NOTE — PROGRESS NOTE ADULT - SUBJECTIVE AND OBJECTIVE BOX
[facility]  Sandeep Leos PA-C  [address] [place] [address]  [phone]      INTERVAL HPI/OVERNIGHT EVENTS:  Pt s/e  GI PCR positive for EAEC and norovirus  No further vomiting  +formed BM today    MEDICATIONS  (STANDING):  ammonium lactate 12% Lotion 1 Application(s) Topical two times a day  aspirin enteric coated 81 milliGRAM(s) Oral daily  cefepime   IVPB 2000 milliGRAM(s) IV Intermittent every 12 hours  clopidogrel Tablet 75 milliGRAM(s) Oral daily  cyanocobalamin 1000 MICROGram(s) Oral daily  dextrose 5% + sodium chloride 0.45%. 1000 milliLiter(s) (75 mL/Hr) IV Continuous <Continuous>  dextrose 5%. 1000 milliLiter(s) (75 mL/Hr) IV Continuous <Continuous>  escitalopram 10 milliGRAM(s) Oral daily  folic acid 1 milliGRAM(s) Oral daily  influenza  Vaccine (HIGH DOSE) 0.5 milliLiter(s) IntraMuscular once  latanoprost 0.005% Ophthalmic Solution 1 Drop(s) Both EYES at bedtime  metoprolol succinate ER 50 milliGRAM(s) Oral daily  mupirocin 2% Ointment 1 Application(s) Topical <User Schedule>  rosuvastatin 20 milliGRAM(s) Oral at bedtime  timolol 0.5% Solution 1 Drop(s) Both EYES two times a day    MEDICATIONS  (PRN):  HYDROmorphone  Injectable 0.2 milliGRAM(s) IV Push every 6 hours PRN Moderate Pain (4 - 6)  HYDROmorphone  Injectable 0.5 milliGRAM(s) IV Push every 6 hours PRN Severe Pain (7 - 10)  melatonin 3 milliGRAM(s) Oral at bedtime PRN Insomnia  trimethobenzamide Injectable 200 milliGRAM(s) IntraMuscular every 8 hours PRN Nausea      Allergies  No Known Allergies    PHYSICAL EXAM:   Vital Signs:  Vital Signs Last 24 Hrs  T(C): 36.3 (2025 11:42), Max: 37.2 (2025 20:13)  T(F): 97.4 (2025 11:42), Max: 98.9 (2025 20:13)  HR: 53 (2025 11:42) (53 - 65)  BP: 178/71 (2025 11:42) (148/66 - 196/77)  BP(mean): --  RR: 18 (2025 11:42) (18 - 18)  SpO2: 100% (2025 11:42) (93% - 100%)    Parameters below as of 2025 11:42  Patient On (Oxygen Delivery Method): room air      Daily     Daily Weight in k.9 (2025 02:20)    GENERAL:  Appears stated age  HEENT:  NC/AT  CHEST:  Full & symmetric excursion  HEART:  Regular rhythm  ABDOMEN:  Soft, non-tender, non-distended  EXTEREMITIES:  no cyanosis  SKIN:  No rash  NEURO:  Alert      LABS:                        9.0    6.07  )-----------( 191      ( 2025 07:40 )             29.0     01-    144  |  115[H]  |  33[H]  ----------------------------<  84  3.8   |  21[L]  |  1.50[H]    Ca    8.7      2025 07:40        Urinalysis Basic - ( 2025 07:40 )    Color: x / Appearance: x / SG: x / pH: x  Gluc: 84 mg/dL / Ketone: x  / Bili: x / Urobili: x   Blood: x / Protein: x / Nitrite: x   Leuk Esterase: x / RBC: x / WBC x   Sq Epi: x / Non Sq Epi: x / Bacteria: x

## 2025-01-09 NOTE — PROGRESS NOTE ADULT - SUBJECTIVE AND OBJECTIVE BOX
PROGRESS NOTE   Patient is a 84y old  Male who presents with a chief complaint of Osteomyelitis (08 Jan 2025 18:44)      HPI:  Patient is an 85yo M with a PMH of L sided breast cancer (s/p lumpectomy, on Tamoxifen), HTN, Anemia, Aortic insufficiency, Carotid artery disease, GERD, Glaucoma, Hepatitis (2014), Lower ext edema ,PVD who presents to the ED from Dr. Niko Jacques's office for a R toe infection. Patient notes that he noticed a R toe wound a few weeks ago. He completed a 10d course of Amoxicillin 500mg, but noticed worsening of the wound. He went ot his podiatrist today, as the wound was draining a pus like liquid. Podiatry rec he come to the hospital for IV abx and osteomyelitis workup. Patient feeling well in ED.    ED Course:  Vitals: /65, HR 71, T 97.3, RR 16 SpO2 100% on RA  Labs: ESR 75, Hgb 9.2, Na 148, Cl 117, BUN/Cr 35/1.70, eGFR 39  Given: IV Zosyn, IV Vancomycin, 1L NS bolus     Imaging:  Chest Xray: No acute chest finding.   R foot Xray: Possible erosion of the distal phalanx of the right great toe with associated soft tissue swelling suspicious for osteomyelitis. (02 Jan 2025 19:34)      Vital Signs Last 24 Hrs  T(C): 37.1 (09 Jan 2025 04:39), Max: 37.2 (08 Jan 2025 20:13)  T(F): 98.7 (09 Jan 2025 04:39), Max: 98.9 (08 Jan 2025 20:13)  HR: 62 (09 Jan 2025 04:39) (62 - 65)  BP: 191/68 (09 Jan 2025 04:39) (148/66 - 196/77)  BP(mean): --  RR: 18 (09 Jan 2025 04:39) (18 - 18)  SpO2: 94% (09 Jan 2025 04:39) (93% - 98%)    Parameters below as of 09 Jan 2025 04:39  Patient On (Oxygen Delivery Method): room air                              8.4    6.51  )-----------( 171      ( 08 Jan 2025 10:45 )             27.0               01-08    143  |  116[H]  |  38[H]  ----------------------------<  88  3.8   |  21[L]  |  1.60[H]    Ca    8.6      08 Jan 2025 10:45        PHYSICAL EXAM  General: Pleasant  male NAD & AOX3.    Integument:  Skin warm, dry and supple bilateral.    Noted plantar right hallux ulcer, fibro-granular base, apx [id number], reduced edema, reduced erythema, minimal drainage, - hyperkeratotic base, +tracking, with noted cellulitic changes, Consistent with OM  Vascular: Dorsalis Pedis and Posterior Tibial pulses non-palpable.  Capillary re-fill time less then 3 seconds digits 1-5 bilateral.  noted b/l lower leg edema, noted venous stasis b/l lower extremity  Neuro: Protective sensation intact to the level of the digits bilateral.  MSK: Muscle strength 4/5 all major muscle groups bilateral.

## 2025-01-09 NOTE — PROGRESS NOTE ADULT - ASSESSMENT
STEPHANY on CKD  Hypernatremia  Renal Mass  HTN  PAD  Vomiting  Anemia    -Baseline Creatinine Unknown but given age and co-morbidities will have CKD  -STEPHANY Likely 2/2 Decreased EABV  -SCr is downtrending  -Hypernatremia is resolved, no need for D5W at this time, encourage po free water intake  -Renal US with solid mass,  will need CT or MRI, at this time favor MRI with elevated creatinine, should see Urology for eval also, can see outpatient, but at the least need imaging while inpatient  -S/p LE Angio; no evidence of VLADIMIR at this time  -Creatinine stabilized; likely baseline; labs daily  -GI eval noted  -Iron panel to be checked in am for anemia

## 2025-01-09 NOTE — PROGRESS NOTE ADULT - ASSESSMENT
85yo M with a PMH of L sided breast cancer (s/p lumpectomy, on Tamoxifen), HTN, Anemia, Aortic insufficiency, Carotid artery disease, GERD, Glaucoma, Hepatitis (2014), Lower ext edema ,PVD, who presented to the ED from Dr. Niko Jacques's office for a R toe infection. MRI concerning for osteomyelitis of R 1st toe. He has no fevers and no leukocytosis. Skin changes on lower legs appear concerning for venous stasis. Podiatry following.     R 1st toe ulcer w/ underlying osteomyelitis  - afebrile, no leukocytosis  - BCx NGTD  - Culture - Wound Aerobic (collected 01-02-25 @ 18:30)    Few Pseudomonas aeruginosa    Rare Serratia marcescens  1/6 s/p RLE angio    Diarrhea  GI PCR + Norovirus, EAEC    Recommendations:   C/w cefepime  Adding azithromycin 500mg x3 day course  F/u podiatry recs re: intervention   Trend temps/WBC  Further recs to follow pending above    Patient evaluated with face-to-face time in addition to reviewing history, labs, microbiology, and imaging.   Antibiotic stewardship, local antibiogram, infection control strategies and potential transmission issues taken into consideration at time of treatment decision making process.   Thank you for allowing us to participate in the care of your patient.  D/w Dr. Benson  Infectious Diseases will follow. Please call with any questions.  Yen Glez M.D.  Available on [employer] TEAMS -- *PREFERRED*  [facility] [phone]  For after 5 P.M. and weekends, please call [phone]

## 2025-01-09 NOTE — DIETITIAN INITIAL EVALUATION ADULT - PROBLEM SELECTOR PLAN 4
Chronic. On home Metoprolol  XL 50mg and Lasix 80mg daily  - /65 on admission  - Continue home Metoprolol XL daily -Lasix on HOLD   - Monitor routine hemodynamics

## 2025-01-09 NOTE — DIETITIAN INITIAL EVALUATION ADULT - PROBLEM SELECTOR PLAN 5
Chronic. On Lasix 80mg daily. Patient denies any hx of CHF. No evidence of volume overload on exam. Follows Cardio Dr. Bland  - Chest Xray: No acute chest finding.  - Hold home Lasix in setting of Hypernatremia and possible STEPHANY --> if Cr improves, or patient develops fluid overload, can consider restarting Lasix  for b/l lower ext edema   - F/u TTE   - F/u b/l LE doppler

## 2025-01-09 NOTE — DIETITIAN INITIAL EVALUATION ADULT - ORAL INTAKE PTA/DIET HISTORY
Pt reports fair appetite/intake PTA. Typically consumes 2-3 meals/day. Pt does grocery shopping and wife does meal preparation. Follows a low salt diet at home.

## 2025-01-10 LAB
ALBUMIN SERPL ELPH-MCNC: 2.2 G/DL — LOW (ref 3.3–5)
ALP SERPL-CCNC: 56 U/L — SIGNIFICANT CHANGE UP (ref 40–120)
ALT FLD-CCNC: 28 U/L — SIGNIFICANT CHANGE UP (ref 12–78)
ANION GAP SERPL CALC-SCNC: 6 MMOL/L — SIGNIFICANT CHANGE UP (ref 5–17)
AST SERPL-CCNC: 37 U/L — SIGNIFICANT CHANGE UP (ref 15–37)
BILIRUB SERPL-MCNC: 0.1 MG/DL — LOW (ref 0.2–1.2)
BUN SERPL-MCNC: 30 MG/DL — HIGH (ref 7–23)
CALCIUM SERPL-MCNC: 8.1 MG/DL — LOW (ref 8.5–10.1)
CHLORIDE SERPL-SCNC: 114 MMOL/L — HIGH (ref 96–108)
CO2 SERPL-SCNC: 22 MMOL/L — SIGNIFICANT CHANGE UP (ref 22–31)
CREAT SERPL-MCNC: 1.3 MG/DL — SIGNIFICANT CHANGE UP (ref 0.5–1.3)
CULTURE RESULTS: SIGNIFICANT CHANGE UP
EGFR: 54 ML/MIN/1.73M2 — LOW
FERRITIN SERPL-MCNC: 121 NG/ML — SIGNIFICANT CHANGE UP (ref 30–400)
GLUCOSE SERPL-MCNC: 90 MG/DL — SIGNIFICANT CHANGE UP (ref 70–99)
HCT VFR BLD CALC: 25.4 % — LOW (ref 39–50)
HGB BLD-MCNC: 8.1 G/DL — LOW (ref 13–17)
IRON SATN MFR SERPL: 19 % — SIGNIFICANT CHANGE UP (ref 16–55)
IRON SATN MFR SERPL: 39 UG/DL — LOW (ref 45–165)
MAGNESIUM SERPL-MCNC: 1.9 MG/DL — SIGNIFICANT CHANGE UP (ref 1.6–2.6)
MCHC RBC-ENTMCNC: 29.7 PG — SIGNIFICANT CHANGE UP (ref 27–34)
MCHC RBC-ENTMCNC: 31.9 G/DL — LOW (ref 32–36)
MCV RBC AUTO: 93 FL — SIGNIFICANT CHANGE UP (ref 80–100)
NRBC # BLD: 0 /100 WBCS — SIGNIFICANT CHANGE UP (ref 0–0)
NRBC BLD-RTO: 0 /100 WBCS — SIGNIFICANT CHANGE UP (ref 0–0)
PHOSPHATE SERPL-MCNC: 1.9 MG/DL — LOW (ref 2.5–4.5)
PLATELET # BLD AUTO: 153 K/UL — SIGNIFICANT CHANGE UP (ref 150–400)
POTASSIUM SERPL-MCNC: 4.2 MMOL/L — SIGNIFICANT CHANGE UP (ref 3.5–5.3)
POTASSIUM SERPL-SCNC: 4.2 MMOL/L — SIGNIFICANT CHANGE UP (ref 3.5–5.3)
PROT SERPL-MCNC: 5.6 G/DL — LOW (ref 6–8.3)
RBC # BLD: 2.73 M/UL — LOW (ref 4.2–5.8)
RBC # FLD: 17 % — HIGH (ref 10.3–14.5)
SODIUM SERPL-SCNC: 142 MMOL/L — SIGNIFICANT CHANGE UP (ref 135–145)
SPECIMEN SOURCE: SIGNIFICANT CHANGE UP
TIBC SERPL-MCNC: 203 UG/DL — LOW (ref 220–430)
UIBC SERPL-MCNC: 164 UG/DL — SIGNIFICANT CHANGE UP (ref 110–370)
WBC # BLD: 6.06 K/UL — SIGNIFICANT CHANGE UP (ref 3.8–10.5)
WBC # FLD AUTO: 6.06 K/UL — SIGNIFICANT CHANGE UP (ref 3.8–10.5)

## 2025-01-10 RX ADMIN — ASPIRIN 81 MILLIGRAM(S): 81 TABLET, COATED ORAL at 11:15

## 2025-01-10 RX ADMIN — AZITHROMYCIN DIHYDRATE 500 MILLIGRAM(S): 500 TABLET, FILM COATED ORAL at 14:57

## 2025-01-10 RX ADMIN — Medication 1 APPLICATION(S): at 17:27

## 2025-01-10 RX ADMIN — ROSUVASTATIN CALCIUM 20 MILLIGRAM(S): 10 TABLET, FILM COATED ORAL at 22:35

## 2025-01-10 RX ADMIN — LATANOPROST 1 DROP(S): 50 SOLUTION OPHTHALMIC at 22:21

## 2025-01-10 RX ADMIN — Medication 1 MILLIGRAM(S): at 11:15

## 2025-01-10 RX ADMIN — ESCITALOPRAM 10 MILLIGRAM(S): 10 TABLET, FILM COATED ORAL at 11:15

## 2025-01-10 RX ADMIN — Medication 50 MILLIGRAM(S): at 05:31

## 2025-01-10 RX ADMIN — Medication 100 MILLIGRAM(S): at 05:28

## 2025-01-10 RX ADMIN — Medication 1 APPLICATION(S): at 05:33

## 2025-01-10 RX ADMIN — Medication 1000 MICROGRAM(S): at 11:15

## 2025-01-10 RX ADMIN — Medication 75 MILLIGRAM(S): at 11:15

## 2025-01-10 RX ADMIN — TIMOLOL MALEATE 1 DROP(S): 5 SOLUTION OPHTHALMIC at 17:22

## 2025-01-10 RX ADMIN — TIMOLOL MALEATE 1 DROP(S): 5 SOLUTION OPHTHALMIC at 05:32

## 2025-01-10 RX ADMIN — MUPIROCIN 1 APPLICATION(S): 2 CREAM TOPICAL at 07:32

## 2025-01-10 RX ADMIN — Medication 100 MILLIGRAM(S): at 17:20

## 2025-01-10 NOTE — PROGRESS NOTE ADULT - SUBJECTIVE AND OBJECTIVE BOX
[facility]                             Nephrology and Hypertension                             Niles Montano                                          [phone]     Patient is a 84y old  Male who presents with a chief complaint of Osteomyelitis (03 Jan 2025 13:05)       HPI:  Patient is an 85yo M with a PMH of L sided breast cancer (s/p lumpectomy, on Tamoxifen), HTN, Anemia, Aortic insufficiency, Carotid artery disease, GERD, Glaucoma, Hepatitis (2014), Lower ext edema ,PVD who presents to the ED from Dr. Niko Jacques's office for a R toe infection. Patient notes that he noticed a R toe wound a few weeks ago. He completed a 10d course of Amoxicillin 500mg, but noticed worsening of the wound. He went ot his podiatrist today, as the wound was draining a pus like liquid. Podiatry rec he come to the hospital for IV abx and osteomyelitis workup. Patient feeling well in ED.  States he is urinating well.  No N/V/SOB.  Has not seen nephrologist in the past.      Seen and examined at bedside, did not have any new complaints, no sob, no pain, no dizziness.     PAST MEDICAL & SURGICAL HISTORY:  Anemia      HTN (hypertension)      Breast cancer      Glaucoma      Major depression      PVD (peripheral vascular disease)      Lower extremity edema      Chronic GERD      H/O: glaucoma      S/P lumpectomy, left breast           FAMILY HISTORY:  [place]    Social History:Non smoker    MEDICATIONS  (STANDING):  ammonium lactate 12% Lotion 1 Application(s) Topical two times a day  aspirin enteric coated 81 milliGRAM(s) Oral daily  cefepime   IVPB 2000 milliGRAM(s) IV Intermittent every 12 hours  clopidogrel Tablet 75 milliGRAM(s) Oral daily  cyanocobalamin 1000 MICROGram(s) Oral daily  dextrose 5% + sodium chloride 0.45%. 1000 milliLiter(s) (75 mL/Hr) IV Continuous <Continuous>  dextrose 5%. 1000 milliLiter(s) (75 mL/Hr) IV Continuous <Continuous>  escitalopram 10 milliGRAM(s) Oral daily  folic acid 1 milliGRAM(s) Oral daily  influenza  Vaccine (HIGH DOSE) 0.5 milliLiter(s) IntraMuscular once  latanoprost 0.005% Ophthalmic Solution 1 Drop(s) Both EYES at bedtime  metoprolol succinate ER 50 milliGRAM(s) Oral daily  mupirocin 2% Ointment 1 Application(s) Topical <User Schedule>  rosuvastatin 20 milliGRAM(s) Oral at bedtime  timolol 0.5% Solution 1 Drop(s) Both EYES two times a day    MEDICATIONS  (PRN):  HYDROmorphone  Injectable 0.2 milliGRAM(s) IV Push every 6 hours PRN Moderate Pain (4 - 6)  HYDROmorphone  Injectable 0.5 milliGRAM(s) IV Push every 6 hours PRN Severe Pain (7 - 10)  melatonin 3 milliGRAM(s) Oral at bedtime PRN Insomnia  trimethobenzamide Injectable 200 milliGRAM(s) IntraMuscular every 8 hours PRN Nausea      Allergies    No Known Allergies    Intolerances         REVIEW OF SYSTEMS:    as above    ICU Vital Signs Last 24 Hrs  T(C): 36.4 (10 Marlo 2025 11:44), Max: 36.4 (10 Marlo 2025 11:44)  T(F): 97.6 (10 Marlo 2025 11:44), Max: 97.6 (10 Marlo 2025 11:44)  HR: 59 (10 Marlo 2025 11:44) (55 - 63)  BP: 165/55 (10 Marlo 2025 11:44) (165/55 - 189/75)  BP(mean): --  ABP: --  ABP(mean): --  RR: 18 (10 Marlo 2025 11:44) (18 - 18)  SpO2: 96% (10 Marlo 2025 11:44) (96% - 98%)    O2 Parameters below as of 10 Marlo 2025 11:44  Patient On (Oxygen Delivery Method): room air            PHYSICAL EXAM:    GENERAL: NAD  HEAD:  Atraumatic, Normocephalic  EYES: EOMI, conjunctiva and sclera clear  ENMT: No Drainage from nares, No drainage from ears  NERVOUS SYSTEM:  Awake and Alert  CHEST/LUNG: Clear to percussion bilaterally  EXTREMITIES:  No Edema  SKIN: No rashes No obvious ecchymosis      LABS:                                     8.1    6.06  )-----------( 153      ( 10 Marlo 2025 08:46 )             25.4     01-10    142  |  114[H]  |  30[H]  ----------------------------<  90  4.2   |  22  |  1.30    Ca    8.1[L]      10 Marlo 2025 08:46  Phos  1.9     01-10  Mg     1.9     01-10    TPro  5.6[L]  /  Alb  2.2[L]  /  TBili  0.1[L]  /  DBili  x   /  AST  37  /  ALT  28  /  AlkPhos  56  01-10      Urinalysis Basic - ( 10 Marlo 2025 08:46 )    Color: x / Appearance: x / SG: x / pH: x  Gluc: 90 mg/dL / Ketone: x  / Bili: x / Urobili: x   Blood: x / Protein: x / Nitrite: x   Leuk Esterase: x / RBC: x / WBC x   Sq Epi: x / Non Sq Epi: x / Bacteria: x

## 2025-01-10 NOTE — PROGRESS NOTE ADULT - SUBJECTIVE AND OBJECTIVE BOX
[facility]  RHEA Brand Y. Patel, S. Shah, G. [facility]  [phone]    Name: DAYA MATHIS  Age: 84y  Gender: Male  MRN: [id number]    Interval History:  Patient seen and examined at bedside  No acute overnight events. Afebrile  Notes reviewed    Antibiotics:  azithromycin   Tablet 500 milliGRAM(s) Oral every 24 hours  cefepime   IVPB 2000 milliGRAM(s) IV Intermittent every 12 hours      Medications:  ammonium lactate 12% Lotion 1 Application(s) Topical two times a day  aspirin enteric coated 81 milliGRAM(s) Oral daily  azithromycin   Tablet 500 milliGRAM(s) Oral every 24 hours  cefepime   IVPB 2000 milliGRAM(s) IV Intermittent every 12 hours  clopidogrel Tablet 75 milliGRAM(s) Oral daily  cyanocobalamin 1000 MICROGram(s) Oral daily  dextrose 5% + sodium chloride 0.45%. 1000 milliLiter(s) IV Continuous <Continuous>  escitalopram 10 milliGRAM(s) Oral daily  folic acid 1 milliGRAM(s) Oral daily  HYDROmorphone  Injectable 0.2 milliGRAM(s) IV Push every 6 hours PRN  HYDROmorphone  Injectable 0.5 milliGRAM(s) IV Push every 6 hours PRN  influenza  Vaccine (HIGH DOSE) 0.5 milliLiter(s) IntraMuscular once  latanoprost 0.005% Ophthalmic Solution 1 Drop(s) Both EYES at bedtime  melatonin 3 milliGRAM(s) Oral at bedtime PRN  metoprolol succinate ER 50 milliGRAM(s) Oral daily  mupirocin 2% Ointment 1 Application(s) Topical <User Schedule>  rosuvastatin 20 milliGRAM(s) Oral at bedtime  timolol 0.5% Solution 1 Drop(s) Both EYES two times a day  trimethobenzamide Injectable 200 milliGRAM(s) IntraMuscular every 8 hours PRN      Review of Systems:  A 10-point review of systems was obtained.  Review of systems otherwise negative except as previously noted.    Allergies: No Known Allergies    For details regarding the patient's past medical history, social history, family history, and other miscellaneous elements, please refer the initial infectious diseases consultation and/or the admitting history and physical examination for this admission.    Objective:  Vitals:   T(C): 36.4 (01-10-25 @ 11:44), Max: 36.4 (01-10-25 @ 11:44)  HR: 59 (01-10-25 @ 11:44) (55 - 63)  BP: 165/55 (01-10-25 @ 11:44) (165/55 - 189/75)  RR: 18 (01-10-25 @ 11:44) (18 - 18)  SpO2: 96% (01-10-25 @ 11:44) (96% - 98%)    Physical Examination:  General: no acute distress  HEENT: NC/AT, EOMI  Cardio: RRR  Resp: breath sounds heard bilaterally  Abd: soft, NT, ND  Ext: no edema or cyanosis, foot in dressing  Skin: warm, dry, no visible rash      Laboratory Studies:  CBC:                       8.1    6.06  )-----------( 153      ( 10 Marlo 2025 08:46 )             25.4     CMP: 01-10    142  |  114[H]  |  30[H]  ----------------------------<  90  4.2   |  22  |  1.30    Ca    8.1[L]      10 Marlo 2025 08:46  Phos  1.9     01-10  Mg     1.9     01-10    TPro  5.6[L]  /  Alb  2.2[L]  /  TBili  0.1[L]  /  DBili  x   /  AST  37  /  ALT  28  /  AlkPhos  56  01-10    LIVER FUNCTIONS - ( 10 Marlo 2025 08:46 )  Alb: 2.2 g/dL / Pro: 5.6 g/dL / ALK PHOS: 56 U/L / ALT: 28 U/L / AST: 37 U/L / GGT: x           Urinalysis Basic - ( 10 Marlo 2025 08:46 )    Color: x / Appearance: x / SG: x / pH: x  Gluc: 90 mg/dL / Ketone: x  / Bili: x / Urobili: x   Blood: x / Protein: x / Nitrite: x   Leuk Esterase: x / RBC: x / WBC x   Sq Epi: x / Non Sq Epi: x / Bacteria: x        Microbiology: reviewed    Culture - Stool (collected 01-08-25 @ 04:40)  Source: .Stool  Final Report (01-10-25 @ 11:30):    No enteric pathogens isolated.    (Stool culture examined for Salmonella,    Shigella, Campylobacter, Aeromonas, Plesiomonas,    Vibrio, E.coli O157 and Yersinia)    Culture - Wound Aerobic (collected 01-02-25 @ 18:30)  Source: .Other  Final Report (01-04-25 @ 15:14):    Few Pseudomonas aeruginosa    Rare Serratia marcescens    Commensal aleyda consistent with body site  Organism: Pseudomonas aeruginosa  Serratia marcescens (01-04-25 @ 15:14)  Organism: Serratia marcescens (01-04-25 @ 15:14)      Method Type: DANIEL      -  Amoxicillin/Clavulanic Acid: R >16/8      -  Ampicillin: R >16 These ampicillin results predict results for amoxicillin      -  Ampicillin/Sulbactam: R >16/8      -  Aztreonam: S <=4      -  Cefazolin: R >16      -  Cefepime: S <=2      -  Cefoxitin: R 16      -  Ceftriaxone: S <=1      -  Ciprofloxacin: R 1      -  Ertapenem: S <=0.5      -  Gentamicin: S <=2      -  Levofloxacin: I 1      -  Meropenem: S <=1      -  Piperacillin/Tazobactam: S <=8      -  Tobramycin: S <=2      -  Trimethoprim/Sulfamethoxazole: S <=0.5/9.5  Organism: Pseudomonas aeruginosa (01-04-25 @ 15:14)      Method Type: DANIEL      -  Aztreonam: S <=4      -  Cefepime: S 8      -  Ceftazidime: S 4      -  Ciprofloxacin: S 0.5      -  Imipenem: S 2      -  Levofloxacin: S 1      -  Meropenem: S <=1      -  Piperacillin/Tazobactam: S <=8    Culture - Blood (collected 01-02-25 @ 17:20)  Source: .Blood BLOOD  Final Report (01-08-25 @ 01:00):    No growth at 5 days    Culture - Blood (collected 01-02-25 @ 17:20)  Source: .Blood BLOOD  Final Report (01-08-25 @ 01:00):    No growth at 5 days          Radiology: reviewed       [facility]  RHEA Brand Y. Patel, S. Shah, G. [facility]  [phone]    Name: DAYA MATHIS  Age: 84y  Gender: Male  MRN: [id number]    Interval History:  No acute overnight events. Afebrile  Notes reviewed    Antibiotics:  azithromycin   Tablet 500 milliGRAM(s) Oral every 24 hours  cefepime   IVPB 2000 milliGRAM(s) IV Intermittent every 12 hours      Medications:  ammonium lactate 12% Lotion 1 Application(s) Topical two times a day  aspirin enteric coated 81 milliGRAM(s) Oral daily  azithromycin   Tablet 500 milliGRAM(s) Oral every 24 hours  cefepime   IVPB 2000 milliGRAM(s) IV Intermittent every 12 hours  clopidogrel Tablet 75 milliGRAM(s) Oral daily  cyanocobalamin 1000 MICROGram(s) Oral daily  dextrose 5% + sodium chloride 0.45%. 1000 milliLiter(s) IV Continuous <Continuous>  escitalopram 10 milliGRAM(s) Oral daily  folic acid 1 milliGRAM(s) Oral daily  HYDROmorphone  Injectable 0.2 milliGRAM(s) IV Push every 6 hours PRN  HYDROmorphone  Injectable 0.5 milliGRAM(s) IV Push every 6 hours PRN  influenza  Vaccine (HIGH DOSE) 0.5 milliLiter(s) IntraMuscular once  latanoprost 0.005% Ophthalmic Solution 1 Drop(s) Both EYES at bedtime  melatonin 3 milliGRAM(s) Oral at bedtime PRN  metoprolol succinate ER 50 milliGRAM(s) Oral daily  mupirocin 2% Ointment 1 Application(s) Topical <User Schedule>  rosuvastatin 20 milliGRAM(s) Oral at bedtime  timolol 0.5% Solution 1 Drop(s) Both EYES two times a day  trimethobenzamide Injectable 200 milliGRAM(s) IntraMuscular every 8 hours PRN      Review of Systems:  A 10-point review of systems was obtained.  Review of systems otherwise negative except as previously noted.    Allergies: No Known Allergies    For details regarding the patient's past medical history, social history, family history, and other miscellaneous elements, please refer the initial infectious diseases consultation and/or the admitting history and physical examination for this admission.    Objective:  Vitals:   T(C): 36.4 (01-10-25 @ 11:44), Max: 36.4 (01-10-25 @ 11:44)  HR: 59 (01-10-25 @ 11:44) (55 - 63)  BP: 165/55 (01-10-25 @ 11:44) (165/55 - 189/75)  RR: 18 (01-10-25 @ 11:44) (18 - 18)  SpO2: 96% (01-10-25 @ 11:44) (96% - 98%)    Physical Examination:  General: no acute distress  HEENT: NC/AT, EOMI  Cardio: RRR  Resp: breath sounds heard bilaterally  Abd: soft, NT, ND  Ext: no edema or cyanosis, foot in dressing  Skin: warm, dry, no visible rash      Laboratory Studies:  CBC:                       8.1    6.06  )-----------( 153      ( 10 Marlo 2025 08:46 )             25.4     CMP: 01-10    142  |  114[H]  |  30[H]  ----------------------------<  90  4.2   |  22  |  1.30    Ca    8.1[L]      10 Marlo 2025 08:46  Phos  1.9     01-10  Mg     1.9     01-10    TPro  5.6[L]  /  Alb  2.2[L]  /  TBili  0.1[L]  /  DBili  x   /  AST  37  /  ALT  28  /  AlkPhos  56  01-10    LIVER FUNCTIONS - ( 10 Marlo 2025 08:46 )  Alb: 2.2 g/dL / Pro: 5.6 g/dL / ALK PHOS: 56 U/L / ALT: 28 U/L / AST: 37 U/L / GGT: x           Urinalysis Basic - ( 10 Marlo 2025 08:46 )    Color: x / Appearance: x / SG: x / pH: x  Gluc: 90 mg/dL / Ketone: x  / Bili: x / Urobili: x   Blood: x / Protein: x / Nitrite: x   Leuk Esterase: x / RBC: x / WBC x   Sq Epi: x / Non Sq Epi: x / Bacteria: x        Microbiology: reviewed    Culture - Stool (collected 01-08-25 @ 04:40)  Source: .Stool  Final Report (01-10-25 @ 11:30):    No enteric pathogens isolated.    (Stool culture examined for Salmonella,    Shigella, Campylobacter, Aeromonas, Plesiomonas,    Vibrio, E.coli O157 and Yersinia)    Culture - Wound Aerobic (collected 01-02-25 @ 18:30)  Source: .Other  Final Report (01-04-25 @ 15:14):    Few Pseudomonas aeruginosa    Rare Serratia marcescens    Commensal aleyda consistent with body site  Organism: Pseudomonas aeruginosa  Serratia marcescens (01-04-25 @ 15:14)  Organism: Serratia marcescens (01-04-25 @ 15:14)      Method Type: DANIEL      -  Amoxicillin/Clavulanic Acid: R >16/8      -  Ampicillin: R >16 These ampicillin results predict results for amoxicillin      -  Ampicillin/Sulbactam: R >16/8      -  Aztreonam: S <=4      -  Cefazolin: R >16      -  Cefepime: S <=2      -  Cefoxitin: R 16      -  Ceftriaxone: S <=1      -  Ciprofloxacin: R 1      -  Ertapenem: S <=0.5      -  Gentamicin: S <=2      -  Levofloxacin: I 1      -  Meropenem: S <=1      -  Piperacillin/Tazobactam: S <=8      -  Tobramycin: S <=2      -  Trimethoprim/Sulfamethoxazole: S <=0.5/9.5  Organism: Pseudomonas aeruginosa (01-04-25 @ 15:14)      Method Type: DANIEL      -  Aztreonam: S <=4      -  Cefepime: S 8      -  Ceftazidime: S 4      -  Ciprofloxacin: S 0.5      -  Imipenem: S 2      -  Levofloxacin: S 1      -  Meropenem: S <=1      -  Piperacillin/Tazobactam: S <=8    Culture - Blood (collected 01-02-25 @ 17:20)  Source: .Blood BLOOD  Final Report (01-08-25 @ 01:00):    No growth at 5 days    Culture - Blood (collected 01-02-25 @ 17:20)  Source: .Blood BLOOD  Final Report (01-08-25 @ 01:00):    No growth at 5 days          Radiology: reviewed

## 2025-01-10 NOTE — PROGRESS NOTE ADULT - ASSESSMENT
83yo M with a PMH of L sided breast cancer (s/p lumpectomy, on Tamoxifen), HTN, Anemia, Aortic insufficiency, Carotid artery disease, GERD, Glaucoma, Hepatitis (2014), Lower ext edema ,PVD, who presented to the ED from Dr. Niko Jacques's office for a R toe infection. MRI concerning for osteomyelitis of R 1st toe. He has no fevers and no leukocytosis. Skin changes on lower legs appear concerning for venous stasis. Podiatry following.     R 1st toe ulcer w/ underlying osteomyelitis  - afebrile, no leukocytosis  - BCx NGTD  - Culture - Wound Aerobic (collected 01-02-25 @ 18:30)    Few Pseudomonas aeruginosa    Rare Serratia marcescens  1/6 s/p RLE angio    Diarrhea  GI PCR + Norovirus, EAEC  Stool cx negative    Recommendations:   C/w azithromycin 500mg x3 day course until 1/11/25  C/w cefepime  --f/u podiatry recs re: intervention   --If no plan for intervention, plan cefepime 2gm q12h x6 week course until 2/12/25   --will need weekly CMP, CBC, ESR, CRP  Trend temps/WBC  Wound care  Additional care per primary team    Patient evaluated with face-to-face time in addition to reviewing history, labs, microbiology, and imaging.   Antibiotic stewardship, local antibiogram, infection control strategies and potential transmission issues taken into consideration at time of treatment decision making process.   Thank you for allowing us to participate in the care of your patient.  D/w Dr. Benson  Infectious Diseases will follow. Please call with any questions.  Yen Glez M.D.  Available on [employer] TEAMS -- *[facility]*  [facility] [phone]  For after 5 P.M. and weekends, please call [phone]   83yo M with a PMH of L sided breast cancer (s/p lumpectomy, on Tamoxifen), HTN, Anemia, Aortic insufficiency, Carotid artery disease, GERD, Glaucoma, Hepatitis (2014), Lower ext edema ,PVD, who presented to the ED from Dr. Niko Jacques's office for a R toe infection. MRI concerning for osteomyelitis of R 1st toe. He has no fevers and no leukocytosis. Skin changes on lower legs appear concerning for venous stasis. Podiatry following.     R 1st toe ulcer w/ underlying osteomyelitis  - afebrile, no leukocytosis  - BCx NGTD  - Culture - Wound Aerobic (collected 01-02-25 @ 18:30)    Few Pseudomonas aeruginosa    Rare Serratia marcescens  1/6 s/p RLE angio    Diarrhea  GI PCR + Norovirus, EAEC  Stool cx negative    Recommendations:   C/w azithromycin 500mg x3 day course until 1/11/25  C/w cefepime  --f/u podiatry recs re: intervention   --If no plan for intervention, plan cefepime 2gm q12h x6 week course until 2/12/25   --will need weekly CMP, CBC, ESR, CRP  Trend temps/WBC  Wound care  Additional care per primary team    Addendum: Patient doesnt want IV Abx/PICC line.       Patient evaluated with face-to-face time in addition to reviewing history, labs, microbiology, and imaging.   Antibiotic stewardship, local antibiogram, infection control strategies and potential transmission issues taken into consideration at time of treatment decision making process.   Thank you for allowing us to participate in the care of your patient.  D/w Dr. Benson  Infectious Diseases will follow. Please call with any questions.  Yen Glez M.D.  Available on [employer] TEAMS -- *PREFERRED*  [facility] [phone]  For after 5 P.M. and weekends, please call [phone]   85yo M with a PMH of L sided breast cancer (s/p lumpectomy, on Tamoxifen), HTN, Anemia, Aortic insufficiency, Carotid artery disease, GERD, Glaucoma, Hepatitis (2014), Lower ext edema ,PVD, who presented to the ED from Dr. Niko Jacques's office for a R toe infection. MRI concerning for osteomyelitis of R 1st toe. He has no fevers and no leukocytosis. Skin changes on lower legs appear concerning for venous stasis. Podiatry following.     R 1st toe ulcer w/ underlying osteomyelitis  - afebrile, no leukocytosis  - BCx NGTD  - Culture - Wound Aerobic (collected 01-02-25 @ 18:30)    Few Pseudomonas aeruginosa    Rare Serratia marcescens  1/6 s/p RLE angio    Diarrhea  GI PCR + Norovirus, EAEC  Stool cx negative    Recommendations:   C/w azithromycin 500mg x3 day course until 1/11/25  C/w cefepime  --f/u podiatry recs re: intervention   --If no plan for intervention, plan cefepime 2gm q12h x6 week course until 2/12/25   --will need weekly CMP, CBC, ESR, CRP  Trend temps/WBC  Wound care  Additional care per primary team    Addendum: Patient doesnt want IV Abx/PICC line. PICC dc  OR 1/14 Final recs to follow    Patient evaluated with face-to-face time in addition to reviewing history, labs, microbiology, and imaging.   Antibiotic stewardship, local antibiogram, infection control strategies and potential transmission issues taken into consideration at time of treatment decision making process.   Thank you for allowing us to participate in the care of your patient.  D/w Dr. Kelley Portillo covering weekend service from 1/11/25-1/12/25  Infectious Diseases will follow. Please call with any questions.  Yen Glez M.D.  Available on [employer] TEAMS -- *[facility]*  [facility] [phone]  For after 5 P.M. and weekends, please call [phone]

## 2025-01-10 NOTE — PROGRESS NOTE ADULT - ASSESSMENT
STEPHANY on CKD  Hypernatremia  Renal Mass  HTN  PAD  Vomiting  Anemia    -Baseline Creatinine Unknown but given age and co-morbidities will have CKD  -STEPHANY Likely 2/2 Decreased EABV  -SCr is downtrending  -Hypernatremia is resolved, no need for D5W at this time, encourage po free water intake  -Renal US with solid mass,  will need CT or MRI, at this time favor MRI with elevated creatinine, should see Urology for eval also, can see outpatient  -S/p LE Angio; no evidence of VLADIMIR at this time  -Creatinine stabilized; likely baseline; labs daily  -GI eval noted  -Iron panel to be checked in am for anemia  -Tentatively planned for Tuesday, 01/14/2025 for amputation of the right great toe with bone biopsy of the 1st metatarsal head.

## 2025-01-10 NOTE — PROGRESS NOTE ADULT - ASSESSMENT
Nausea/vomiting  Diarrhea    Recommendations:  - GI PCR sent, f/u with results  - Tigan IM q8h PRN for nausea, has prolonged QTc on recent EKG  - Monitor GI function and stools  - Diet as tolerated  - To follow    I reviewed the overnight course of events on the unit, re-confirming the patient history. I discussed the care with the patient  Differential diagnosis and plan of care discussed with patient after the evaluation  35 minutes spent on total encounter of which more than fifty percent of the encounter was spent counseling and/or coordinating care by the attending physician. Nausea/vomiting  Diarrhea    Recommendations:  - GI PCR positive for norovirus and EAEC  - Complete azithromycin  - Tigan IM q8h PRN for nausea, has prolonged QTc on recent EKG  - Monitor GI function and stools  - Diet as tolerated  - To follow    I reviewed the overnight course of events on the unit, re-confirming the patient history. I discussed the care with the patient  Differential diagnosis and plan of care discussed with patient after the evaluation  35 minutes spent on total encounter of which more than fifty percent of the encounter was spent counseling and/or coordinating care by the attending physician.

## 2025-01-10 NOTE — CHART NOTE - NSCHARTNOTEFT_GEN_A_CORE
Discussed patient care goals at length with patient and his son. We will proceed with surgical intervention tentatively planned for Tuesday, 01/14/2025 for amputation of the right great toe with bone biopsy of the 1st metatarsal head. Discussed risks, benefits, and alternatives of surgery and explained that this is a limb salvage attempt with no guarantees, and future surgical interventions and/or long-term IV antibiotics may still be required.

## 2025-01-10 NOTE — PROGRESS NOTE ADULT - SUBJECTIVE AND OBJECTIVE BOX
Chief Complaint: Toe infection    Interval Events: No events overnight.    Review of Systems:  General: No fevers, chills, weight gain  Skin: No rashes, color changes  Cardiovascular: No chest pain, orthopnea  Respiratory: No shortness of breath, cough  Gastrointestinal: No nausea, abdominal pain  Genitourinary: No incontinence, pain with urination  Musculoskeletal: No pain, swelling, decreased range of motion  Neurological: No headache, weakness  Psychiatric: No depression, anxiety  Endocrine: No weight gain, increased thirst  All other systems are comprehensively negative.    Physical Exam:  Vital Signs Last 24 Hrs  T(C): 36.3 (10 Marlo 2025 05:24), Max: 36.3 (09 Jan 2025 11:42)  T(F): 97.3 (10 Marlo 2025 05:24), Max: 97.4 (09 Jan 2025 11:42)  HR: 63 (10 Marlo 2025 05:24) (53 - 63)  BP: 171/81 (10 Marlo 2025 05:24) (171/81 - 189/75)  BP(mean): --  RR: 18 (10 Marlo 2025 05:24) (18 - 18)  SpO2: 96% (10 Marlo 2025 05:24) (96% - 100%)  Parameters below as of 10 Marlo 2025 05:24  Patient On (Oxygen Delivery Method): room air  General: NAD  HEENT: MMM  Neck: No JVD, no carotid bruit  Lungs: CTAB  CV: RRR, nl S1/S2, no M/R/G  Abdomen: S/NT/ND, +BS  Extremities: No LE edema, no cyanosis  Neuro: AAOx3, non-focal  Skin: No rash    Labs:             01-09    144  |  115[H]  |  33[H]  ----------------------------<  84  3.8   |  21[L]  |  1.50[H]    Ca    8.7      09 Jan 2025 07:40                          8.1    6.06  )-----------( 153      ( 10 Marlo 2025 08:46 )             25.4       ECG/Telemetry: Baseline artifact, sinus bradycardia, RBBB

## 2025-01-10 NOTE — DISCHARGE NOTE NURSING/CASE MANAGEMENT/SOCIAL WORK - PATIENT PORTAL LINK FT
You can access the FollowMyHealth Patient Portal offered by [facility] by registering at the following website: [email or web address]. By joining [facility]’s FollowMyHealth portal, you will also be able to view your health information using other applications (apps) compatible with our system.

## 2025-01-10 NOTE — PROGRESS NOTE ADULT - SUBJECTIVE AND OBJECTIVE BOX
PROGRESS NOTE   Patient is a 84y old  Male who presents with a chief complaint of Osteomyelitis (09 Jan 2025 17:24)      HPI:  Patient is an 83yo M with a PMH of L sided breast cancer (s/p lumpectomy, on Tamoxifen), HTN, Anemia, Aortic insufficiency, Carotid artery disease, GERD, Glaucoma, Hepatitis (2014), Lower ext edema ,PVD who presents to the ED from Dr. Niko Jacques's office for a R toe infection. Patient notes that he noticed a R toe wound a few weeks ago. He completed a 10d course of Amoxicillin 500mg, but noticed worsening of the wound. He went ot his podiatrist today, as the wound was draining a pus like liquid. Podiatry rec he come to the hospital for IV abx and osteomyelitis workup. Patient feeling well in ED.    ED Course:  Vitals: /65, HR 71, T 97.3, RR 16 SpO2 100% on RA  Labs: ESR 75, Hgb 9.2, Na 148, Cl 117, BUN/Cr 35/1.70, eGFR 39  Given: IV Zosyn, IV Vancomycin, 1L NS bolus     Imaging:  Chest Xray: No acute chest finding.   R foot Xray: Possible erosion of the distal phalanx of the right great toe with associated soft tissue swelling suspicious for osteomyelitis. (02 Jan 2025 19:34)      Vital Signs Last 24 Hrs  T(C): 36.3 (10 Marlo 2025 05:24), Max: 36.3 (09 Jan 2025 11:42)  T(F): 97.3 (10 Marlo 2025 05:24), Max: 97.4 (09 Jan 2025 11:42)  HR: 63 (10 Marlo 2025 05:24) (53 - 63)  BP: 171/81 (10 Marlo 2025 05:24) (171/81 - 189/75)  BP(mean): --  RR: 18 (10 Marlo 2025 05:24) (18 - 18)  SpO2: 96% (10 Marlo 2025 05:24) (96% - 100%)    Parameters below as of 10 Marlo 2025 05:24  Patient On (Oxygen Delivery Method): room air                              8.1    6.06  )-----------( 153      ( 10 Marlo 2025 08:46 )             25.4               01-10    142  |  114[H]  |  30[H]  ----------------------------<  90  4.2   |  22  |  1.30    Ca    8.1[L]      10 Marlo 2025 08:46  Phos  1.9     01-10  Mg     1.9     01-10    TPro  5.6[L]  /  Alb  2.2[L]  /  TBili  0.1[L]  /  DBili  x   /  AST  37  /  ALT  28  /  AlkPhos  56  01-10      PHYSICAL EXAM  General: Pleasant  male NAD & AOX3.    Integument:  Skin warm, dry and supple bilateral.    Noted plantar right hallux ulcer, fibro-granular base, apx [id number], reduced edema, reduced erythema, minimal drainage, - hyperkeratotic base, +tracking, with noted cellulitic changes, Consistent with OM  Vascular: Dorsalis Pedis and Posterior Tibial pulses non-palpable.  Capillary re-fill time less then 3 seconds digits 1-5 bilateral.  noted b/l lower leg edema, noted venous stasis b/l lower extremity  Neuro: Protective sensation intact to the level of the digits bilateral.  MSK: Muscle strength 4/5 all major muscle groups bilateral.

## 2025-01-10 NOTE — DISCHARGE NOTE NURSING/CASE MANAGEMENT/SOCIAL WORK - FINANCIAL ASSISTANCE
[facility] provides services at a reduced cost to those who are determined to be eligible through [facility]’s financial assistance program. Information regarding [facility]’s financial assistance program can be found by going to [email or web address] or by calling [phone].

## 2025-01-10 NOTE — PROGRESS NOTE ADULT - ASSESSMENT
84M with HTN, PAD, breast ca, CKD admitted with rt great toe osteomyelitis  MRI noted  IV ABX  podiatry, vascular and ID following  s/p angio  plan for toe amputation on Tuesday    #n/v diarrhea- GI pcr+ noro virus and ecoli sps  supportive care  GI fu  ID fu- zithromax    CKD/Hypernatremia  creatinine at about baseline  nephrology following  IVF as per renal    HTN  continue metoprolol    Breast ca  continue tamoxifen    edema  elevate LE    discussed at length at bedside plan of care with pt and son Marco  addressed all qs and concerns      [employer]   [phone]

## 2025-01-10 NOTE — PROGRESS NOTE ADULT - SUBJECTIVE AND OBJECTIVE BOX
[facility]  Sandeep Leos PA-C  [address] [place] [address]  [phone]      INTERVAL HPI/OVERNIGHT EVENTS:  Pt s/e  Tolerating diet  Diarrhea resolving    MEDICATIONS  (STANDING):  ammonium lactate 12% Lotion 1 Application(s) Topical two times a day  aspirin enteric coated 81 milliGRAM(s) Oral daily  azithromycin   Tablet 500 milliGRAM(s) Oral every 24 hours  cefepime   IVPB 2000 milliGRAM(s) IV Intermittent every 12 hours  clopidogrel Tablet 75 milliGRAM(s) Oral daily  cyanocobalamin 1000 MICROGram(s) Oral daily  dextrose 5% + sodium chloride 0.45%. 1000 milliLiter(s) (75 mL/Hr) IV Continuous <Continuous>  escitalopram 10 milliGRAM(s) Oral daily  folic acid 1 milliGRAM(s) Oral daily  influenza  Vaccine (HIGH DOSE) 0.5 milliLiter(s) IntraMuscular once  latanoprost 0.005% Ophthalmic Solution 1 Drop(s) Both EYES at bedtime  metoprolol succinate ER 50 milliGRAM(s) Oral daily  mupirocin 2% Ointment 1 Application(s) Topical <User Schedule>  rosuvastatin 20 milliGRAM(s) Oral at bedtime  timolol 0.5% Solution 1 Drop(s) Both EYES two times a day    MEDICATIONS  (PRN):  HYDROmorphone  Injectable 0.2 milliGRAM(s) IV Push every 6 hours PRN Moderate Pain (4 - 6)  HYDROmorphone  Injectable 0.5 milliGRAM(s) IV Push every 6 hours PRN Severe Pain (7 - 10)  melatonin 3 milliGRAM(s) Oral at bedtime PRN Insomnia  trimethobenzamide Injectable 200 milliGRAM(s) IntraMuscular every 8 hours PRN Nausea      Allergies    No Known Allergies      PHYSICAL EXAM:   Vital Signs:  Vital Signs Last 24 Hrs  T(C): 36.3 (10 Marlo 2025 05:24), Max: 36.3 (2025 11:42)  T(F): 97.3 (10 Marlo 2025 05:24), Max: 97.4 (2025 11:42)  HR: 63 (10 Marlo 2025 05:24) (53 - 63)  BP: 171/81 (10 Marlo 2025 05:24) (171/81 - 189/75)  BP(mean): --  RR: 18 (10 Marlo 2025 05:24) (18 - 18)  SpO2: 96% (10 Marlo 2025 05:24) (96% - 100%)    Parameters below as of 10 Marlo 2025 05:24  Patient On (Oxygen Delivery Method): room air      Daily     Daily Weight in k.9 (10 Marlo 2025 05:24)    GENERAL:  Appears stated age  HEENT:  NC/AT  CHEST:  Full & symmetric excursion  HEART:  Regular rhythm  ABDOMEN:  Soft, non-tender, non-distended  EXTEREMITIES:  no cyanosis  SKIN:  No rash  NEURO:  Alert      LABS:                        8.1    6.06  )-----------( 153      ( 10 Marlo 2025 08:46 )             25.4     01-10    142  |  114[H]  |  30[H]  ----------------------------<  90  4.2   |  22  |  1.30    Ca    8.1[L]      10 Marlo 2025 08:46  Phos  1.9     01-10  Mg     1.9     01-10    TPro  5.6[L]  /  Alb  2.2[L]  /  TBili  0.1[L]  /  DBili  x   /  AST  37  /  ALT  28  /  AlkPhos  56  01-10      Urinalysis Basic - ( 10 Marlo 2025 08:46 )    Color: x / Appearance: x / SG: x / pH: x  Gluc: 90 mg/dL / Ketone: x  / Bili: x / Urobili: x   Blood: x / Protein: x / Nitrite: x   Leuk Esterase: x / RBC: x / WBC x   Sq Epi: x / Non Sq Epi: x / Bacteria: x

## 2025-01-10 NOTE — PROGRESS NOTE ADULT - SUBJECTIVE AND OBJECTIVE BOX
Patient is a 84y old  Male who presents with a chief complaint of Osteomyelitis (10 Marlo 2025 15:32)      INTERVAL HPI/OVERNIGHT EVENTS: noted  pt seen and examined this am   events noted  feels well  n/v/d subsided  tolerating po well      Vital Signs Last 24 Hrs  T(C): 36.4 (10 Marlo 2025 11:44), Max: 36.4 (10 Marlo 2025 11:44)  T(F): 97.6 (10 Marlo 2025 11:44), Max: 97.6 (10 Marlo 2025 11:44)  HR: 59 (10 Marlo 2025 11:44) (55 - 63)  BP: 165/55 (10 Marlo 2025 11:44) (165/55 - 189/75)  BP(mean): --  RR: 18 (10 Marlo 2025 11:44) (18 - 18)  SpO2: 96% (10 Marlo 2025 11:44) (96% - 98%)    Parameters below as of 10 Marlo 2025 11:44  Patient On (Oxygen Delivery Method): room air        ammonium lactate 12% Lotion 1 Application(s) Topical two times a day  aspirin enteric coated 81 milliGRAM(s) Oral daily  azithromycin   Tablet 500 milliGRAM(s) Oral every 24 hours  cefepime   IVPB 2000 milliGRAM(s) IV Intermittent every 12 hours  clopidogrel Tablet 75 milliGRAM(s) Oral daily  cyanocobalamin 1000 MICROGram(s) Oral daily  dextrose 5% + sodium chloride 0.45%. 1000 milliLiter(s) IV Continuous <Continuous>  escitalopram 10 milliGRAM(s) Oral daily  folic acid 1 milliGRAM(s) Oral daily  HYDROmorphone  Injectable 0.2 milliGRAM(s) IV Push every 6 hours PRN  HYDROmorphone  Injectable 0.5 milliGRAM(s) IV Push every 6 hours PRN  influenza  Vaccine (HIGH DOSE) 0.5 milliLiter(s) IntraMuscular once  latanoprost 0.005% Ophthalmic Solution 1 Drop(s) Both EYES at bedtime  melatonin 3 milliGRAM(s) Oral at bedtime PRN  metoprolol succinate ER 50 milliGRAM(s) Oral daily  mupirocin 2% Ointment 1 Application(s) Topical <User Schedule>  rosuvastatin 20 milliGRAM(s) Oral at bedtime  timolol 0.5% Solution 1 Drop(s) Both EYES two times a day  trimethobenzamide Injectable 200 milliGRAM(s) IntraMuscular every 8 hours PRN      PHYSICAL EXAM:  GENERAL: NAD,   EYES: conjunctiva and sclera clear  ENMT: Moist mucous membranes  NECK: Supple, No JVD, Normal thyroid  CHEST/LUNG: non labored, cta b/l  HEART: Regular rate and rhythm; No murmurs, rubs, or gallops  ABDOMEN: Soft, Nontender, Nondistended; Bowel sounds present  EXTREMITIES:  2+ Peripheral Pulses, No clubbing, cyanosis, or edema  LYMPH: No lymphadenopathy noted  SKIN: No rashes or lesions    Consultant(s) Notes Reviewed:  [x ] YES  [ ] NO  Care Discussed with Consultants/Other Providers [ x] YES  [ ] NO    LABS:                        8.1    6.06  )-----------( 153      ( 10 Marlo 2025 08:46 )             25.4     01-10    142  |  114[H]  |  30[H]  ----------------------------<  90  4.2   |  22  |  1.30    Ca    8.1[L]      10 Marlo 2025 08:46  Phos  1.9     01-10  Mg     1.9     01-10    TPro  5.6[L]  /  Alb  2.2[L]  /  TBili  0.1[L]  /  DBili  x   /  AST  37  /  ALT  28  /  AlkPhos  56  01-10      Urinalysis Basic - ( 10 Marlo 2025 08:46 )    Color: x / Appearance: x / SG: x / pH: x  Gluc: 90 mg/dL / Ketone: x  / Bili: x / Urobili: x   Blood: x / Protein: x / Nitrite: x   Leuk Esterase: x / RBC: x / WBC x   Sq Epi: x / Non Sq Epi: x / Bacteria: x      CAPILLARY BLOOD GLUCOSE            Urinalysis Basic - ( 10 Marlo 2025 08:46 )    Color: x / Appearance: x / SG: x / pH: x  Gluc: 90 mg/dL / Ketone: x  / Bili: x / Urobili: x   Blood: x / Protein: x / Nitrite: x   Leuk Esterase: x / RBC: x / WBC x   Sq Epi: x / Non Sq Epi: x / Bacteria: x        Culture - Stool (collected 08 Jan 2025 04:40)  Source: .Stool  Final Report (10 Marlo 2025 11:30):    No enteric pathogens isolated.    (Stool culture examined for Salmonella,    Shigella, Campylobacter, Aeromonas, Plesiomonas,    Vibrio, E.coli O157 and Yersinia)        RADIOLOGY & ADDITIONAL TESTS:    Imaging Personally Reviewed:  [x ] YES  [ ] NO

## 2025-01-10 NOTE — PROGRESS NOTE ADULT - PROBLEM SELECTOR PLAN 1
Chart reviewed and Patient evaluated  There is concern for right hallux ulcer with infection and OM.  Wound culture results reveal Pseudo. A  MRI shows OM right great toe  Cont to apply Bactroban daily with dressing  Wound flushed with normal saline  Applied dry sterile dressing  Appreciates vascular consult, angiogram, angioplasty done 1/6/2025  Continue IV abx and follow ID  Will cont to allow demarcation of infective process.   Discussed findings and condition with patient and patient's family at length. The patient and family understand patient has osteomyelitis, and is at risk to lose part of the toe, all of the toe, part of the foot, all of the foot.   Patient and family have chosen conservative treatment of IV abx and to delay surgery at this time.  Patient understands if his symptoms do not remit, a more aggressive surgical procedure may be necessary in the future.  Patient and family confirm understanding and would like to proceed with abx only at this time understanding that delaying surgery may produce negative sequale.   Continued medical management per primary and GI teams.  Will discuss care plan  with all  Attendings

## 2025-01-10 NOTE — CASE MANAGEMENT PROGRESS NOTE - NSCMPROGRESSNOTE_GEN_ALL_CORE
Patient discussed during interdisciplinary rounds. Patient remains acute on IV cefepime and IV dilaudid. Patient has Rolling Walker at his bedside. Referral initiated for [facility] and will be sent prior to discharge. CM remains available.

## 2025-01-11 LAB
ANION GAP SERPL CALC-SCNC: 5 MMOL/L — SIGNIFICANT CHANGE UP (ref 5–17)
BUN SERPL-MCNC: 24 MG/DL — HIGH (ref 7–23)
CALCIUM SERPL-MCNC: 8.4 MG/DL — LOW (ref 8.5–10.1)
CHLORIDE SERPL-SCNC: 114 MMOL/L — HIGH (ref 96–108)
CO2 SERPL-SCNC: 23 MMOL/L — SIGNIFICANT CHANGE UP (ref 22–31)
CREAT SERPL-MCNC: 1.2 MG/DL — SIGNIFICANT CHANGE UP (ref 0.5–1.3)
EGFR: 59 ML/MIN/1.73M2 — LOW
GLUCOSE SERPL-MCNC: 98 MG/DL — SIGNIFICANT CHANGE UP (ref 70–99)
HCT VFR BLD CALC: 26.9 % — LOW (ref 39–50)
HGB BLD-MCNC: 8.4 G/DL — LOW (ref 13–17)
MCHC RBC-ENTMCNC: 28.8 PG — SIGNIFICANT CHANGE UP (ref 27–34)
MCHC RBC-ENTMCNC: 31.2 G/DL — LOW (ref 32–36)
MCV RBC AUTO: 92.1 FL — SIGNIFICANT CHANGE UP (ref 80–100)
NRBC # BLD: 0 /100 WBCS — SIGNIFICANT CHANGE UP (ref 0–0)
NRBC BLD-RTO: 0 /100 WBCS — SIGNIFICANT CHANGE UP (ref 0–0)
PLATELET # BLD AUTO: 158 K/UL — SIGNIFICANT CHANGE UP (ref 150–400)
POTASSIUM SERPL-MCNC: 3.8 MMOL/L — SIGNIFICANT CHANGE UP (ref 3.5–5.3)
POTASSIUM SERPL-SCNC: 3.8 MMOL/L — SIGNIFICANT CHANGE UP (ref 3.5–5.3)
RBC # BLD: 2.92 M/UL — LOW (ref 4.2–5.8)
RBC # FLD: 17 % — HIGH (ref 10.3–14.5)
SODIUM SERPL-SCNC: 142 MMOL/L — SIGNIFICANT CHANGE UP (ref 135–145)
WBC # BLD: 6.48 K/UL — SIGNIFICANT CHANGE UP (ref 3.8–10.5)
WBC # FLD AUTO: 6.48 K/UL — SIGNIFICANT CHANGE UP (ref 3.8–10.5)

## 2025-01-11 RX ADMIN — TIMOLOL MALEATE 1 DROP(S): 5 SOLUTION OPHTHALMIC at 05:18

## 2025-01-11 RX ADMIN — LATANOPROST 1 DROP(S): 50 SOLUTION OPHTHALMIC at 21:42

## 2025-01-11 RX ADMIN — Medication 75 MILLIGRAM(S): at 11:10

## 2025-01-11 RX ADMIN — MUPIROCIN 1 APPLICATION(S): 2 CREAM TOPICAL at 08:15

## 2025-01-11 RX ADMIN — TIMOLOL MALEATE 1 DROP(S): 5 SOLUTION OPHTHALMIC at 17:25

## 2025-01-11 RX ADMIN — Medication 50 MILLIGRAM(S): at 05:18

## 2025-01-11 RX ADMIN — ESCITALOPRAM 10 MILLIGRAM(S): 10 TABLET, FILM COATED ORAL at 11:10

## 2025-01-11 RX ADMIN — ROSUVASTATIN CALCIUM 20 MILLIGRAM(S): 10 TABLET, FILM COATED ORAL at 21:41

## 2025-01-11 RX ADMIN — Medication 1 APPLICATION(S): at 17:25

## 2025-01-11 RX ADMIN — ASPIRIN 81 MILLIGRAM(S): 81 TABLET, COATED ORAL at 11:10

## 2025-01-11 RX ADMIN — Medication 1000 MICROGRAM(S): at 11:10

## 2025-01-11 RX ADMIN — Medication 100 MILLIGRAM(S): at 05:18

## 2025-01-11 RX ADMIN — Medication 100 MILLIGRAM(S): at 17:23

## 2025-01-11 RX ADMIN — Medication 1 MILLIGRAM(S): at 11:10

## 2025-01-11 RX ADMIN — AZITHROMYCIN DIHYDRATE 500 MILLIGRAM(S): 500 TABLET, FILM COATED ORAL at 13:57

## 2025-01-11 NOTE — PROGRESS NOTE ADULT - PROBLEM SELECTOR PLAN 1
Chart reviewed and Patient evaluated  There is concern for right hallux ulcer with infection and OM.  Wound culture results reveal Pseudo. A  MRI shows OM right great toe  Cont to apply Bactroban daily with dressing  Wound flushed with normal saline  Applied dry sterile dressing  Appreciates vascular consult, angiogram, angioplasty done 1/6/2025  Continue IV abx and follow ID  Will cont to allow demarcation of infective process.   Discussed findings and condition with patient and patient's family at length. The patient and family understand patient has osteomyelitis, and is at risk to lose part of the toe, all of the toe, part of the foot, all of the foot.   Patient and family have chosen conservative treatment of IV abx and to delay surgery at this time.  Patient understands if his symptoms do not remit, a more aggressive surgical procedure may be necessary in the future.  Patient and family confirm understanding and would like to proceed with abx only at this time understanding that delaying surgery may produce negative sequale.   Tentative surgery Tuesday 1/14/25  Continued medical management per primary and GI teams.  Will discuss care plan  with all  Attendings Chart reviewed and Patient evaluated  There is concern for right hallux ulcer with infection and OM.  Wound culture results reveal Pseudo. A  MRI shows OM right great toe  Cont to apply Bactroban daily with dressing  Wound flushed with normal saline  Applied dry sterile dressing  Appreciates vascular consult, angiogram, angioplasty done 1/6/2025  Continue IV abx and follow ID  Will cont to allow demarcation of infective process.   Discussed findings and condition with patient and patient's family at length. The patient and family understand patient has osteomyelitis, and is at risk to lose part of the toe, all of the toe, part of the foot, all of the foot.   Patient and family had chosen conservative treatment of IV abx and to delay surgery previously.  Patient understands if his symptoms do not remit, a more aggressive surgical procedure may be necessary in the future.  Patient and family confirm understanding and would like to proceed with  surgical option  at this time understanding that delaying surgery may produce negative sequale.   Tentative surgery Tuesday 1/14/25  Continued medical management per primary and GI teams.  Will discuss care plan  with all  Attendings

## 2025-01-11 NOTE — PROGRESS NOTE ADULT - SUBJECTIVE AND OBJECTIVE BOX
Patient is a 84y old  Male who presents with a chief complaint of Osteomyelitis (11 Jan 2025 14:36)      INTERVAL HPI/OVERNIGHT EVENTS: noted  pt seen and examined this am   events noted      Vital Signs Last 24 Hrs  T(C): 36.6 (11 Jan 2025 04:54), Max: 36.6 (11 Jan 2025 04:54)  T(F): 97.8 (11 Jan 2025 04:54), Max: 97.8 (11 Jan 2025 04:54)  HR: 62 (11 Jan 2025 04:54) (59 - 62)  BP: 138/72 (11 Jan 2025 06:35) (138/72 - 189/78)  BP(mean): --  RR: 18 (11 Jan 2025 04:54) (18 - 18)  SpO2: 97% (11 Jan 2025 04:54) (97% - 100%)    Parameters below as of 11 Jan 2025 04:54  Patient On (Oxygen Delivery Method): room air        ammonium lactate 12% Lotion 1 Application(s) Topical two times a day  aspirin enteric coated 81 milliGRAM(s) Oral daily  cefepime   IVPB 2000 milliGRAM(s) IV Intermittent every 12 hours  clopidogrel Tablet 75 milliGRAM(s) Oral daily  cyanocobalamin 1000 MICROGram(s) Oral daily  dextrose 5% + sodium chloride 0.45%. 1000 milliLiter(s) IV Continuous <Continuous>  escitalopram 10 milliGRAM(s) Oral daily  folic acid 1 milliGRAM(s) Oral daily  HYDROmorphone  Injectable 0.2 milliGRAM(s) IV Push every 6 hours PRN  HYDROmorphone  Injectable 0.5 milliGRAM(s) IV Push every 6 hours PRN  influenza  Vaccine (HIGH DOSE) 0.5 milliLiter(s) IntraMuscular once  latanoprost 0.005% Ophthalmic Solution 1 Drop(s) Both EYES at bedtime  melatonin 3 milliGRAM(s) Oral at bedtime PRN  metoprolol succinate ER 50 milliGRAM(s) Oral daily  mupirocin 2% Ointment 1 Application(s) Topical <User Schedule>  rosuvastatin 20 milliGRAM(s) Oral at bedtime  timolol 0.5% Solution 1 Drop(s) Both EYES two times a day  trimethobenzamide Injectable 200 milliGRAM(s) IntraMuscular every 8 hours PRN      PHYSICAL EXAM:  GENERAL: NAD,   EYES: conjunctiva and sclera clear  ENMT: Moist mucous membranes  NECK: Supple, No JVD, Normal thyroid  CHEST/LUNG: non labored, cta b/l  HEART: Regular rate and rhythm; No murmurs, rubs, or gallops  ABDOMEN: Soft, Nontender, Nondistended; Bowel sounds present  EXTREMITIES:  2+ Peripheral Pulses, No clubbing, cyanosis, or edema  LYMPH: No lymphadenopathy noted  SKIN: No rashes or lesions    Consultant(s) Notes Reviewed:  [x ] YES  [ ] NO  Care Discussed with Consultants/Other Providers [ x] YES  [ ] NO    LABS:                        8.4    6.48  )-----------( 158      ( 11 Jan 2025 09:50 )             26.9     01-11    142  |  114[H]  |  24[H]  ----------------------------<  98  3.8   |  23  |  1.20    Ca    8.4[L]      11 Jan 2025 09:50  Phos  1.9     01-10  Mg     1.9     01-10    TPro  5.6[L]  /  Alb  2.2[L]  /  TBili  0.1[L]  /  DBili  x   /  AST  37  /  ALT  28  /  AlkPhos  56  01-10      Urinalysis Basic - ( 11 Jan 2025 09:50 )    Color: x / Appearance: x / SG: x / pH: x  Gluc: 98 mg/dL / Ketone: x  / Bili: x / Urobili: x   Blood: x / Protein: x / Nitrite: x   Leuk Esterase: x / RBC: x / WBC x   Sq Epi: x / Non Sq Epi: x / Bacteria: x      CAPILLARY BLOOD GLUCOSE            Urinalysis Basic - ( 11 Jan 2025 09:50 )    Color: x / Appearance: x / SG: x / pH: x  Gluc: 98 mg/dL / Ketone: x  / Bili: x / Urobili: x   Blood: x / Protein: x / Nitrite: x   Leuk Esterase: x / RBC: x / WBC x   Sq Epi: x / Non Sq Epi: x / Bacteria: x          RADIOLOGY & ADDITIONAL TESTS:    Imaging Personally Reviewed:  [x ] YES  [ ] NO

## 2025-01-11 NOTE — PROGRESS NOTE ADULT - ASSESSMENT
STEPHANY on CKD Stage 3  Hypernatremia  Renal Mass  HTN  PAD  Vomiting  Anemia      -STEPHANY Likely 2/2 Decreased EABV  -Hypernatremia is resolved, s/p D5W  -Renal US with solid mass,  will need CT or MRI, at this time favor MRI with elevated creatinine, should see Urology for eval also, can see outpatient  -S/p LE Angio; no evidence of VLADIMIR at this time  -Creatinine stabilized at baseline range  -GI eval noted  -Iron panel to be checked in am for anemia  -Tentatively planned for Tuesday, 01/14/2025 for amputation of the right great toe with bone biopsy of the 1st metatarsal head.

## 2025-01-11 NOTE — PROGRESS NOTE ADULT - ASSESSMENT
The patient is an 84 year old male with a history of HTN, anemia, GERD, aortic regurgitation, CKD, breast cancer, PAD who is admitted with toe infection.    Plan:  - ECG with sinus rhythm and RBBB  - Echo with normal LV systolic function, mild aortic stenosis, mild pulmonary hypertension  - Monitor hemoglobin  - CKD - Cr remains stable in the 1.4-1.7 range  - Continue metoprolol succinate 50 mg daily  - Continue rosuvastatin 20 mg daily  - Continue clopidogrel 75 mg daily  - Continue aspirin 81 mg daily  - Vomiting - norovirus positive  - The patient remains optimized from a cardiac standpoint to proceed for intermediate risk podiatry surgery

## 2025-01-11 NOTE — PROGRESS NOTE ADULT - ASSESSMENT
Nausea/vomiting  Diarrhea    Recommendations:  - GI PCR positive for norovirus and EAEC  - Complete azithromycin  - Tigan IM q8h PRN for nausea, has prolonged QTc on recent EKG  - Monitor GI function and stools  - Diet as tolerated  - To follow    I reviewed the overnight course of events on the unit, re-confirming the patient history. I discussed the care with the patient  Differential diagnosis and plan of care discussed with patient after the evaluation  35 minutes spent on total encounter of which more than fifty percent of the encounter was spent counseling and/or coordinating care by the attending physician.

## 2025-01-11 NOTE — PROGRESS NOTE ADULT - ASSESSMENT
84M with HTN, PAD, breast ca, CKD admitted with rt great toe osteomyelitis  MRI noted  IV ABX  podiatry, vascular and ID following  s/p angio  plan for toe amputation on Tuesday    #n/v diarrhea- GI pcr+ noro virus and ecoli sps  supportive care  GI fu  ID fu- zithromax    CKD/Hypernatremia  creatinine at about baseline  nephrology following  IVF as per renal    HTN  continue metoprolol    Breast ca  continue tamoxifen    edema  elevate LE    discussed at length at bedside plan of care with pt and son Marco  addressed all qs and concerns      [facility]   [phone]

## 2025-01-11 NOTE — PROGRESS NOTE ADULT - SUBJECTIVE AND OBJECTIVE BOX
[facility]  Sandeep Leos PA-C  [address] [place] [address]  [phone]      INTERVAL HPI/OVERNIGHT EVENTS:  Pt s/e  Diarrhea resolving    MEDICATIONS  (STANDING):  ammonium lactate 12% Lotion 1 Application(s) Topical two times a day  aspirin enteric coated 81 milliGRAM(s) Oral daily  azithromycin   Tablet 500 milliGRAM(s) Oral every 24 hours  cefepime   IVPB 2000 milliGRAM(s) IV Intermittent every 12 hours  clopidogrel Tablet 75 milliGRAM(s) Oral daily  cyanocobalamin 1000 MICROGram(s) Oral daily  dextrose 5% + sodium chloride 0.45%. 1000 milliLiter(s) (75 mL/Hr) IV Continuous <Continuous>  escitalopram 10 milliGRAM(s) Oral daily  folic acid 1 milliGRAM(s) Oral daily  influenza  Vaccine (HIGH DOSE) 0.5 milliLiter(s) IntraMuscular once  latanoprost 0.005% Ophthalmic Solution 1 Drop(s) Both EYES at bedtime  metoprolol succinate ER 50 milliGRAM(s) Oral daily  mupirocin 2% Ointment 1 Application(s) Topical <User Schedule>  rosuvastatin 20 milliGRAM(s) Oral at bedtime  timolol 0.5% Solution 1 Drop(s) Both EYES two times a day    MEDICATIONS  (PRN):  HYDROmorphone  Injectable 0.2 milliGRAM(s) IV Push every 6 hours PRN Moderate Pain (4 - 6)  HYDROmorphone  Injectable 0.5 milliGRAM(s) IV Push every 6 hours PRN Severe Pain (7 - 10)  melatonin 3 milliGRAM(s) Oral at bedtime PRN Insomnia  trimethobenzamide Injectable 200 milliGRAM(s) IntraMuscular every 8 hours PRN Nausea      Allergies  No Known Allergies        PHYSICAL EXAM:   Vital Signs:  Vital Signs Last 24 Hrs  T(C): 36.6 (2025 04:54), Max: 36.6 (2025 04:54)  T(F): 97.8 (2025 04:54), Max: 97.8 (2025 04:54)  HR: 62 (2025 04:54) (59 - 62)  BP: 138/72 (2025 06:35) (138/72 - 189/78)  BP(mean): --  RR: 18 (2025 04:54) (18 - 18)  SpO2: 97% (2025 04:54) (97% - 100%)    Parameters below as of 2025 04:54  Patient On (Oxygen Delivery Method): room air      Daily     Daily Weight in k.6 (2025 04:54)    GENERAL:  Appears stated age  HEENT:  NC/AT  CHEST:  Full & symmetric excursion  HEART:  Regular rhythm  ABDOMEN:  Soft, non-tender, non-distended  EXTEREMITIES:  no cyanosis  SKIN:  No rash  NEURO:  Alert      LABS:                        8.4    6.48  )-----------( 158      ( 2025 09:50 )             26.9     01-11    142  |  114[H]  |  24[H]  ----------------------------<  98  3.8   |  23  |  1.20    Ca    8.4[L]      2025 09:50  Phos  1.9     01-10  Mg     1.9     01-10    TPro  5.6[L]  /  Alb  2.2[L]  /  TBili  0.1[L]  /  DBili  x   /  AST  37  /  ALT  28  /  AlkPhos  56  01-10      Urinalysis Basic - ( 2025 09:50 )    Color: x / Appearance: x / SG: x / pH: x  Gluc: 98 mg/dL / Ketone: x  / Bili: x / Urobili: x   Blood: x / Protein: x / Nitrite: x   Leuk Esterase: x / RBC: x / WBC x   Sq Epi: x / Non Sq Epi: x / Bacteria: x

## 2025-01-11 NOTE — PROGRESS NOTE ADULT - SUBJECTIVE AND OBJECTIVE BOX
Chief Complaint: Toe infection    Interval Events: No events overnight.    Review of Systems:  General: No fevers, chills, weight gain  Skin: No rashes, color changes  Cardiovascular: No chest pain, orthopnea  Respiratory: No shortness of breath, cough  Gastrointestinal: No nausea, abdominal pain  Genitourinary: No incontinence, pain with urination  Musculoskeletal: No pain, swelling, decreased range of motion  Neurological: No headache, weakness  Psychiatric: No depression, anxiety  Endocrine: No weight gain, increased thirst  All other systems are comprehensively negative.    Physical Exam:  Vital Signs Last 24 Hrs  T(C): 36.6 (11 Jan 2025 04:54), Max: 36.6 (11 Jan 2025 04:54)  T(F): 97.8 (11 Jan 2025 04:54), Max: 97.8 (11 Jan 2025 04:54)  HR: 62 (11 Jan 2025 04:54) (59 - 62)  BP: 138/72 (11 Jan 2025 06:35) (138/72 - 189/78)  BP(mean): --  RR: 18 (11 Jan 2025 04:54) (18 - 18)  SpO2: 97% (11 Jan 2025 04:54) (96% - 100%)  Parameters below as of 11 Jan 2025 04:54  Patient On (Oxygen Delivery Method): room air  General: NAD  HEENT: MMM  Neck: No JVD, no carotid bruit  Lungs: CTAB  CV: RRR, nl S1/S2, no M/R/G  Abdomen: S/NT/ND, +BS  Extremities: No LE edema, no cyanosis  Neuro: AAOx3, non-focal  Skin: No rash    Labs:    01-10    142  |  114[H]  |  30[H]  ----------------------------<  90  4.2   |  22  |  1.30    Ca    8.1[L]      10 Marlo 2025 08:46  Phos  1.9     01-10  Mg     1.9     01-10    TPro  5.6[L]  /  Alb  2.2[L]  /  TBili  0.1[L]  /  DBili  x   /  AST  37  /  ALT  28  /  AlkPhos  56  01-10                        8.1    6.06  )-----------( 153      ( 10 Marlo 2025 08:46 )             25.4       ECG/Telemetry: Baseline artifact, sinus bradycardia, RBBB

## 2025-01-11 NOTE — PROGRESS NOTE ADULT - SUBJECTIVE AND OBJECTIVE BOX
PROGRESS NOTE   Patient is a 84y old  Male who presents with a chief complaint of Osteomyelitis (11 Jan 2025 13:09)      HPI:  Patient is an 83yo M with a PMH of L sided breast cancer (s/p lumpectomy, on Tamoxifen), HTN, Anemia, Aortic insufficiency, Carotid artery disease, GERD, Glaucoma, Hepatitis (2014), Lower ext edema ,PVD who presents to the ED from Dr. Niko Jacques's office for a R toe infection. Patient notes that he noticed a R toe wound a few weeks ago. He completed a 10d course of Amoxicillin 500mg, but noticed worsening of the wound. He went ot his podiatrist today, as the wound was draining a pus like liquid. Podiatry rec he come to the hospital for IV abx and osteomyelitis workup. Patient feeling well in ED.    ED Course:  Vitals: /65, HR 71, T 97.3, RR 16 SpO2 100% on RA  Labs: ESR 75, Hgb 9.2, Na 148, Cl 117, BUN/Cr 35/1.70, eGFR 39  Given: IV Zosyn, IV Vancomycin, 1L NS bolus     Imaging:  Chest Xray: No acute chest finding.   R foot Xray: Possible erosion of the distal phalanx of the right great toe with associated soft tissue swelling suspicious for osteomyelitis. (02 Jan 2025 19:34)      Vital Signs Last 24 Hrs  T(C): 36.6 (11 Jan 2025 04:54), Max: 36.6 (11 Jan 2025 04:54)  T(F): 97.8 (11 Jan 2025 04:54), Max: 97.8 (11 Jan 2025 04:54)  HR: 62 (11 Jan 2025 04:54) (59 - 62)  BP: 138/72 (11 Jan 2025 06:35) (138/72 - 189/78)  BP(mean): --  RR: 18 (11 Jan 2025 04:54) (18 - 18)  SpO2: 97% (11 Jan 2025 04:54) (97% - 100%)    Parameters below as of 11 Jan 2025 04:54  Patient On (Oxygen Delivery Method): room air                              8.4    6.48  )-----------( 158      ( 11 Jan 2025 09:50 )             26.9               01-11    142  |  114[H]  |  24[H]  ----------------------------<  98  3.8   |  23  |  1.20    Ca    8.4[L]      11 Jan 2025 09:50  Phos  1.9     01-10  Mg     1.9     01-10    TPro  5.6[L]  /  Alb  2.2[L]  /  TBili  0.1[L]  /  DBili  x   /  AST  37  /  ALT  28  /  AlkPhos  56  01-10      PHYSICAL EXAM  General: Pleasant  male NAD & AOX3.    Integument:  Skin warm, dry and supple bilateral.    Noted plantar right hallux ulcer, fibro-granular base, apx [id number], reduced edema, reduced erythema, minimal drainage, - hyperkeratotic base, +tracking, with noted cellulitic changes, Consistent with OM  Vascular: Dorsalis Pedis and Posterior Tibial pulses non-palpable.  Capillary re-fill time less then 3 seconds digits 1-5 bilateral.  noted b/l lower leg edema, noted venous stasis b/l lower extremity  Neuro: Protective sensation intact to the level of the digits bilateral.  MSK: Muscle strength 4/5 all major muscle groups bilateral.

## 2025-01-11 NOTE — PROGRESS NOTE ADULT - SUBJECTIVE AND OBJECTIVE BOX
[facility]                             Nephrology and Hypertension                             Niles Montano                                          [phone]     Patient is a 84y old  Male who presents with a chief complaint of Osteomyelitis (03 Jan 2025 13:05)       HPI:  Patient is an 85yo M with a PMH of L sided breast cancer (s/p lumpectomy, on Tamoxifen), HTN, Anemia, Aortic insufficiency, Carotid artery disease, GERD, Glaucoma, Hepatitis (2014), Lower ext edema ,PVD who presents to the ED from Dr. Niko Jacques's office for a R toe infection. Patient notes that he noticed a R toe wound a few weeks ago. He completed a 10d course of Amoxicillin 500mg, but noticed worsening of the wound. He went ot his podiatrist today, as the wound was draining a pus like liquid. Podiatry rec he come to the hospital for IV abx and osteomyelitis workup. Patient feeling well in ED.  States he is urinating well.  No N/V/SOB.  Has not seen nephrologist in the past.      Seen and examined at bedside, did not have any new complaints, no sob, no pain, no dizziness.     PAST MEDICAL & SURGICAL HISTORY:  Anemia      HTN (hypertension)      Breast cancer      Glaucoma      Major depression      PVD (peripheral vascular disease)      Lower extremity edema      Chronic GERD      H/O: glaucoma      S/P lumpectomy, left breast           FAMILY HISTORY:  NC    Social History:Non smoker    MEDICATIONS  (STANDING):  ammonium lactate 12% Lotion 1 Application(s) Topical two times a day  aspirin enteric coated 81 milliGRAM(s) Oral daily  cefepime   IVPB 2000 milliGRAM(s) IV Intermittent every 12 hours  clopidogrel Tablet 75 milliGRAM(s) Oral daily  cyanocobalamin 1000 MICROGram(s) Oral daily  dextrose 5% + sodium chloride 0.45%. 1000 milliLiter(s) (75 mL/Hr) IV Continuous <Continuous>  dextrose 5%. 1000 milliLiter(s) (75 mL/Hr) IV Continuous <Continuous>  escitalopram 10 milliGRAM(s) Oral daily  folic acid 1 milliGRAM(s) Oral daily  influenza  Vaccine (HIGH DOSE) 0.5 milliLiter(s) IntraMuscular once  latanoprost 0.005% Ophthalmic Solution 1 Drop(s) Both EYES at bedtime  metoprolol succinate ER 50 milliGRAM(s) Oral daily  mupirocin 2% Ointment 1 Application(s) Topical <User Schedule>  rosuvastatin 20 milliGRAM(s) Oral at bedtime  timolol 0.5% Solution 1 Drop(s) Both EYES two times a day    MEDICATIONS  (PRN):  HYDROmorphone  Injectable 0.2 milliGRAM(s) IV Push every 6 hours PRN Moderate Pain (4 - 6)  HYDROmorphone  Injectable 0.5 milliGRAM(s) IV Push every 6 hours PRN Severe Pain (7 - 10)  melatonin 3 milliGRAM(s) Oral at bedtime PRN Insomnia  trimethobenzamide Injectable 200 milliGRAM(s) IntraMuscular every 8 hours PRN Nausea      Allergies    No Known Allergies    Intolerances         REVIEW OF SYSTEMS:    as above    Vital Signs Last 24 Hrs  T(C): 36.6 (11 Jan 2025 04:54), Max: 36.6 (11 Jan 2025 04:54)  T(F): 97.8 (11 Jan 2025 04:54), Max: 97.8 (11 Jan 2025 04:54)  HR: 62 (11 Jan 2025 04:54) (59 - 62)  BP: 138/72 (11 Jan 2025 06:35) (138/72 - 189/78)  BP(mean): --  RR: 18 (11 Jan 2025 04:54) (18 - 18)  SpO2: 97% (11 Jan 2025 04:54) (97% - 100%)    Parameters below as of 11 Jan 2025 04:54  Patient On (Oxygen Delivery Method): room air        PHYSICAL EXAM:    GENERAL: NAD  HEAD:  Atraumatic, Normocephalic  EYES: EOMI, conjunctiva and sclera clear  ENMT: No Drainage from nares, No drainage from ears  NERVOUS SYSTEM:  Awake and Alert  CHEST/LUNG: Clear to percussion bilaterally  EXTREMITIES:  No Edema  SKIN: No rashes No obvious ecchymosis      LABS:                            8.4    6.48  )-----------( 158      ( 11 Jan 2025 09:50 )             26.9     01-11    142  |  114[H]  |  24[H]  ----------------------------<  98  3.8   |  23  |  1.20    Ca    8.4[L]      11 Jan 2025 09:50  Phos  1.9     01-10  Mg     1.9     01-10    TPro  5.6[L]  /  Alb  2.2[L]  /  TBili  0.1[L]  /  DBili  x   /  AST  37  /  ALT  28  /  AlkPhos  56  01-10      Urinalysis Basic - ( 11 Jan 2025 09:50 )    Color: x / Appearance: x / SG: x / pH: x  Gluc: 98 mg/dL / Ketone: x  / Bili: x / Urobili: x   Blood: x / Protein: x / Nitrite: x   Leuk Esterase: x / RBC: x / WBC x   Sq Epi: x / Non Sq Epi: x / Bacteria: x

## 2025-01-12 LAB
ANION GAP SERPL CALC-SCNC: 6 MMOL/L — SIGNIFICANT CHANGE UP (ref 5–17)
BUN SERPL-MCNC: 21 MG/DL — SIGNIFICANT CHANGE UP (ref 7–23)
CALCIUM SERPL-MCNC: 8.2 MG/DL — LOW (ref 8.5–10.1)
CHLORIDE SERPL-SCNC: 114 MMOL/L — HIGH (ref 96–108)
CO2 SERPL-SCNC: 22 MMOL/L — SIGNIFICANT CHANGE UP (ref 22–31)
CREAT SERPL-MCNC: 1.3 MG/DL — SIGNIFICANT CHANGE UP (ref 0.5–1.3)
EGFR: 54 ML/MIN/1.73M2 — LOW
GLUCOSE SERPL-MCNC: 111 MG/DL — HIGH (ref 70–99)
HCT VFR BLD CALC: 26.8 % — LOW (ref 39–50)
HGB BLD-MCNC: 8.5 G/DL — LOW (ref 13–17)
MCHC RBC-ENTMCNC: 28.9 PG — SIGNIFICANT CHANGE UP (ref 27–34)
MCHC RBC-ENTMCNC: 31.7 G/DL — LOW (ref 32–36)
MCV RBC AUTO: 91.2 FL — SIGNIFICANT CHANGE UP (ref 80–100)
NRBC # BLD: 0 /100 WBCS — SIGNIFICANT CHANGE UP (ref 0–0)
NRBC BLD-RTO: 0 /100 WBCS — SIGNIFICANT CHANGE UP (ref 0–0)
PLATELET # BLD AUTO: 152 K/UL — SIGNIFICANT CHANGE UP (ref 150–400)
POTASSIUM SERPL-MCNC: 3.8 MMOL/L — SIGNIFICANT CHANGE UP (ref 3.5–5.3)
POTASSIUM SERPL-SCNC: 3.8 MMOL/L — SIGNIFICANT CHANGE UP (ref 3.5–5.3)
RBC # BLD: 2.94 M/UL — LOW (ref 4.2–5.8)
RBC # FLD: 17.1 % — HIGH (ref 10.3–14.5)
SODIUM SERPL-SCNC: 142 MMOL/L — SIGNIFICANT CHANGE UP (ref 135–145)
WBC # BLD: 5.82 K/UL — SIGNIFICANT CHANGE UP (ref 3.8–10.5)
WBC # FLD AUTO: 5.82 K/UL — SIGNIFICANT CHANGE UP (ref 3.8–10.5)

## 2025-01-12 RX ORDER — AMLODIPINE BESYLATE 5 MG
5 TABLET ORAL ONCE
Refills: 0 | Status: COMPLETED | OUTPATIENT
Start: 2025-01-12 | End: 2025-01-12

## 2025-01-12 RX ADMIN — Medication 75 MILLIGRAM(S): at 08:34

## 2025-01-12 RX ADMIN — Medication 1000 MICROGRAM(S): at 08:35

## 2025-01-12 RX ADMIN — Medication 5 MILLIGRAM(S): at 23:00

## 2025-01-12 RX ADMIN — MUPIROCIN 1 APPLICATION(S): 2 CREAM TOPICAL at 08:35

## 2025-01-12 RX ADMIN — ASPIRIN 81 MILLIGRAM(S): 81 TABLET, COATED ORAL at 08:35

## 2025-01-12 RX ADMIN — Medication 1 APPLICATION(S): at 17:44

## 2025-01-12 RX ADMIN — Medication 100 MILLIGRAM(S): at 05:31

## 2025-01-12 RX ADMIN — LATANOPROST 1 DROP(S): 50 SOLUTION OPHTHALMIC at 23:00

## 2025-01-12 RX ADMIN — TIMOLOL MALEATE 1 DROP(S): 5 SOLUTION OPHTHALMIC at 17:42

## 2025-01-12 RX ADMIN — ROSUVASTATIN CALCIUM 20 MILLIGRAM(S): 10 TABLET, FILM COATED ORAL at 23:00

## 2025-01-12 RX ADMIN — Medication 1 MILLIGRAM(S): at 08:36

## 2025-01-12 RX ADMIN — TIMOLOL MALEATE 1 DROP(S): 5 SOLUTION OPHTHALMIC at 05:31

## 2025-01-12 RX ADMIN — Medication 50 MILLIGRAM(S): at 05:31

## 2025-01-12 RX ADMIN — Medication 100 MILLIGRAM(S): at 17:38

## 2025-01-12 RX ADMIN — ESCITALOPRAM 10 MILLIGRAM(S): 10 TABLET, FILM COATED ORAL at 08:34

## 2025-01-12 NOTE — PROGRESS NOTE ADULT - SUBJECTIVE AND OBJECTIVE BOX
[facility]  Sandeep Leos PA-C  [address] [place] [address]  [phone]      INTERVAL HPI/OVERNIGHT EVENTS:  pt s/e  Diarrhea resolving  Tolerating diet    MEDICATIONS  (STANDING):  ammonium lactate 12% Lotion 1 Application(s) Topical two times a day  aspirin enteric coated 81 milliGRAM(s) Oral daily  cefepime   IVPB 2000 milliGRAM(s) IV Intermittent every 12 hours  clopidogrel Tablet 75 milliGRAM(s) Oral daily  cyanocobalamin 1000 MICROGram(s) Oral daily  dextrose 5% + sodium chloride 0.45%. 1000 milliLiter(s) (75 mL/Hr) IV Continuous <Continuous>  escitalopram 10 milliGRAM(s) Oral daily  folic acid 1 milliGRAM(s) Oral daily  influenza  Vaccine (HIGH DOSE) 0.5 milliLiter(s) IntraMuscular once  latanoprost 0.005% Ophthalmic Solution 1 Drop(s) Both EYES at bedtime  metoprolol succinate ER 50 milliGRAM(s) Oral daily  mupirocin 2% Ointment 1 Application(s) Topical <User Schedule>  rosuvastatin 20 milliGRAM(s) Oral at bedtime  timolol 0.5% Solution 1 Drop(s) Both EYES two times a day    MEDICATIONS  (PRN):  HYDROmorphone  Injectable 0.2 milliGRAM(s) IV Push every 6 hours PRN Moderate Pain (4 - 6)  HYDROmorphone  Injectable 0.5 milliGRAM(s) IV Push every 6 hours PRN Severe Pain (7 - 10)  melatonin 3 milliGRAM(s) Oral at bedtime PRN Insomnia  trimethobenzamide Injectable 200 milliGRAM(s) IntraMuscular every 8 hours PRN Nausea      Allergies    No Known Allergies        PHYSICAL EXAM:   Vital Signs:  Vital Signs Last 24 Hrs  T(C): 36.5 (2025 12:26), Max: 36.9 (2025 05:12)  T(F): 97.7 (2025 12:26), Max: 98.4 (2025 05:12)  HR: 58 (2025 12:26) (56 - 69)  BP: 182/71 (2025 12:26) (129/74 - 185/70)  BP(mean): --  RR: 18 (2025 12:26) (18 - 18)  SpO2: 98% (2025 12:26) (96% - 98%)    Parameters below as of 2025 12:26  Patient On (Oxygen Delivery Method): room air      Daily     Daily Weight in k (2025 05:12)    GENERAL:  Appears stated age  HEENT:  NC/AT  CHEST:  Full & symmetric excursion  HEART:  Regular rhythm  ABDOMEN:  Soft, non-tender, non-distended  EXTEREMITIES:  no cyanosis  SKIN:  No rash  NEURO:  Alert      LABS:                        8.5    5.82  )-----------( 152      ( 2025 09:32 )             26.8     01-12    142  |  114[H]  |  21  ----------------------------<  111[H]  3.8   |  22  |  1.30    Ca    8.2[L]      2025 09:32        Urinalysis Basic - ( 2025 09:32 )    Color: x / Appearance: x / SG: x / pH: x  Gluc: 111 mg/dL / Ketone: x  / Bili: x / Urobili: x   Blood: x / Protein: x / Nitrite: x   Leuk Esterase: x / RBC: x / WBC x   Sq Epi: x / Non Sq Epi: x / Bacteria: x

## 2025-01-12 NOTE — PROGRESS NOTE ADULT - ASSESSMENT
STEPHANY on CKD Stage 3  Hypernatremia  Renal Mass  HTN  PAD  Vomiting  Anemia  OM      -STEPHANY Likely 2/2 Decreased EABV  -Hypernatremia is resolved, s/p D5W  -Renal US with solid mass,  will need CT or MRI, at this time favor MRI with elevated creatinine, should see Urology for eval also, can see outpatient  -S/p LE Angio; no evidence of VLADIMIR at this time  -Creatinine stabilized at baseline range  -GI eval noted  -Tentatively planned for Tuesday, 01/14/2025 for amputation of the right great toe with bone biopsy of the 1st metatarsal head.

## 2025-01-12 NOTE — PROGRESS NOTE ADULT - SUBJECTIVE AND OBJECTIVE BOX
Chief Complaint: Toe infection    Interval Events: No events overnight.    Review of Systems:  General: No fevers, chills, weight gain  Skin: No rashes, color changes  Cardiovascular: No chest pain, orthopnea  Respiratory: No shortness of breath, cough  Gastrointestinal: No nausea, abdominal pain  Genitourinary: No incontinence, pain with urination  Musculoskeletal: No pain, swelling, decreased range of motion  Neurological: No headache, weakness  Psychiatric: No depression, anxiety  Endocrine: No weight gain, increased thirst  All other systems are comprehensively negative.    Physical Exam:  Vital Signs Last 24 Hrs  T(C): 36.9 (12 Jan 2025 05:12), Max: 36.9 (12 Jan 2025 05:12)  T(F): 98.4 (12 Jan 2025 05:12), Max: 98.4 (12 Jan 2025 05:12)  HR: 56 (12 Jan 2025 05:12) (56 - 69)  BP: 185/70 (12 Jan 2025 05:12) (129/74 - 185/70)  BP(mean): --  RR: 18 (12 Jan 2025 05:12) (18 - 18)  SpO2: 97% (12 Jan 2025 05:12) (96% - 97%)  Parameters below as of 12 Jan 2025 05:12  Patient On (Oxygen Delivery Method): room air  General: NAD  HEENT: MMM  Neck: No JVD, no carotid bruit  Lungs: CTAB  CV: RRR, nl S1/S2, no M/R/G  Abdomen: S/NT/ND, +BS  Extremities: No LE edema, no cyanosis  Neuro: AAOx3, non-focal  Skin: No rash    Labs:    01-11    142  |  114[H]  |  24[H]  ----------------------------<  98  3.8   |  23  |  1.20    Ca    8.4[L]      11 Jan 2025 09:50                          8.4    6.48  )-----------( 158      ( 11 Jan 2025 09:50 )             26.9       ECG/Telemetry: Baseline artifact, sinus bradycardia, RBBB

## 2025-01-12 NOTE — PROGRESS NOTE ADULT - ASSESSMENT
The patient is an 84 year old male with a history of HTN, anemia, GERD, aortic regurgitation, CKD, breast cancer, PAD who is admitted with toe infection.    Plan:  - ECG with sinus rhythm and RBBB  - Echo with normal LV systolic function, mild aortic stenosis, mild pulmonary hypertension  - Monitor hemoglobin  - CKD - Cr remains stable in the 1.4-1.7 range  - Continue metoprolol succinate 50 mg daily  - Continue rosuvastatin 20 mg daily  - Continue clopidogrel 75 mg daily  - Continue aspirin 81 mg daily  - Vomiting - norovirus positive - largely resolved  - The patient remains optimized from a cardiac standpoint to proceed for intermediate risk podiatry surgery

## 2025-01-12 NOTE — PROGRESS NOTE ADULT - SUBJECTIVE AND OBJECTIVE BOX
PROGRESS NOTE   Patient is a 84y old  Male who presents with a chief complaint of Osteomyelitis (12 Jan 2025 09:32)      HPI:  Patient is an 83yo M with a PMH of L sided breast cancer (s/p lumpectomy, on Tamoxifen), HTN, Anemia, Aortic insufficiency, Carotid artery disease, GERD, Glaucoma, Hepatitis (2014), Lower ext edema ,PVD who presents to the ED from Dr. Niko Jacques's office for a R toe infection. Patient notes that he noticed a R toe wound a few weeks ago. He completed a 10d course of Amoxicillin 500mg, but noticed worsening of the wound. He went ot his podiatrist today, as the wound was draining a pus like liquid. Podiatry rec he come to the hospital for IV abx and osteomyelitis workup. Patient feeling well in ED.    ED Course:  Vitals: /65, HR 71, T 97.3, RR 16 SpO2 100% on RA  Labs: ESR 75, Hgb 9.2, Na 148, Cl 117, BUN/Cr 35/1.70, eGFR 39  Given: IV Zosyn, IV Vancomycin, 1L NS bolus     Imaging:  Chest Xray: No acute chest finding.   R foot Xray: Possible erosion of the distal phalanx of the right great toe with associated soft tissue swelling suspicious for osteomyelitis. (02 Jan 2025 19:34)      Vital Signs Last 24 Hrs  T(C): 36.9 (12 Jan 2025 05:12), Max: 36.9 (12 Jan 2025 05:12)  T(F): 98.4 (12 Jan 2025 05:12), Max: 98.4 (12 Jan 2025 05:12)  HR: 56 (12 Jan 2025 05:12) (56 - 69)  BP: 185/70 (12 Jan 2025 05:12) (129/74 - 185/70)  BP(mean): --  RR: 18 (12 Jan 2025 05:12) (18 - 18)  SpO2: 97% (12 Jan 2025 05:12) (96% - 97%)    Parameters below as of 12 Jan 2025 05:12  Patient On (Oxygen Delivery Method): room air                              8.5    5.82  )-----------( 152      ( 12 Jan 2025 09:32 )             26.8               01-11    142  |  114[H]  |  24[H]  ----------------------------<  98  3.8   |  23  |  1.20    Ca    8.4[L]      11 Jan 2025 09:50        PHYSICAL EXAM  General: Pleasant  male NAD & AOX3.    Integument:  Skin warm, dry and supple bilateral.    Noted plantar right hallux ulcer, fibro-granular base, apx [id number], reduced edema, reduced erythema, minimal drainage, - hyperkeratotic base, +tracking, with noted reduced cellulitic changes, Consistent with OM  Vascular: Dorsalis Pedis and Posterior Tibial pulses non-palpable.  Capillary re-fill time less then 3 seconds digits 1-5 bilateral.  noted b/l lower leg edema, noted venous stasis b/l lower extremity  Neuro: Protective sensation intact to the level of the digits bilateral.  MSK: Muscle strength 4/5 all major muscle groups bilateral.

## 2025-01-12 NOTE — PROGRESS NOTE ADULT - ASSESSMENT
84M with HTN, PAD, breast ca, CKD admitted with rt great toe osteomyelitis  MRI noted  IV ABX  podiatry, vascular and ID following  s/p angio  plan for toe amputation on Tuesday    #n/v diarrhea- GI pcr+ noro virus and ecoli sps  supportive care  GI fu  ID fu- zithromax    CKD/Hypernatremia  creatinine at about baseline  nephrology following  IVF as per renal    HTN  continue metoprolol    Breast ca  continue tamoxifen    edema  elevate LE    discussed at length at bedside plan of care with pt and son Marco  addressed all qs and concerns      [employer]   [phone]

## 2025-01-12 NOTE — PROGRESS NOTE ADULT - SUBJECTIVE AND OBJECTIVE BOX
Patient is a 84y old  Male who presents with a chief complaint of Osteomyelitis (12 Jan 2025 12:36)      INTERVAL HPI/OVERNIGHT EVENTS: noted  pt seen and examined this am   events noted      Vital Signs Last 24 Hrs  T(C): 36.6 (12 Jan 2025 20:23), Max: 36.9 (12 Jan 2025 05:12)  T(F): 97.8 (12 Jan 2025 20:23), Max: 98.4 (12 Jan 2025 05:12)  HR: 52 (12 Jan 2025 20:23) (52 - 58)  BP: 182/71 (12 Jan 2025 12:26) (182/71 - 185/70)  BP(mean): --  RR: 18 (12 Jan 2025 20:23) (18 - 18)  SpO2: 96% (12 Jan 2025 20:23) (96% - 98%)    Parameters below as of 12 Jan 2025 20:23  Patient On (Oxygen Delivery Method): room air        ammonium lactate 12% Lotion 1 Application(s) Topical two times a day  aspirin enteric coated 81 milliGRAM(s) Oral daily  cefepime   IVPB 2000 milliGRAM(s) IV Intermittent every 12 hours  clopidogrel Tablet 75 milliGRAM(s) Oral daily  cyanocobalamin 1000 MICROGram(s) Oral daily  dextrose 5% + sodium chloride 0.45%. 1000 milliLiter(s) IV Continuous <Continuous>  escitalopram 10 milliGRAM(s) Oral daily  folic acid 1 milliGRAM(s) Oral daily  HYDROmorphone  Injectable 0.2 milliGRAM(s) IV Push every 6 hours PRN  HYDROmorphone  Injectable 0.5 milliGRAM(s) IV Push every 6 hours PRN  influenza  Vaccine (HIGH DOSE) 0.5 milliLiter(s) IntraMuscular once  latanoprost 0.005% Ophthalmic Solution 1 Drop(s) Both EYES at bedtime  melatonin 3 milliGRAM(s) Oral at bedtime PRN  metoprolol succinate ER 50 milliGRAM(s) Oral daily  mupirocin 2% Ointment 1 Application(s) Topical <User Schedule>  rosuvastatin 20 milliGRAM(s) Oral at bedtime  timolol 0.5% Solution 1 Drop(s) Both EYES two times a day  trimethobenzamide Injectable 200 milliGRAM(s) IntraMuscular every 8 hours PRN      PHYSICAL EXAM:  GENERAL: NAD,   EYES: conjunctiva and sclera clear  ENMT: Moist mucous membranes  NECK: Supple, No JVD, Normal thyroid  CHEST/LUNG: non labored, cta b/l  HEART: Regular rate and rhythm; No murmurs, rubs, or gallops  ABDOMEN: Soft, Nontender, Nondistended; Bowel sounds present  EXTREMITIES:  2+ Peripheral Pulses, No clubbing, cyanosis, or edema  LYMPH: No lymphadenopathy noted  SKIN: No rashes or lesions    Consultant(s) Notes Reviewed:  [x ] YES  [ ] NO  Care Discussed with Consultants/Other Providers [ x] YES  [ ] NO    LABS:                        8.5    5.82  )-----------( 152      ( 12 Jan 2025 09:32 )             26.8     01-12    142  |  114[H]  |  21  ----------------------------<  111[H]  3.8   |  22  |  1.30    Ca    8.2[L]      12 Jan 2025 09:32        Urinalysis Basic - ( 12 Jan 2025 09:32 )    Color: x / Appearance: x / SG: x / pH: x  Gluc: 111 mg/dL / Ketone: x  / Bili: x / Urobili: x   Blood: x / Protein: x / Nitrite: x   Leuk Esterase: x / RBC: x / WBC x   Sq Epi: x / Non Sq Epi: x / Bacteria: x      CAPILLARY BLOOD GLUCOSE            Urinalysis Basic - ( 12 Jan 2025 09:32 )    Color: x / Appearance: x / SG: x / pH: x  Gluc: 111 mg/dL / Ketone: x  / Bili: x / Urobili: x   Blood: x / Protein: x / Nitrite: x   Leuk Esterase: x / RBC: x / WBC x   Sq Epi: x / Non Sq Epi: x / Bacteria: x          RADIOLOGY & ADDITIONAL TESTS:    Imaging Personally Reviewed:  [x ] YES  [ ] NO

## 2025-01-12 NOTE — PROGRESS NOTE ADULT - SUBJECTIVE AND OBJECTIVE BOX
[facility]                             Nephrology and Hypertension                             Niles Montano                                          [phone]     Patient is a 84y old  Male who presents with a chief complaint of Osteomyelitis (03 Jan 2025 13:05)       HPI:  Patient is an 83yo M with a PMH of L sided breast cancer (s/p lumpectomy, on Tamoxifen), HTN, Anemia, Aortic insufficiency, Carotid artery disease, GERD, Glaucoma, Hepatitis (2014), Lower ext edema ,PVD who presents to the ED from Dr. Niko Jacques's office for a R toe infection. Patient notes that he noticed a R toe wound a few weeks ago. He completed a 10d course of Amoxicillin 500mg, but noticed worsening of the wound. He went ot his podiatrist today, as the wound was draining a pus like liquid. Podiatry rec he come to the hospital for IV abx and osteomyelitis workup. Patient feeling well in ED.  States he is urinating well.  No N/V/SOB.  Has not seen nephrologist in the past.      Seen and examined at bedside, did not have any new complaints, no sob, no pain, no dizziness.     PAST MEDICAL & SURGICAL HISTORY:  Anemia      HTN (hypertension)      Breast cancer      Glaucoma      Major depression      PVD (peripheral vascular disease)      Lower extremity edema      Chronic GERD      H/O: glaucoma      S/P lumpectomy, left breast           FAMILY HISTORY:  [place]    Social History:Non smoker    MEDICATIONS  (STANDING):  ammonium lactate 12% Lotion 1 Application(s) Topical two times a day  aspirin enteric coated 81 milliGRAM(s) Oral daily  cefepime   IVPB 2000 milliGRAM(s) IV Intermittent every 12 hours  clopidogrel Tablet 75 milliGRAM(s) Oral daily  cyanocobalamin 1000 MICROGram(s) Oral daily  dextrose 5% + sodium chloride 0.45%. 1000 milliLiter(s) (75 mL/Hr) IV Continuous <Continuous>  dextrose 5%. 1000 milliLiter(s) (75 mL/Hr) IV Continuous <Continuous>  escitalopram 10 milliGRAM(s) Oral daily  folic acid 1 milliGRAM(s) Oral daily  influenza  Vaccine (HIGH DOSE) 0.5 milliLiter(s) IntraMuscular once  latanoprost 0.005% Ophthalmic Solution 1 Drop(s) Both EYES at bedtime  metoprolol succinate ER 50 milliGRAM(s) Oral daily  mupirocin 2% Ointment 1 Application(s) Topical <User Schedule>  rosuvastatin 20 milliGRAM(s) Oral at bedtime  timolol 0.5% Solution 1 Drop(s) Both EYES two times a day    MEDICATIONS  (PRN):  HYDROmorphone  Injectable 0.2 milliGRAM(s) IV Push every 6 hours PRN Moderate Pain (4 - 6)  HYDROmorphone  Injectable 0.5 milliGRAM(s) IV Push every 6 hours PRN Severe Pain (7 - 10)  melatonin 3 milliGRAM(s) Oral at bedtime PRN Insomnia  trimethobenzamide Injectable 200 milliGRAM(s) IntraMuscular every 8 hours PRN Nausea      Allergies    No Known Allergies    Intolerances         REVIEW OF SYSTEMS:    as above    Vital Signs Last 24 Hrs  T(C): 36.9 (12 Jan 2025 05:12), Max: 36.9 (12 Jan 2025 05:12)  T(F): 98.4 (12 Jan 2025 05:12), Max: 98.4 (12 Jan 2025 05:12)  HR: 56 (12 Jan 2025 05:12) (56 - 69)  BP: 185/70 (12 Jan 2025 05:12) (129/74 - 185/70)  BP(mean): --  RR: 18 (12 Jan 2025 05:12) (18 - 18)  SpO2: 97% (12 Jan 2025 05:12) (96% - 97%)    Parameters below as of 12 Jan 2025 05:12  Patient On (Oxygen Delivery Method): room air          PHYSICAL EXAM:    GENERAL: NAD  HEAD:  Atraumatic, Normocephalic  EYES: EOMI, conjunctiva and sclera clear  ENMT: No Drainage from nares, No drainage from ears  NERVOUS SYSTEM:  Awake and Alert  CHEST/LUNG: Clear to percussion bilaterally  EXTREMITIES:  No Edema  SKIN: No rashes No obvious ecchymosis      LABS:                          8.4    6.48  )-----------( 158      ( 11 Jan 2025 09:50 )             26.9     01-11    142  |  114[H]  |  24[H]  ----------------------------<  98  3.8   |  23  |  1.20    Ca    8.4[L]      11 Jan 2025 09:50        Urinalysis Basic - ( 11 Jan 2025 09:50 )    Color: x / Appearance: x / SG: x / pH: x  Gluc: 98 mg/dL / Ketone: x  / Bili: x / Urobili: x   Blood: x / Protein: x / Nitrite: x   Leuk Esterase: x / RBC: x / WBC x   Sq Epi: x / Non Sq Epi: x / Bacteria: x

## 2025-01-12 NOTE — CHART NOTE - NSCHARTNOTEFT_GEN_A_CORE
RN called for manual BP reading of 191/79, patient asymptomatic. Will order one time amlodipine 5mg PO, day team to reassess need for further HTN management.

## 2025-01-12 NOTE — PROGRESS NOTE ADULT - PROBLEM SELECTOR PLAN 1
Chart reviewed and Patient evaluated  There is concern for right hallux ulcer with infection and OM.  Wound culture results reveal Pseudo. A  MRI shows OM right great toe  Cont to apply Bactroban daily with dressing  Wound flushed with normal saline  Applied dry sterile dressing  Appreciates vascular consult, angiogram, angioplasty done 1/6/2025  Continue IV abx and follow ID  Will cont to allow demarcation of infective process.   Discussed findings and condition with patient. The patient and family understand patient has osteomyelitis, and is at risk to lose part of the toe, all of the toe, part of the foot, all of the foot.   Patient and family had chosen conservative treatment of IV abx and to delay surgery previously.  Patient understands if his symptoms do not remit, a more aggressive surgical procedure may be necessary in the future.  Patient and family confirm understanding and would like to proceed with  surgical option  at this time understanding that delaying surgery may produce negative sequale.   Tentative surgery Tuesday 1/14/25  Continued medical management per primary and GI teams.  Will discuss care plan  with all  Attendings.

## 2025-01-13 LAB
ANION GAP SERPL CALC-SCNC: 5 MMOL/L — SIGNIFICANT CHANGE UP (ref 5–17)
BUN SERPL-MCNC: 18 MG/DL — SIGNIFICANT CHANGE UP (ref 7–23)
CALCIUM SERPL-MCNC: 8.4 MG/DL — LOW (ref 8.5–10.1)
CHLORIDE SERPL-SCNC: 114 MMOL/L — HIGH (ref 96–108)
CO2 SERPL-SCNC: 24 MMOL/L — SIGNIFICANT CHANGE UP (ref 22–31)
CREAT SERPL-MCNC: 1.2 MG/DL — SIGNIFICANT CHANGE UP (ref 0.5–1.3)
EGFR: 59 ML/MIN/1.73M2 — LOW
GLUCOSE SERPL-MCNC: 98 MG/DL — SIGNIFICANT CHANGE UP (ref 70–99)
HCT VFR BLD CALC: 27.7 % — LOW (ref 39–50)
HGB BLD-MCNC: 8.5 G/DL — LOW (ref 13–17)
MCHC RBC-ENTMCNC: 28.4 PG — SIGNIFICANT CHANGE UP (ref 27–34)
MCHC RBC-ENTMCNC: 30.7 G/DL — LOW (ref 32–36)
MCV RBC AUTO: 92.6 FL — SIGNIFICANT CHANGE UP (ref 80–100)
NRBC # BLD: 0 /100 WBCS — SIGNIFICANT CHANGE UP (ref 0–0)
NRBC BLD-RTO: 0 /100 WBCS — SIGNIFICANT CHANGE UP (ref 0–0)
PLATELET # BLD AUTO: 156 K/UL — SIGNIFICANT CHANGE UP (ref 150–400)
POTASSIUM SERPL-MCNC: 3.8 MMOL/L — SIGNIFICANT CHANGE UP (ref 3.5–5.3)
POTASSIUM SERPL-SCNC: 3.8 MMOL/L — SIGNIFICANT CHANGE UP (ref 3.5–5.3)
RBC # BLD: 2.99 M/UL — LOW (ref 4.2–5.8)
RBC # FLD: 16.9 % — HIGH (ref 10.3–14.5)
SODIUM SERPL-SCNC: 143 MMOL/L — SIGNIFICANT CHANGE UP (ref 135–145)
WBC # BLD: 5.47 K/UL — SIGNIFICANT CHANGE UP (ref 3.8–10.5)
WBC # FLD AUTO: 5.47 K/UL — SIGNIFICANT CHANGE UP (ref 3.8–10.5)

## 2025-01-13 RX ADMIN — Medication 1 MILLIGRAM(S): at 11:46

## 2025-01-13 RX ADMIN — ASPIRIN 81 MILLIGRAM(S): 81 TABLET, COATED ORAL at 11:45

## 2025-01-13 RX ADMIN — Medication 75 MILLIGRAM(S): at 11:46

## 2025-01-13 RX ADMIN — Medication 1 APPLICATION(S): at 05:52

## 2025-01-13 RX ADMIN — LATANOPROST 1 DROP(S): 50 SOLUTION OPHTHALMIC at 22:23

## 2025-01-13 RX ADMIN — Medication 1 APPLICATION(S): at 17:28

## 2025-01-13 RX ADMIN — MUPIROCIN 1 APPLICATION(S): 2 CREAM TOPICAL at 08:26

## 2025-01-13 RX ADMIN — ROSUVASTATIN CALCIUM 20 MILLIGRAM(S): 10 TABLET, FILM COATED ORAL at 22:22

## 2025-01-13 RX ADMIN — Medication 1000 MICROGRAM(S): at 11:46

## 2025-01-13 RX ADMIN — ESCITALOPRAM 10 MILLIGRAM(S): 10 TABLET, FILM COATED ORAL at 11:45

## 2025-01-13 RX ADMIN — Medication 100 MILLIGRAM(S): at 05:52

## 2025-01-13 RX ADMIN — Medication 100 MILLIGRAM(S): at 17:28

## 2025-01-13 RX ADMIN — TIMOLOL MALEATE 1 DROP(S): 5 SOLUTION OPHTHALMIC at 05:52

## 2025-01-13 RX ADMIN — TIMOLOL MALEATE 1 DROP(S): 5 SOLUTION OPHTHALMIC at 17:29

## 2025-01-13 RX ADMIN — Medication 50 MILLIGRAM(S): at 05:52

## 2025-01-13 NOTE — PROGRESS NOTE ADULT - SUBJECTIVE AND OBJECTIVE BOX
Chief Complaint: Toe infection    Interval Events: No events overnight.    Review of Systems:  General: No fevers, chills, weight gain  Skin: No rashes, color changes  Cardiovascular: No chest pain, orthopnea  Respiratory: No shortness of breath, cough  Gastrointestinal: No nausea, abdominal pain  Genitourinary: No incontinence, pain with urination  Musculoskeletal: No pain, swelling, decreased range of motion  Neurological: No headache, weakness  Psychiatric: No depression, anxiety  Endocrine: No weight gain, increased thirst  All other systems are comprehensively negative.    Physical Exam:  Vital Signs Last 24 Hrs  T(C): 36.6 (13 Jan 2025 04:43), Max: 36.6 (12 Jan 2025 20:23)  T(F): 97.9 (13 Jan 2025 04:43), Max: 97.9 (13 Jan 2025 04:43)  HR: 57 (13 Jan 2025 04:43) (52 - 58)  BP: 159/81 (13 Jan 2025 04:43) (159/81 - 182/71)  BP(mean): --  RR: 18 (13 Jan 2025 04:43) (18 - 18)  SpO2: 94% (13 Jan 2025 04:43) (94% - 98%)  Parameters below as of 13 Jan 2025 04:43  Patient On (Oxygen Delivery Method): room air  General: NAD  HEENT: MMM  Neck: No JVD, no carotid bruit  Lungs: CTAB  CV: RRR, nl S1/S2, no M/R/G  Abdomen: S/NT/ND, +BS  Extremities: No LE edema, no cyanosis  Neuro: AAOx3, non-focal  Skin: No rash    Labs:    01-13    143  |  114[H]  |  18  ----------------------------<  98  3.8   |  24  |  1.20    Ca    8.4[L]      13 Jan 2025 08:00                          8.5    5.47  )-----------( 156      ( 13 Jan 2025 08:00 )             27.7       ECG/Telemetry: Baseline artifact, sinus bradycardia, RBBB

## 2025-01-13 NOTE — PROGRESS NOTE ADULT - SUBJECTIVE AND OBJECTIVE BOX
[facility]                             Nephrology and Hypertension                             Niles Montano                                          [phone]     Patient is a 84y old  Male who presents with a chief complaint of Osteomyelitis (03 Jan 2025 13:05)       HPI:  Patient is an 83yo M with a PMH of L sided breast cancer (s/p lumpectomy, on Tamoxifen), HTN, Anemia, Aortic insufficiency, Carotid artery disease, GERD, Glaucoma, Hepatitis (2014), Lower ext edema ,PVD who presents to the ED from Dr. Niko Jacques's office for a R toe infection. Patient notes that he noticed a R toe wound a few weeks ago. He completed a 10d course of Amoxicillin 500mg, but noticed worsening of the wound. He went ot his podiatrist today, as the wound was draining a pus like liquid. Podiatry rec he come to the hospital for IV abx and osteomyelitis workup. Patient feeling well in ED.  States he is urinating well.  No N/V/SOB.  Has not seen nephrologist in the past.      Seen and examined at bedside, did not have any new complaints, no sob, no pain, no dizziness.     PAST MEDICAL & SURGICAL HISTORY:  Anemia      HTN (hypertension)      Breast cancer      Glaucoma      Major depression      PVD (peripheral vascular disease)      Lower extremity edema      Chronic GERD      H/O: glaucoma      S/P lumpectomy, left breast           FAMILY HISTORY:  NC    Social History:Non smoker    MEDICATIONS  (STANDING):  ammonium lactate 12% Lotion 1 Application(s) Topical two times a day  aspirin enteric coated 81 milliGRAM(s) Oral daily  cefepime   IVPB 2000 milliGRAM(s) IV Intermittent every 12 hours  clopidogrel Tablet 75 milliGRAM(s) Oral daily  cyanocobalamin 1000 MICROGram(s) Oral daily  dextrose 5% + sodium chloride 0.45%. 1000 milliLiter(s) (75 mL/Hr) IV Continuous <Continuous>  dextrose 5%. 1000 milliLiter(s) (75 mL/Hr) IV Continuous <Continuous>  escitalopram 10 milliGRAM(s) Oral daily  folic acid 1 milliGRAM(s) Oral daily  influenza  Vaccine (HIGH DOSE) 0.5 milliLiter(s) IntraMuscular once  latanoprost 0.005% Ophthalmic Solution 1 Drop(s) Both EYES at bedtime  metoprolol succinate ER 50 milliGRAM(s) Oral daily  mupirocin 2% Ointment 1 Application(s) Topical <User Schedule>  rosuvastatin 20 milliGRAM(s) Oral at bedtime  timolol 0.5% Solution 1 Drop(s) Both EYES two times a day    MEDICATIONS  (PRN):  HYDROmorphone  Injectable 0.2 milliGRAM(s) IV Push every 6 hours PRN Moderate Pain (4 - 6)  HYDROmorphone  Injectable 0.5 milliGRAM(s) IV Push every 6 hours PRN Severe Pain (7 - 10)  melatonin 3 milliGRAM(s) Oral at bedtime PRN Insomnia  trimethobenzamide Injectable 200 milliGRAM(s) IntraMuscular every 8 hours PRN Nausea      Allergies    No Known Allergies    Intolerances         REVIEW OF SYSTEMS:    as above    ICU Vital Signs Last 24 Hrs  T(C): 36.3 (13 Jan 2025 11:42), Max: 36.6 (12 Jan 2025 20:23)  T(F): 97.4 (13 Jan 2025 11:42), Max: 97.9 (13 Jan 2025 04:43)  HR: 55 (13 Jan 2025 11:42) (52 - 57)  BP: 154/64 (13 Jan 2025 11:42) (154/64 - 159/81)  BP(mean): --  ABP: --  ABP(mean): --  RR: 18 (13 Jan 2025 11:42) (18 - 18)  SpO2: 95% (13 Jan 2025 11:42) (94% - 96%)    O2 Parameters below as of 13 Jan 2025 11:42  Patient On (Oxygen Delivery Method): room air            PHYSICAL EXAM:    GENERAL: NAD  HEAD:  Atraumatic, Normocephalic  EYES: EOMI, conjunctiva and sclera clear  ENMT: No Drainage from nares, No drainage from ears  NERVOUS SYSTEM:  Awake and Alert  CHEST/LUNG: Clear to percussion bilaterally  EXTREMITIES:  No Edema  SKIN: No rashes No obvious ecchymosis      LABS:                                   8.5    5.47  )-----------( 156      ( 13 Jan 2025 08:00 )             27.7     01-13    143  |  114[H]  |  18  ----------------------------<  98  3.8   |  24  |  1.20    Ca    8.4[L]      13 Jan 2025 08:00        Urinalysis Basic - ( 13 Jan 2025 08:00 )    Color: x / Appearance: x / SG: x / pH: x  Gluc: 98 mg/dL / Ketone: x  / Bili: x / Urobili: x   Blood: x / Protein: x / Nitrite: x   Leuk Esterase: x / RBC: x / WBC x   Sq Epi: x / Non Sq Epi: x / Bacteria: x

## 2025-01-13 NOTE — PROGRESS NOTE ADULT - PROBLEM SELECTOR PLAN 1
Chart reviewed and Patient evaluated  There is concern for right hallux ulcer with infection and OM.  Wound culture results reveal Pseudo. A  MRI shows OM right great toe  Cont to apply Bactroban daily with dressing  Wound flushed with normal saline  Applied dry sterile dressing  Appreciates vascular consult, angiogram, angioplasty done 1/6/2025  Continue IV abx and follow ID  Will cont to allow demarcation of infective process.   Discussed findings and condition with patient. The patient and family understand patient has osteomyelitis, and is at risk to lose part of the toe, all of the toe, part of the foot, all of the foot.   Patient and family had chosen conservative treatment of IV abx and to delay surgery previously.  Patient understands if his symptoms do not remit, a more aggressive surgical procedure may be necessary in the future.  Patient and family confirm understanding and would like to proceed with  surgical option  at this time understanding that delaying surgery may produce negative sequale.   OR Tuesday 1/14/25 2:00PM  Requesting medical clearance prior to the surgery  NPO after midnight   Continued medical management per primary and GI teams.  Will discuss care plan  with all  Attendings.

## 2025-01-13 NOTE — PROGRESS NOTE ADULT - SUBJECTIVE AND OBJECTIVE BOX
[facility]  RHEA Brand Y. Patel, S. Shah, G. [facility]  [phone]    Name: DAYA MATHIS  Age: 84y  Gender: Male  MRN: [id number]    Interval History:  No acute overnight events.   Afebrile  No diarrhea  denies UTI sx  Notes reviewed    Antibiotics:  cefepime   IVPB 2000 milliGRAM(s) IV Intermittent every 12 hours      Medications:  ammonium lactate 12% Lotion 1 Application(s) Topical two times a day  aspirin enteric coated 81 milliGRAM(s) Oral daily  cefepime   IVPB 2000 milliGRAM(s) IV Intermittent every 12 hours  clopidogrel Tablet 75 milliGRAM(s) Oral daily  cyanocobalamin 1000 MICROGram(s) Oral daily  dextrose 5% + sodium chloride 0.45%. 1000 milliLiter(s) IV Continuous <Continuous>  escitalopram 10 milliGRAM(s) Oral daily  folic acid 1 milliGRAM(s) Oral daily  HYDROmorphone  Injectable 0.5 milliGRAM(s) IV Push every 6 hours PRN  HYDROmorphone  Injectable 0.2 milliGRAM(s) IV Push every 6 hours PRN  influenza  Vaccine (HIGH DOSE) 0.5 milliLiter(s) IntraMuscular once  latanoprost 0.005% Ophthalmic Solution 1 Drop(s) Both EYES at bedtime  melatonin 3 milliGRAM(s) Oral at bedtime PRN  metoprolol succinate ER 50 milliGRAM(s) Oral daily  mupirocin 2% Ointment 1 Application(s) Topical <User Schedule>  rosuvastatin 20 milliGRAM(s) Oral at bedtime  timolol 0.5% Solution 1 Drop(s) Both EYES two times a day  trimethobenzamide Injectable 200 milliGRAM(s) IntraMuscular every 8 hours PRN      Review of Systems:  A 10-point review of systems was obtained.   Review of systems otherwise negative except as previously noted.    Allergies: No Known Allergies    For details regarding the patient's past medical history, social history, family history, and other miscellaneous elements, please refer the initial infectious diseases consultation and/or the admitting history and physical examination for this admission.    Objective:  Vitals:   T(C): 36.6 (01-13-25 @ 04:43), Max: 36.6 (01-12-25 @ 20:23)  HR: 57 (01-13-25 @ 04:43) (52 - 58)  BP: 159/81 (01-13-25 @ 04:43) (159/81 - 182/71)  RR: 18 (01-13-25 @ 04:43) (18 - 18)  SpO2: 94% (01-13-25 @ 04:43) (94% - 98%)    Physical Examination:  General: no acute distress  HEENT: NC/AT, EOMI  Cardio: RRR  Resp: no use resp acc muscles  Abd: soft, NT, ND  Ext: no edema or cyanosis  Skin: warm, dry, no visible rash      Laboratory Studies:  CBC:                       8.5    5.47  )-----------( 156      ( 13 Jan 2025 08:00 )             27.7     CMP: 01-13    143  |  114[H]  |  18  ----------------------------<  98  3.8   |  24  |  1.20    Ca    8.4[L]      13 Jan 2025 08:00        Urinalysis Basic - ( 13 Jan 2025 08:00 )    Color: x / Appearance: x / SG: x / pH: x  Gluc: 98 mg/dL / Ketone: x  / Bili: x / Urobili: x   Blood: x / Protein: x / Nitrite: x   Leuk Esterase: x / RBC: x / WBC x   Sq Epi: x / Non Sq Epi: x / Bacteria: x        Microbiology: reviewed    Culture - Stool (collected 01-08-25 @ 04:40)  Source: .Stool  Final Report (01-10-25 @ 11:30):    No enteric pathogens isolated.    (Stool culture examined for Salmonella,    Shigella, Campylobacter, Aeromonas, Plesiomonas,    Vibrio, E.coli O157 and Yersinia)    Culture - Wound Aerobic (collected 01-02-25 @ 18:30)  Source: .Other  Final Report (01-04-25 @ 15:14):    Few Pseudomonas aeruginosa    Rare Serratia marcescens    Commensal aleyda consistent with body site  Organism: Pseudomonas aeruginosa  Serratia marcescens (01-04-25 @ 15:14)  Organism: Serratia marcescens (01-04-25 @ 15:14)      Method Type: DANIEL      -  Amoxicillin/Clavulanic Acid: R >16/8      -  Ampicillin: R >16 These ampicillin results predict results for amoxicillin      -  Ampicillin/Sulbactam: R >16/8      -  Aztreonam: S <=4      -  Cefazolin: R >16      -  Cefepime: S <=2      -  Cefoxitin: R 16      -  Ceftriaxone: S <=1      -  Ciprofloxacin: R 1      -  Ertapenem: S <=0.5      -  Gentamicin: S <=2      -  Levofloxacin: I 1      -  Meropenem: S <=1      -  Piperacillin/Tazobactam: S <=8      -  Tobramycin: S <=2      -  Trimethoprim/Sulfamethoxazole: S <=0.5/9.5  Organism: Pseudomonas aeruginosa (01-04-25 @ 15:14)      Method Type: DANIEL      -  Aztreonam: S <=4      -  Cefepime: S 8      -  Ceftazidime: S 4      -  Ciprofloxacin: S 0.5      -  Imipenem: S 2      -  Levofloxacin: S 1      -  Meropenem: S <=1      -  Piperacillin/Tazobactam: S <=8    Culture - Blood (collected 01-02-25 @ 17:20)  Source: .Blood BLOOD  Final Report (01-08-25 @ 01:00):    No growth at 5 days    Culture - Blood (collected 01-02-25 @ 17:20)  Source: .Blood BLOOD  Final Report (01-08-25 @ 01:00):    No growth at 5 days          Radiology: reviewed

## 2025-01-13 NOTE — PROGRESS NOTE ADULT - ASSESSMENT
Nausea/vomiting  Diarrhea    Recommendations:  - GI PCR positive for norovirus and EAEC  - Completed azithromycin  - Tigan IM q8h PRN for nausea, has prolonged QTc on recent EKG  - Monitor GI function and stools  - Diet as tolerated  - To follow    I reviewed the overnight course of events on the unit, re-confirming the patient history. I discussed the care with the patient  Differential diagnosis and plan of care discussed with patient after the evaluation  35 minutes spent on total encounter of which more than fifty percent of the encounter was spent counseling and/or coordinating care by the attending physician.

## 2025-01-13 NOTE — PROGRESS NOTE ADULT - SUBJECTIVE AND OBJECTIVE BOX
PROGRESS NOTE   Patient is a 84y old  Male who presents with a chief complaint of Osteomyelitis (13 Jan 2025 11:31)      HPI:  Patient is an 83yo M with a PMH of L sided breast cancer (s/p lumpectomy, on Tamoxifen), HTN, Anemia, Aortic insufficiency, Carotid artery disease, GERD, Glaucoma, Hepatitis (2014), Lower ext edema ,PVD who presents to the ED from Dr. Niko Jacques's office for a R toe infection. Patient notes that he noticed a R toe wound a few weeks ago. He completed a 10d course of Amoxicillin 500mg, but noticed worsening of the wound. He went ot his podiatrist today, as the wound was draining a pus like liquid. Podiatry rec he come to the hospital for IV abx and osteomyelitis workup. Patient feeling well in ED.    ED Course:  Vitals: /65, HR 71, T 97.3, RR 16 SpO2 100% on RA  Labs: ESR 75, Hgb 9.2, Na 148, Cl 117, BUN/Cr 35/1.70, eGFR 39  Given: IV Zosyn, IV Vancomycin, 1L NS bolus     Imaging:  Chest Xray: No acute chest finding.   R foot Xray: Possible erosion of the distal phalanx of the right great toe with associated soft tissue swelling suspicious for osteomyelitis. (02 Jan 2025 19:34)      Vital Signs Last 24 Hrs  T(C): 36.6 (13 Jan 2025 04:43), Max: 36.6 (12 Jan 2025 20:23)  T(F): 97.9 (13 Jan 2025 04:43), Max: 97.9 (13 Jan 2025 04:43)  HR: 57 (13 Jan 2025 04:43) (52 - 58)  BP: 159/81 (13 Jan 2025 04:43) (159/81 - 182/71)  BP(mean): --  RR: 18 (13 Jan 2025 04:43) (18 - 18)  SpO2: 94% (13 Jan 2025 04:43) (94% - 98%)    Parameters below as of 13 Jan 2025 04:43  Patient On (Oxygen Delivery Method): room air                              8.5    5.47  )-----------( 156      ( 13 Jan 2025 08:00 )             27.7               01-13    143  |  114[H]  |  18  ----------------------------<  98  3.8   |  24  |  1.20    Ca    8.4[L]      13 Jan 2025 08:00        PHYSICAL EXAM  General: Pleasant  male NAD & AOX3.    Integument:  Skin warm, dry and supple bilateral.    Noted plantar right hallux ulcer, fibro-granular base, apx [id number], reduced edema, reduced erythema, minimal drainage, - hyperkeratotic base, +tracking, with noted reduced cellulitic changes, Consistent with OM  Vascular: Dorsalis Pedis and Posterior Tibial pulses non-palpable.  Capillary re-fill time less then 3 seconds digits 1-5 bilateral.  noted b/l lower leg edema, noted venous stasis b/l lower extremity  Neuro: Protective sensation intact to the level of the digits bilateral.  MSK: Muscle strength 4/5 all major muscle groups bilateral.

## 2025-01-13 NOTE — PROGRESS NOTE ADULT - ASSESSMENT
83yo M with a PMH of L sided breast cancer (s/p lumpectomy, on Tamoxifen), HTN, Anemia, Aortic insufficiency, Carotid artery disease, GERD, Glaucoma, Hepatitis (2014), Lower ext edema ,PVD, who presented to the ED from Dr. Niko Jacques's office for a R toe infection. MRI concerning for osteomyelitis of R 1st toe. He has no fevers and no leukocytosis. Skin changes on lower legs appear concerning for venous stasis. Podiatry following.     R 1st toe ulcer w/ underlying osteomyelitis  - afebrile, no leukocytosis  - BCx NGTD  - Culture - Wound Aerobic (collected 01-02-25 @ 18:30)    Few Pseudomonas aeruginosa    Rare Serratia marcescens  1/6 s/p RLE angio    Diarrhea  GI PCR + Norovirus, EAEC  Stool cx negative    Recommendations:   C/w azithromycin 500mg x3 day course until 1/11/25  C/w cefepime  Podiatry following--OR 1/14  Trend temps/WBC  Wound care  Additional care per primary team    D/w Dr. Soliz from Podiatry  Infectious Diseases will follow. Please call with any questions.  Yen Glez M.D.  Available on [employer] TEAMS -- *[facility]*  [facility] [phone]  For after 5 P.M. and weekends, please call [phone]     85yo M with a PMH of L sided breast cancer (s/p lumpectomy, on Tamoxifen), HTN, Anemia, Aortic insufficiency, Carotid artery disease, GERD, Glaucoma, Hepatitis (2014), Lower ext edema ,PVD, who presented to the ED from Dr. Niko Jacques's office for a R toe infection. MRI concerning for osteomyelitis of R 1st toe. He has no fevers and no leukocytosis. Skin changes on lower legs appear concerning for venous stasis. Podiatry following.     R 1st toe ulcer w/ underlying osteomyelitis  - afebrile, no leukocytosis  - BCx NGTD  - Culture - Wound Aerobic (collected 01-02-25 @ 18:30)    Few Pseudomonas aeruginosa    Rare Serratia marcescens  1/6 s/p RLE angio    Diarrhea  GI PCR + Norovirus, EAEC  Stool cx negative  s/p azithromycin 500mg x3 day course completed 1/11    Recommendations:   C/w cefepime  Podiatry following--OR 1/14  Trend temps/WBC  Wound care  Additional care per primary team    D/w Dr. Soliz from Podiatry  Infectious Diseases will follow. Please call with any questions.  Yen Glez M.D.  Available on [employer] TEAMS -- *[facility]*  [facility] [phone]  For after 5 P.M. and weekends, please call [phone]

## 2025-01-13 NOTE — PROGRESS NOTE ADULT - SUBJECTIVE AND OBJECTIVE BOX
Patient is a 84y old  Male who presents with a chief complaint of Osteomyelitis (13 Jan 2025 14:04)      INTERVAL HPI/OVERNIGHT EVENTS: noted  pt seen and examined this am   events noted  feels well      Vital Signs Last 24 Hrs  T(C): 36.3 (13 Jan 2025 11:42), Max: 36.6 (12 Jan 2025 20:23)  T(F): 97.4 (13 Jan 2025 11:42), Max: 97.9 (13 Jan 2025 04:43)  HR: 55 (13 Jan 2025 11:42) (52 - 57)  BP: 154/64 (13 Jan 2025 11:42) (154/64 - 159/81)  BP(mean): --  RR: 18 (13 Jan 2025 11:42) (18 - 18)  SpO2: 95% (13 Jan 2025 11:42) (94% - 96%)    Parameters below as of 13 Jan 2025 11:42  Patient On (Oxygen Delivery Method): room air        ammonium lactate 12% Lotion 1 Application(s) Topical two times a day  aspirin enteric coated 81 milliGRAM(s) Oral daily  cefepime   IVPB 2000 milliGRAM(s) IV Intermittent every 12 hours  clopidogrel Tablet 75 milliGRAM(s) Oral daily  cyanocobalamin 1000 MICROGram(s) Oral daily  dextrose 5% + sodium chloride 0.45%. 1000 milliLiter(s) IV Continuous <Continuous>  escitalopram 10 milliGRAM(s) Oral daily  folic acid 1 milliGRAM(s) Oral daily  HYDROmorphone  Injectable 0.2 milliGRAM(s) IV Push every 6 hours PRN  HYDROmorphone  Injectable 0.5 milliGRAM(s) IV Push every 6 hours PRN  influenza  Vaccine (HIGH DOSE) 0.5 milliLiter(s) IntraMuscular once  latanoprost 0.005% Ophthalmic Solution 1 Drop(s) Both EYES at bedtime  melatonin 3 milliGRAM(s) Oral at bedtime PRN  metoprolol succinate ER 50 milliGRAM(s) Oral daily  mupirocin 2% Ointment 1 Application(s) Topical <User Schedule>  rosuvastatin 20 milliGRAM(s) Oral at bedtime  timolol 0.5% Solution 1 Drop(s) Both EYES two times a day  trimethobenzamide Injectable 200 milliGRAM(s) IntraMuscular every 8 hours PRN      PHYSICAL EXAM:  GENERAL: NAD,   EYES: conjunctiva and sclera clear  ENMT: Moist mucous membranes  NECK: Supple, No JVD, Normal thyroid  CHEST/LUNG: non labored, cta b/l  HEART: Regular rate and rhythm; No murmurs, rubs, or gallops  ABDOMEN: Soft, Nontender, Nondistended; Bowel sounds present  EXTREMITIES:  2+ Peripheral Pulses, No clubbing, cyanosis, or edema  LYMPH: No lymphadenopathy noted  SKIN: No rashes or lesions    Consultant(s) Notes Reviewed:  [x ] YES  [ ] NO  Care Discussed with Consultants/Other Providers [ x] YES  [ ] NO    LABS:                        8.5    5.47  )-----------( 156      ( 13 Jan 2025 08:00 )             27.7     01-13    143  |  114[H]  |  18  ----------------------------<  98  3.8   |  24  |  1.20    Ca    8.4[L]      13 Jan 2025 08:00        Urinalysis Basic - ( 13 Jan 2025 08:00 )    Color: x / Appearance: x / SG: x / pH: x  Gluc: 98 mg/dL / Ketone: x  / Bili: x / Urobili: x   Blood: x / Protein: x / Nitrite: x   Leuk Esterase: x / RBC: x / WBC x   Sq Epi: x / Non Sq Epi: x / Bacteria: x      CAPILLARY BLOOD GLUCOSE            Urinalysis Basic - ( 13 Jan 2025 08:00 )    Color: x / Appearance: x / SG: x / pH: x  Gluc: 98 mg/dL / Ketone: x  / Bili: x / Urobili: x   Blood: x / Protein: x / Nitrite: x   Leuk Esterase: x / RBC: x / WBC x   Sq Epi: x / Non Sq Epi: x / Bacteria: x          RADIOLOGY & ADDITIONAL TESTS:    Imaging Personally Reviewed:  [x ] YES  [ ] NO

## 2025-01-13 NOTE — PROGRESS NOTE ADULT - ASSESSMENT
84M with HTN, PAD, breast ca, CKD admitted with rt great toe osteomyelitis  MRI noted  IV ABX  podiatry, vascular and ID following  s/p angio  plan for toe amputation on Tuesday    #n/v diarrhea- GI pcr+ noro virus and ecoli sps  supportive care  GI fu  ID fu- zithromax    CKD/Hypernatremia  creatinine at about baseline  nephrology following  IVF as per renal    HTN  continue metoprolol    Breast ca  continue tamoxifen    edema  elevate LE    discussed at length at bedside plan of care with pt and son Marco  addressed all qs and concerns      [facility]   [phone]

## 2025-01-13 NOTE — PROGRESS NOTE ADULT - SUBJECTIVE AND OBJECTIVE BOX
[facility]  Sandeep Leos PA-C  [address] [place] [address]  [phone]      INTERVAL HPI/OVERNIGHT EVENTS:  Pt s/e  Diarrhea resolving  Completed azithromycin  No new GI events    MEDICATIONS  (STANDING):  ammonium lactate 12% Lotion 1 Application(s) Topical two times a day  aspirin enteric coated 81 milliGRAM(s) Oral daily  cefepime   IVPB 2000 milliGRAM(s) IV Intermittent every 12 hours  clopidogrel Tablet 75 milliGRAM(s) Oral daily  cyanocobalamin 1000 MICROGram(s) Oral daily  dextrose 5% + sodium chloride 0.45%. 1000 milliLiter(s) (75 mL/Hr) IV Continuous <Continuous>  escitalopram 10 milliGRAM(s) Oral daily  folic acid 1 milliGRAM(s) Oral daily  influenza  Vaccine (HIGH DOSE) 0.5 milliLiter(s) IntraMuscular once  latanoprost 0.005% Ophthalmic Solution 1 Drop(s) Both EYES at bedtime  metoprolol succinate ER 50 milliGRAM(s) Oral daily  mupirocin 2% Ointment 1 Application(s) Topical <User Schedule>  rosuvastatin 20 milliGRAM(s) Oral at bedtime  timolol 0.5% Solution 1 Drop(s) Both EYES two times a day    MEDICATIONS  (PRN):  HYDROmorphone  Injectable 0.2 milliGRAM(s) IV Push every 6 hours PRN Moderate Pain (4 - 6)  HYDROmorphone  Injectable 0.5 milliGRAM(s) IV Push every 6 hours PRN Severe Pain (7 - 10)  melatonin 3 milliGRAM(s) Oral at bedtime PRN Insomnia  trimethobenzamide Injectable 200 milliGRAM(s) IntraMuscular every 8 hours PRN Nausea      Allergies  No Known Allergies    PHYSICAL EXAM:   Vital Signs:  Vital Signs Last 24 Hrs  T(C): 36.3 (2025 11:42), Max: 36.6 (2025 20:23)  T(F): 97.4 (2025 11:42), Max: 97.9 (2025 04:43)  HR: 55 (2025 11:42) (52 - 58)  BP: 154/64 (2025 11:42) (154/64 - 182/71)  BP(mean): --  RR: 18 (2025 11:42) (18 - 18)  SpO2: 95% (2025 11:42) (94% - 98%)    Parameters below as of 2025 11:42  Patient On (Oxygen Delivery Method): room air      Daily     Daily Weight in k.1 (2025 04:43)    GENERAL:  Appears stated age  HEENT:  NC/AT  CHEST:  Full & symmetric excursion  HEART:  Regular rhythm  ABDOMEN:  Soft, non-tender, non-distended  EXTEREMITIES:  no cyanosis  SKIN:  No rash  NEURO:  Alert      LABS:                        8.5    5.47  )-----------( 156      ( 2025 08:00 )             27.7     01-13    143  |  114[H]  |  18  ----------------------------<  98  3.8   |  24  |  1.20    Ca    8.4[L]      2025 08:00        Urinalysis Basic - ( 2025 08:00 )    Color: x / Appearance: x / SG: x / pH: x  Gluc: 98 mg/dL / Ketone: x  / Bili: x / Urobili: x   Blood: x / Protein: x / Nitrite: x   Leuk Esterase: x / RBC: x / WBC x   Sq Epi: x / Non Sq Epi: x / Bacteria: x

## 2025-01-14 RX ADMIN — Medication 100 MILLIGRAM(S): at 17:13

## 2025-01-14 RX ADMIN — Medication 50 MILLIGRAM(S): at 05:29

## 2025-01-14 RX ADMIN — Medication 100 MILLIGRAM(S): at 05:29

## 2025-01-14 RX ADMIN — Medication 75 MILLIGRAM(S): at 17:12

## 2025-01-14 RX ADMIN — MUPIROCIN 1 APPLICATION(S): 2 CREAM TOPICAL at 07:54

## 2025-01-14 RX ADMIN — TIMOLOL MALEATE 1 DROP(S): 5 SOLUTION OPHTHALMIC at 05:30

## 2025-01-14 RX ADMIN — Medication 1 APPLICATION(S): at 17:13

## 2025-01-14 RX ADMIN — Medication 1 APPLICATION(S): at 05:31

## 2025-01-14 RX ADMIN — Medication 80 MILLIGRAM(S): at 10:24

## 2025-01-14 RX ADMIN — LATANOPROST 1 DROP(S): 50 SOLUTION OPHTHALMIC at 21:24

## 2025-01-14 RX ADMIN — TIMOLOL MALEATE 1 DROP(S): 5 SOLUTION OPHTHALMIC at 17:12

## 2025-01-14 RX ADMIN — ASPIRIN 81 MILLIGRAM(S): 81 TABLET, COATED ORAL at 17:12

## 2025-01-14 RX ADMIN — Medication 1000 MICROGRAM(S): at 11:41

## 2025-01-14 RX ADMIN — ROSUVASTATIN CALCIUM 20 MILLIGRAM(S): 10 TABLET, FILM COATED ORAL at 21:24

## 2025-01-14 RX ADMIN — Medication 1 MILLIGRAM(S): at 11:41

## 2025-01-14 RX ADMIN — ESCITALOPRAM 10 MILLIGRAM(S): 10 TABLET, FILM COATED ORAL at 11:41

## 2025-01-14 NOTE — PROGRESS NOTE ADULT - SUBJECTIVE AND OBJECTIVE BOX
[facility]  Sandeep Leos PA-C  [address] [place] [address]  [phone]      INTERVAL HPI/OVERNIGHT EVENTS:  Pt s/e  Diarrhea resolved  Planned for podiatry procedure    MEDICATIONS  (STANDING):  ammonium lactate 12% Lotion 1 Application(s) Topical two times a day  aspirin enteric coated 81 milliGRAM(s) Oral daily  cefepime   IVPB 2000 milliGRAM(s) IV Intermittent every 12 hours  clopidogrel Tablet 75 milliGRAM(s) Oral daily  cyanocobalamin 1000 MICROGram(s) Oral daily  dextrose 5% + sodium chloride 0.45%. 1000 milliLiter(s) (75 mL/Hr) IV Continuous <Continuous>  escitalopram 10 milliGRAM(s) Oral daily  folic acid 1 milliGRAM(s) Oral daily  influenza  Vaccine (HIGH DOSE) 0.5 milliLiter(s) IntraMuscular once  latanoprost 0.005% Ophthalmic Solution 1 Drop(s) Both EYES at bedtime  metoprolol succinate ER 50 milliGRAM(s) Oral daily  mupirocin 2% Ointment 1 Application(s) Topical <User Schedule>  rosuvastatin 20 milliGRAM(s) Oral at bedtime  timolol 0.5% Solution 1 Drop(s) Both EYES two times a day    MEDICATIONS  (PRN):  melatonin 3 milliGRAM(s) Oral at bedtime PRN Insomnia  trimethobenzamide Injectable 200 milliGRAM(s) IntraMuscular every 8 hours PRN Nausea      Allergies    No Known Allergies      PHYSICAL EXAM:   Vital Signs:  Vital Signs Last 24 Hrs  T(C): 36.7 (2025 08:02), Max: 36.7 (2025 08:02)  T(F): 98.1 (2025 08:02), Max: 98.1 (2025 08:02)  HR: 58 (2025 10:24) (58 - 61)  BP: 179/69 (2025 10:24) (160/76 - 179/69)  BP(mean): --  RR: 16 (2025 08:02) (16 - 19)  SpO2: 94% (2025 08:02) (94% - 96%)    Parameters below as of 2025 08:02  Patient On (Oxygen Delivery Method): room air      Daily     Daily Weight in k.5 (2025 04:30)    GENERAL:  Appears stated age  HEENT:  NC/AT  CHEST:  Full & symmetric excursion  HEART:  Regular rhythm  ABDOMEN:  Soft, non-tender, non-distended  EXTEREMITIES:  no cyanosis  SKIN:  No rash  NEURO:  Alert      LABS:                        8.5    5.47  )-----------( 156      ( 2025 08:00 )             27.7     01-13    143  |  114[H]  |  18  ----------------------------<  98  3.8   |  24  |  1.20    Ca    8.4[L]      2025 08:00        Urinalysis Basic - ( 2025 08:00 )    Color: x / Appearance: x / SG: x / pH: x  Gluc: 98 mg/dL / Ketone: x  / Bili: x / Urobili: x   Blood: x / Protein: x / Nitrite: x   Leuk Esterase: x / RBC: x / WBC x   Sq Epi: x / Non Sq Epi: x / Bacteria: x

## 2025-01-14 NOTE — CHART NOTE - NSCHARTNOTEFT_GEN_A_CORE
Informed patient had episode of Norovirus, diarrhea, and remains contact isolation.  Surgical procedure is not emergent, will wait for patient to stabilize and out of contact isolation prior to taking to OR

## 2025-01-14 NOTE — PROGRESS NOTE ADULT - SUBJECTIVE AND OBJECTIVE BOX
[facility]                             Nephrology and Hypertension                             Niles Montano                                          [phone]     Patient is a 84y old  Male who presents with a chief complaint of Osteomyelitis (03 Jan 2025 13:05)       HPI:  Patient is an 85yo M with a PMH of L sided breast cancer (s/p lumpectomy, on Tamoxifen), HTN, Anemia, Aortic insufficiency, Carotid artery disease, GERD, Glaucoma, Hepatitis (2014), Lower ext edema ,PVD who presents to the ED from Dr. Niko Jacques's office for a R toe infection. Patient notes that he noticed a R toe wound a few weeks ago. He completed a 10d course of Amoxicillin 500mg, but noticed worsening of the wound. He went ot his podiatrist today, as the wound was draining a pus like liquid. Podiatry rec he come to the hospital for IV abx and osteomyelitis workup. Patient feeling well in ED.  States he is urinating well.  No N/V/SOB.  Has not seen nephrologist in the past.      Seen and examined at bedside, did not have any new complaints, no sob, no pain, no dizziness.     PAST MEDICAL & SURGICAL HISTORY:  Anemia      HTN (hypertension)      Breast cancer      Glaucoma      Major depression      PVD (peripheral vascular disease)      Lower extremity edema      Chronic GERD      H/O: glaucoma      S/P lumpectomy, left breast           FAMILY HISTORY:  [place]    Social History:Non smoker    MEDICATIONS  (STANDING):  ammonium lactate 12% Lotion 1 Application(s) Topical two times a day  aspirin enteric coated 81 milliGRAM(s) Oral daily  cefepime   IVPB 2000 milliGRAM(s) IV Intermittent every 12 hours  clopidogrel Tablet 75 milliGRAM(s) Oral daily  cyanocobalamin 1000 MICROGram(s) Oral daily  dextrose 5% + sodium chloride 0.45%. 1000 milliLiter(s) (75 mL/Hr) IV Continuous <Continuous>  dextrose 5%. 1000 milliLiter(s) (75 mL/Hr) IV Continuous <Continuous>  escitalopram 10 milliGRAM(s) Oral daily  folic acid 1 milliGRAM(s) Oral daily  influenza  Vaccine (HIGH DOSE) 0.5 milliLiter(s) IntraMuscular once  latanoprost 0.005% Ophthalmic Solution 1 Drop(s) Both EYES at bedtime  metoprolol succinate ER 50 milliGRAM(s) Oral daily  mupirocin 2% Ointment 1 Application(s) Topical <User Schedule>  rosuvastatin 20 milliGRAM(s) Oral at bedtime  timolol 0.5% Solution 1 Drop(s) Both EYES two times a day    MEDICATIONS  (PRN):  HYDROmorphone  Injectable 0.2 milliGRAM(s) IV Push every 6 hours PRN Moderate Pain (4 - 6)  HYDROmorphone  Injectable 0.5 milliGRAM(s) IV Push every 6 hours PRN Severe Pain (7 - 10)  melatonin 3 milliGRAM(s) Oral at bedtime PRN Insomnia  trimethobenzamide Injectable 200 milliGRAM(s) IntraMuscular every 8 hours PRN Nausea      Allergies    No Known Allergies    Intolerances         REVIEW OF SYSTEMS:    as above    Vital Signs Last 24 Hrs  T(C): 36.7 (14 Jan 2025 08:02), Max: 36.7 (14 Jan 2025 08:02)  T(F): 98.1 (14 Jan 2025 08:02), Max: 98.1 (14 Jan 2025 08:02)  HR: 59 (14 Jan 2025 08:02) (55 - 61)  BP: 171/72 (14 Jan 2025 08:02) (154/64 - 175/73)  BP(mean): --  RR: 16 (14 Jan 2025 08:02) (16 - 19)  SpO2: 94% (14 Jan 2025 08:02) (94% - 96%)    Parameters below as of 14 Jan 2025 08:02  Patient On (Oxygen Delivery Method): room air          PHYSICAL EXAM:    GENERAL: NAD  HEAD:  Atraumatic, Normocephalic  EYES: EOMI, conjunctiva and sclera clear  ENMT: No Drainage from nares, No drainage from ears  NERVOUS SYSTEM:  Awake and Alert  CHEST/LUNG: Clear to percussion bilaterally  EXTREMITIES:  No Edema  SKIN: No rashes No obvious ecchymosis      LABS:                          8.5    5.47  )-----------( 156      ( 13 Jan 2025 08:00 )             27.7     01-13    143  |  114[H]  |  18  ----------------------------<  98  3.8   |  24  |  1.20    Ca    8.4[L]      13 Jan 2025 08:00        Urinalysis Basic - ( 13 Jan 2025 08:00 )    Color: x / Appearance: x / SG: x / pH: x  Gluc: 98 mg/dL / Ketone: x  / Bili: x / Urobili: x   Blood: x / Protein: x / Nitrite: x   Leuk Esterase: x / RBC: x / WBC x   Sq Epi: x / Non Sq Epi: x / Bacteria: x

## 2025-01-14 NOTE — PROGRESS NOTE ADULT - SUBJECTIVE AND OBJECTIVE BOX
Chief Complaint: Toe infection    Interval Events: No events overnight.    Review of Systems:  General: No fevers, chills, weight gain  Skin: No rashes, color changes  Cardiovascular: No chest pain, orthopnea  Respiratory: No shortness of breath, cough  Gastrointestinal: No nausea, abdominal pain  Genitourinary: No incontinence, pain with urination  Musculoskeletal: No pain, swelling, decreased range of motion  Neurological: No headache, weakness  Psychiatric: No depression, anxiety  Endocrine: No weight gain, increased thirst  All other systems are comprehensively negative.    Physical Exam:  Vital Signs Last 24 Hrs  T(C): 36.7 (14 Jan 2025 08:02), Max: 36.7 (14 Jan 2025 08:02)  T(F): 98.1 (14 Jan 2025 08:02), Max: 98.1 (14 Jan 2025 08:02)  HR: 59 (14 Jan 2025 08:02) (55 - 61)  BP: 171/72 (14 Jan 2025 08:02) (154/64 - 175/73)  BP(mean): --  RR: 16 (14 Jan 2025 08:02) (16 - 19)  SpO2: 94% (14 Jan 2025 08:02) (94% - 96%)  Parameters below as of 14 Jan 2025 08:02  Patient On (Oxygen Delivery Method): room air  General: NAD  HEENT: MMM  Neck: No JVD, no carotid bruit  Lungs: CTAB  CV: RRR, nl S1/S2, no M/R/G  Abdomen: S/NT/ND, +BS  Extremities: No LE edema, no cyanosis  Neuro: AAOx3, non-focal  Skin: No rash    Labs:    01-13    143  |  114[H]  |  18  ----------------------------<  98  3.8   |  24  |  1.20    Ca    8.4[L]      13 Jan 2025 08:00                          8.5    5.47  )-----------( 156      ( 13 Jan 2025 08:00 )             27.7       ECG/Telemetry: Baseline artifact, sinus bradycardia, RBBB

## 2025-01-14 NOTE — CHART NOTE - NSCHARTNOTEFT_GEN_A_CORE
Spoke with primary team, patient and patient's son.  Per Primary, patient is stable and medically cleared for procedure, patient and son agreeable with proceeding with surgical intervention, pt added on for procedure tomorrow.

## 2025-01-14 NOTE — PROGRESS NOTE ADULT - SUBJECTIVE AND OBJECTIVE BOX
Patient is a 84y old  Male who presents with a chief complaint of Osteomyelitis (13 Jan 2025 18:43)      INTERVAL HPI/OVERNIGHT EVENTS: noted  pt seen and examined this am   events noted      Vital Signs Last 24 Hrs  T(C): 36.7 (14 Jan 2025 08:02), Max: 36.7 (14 Jan 2025 08:02)  T(F): 98.1 (14 Jan 2025 08:02), Max: 98.1 (14 Jan 2025 08:02)  HR: 59 (14 Jan 2025 08:02) (55 - 61)  BP: 171/72 (14 Jan 2025 08:02) (154/64 - 175/73)  BP(mean): --  RR: 16 (14 Jan 2025 08:02) (16 - 19)  SpO2: 94% (14 Jan 2025 08:02) (94% - 96%)    Parameters below as of 14 Jan 2025 08:02  Patient On (Oxygen Delivery Method): room air        ammonium lactate 12% Lotion 1 Application(s) Topical two times a day  aspirin enteric coated 81 milliGRAM(s) Oral daily  cefepime   IVPB 2000 milliGRAM(s) IV Intermittent every 12 hours  clopidogrel Tablet 75 milliGRAM(s) Oral daily  cyanocobalamin 1000 MICROGram(s) Oral daily  dextrose 5% + sodium chloride 0.45%. 1000 milliLiter(s) IV Continuous <Continuous>  escitalopram 10 milliGRAM(s) Oral daily  folic acid 1 milliGRAM(s) Oral daily  influenza  Vaccine (HIGH DOSE) 0.5 milliLiter(s) IntraMuscular once  latanoprost 0.005% Ophthalmic Solution 1 Drop(s) Both EYES at bedtime  melatonin 3 milliGRAM(s) Oral at bedtime PRN  metoprolol succinate ER 50 milliGRAM(s) Oral daily  mupirocin 2% Ointment 1 Application(s) Topical <User Schedule>  rosuvastatin 20 milliGRAM(s) Oral at bedtime  timolol 0.5% Solution 1 Drop(s) Both EYES two times a day  trimethobenzamide Injectable 200 milliGRAM(s) IntraMuscular every 8 hours PRN      PHYSICAL EXAM:  GENERAL: NAD,   EYES: conjunctiva and sclera clear  ENMT: Moist mucous membranes  NECK: Supple, No JVD, Normal thyroid  CHEST/LUNG: non labored, cta b/l  HEART: Regular rate and rhythm; No murmurs, rubs, or gallops  ABDOMEN: Soft, Nontender, Nondistended; Bowel sounds present  EXTREMITIES:  2+ Peripheral Pulses, No clubbing, cyanosis, or edema  LYMPH: No lymphadenopathy noted  SKIN: No rashes or lesions    Consultant(s) Notes Reviewed:  [x ] YES  [ ] NO  Care Discussed with Consultants/Other Providers [ x] YES  [ ] NO    LABS:                        8.5    5.47  )-----------( 156      ( 13 Jan 2025 08:00 )             27.7     01-13    143  |  114[H]  |  18  ----------------------------<  98  3.8   |  24  |  1.20    Ca    8.4[L]      13 Jan 2025 08:00        Urinalysis Basic - ( 13 Jan 2025 08:00 )    Color: x / Appearance: x / SG: x / pH: x  Gluc: 98 mg/dL / Ketone: x  / Bili: x / Urobili: x   Blood: x / Protein: x / Nitrite: x   Leuk Esterase: x / RBC: x / WBC x   Sq Epi: x / Non Sq Epi: x / Bacteria: x      CAPILLARY BLOOD GLUCOSE            Urinalysis Basic - ( 13 Jan 2025 08:00 )    Color: x / Appearance: x / SG: x / pH: x  Gluc: 98 mg/dL / Ketone: x  / Bili: x / Urobili: x   Blood: x / Protein: x / Nitrite: x   Leuk Esterase: x / RBC: x / WBC x   Sq Epi: x / Non Sq Epi: x / Bacteria: x          RADIOLOGY & ADDITIONAL TESTS:    Imaging Personally Reviewed:  [x ] YES  [ ] NO

## 2025-01-14 NOTE — CASE MANAGEMENT PROGRESS NOTE - NSCMPROGRESSNOTE_GEN_ALL_CORE
Discussed pt in rounds plan for OR today. However per podiatry note OR cancelled for today as patient had episode of Norovirus, diarrhea, and still on contact isolation.

## 2025-01-14 NOTE — PROGRESS NOTE ADULT - ASSESSMENT
STEPHANY on CKD Stage 3  Hypernatremia  Renal Mass  HTN  PAD  Vomiting  Anemia  OM      -STEPHANY Likely 2/2 Decreased EABV  -Hypernatremia is resolved, s/p D5W  -Renal US with solid mass,  will need CT or MRI, at this time favor MRI with elevated creatinine, should see Urology for eval, can see outpatient  -S/p LE Angio; no evidence of VLADIMIR at this time  -Creatinine stabilized at baseline range  -GI eval noted  -Tentatively planned for Tuesday, 01/14/2025 for amputation of the right great toe with bone biopsy of the 1st metatarsal head. No renal objection

## 2025-01-14 NOTE — PROGRESS NOTE ADULT - ASSESSMENT
84M with HTN, PAD, breast ca, CKD admitted with rt great toe osteomyelitis  MRI noted  IV ABX  podiatry, vascular and ID following  s/p angio  plan for toe amputation today  pt medically optimized for the planned procedure  pt at intermediate risk for mod risk procedure    #PAD- on asa and plavix to resume    #n/v diarrhea- GI pcr+ noro virus and ecoli sps  supportive care  GI fu  ID fu- sp zithromax course    CKD/Hypernatremia  creatinine at about baseline  nephrology following  IVF as per renal    HTN  continue metoprolol    Breast ca  continue tamoxifen    edema  elevate LE    discussed at length at bedside plan of care with pt and son Marco  addressed all qs and concerns      [facility]   [phone]

## 2025-01-14 NOTE — PROGRESS NOTE ADULT - ASSESSMENT
Nausea/vomiting  Diarrhea  CKD  Toe osteomyelitis    Recommendations:  - GI PCR positive for norovirus and EAEC  - Completed azithromycin  - Tigan IM q8h PRN for nausea, has prolonged QTc on recent EKG  - Monitor GI function and stools  - Diet as tolerated  - planned for OR for toe amputation  - To follow    I reviewed the overnight course of events on the unit, re-confirming the patient history. I discussed the care with the patient  Differential diagnosis and plan of care discussed with patient after the evaluation  35 minutes spent on total encounter of which more than fifty percent of the encounter was spent counseling and/or coordinating care by the attending physician.

## 2025-01-14 NOTE — PROGRESS NOTE ADULT - ASSESSMENT
83yo M with a PMH of L sided breast cancer (s/p lumpectomy, on Tamoxifen), HTN, Anemia, Aortic insufficiency, Carotid artery disease, GERD, Glaucoma, Hepatitis (2014), Lower ext edema ,PVD, who presented to the ED from Dr. Niko Jacques's office for a R toe infection. MRI concerning for osteomyelitis of R 1st toe. He has no fevers and no leukocytosis. Skin changes on lower legs appear concerning for venous stasis. Podiatry following.     R 1st toe ulcer w/ underlying osteomyelitis  - afebrile, no leukocytosis  - BCx NGTD  - Culture - Wound Aerobic (collected 01-02-25 @ 18:30)    Few Pseudomonas aeruginosa    Rare Serratia marcescens  1/6 s/p RLE angio    Diarrhea  GI PCR + Norovirus, EAEC  Stool cx negative  completed azithromycin 1/11    Recommendations:   C/w cefepime  Podiatry following--OR TODAY 1/14  Trend temps/WBC  Wound care  Additional care per primary team    D/w family  Infectious Diseases will follow. Please call with any questions.  Yen Glez M.D.  Available on [employer] TEAMS -- *[facility]*  [facility] [phone]  For after 5 P.M. and weekends, please call [phone]

## 2025-01-14 NOTE — PROGRESS NOTE ADULT - SUBJECTIVE AND OBJECTIVE BOX
[facility]  RHEA Brand Y. Patel, S. Shah, G. [facility]  [phone]    Name: DAYA MATHIS  Age: 84y  Gender: Male  MRN: [id number]    Interval History:  No acute overnight events.   Notes reviewed    Antibiotics:  cefepime   IVPB 2000 milliGRAM(s) IV Intermittent every 12 hours      Medications:  ammonium lactate 12% Lotion 1 Application(s) Topical two times a day  aspirin enteric coated 81 milliGRAM(s) Oral daily  cefepime   IVPB 2000 milliGRAM(s) IV Intermittent every 12 hours  clopidogrel Tablet 75 milliGRAM(s) Oral daily  cyanocobalamin 1000 MICROGram(s) Oral daily  dextrose 5% + sodium chloride 0.45%. 1000 milliLiter(s) IV Continuous <Continuous>  escitalopram 10 milliGRAM(s) Oral daily  folic acid 1 milliGRAM(s) Oral daily  influenza  Vaccine (HIGH DOSE) 0.5 milliLiter(s) IntraMuscular once  latanoprost 0.005% Ophthalmic Solution 1 Drop(s) Both EYES at bedtime  melatonin 3 milliGRAM(s) Oral at bedtime PRN  metoprolol succinate ER 50 milliGRAM(s) Oral daily  mupirocin 2% Ointment 1 Application(s) Topical <User Schedule>  rosuvastatin 20 milliGRAM(s) Oral at bedtime  timolol 0.5% Solution 1 Drop(s) Both EYES two times a day  trimethobenzamide Injectable 200 milliGRAM(s) IntraMuscular every 8 hours PRN      Review of Systems:  A 10-point review of systems was obtained.  Review of systems otherwise negative except as previously noted.    Allergies: No Known Allergies    For details regarding the patient's past medical history, social history, family history, and other miscellaneous elements, please refer the initial infectious diseases consultation and/or the admitting history and physical examination for this admission.    Objective:  Vitals:   T(C): 36.7 (01-14-25 @ 08:02), Max: 36.7 (01-14-25 @ 08:02)  HR: 51 (01-14-25 @ 12:14) (51 - 61)  BP: 179/76 (01-14-25 @ 12:14) (160/76 - 179/76)  RR: 18 (01-14-25 @ 12:14) (16 - 19)  SpO2: 96% (01-14-25 @ 12:14) (94% - 96%)    Physical Examination:  General: no acute distress  HEENT: NC/AT  Cardio: RRR  Resp: no use of resp acc muscles  Abd: appears soft  Ext: no edema or cyanosis  Skin: no visible rash      Laboratory Studies:  CBC:                       8.5    5.47  )-----------( 156      ( 13 Jan 2025 08:00 )             27.7     CMP: 01-13    143  |  114[H]  |  18  ----------------------------<  98  3.8   |  24  |  1.20    Ca    8.4[L]      13 Jan 2025 08:00        Urinalysis Basic - ( 13 Jan 2025 08:00 )    Color: x / Appearance: x / SG: x / pH: x  Gluc: 98 mg/dL / Ketone: x  / Bili: x / Urobili: x   Blood: x / Protein: x / Nitrite: x   Leuk Esterase: x / RBC: x / WBC x   Sq Epi: x / Non Sq Epi: x / Bacteria: x        Microbiology: reviewed    Culture - Stool (collected 01-08-25 @ 04:40)  Source: .Stool  Final Report (01-10-25 @ 11:30):    No enteric pathogens isolated.    (Stool culture examined for Salmonella,    Shigella, Campylobacter, Aeromonas, Plesiomonas,    Vibrio, E.coli O157 and Yersinia)    Culture - Wound Aerobic (collected 01-02-25 @ 18:30)  Source: .Other  Final Report (01-04-25 @ 15:14):    Few Pseudomonas aeruginosa    Rare Serratia marcescens    Commensal aleyda consistent with body site  Organism: Pseudomonas aeruginosa  Serratia marcescens (01-04-25 @ 15:14)  Organism: Serratia marcescens (01-04-25 @ 15:14)      Method Type: DANIEL      -  Amoxicillin/Clavulanic Acid: R >16/8      -  Ampicillin: R >16 These ampicillin results predict results for amoxicillin      -  Ampicillin/Sulbactam: R >16/8      -  Aztreonam: S <=4      -  Cefazolin: R >16      -  Cefepime: S <=2      -  Cefoxitin: R 16      -  Ceftriaxone: S <=1      -  Ciprofloxacin: R 1      -  Ertapenem: S <=0.5      -  Gentamicin: S <=2      -  Levofloxacin: I 1      -  Meropenem: S <=1      -  Piperacillin/Tazobactam: S <=8      -  Tobramycin: S <=2      -  Trimethoprim/Sulfamethoxazole: S <=0.5/9.5  Organism: Pseudomonas aeruginosa (01-04-25 @ 15:14)      Method Type: DANIEL      -  Aztreonam: S <=4      -  Cefepime: S 8      -  Ceftazidime: S 4      -  Ciprofloxacin: S 0.5      -  Imipenem: S 2      -  Levofloxacin: S 1      -  Meropenem: S <=1      -  Piperacillin/Tazobactam: S <=8    Culture - Blood (collected 01-02-25 @ 17:20)  Source: .Blood BLOOD  Final Report (01-08-25 @ 01:00):    No growth at 5 days    Culture - Blood (collected 01-02-25 @ 17:20)  Source: .Blood BLOOD  Final Report (01-08-25 @ 01:00):    No growth at 5 days          Radiology: reviewed

## 2025-01-14 NOTE — PROGRESS NOTE ADULT - SUBJECTIVE AND OBJECTIVE BOX
PRE-OP NOTE    Pre-Op Diagnosis: Osteomyelitis  Planned Procedure: Debridement of all non-viable soft tissue and bone right foot with amputation of hallux and possible met head resection  Scheduled Date / Time: 1/14/2025 @ 1300  Indication: Osteomyelitis  Labs:                       8.5    5.47  )-----------( 156      ( 13 Jan 2025 08:00 )             27.7   01-13    143  |  114[H]  |  18  ----------------------------<  98  3.8   |  24  |  1.20    Ca    8.4[L]      13 Jan 2025 08:00      Vitals: Vital Signs Last 24 Hrs  T(C): 36.7 (14 Jan 2025 08:02), Max: 36.7 (14 Jan 2025 08:02)  T(F): 98.1 (14 Jan 2025 08:02), Max: 98.1 (14 Jan 2025 08:02)  HR: 59 (14 Jan 2025 08:02) (55 - 61)  BP: 171/72 (14 Jan 2025 08:02) (154/64 - 175/73)  BP(mean): --  RR: 16 (14 Jan 2025 08:02) (16 - 19)  SpO2: 94% (14 Jan 2025 08:02) (94% - 96%)    Parameters below as of 14 Jan 2025 08:02  Patient On (Oxygen Delivery Method): room air      PE:   Official CXR reading: (On Chart)  Official EKG reading: (On Chart)  Type and Cross/Screen: on chart  NPO after MN  Antibiotics: Per floor regimen  Anesthesia evaluation (on chart)  Operative Consent (on chart)     PHYSICAL EXAM  General: Pleasant  male NAD & AOX3.    Integument:  Skin warm, dry and supple bilateral.    Noted plantar right hallux ulcer, fibro-granular base, apx [id number], reduced edema, reduced erythema, minimal drainage, - hyperkeratotic base, +tracking, with noted reduced cellulitic changes, Consistent with OM  Vascular: Dorsalis Pedis and Posterior Tibial pulses non-palpable.  Capillary re-fill time less then 3 seconds digits 1-5 bilateral.  noted b/l lower leg edema, noted venous stasis b/l lower extremity  Neuro: Protective sensation intact to the level of the digits bilateral.  MSK: Muscle strength 4/5 all major muscle groups bilateral.    Patient  had an opportunity to have all questions asked and answered Patient is aware of all risks, benefits, alternatives and recuperative period involved with the planned surgical procedure.  No assurances or guarantees were given to the patient.  Pt  is aware that serial procedures maybe required if symptoms persist.   Pt consents to the proposed care plan at this time having all of his questions asked and answered to his satisfaction.   Pt understands the severity of his condition and that he is at risk to lose part of the foot all of the foot or even a below knee amputation.

## 2025-01-14 NOTE — PROGRESS NOTE ADULT - PROBLEM SELECTOR PLAN 1
Patient seen and evaluated  pt medically optimized for planned procedures  consent obtained from patient for planned procedures  pt brought to the operating room for planned procedures  pt is to be admitted after surgery under the hospitalist service post operatively  will await intraoperative cultures/biopsy to help establish appropriate ABX regimens  will cont to follow and discuss with all attendings.

## 2025-01-15 ENCOUNTER — TRANSCRIPTION ENCOUNTER (OUTPATIENT)
Age: 85
End: 2025-01-15

## 2025-01-15 PROCEDURE — 88302 TISSUE EXAM BY PATHOLOGIST: CPT | Mod: 26

## 2025-01-15 PROCEDURE — 88311 DECALCIFY TISSUE: CPT | Mod: 26

## 2025-01-15 PROCEDURE — 88304 TISSUE EXAM BY PATHOLOGIST: CPT | Mod: 26

## 2025-01-15 PROCEDURE — 73630 X-RAY EXAM OF FOOT: CPT | Mod: 26,RT

## 2025-01-15 RX ORDER — CYANOCOBALAMIN (VITAMIN B-12) 1000MCG/ML
1000 VIAL (ML) INJECTION DAILY
Refills: 0 | Status: DISCONTINUED | OUTPATIENT
Start: 2025-01-15 | End: 2025-01-22

## 2025-01-15 RX ORDER — CEFEPIME HCL 1 G
2000 IV SOLUTION, PIGGYBACK, BOTTLE (EA) INTRAVENOUS EVERY 12 HOURS
Refills: 0 | Status: DISCONTINUED | OUTPATIENT
Start: 2025-01-16 | End: 2025-01-16

## 2025-01-15 RX ORDER — FOLIC ACID 1 MG
1 TABLET ORAL DAILY
Refills: 0 | Status: DISCONTINUED | OUTPATIENT
Start: 2025-01-15 | End: 2025-01-22

## 2025-01-15 RX ORDER — ESCITALOPRAM 10 MG/1
10 TABLET, FILM COATED ORAL DAILY
Refills: 0 | Status: DISCONTINUED | OUTPATIENT
Start: 2025-01-15 | End: 2025-01-22

## 2025-01-15 RX ORDER — SODIUM CHLORIDE 9 G/ML
1000 INJECTION, SOLUTION INTRAVENOUS
Refills: 0 | Status: DISCONTINUED | OUTPATIENT
Start: 2025-01-15 | End: 2025-01-15

## 2025-01-15 RX ORDER — ACETAMINOPHEN, DIPHENHYDRAMINE HCL, PHENYLEPHRINE HCL 325; 25; 5 MG/1; MG/1; MG/1
3 TABLET ORAL AT BEDTIME
Refills: 0 | Status: DISCONTINUED | OUTPATIENT
Start: 2025-01-15 | End: 2025-01-22

## 2025-01-15 RX ORDER — METOPROLOL SUCCINATE 25 MG
50 TABLET, EXTENDED RELEASE 24 HR ORAL DAILY
Refills: 0 | Status: DISCONTINUED | OUTPATIENT
Start: 2025-01-15 | End: 2025-01-22

## 2025-01-15 RX ORDER — ASPIRIN 81 MG/1
81 TABLET, COATED ORAL DAILY
Refills: 0 | Status: DISCONTINUED | OUTPATIENT
Start: 2025-01-15 | End: 2025-01-22

## 2025-01-15 RX ORDER — ONDANSETRON 4 MG/1
4 TABLET, ORALLY DISINTEGRATING ORAL ONCE
Refills: 0 | Status: DISCONTINUED | OUTPATIENT
Start: 2025-01-15 | End: 2025-01-15

## 2025-01-15 RX ORDER — MUPIROCIN 2 G/100G
1 CREAM TOPICAL
Refills: 0 | Status: DISCONTINUED | OUTPATIENT
Start: 2025-01-15 | End: 2025-01-22

## 2025-01-15 RX ORDER — AMMONIUM LACTATE 12 %
1 LOTION (GRAM) TOPICAL
Refills: 0 | Status: DISCONTINUED | OUTPATIENT
Start: 2025-01-15 | End: 2025-01-22

## 2025-01-15 RX ORDER — HYDROMORPHONE HYDROCHLORIDE 4 MG/ML
0.2 INJECTION, SOLUTION INTRAMUSCULAR; INTRAVENOUS; SUBCUTANEOUS
Refills: 0 | Status: DISCONTINUED | OUTPATIENT
Start: 2025-01-15 | End: 2025-01-15

## 2025-01-15 RX ORDER — ROSUVASTATIN CALCIUM 10 MG/1
20 TABLET, FILM COATED ORAL AT BEDTIME
Refills: 0 | Status: DISCONTINUED | OUTPATIENT
Start: 2025-01-15 | End: 2025-01-22

## 2025-01-15 RX ORDER — TIMOLOL MALEATE 5 MG/ML
1 SOLUTION OPHTHALMIC
Refills: 0 | Status: DISCONTINUED | OUTPATIENT
Start: 2025-01-15 | End: 2025-01-22

## 2025-01-15 RX ORDER — LATANOPROST 50 UG/ML
1 SOLUTION OPHTHALMIC AT BEDTIME
Refills: 0 | Status: DISCONTINUED | OUTPATIENT
Start: 2025-01-15 | End: 2025-01-22

## 2025-01-15 RX ADMIN — ESCITALOPRAM 10 MILLIGRAM(S): 10 TABLET, FILM COATED ORAL at 11:42

## 2025-01-15 RX ADMIN — Medication 100 MILLIGRAM(S): at 19:34

## 2025-01-15 RX ADMIN — MUPIROCIN 1 APPLICATION(S): 2 CREAM TOPICAL at 08:14

## 2025-01-15 RX ADMIN — ASPIRIN 81 MILLIGRAM(S): 81 TABLET, COATED ORAL at 19:11

## 2025-01-15 RX ADMIN — Medication 1 MILLIGRAM(S): at 11:42

## 2025-01-15 RX ADMIN — TIMOLOL MALEATE 1 DROP(S): 5 SOLUTION OPHTHALMIC at 05:25

## 2025-01-15 RX ADMIN — TIMOLOL MALEATE 1 DROP(S): 5 SOLUTION OPHTHALMIC at 18:36

## 2025-01-15 RX ADMIN — Medication 1 APPLICATION(S): at 05:24

## 2025-01-15 RX ADMIN — Medication 50 MILLIGRAM(S): at 05:24

## 2025-01-15 RX ADMIN — LATANOPROST 1 DROP(S): 50 SOLUTION OPHTHALMIC at 22:41

## 2025-01-15 RX ADMIN — ACETAMINOPHEN, DIPHENHYDRAMINE HCL, PHENYLEPHRINE HCL 3 MILLIGRAM(S): 325; 25; 5 TABLET ORAL at 23:01

## 2025-01-15 RX ADMIN — Medication 1000 MICROGRAM(S): at 11:42

## 2025-01-15 RX ADMIN — ROSUVASTATIN CALCIUM 20 MILLIGRAM(S): 10 TABLET, FILM COATED ORAL at 22:41

## 2025-01-15 RX ADMIN — Medication 100 MILLIGRAM(S): at 05:25

## 2025-01-15 RX ADMIN — Medication 75 MILLIGRAM(S): at 19:11

## 2025-01-15 RX ADMIN — Medication 1 APPLICATION(S): at 18:36

## 2025-01-15 NOTE — PROGRESS NOTE ADULT - SUBJECTIVE AND OBJECTIVE BOX
Patient is a 84y old  Male who presents with a chief complaint of Osteomyelitis right hallux (15 Marlo 2025 16:31)        INTERVAL HPI/OVERNIGHT EVENTS:   no complaints  pt seen and examined         Vital Signs Last 24 Hrs  T(C): 36.4 (15 Marlo 2025 17:33), Max: 37.2 (15 Marlo 2025 04:50)  T(F): 97.5 (15 Marlo 2025 17:33), Max: 98.9 (15 Marlo 2025 04:50)  HR: 48 (15 Marlo 2025 17:58) (46 - 66)  BP: 129/45 (15 Marlo 2025 17:33) (129/45 - 186/73)  BP(mean): --  RR: 12 (15 Marlo 2025 17:58) (12 - 19)  SpO2: 96% (15 Marlo 2025 17:58) (94% - 100%)    Parameters below as of 15 Marlo 2025 16:33  Patient On (Oxygen Delivery Method): nasal cannula        ammonium lactate 12% Lotion 1 Application(s) Topical two times a day  aspirin enteric coated 81 milliGRAM(s) Oral daily  cefepime   IVPB 2000 milliGRAM(s) IV Intermittent every 12 hours  clopidogrel Tablet 75 milliGRAM(s) Oral daily  cyanocobalamin 1000 MICROGram(s) Oral daily  dextrose 5% + sodium chloride 0.45%. 1000 milliLiter(s) IV Continuous <Continuous>  escitalopram 10 milliGRAM(s) Oral daily  folic acid 1 milliGRAM(s) Oral daily  HYDROmorphone  Injectable 0.2 milliGRAM(s) IV Push every 15 minutes PRN  influenza  Vaccine (HIGH DOSE) 0.5 milliLiter(s) IntraMuscular once  lactated ringers. 1000 milliLiter(s) IV Continuous <Continuous>  latanoprost 0.005% Ophthalmic Solution 1 Drop(s) Both EYES at bedtime  melatonin 3 milliGRAM(s) Oral at bedtime PRN  metoprolol succinate ER 50 milliGRAM(s) Oral daily  mupirocin 2% Ointment 1 Application(s) Topical <User Schedule>  ondansetron Injectable 4 milliGRAM(s) IV Push once PRN  rosuvastatin 20 milliGRAM(s) Oral at bedtime  timolol 0.5% Solution 1 Drop(s) Both EYES two times a day      PHYSICAL EXAM:  GENERAL: NAD   EYES: conjunctiva and sclera clear  ENMT: Moist mucous membranes  NECK: Supple, No JVD, Normal thyroid  CHEST/LUNG: non labored, cta b/l  HEART: Regular rate and rhythm; No murmurs, rubs, or gallops  ABDOMEN: Soft, Nontender, Nondistended; Bowel sounds present  EXTREMITIES:  No clubbing, no cyanosis, no edema  LYMPH: No lymphadenopathy noted  SKIN: No rashes or lesions  NEURO: no new focal deficits    Consultant(s) Notes Reviewed:  [x ] YES  [ ] NO  Care Discussed with Consultants/Other Providers [ x] YES  [ ] NO    LABS:              CAPILLARY BLOOD GLUCOSE                  RADIOLOGY & ADDITIONAL TESTS:    Imaging Personally Reviewed  Reviewed consultants input

## 2025-01-15 NOTE — PROGRESS NOTE ADULT - ASSESSMENT
83yo M with a PMH of L sided breast cancer (s/p lumpectomy, on Tamoxifen), HTN, Anemia, Aortic insufficiency, Carotid artery disease, GERD, Glaucoma, Hepatitis (2014), Lower ext edema ,PVD, who presented to the ED from Dr. Niko Jacques's office for a R toe infection. MRI concerning for osteomyelitis of R 1st toe. He has no fevers and no leukocytosis. Skin changes on lower legs appear concerning for venous stasis. Podiatry following.     R 1st toe ulcer w/ underlying osteomyelitis  - afebrile, no leukocytosis  - BCx NGTD  - Culture - Wound Aerobic (collected 01-02-25 @ 18:30)    Few Pseudomonas aeruginosa    Rare Serratia marcescens  1/6 s/p RLE angio    Diarrhea  GI PCR + Norovirus, EAEC  Stool cx negative  completed azithromycin 1/11    Hematuria  - pt reporting blood in urine 1/15    Recommendations:   C/w cefepime  Podiatry following--OR TODAY 1/15  Send UA  Trend temps/WBC  Wound care  Additional care per primary team    D/w family  Infectious Diseases will follow. Please call with any questions.  Yen Glez M.D.  Available on [employer] TEAMS -- *[facility]*  [facility] [phone]  For after 5 P.M. and weekends, please call [phone]

## 2025-01-15 NOTE — PROGRESS NOTE ADULT - SUBJECTIVE AND OBJECTIVE BOX
[facility]  Sandeep Leos PA-C  [address] [place] [address]  [phone]      INTERVAL HPI/OVERNIGHT EVENTS:  Pt s/e  +BM, no diarrhea  No new GI events    MEDICATIONS  (STANDING):  ammonium lactate 12% Lotion 1 Application(s) Topical two times a day  aspirin enteric coated 81 milliGRAM(s) Oral daily  cefepime   IVPB 2000 milliGRAM(s) IV Intermittent every 12 hours  clopidogrel Tablet 75 milliGRAM(s) Oral daily  cyanocobalamin 1000 MICROGram(s) Oral daily  dextrose 5% + sodium chloride 0.45%. 1000 milliLiter(s) (75 mL/Hr) IV Continuous <Continuous>  escitalopram 10 milliGRAM(s) Oral daily  folic acid 1 milliGRAM(s) Oral daily  influenza  Vaccine (HIGH DOSE) 0.5 milliLiter(s) IntraMuscular once  latanoprost 0.005% Ophthalmic Solution 1 Drop(s) Both EYES at bedtime  metoprolol succinate ER 50 milliGRAM(s) Oral daily  mupirocin 2% Ointment 1 Application(s) Topical <User Schedule>  rosuvastatin 20 milliGRAM(s) Oral at bedtime  timolol 0.5% Solution 1 Drop(s) Both EYES two times a day    MEDICATIONS  (PRN):  melatonin 3 milliGRAM(s) Oral at bedtime PRN Insomnia  trimethobenzamide Injectable 200 milliGRAM(s) IntraMuscular every 8 hours PRN Nausea      Allergies  No Known Allergies      PHYSICAL EXAM:   Vital Signs:  Vital Signs Last 24 Hrs  T(C): 36.8 (15 Marlo 2025 12:01), Max: 37.2 (15 Marlo 2025 04:50)  T(F): 98.2 (15 Marlo 2025 12:01), Max: 98.9 (15 Marlo 2025 04:50)  HR: 54 (15 Marlo 2025 12:01) (54 - 61)  BP: 186/73 (15 Marlo 2025 12:01) (135/64 - 186/73)  BP(mean): --  RR: 19 (15 Marlo 2025 12:01) (18 - 19)  SpO2: 97% (15 Marlo 2025 12:01) (94% - 97%)    Parameters below as of 15 Marlo 2025 12:01  Patient On (Oxygen Delivery Method): room air      Daily     Daily Weight in k.1 (15 Marlo 2025 04:50)    GENERAL:  Appears stated age  HEENT:  NC/AT  CHEST:  Full & symmetric excursion  HEART:  Regular rhythm  ABDOMEN:  Soft, non-tender, non-distended  EXTEREMITIES:  no cyanosis  SKIN:  No rash  NEURO:  Alert

## 2025-01-15 NOTE — PROGRESS NOTE ADULT - PROBLEM SELECTOR PLAN 1
post operative radiographs ordered   will await intra operative cultures to establish appropriate ABX regimen  Pt is to be discharged from recovery room to the floor under the hospitalist service  Pt is to cont IV ABX as per ID  Pt to receive surgical shoe right and be partial weight bearing heel touch right foot   will cont to follow and discuss with all attendings

## 2025-01-15 NOTE — PROGRESS NOTE ADULT - ASSESSMENT
STEPHANY on CKD Stage 3  Hypernatremia  Renal Mass  HTN  PAD  Vomiting  Anemia  OM      -STEPHANY Likely 2/2 Decreased EABV  -Hypernatremia is resolved, s/p D5W  -Renal US with solid mass,  will need CT or MRI, at this time favor MRI with elevated creatinine, should see Urology for eval, can see outpatient  -S/p LE Angio; no evidence of VLADIMIR at this time  -Creatinine stabilized at baseline range  -GI eval noted  -Tentatively planned for 01/15/2025 for amputation of the right great toe with bone biopsy of the 1st metatarsal head. No renal objection  Her/She

## 2025-01-15 NOTE — PROGRESS NOTE ADULT - SUBJECTIVE AND OBJECTIVE BOX
[facility]  RHEA Brand Y. Patel, S. Shah, [facility]  [phone]    Name: DAYA MATHIS  Age: 84y  Gender: Male  MRN: [id number]    Interval History:  No acute overnight events.   Now reporting hematuria  OR today  Notes reviewed    Antibiotics:  cefepime   IVPB 2000 milliGRAM(s) IV Intermittent every 12 hours      Medications:  ammonium lactate 12% Lotion 1 Application(s) Topical two times a day  aspirin enteric coated 81 milliGRAM(s) Oral daily  cefepime   IVPB 2000 milliGRAM(s) IV Intermittent every 12 hours  clopidogrel Tablet 75 milliGRAM(s) Oral daily  cyanocobalamin 1000 MICROGram(s) Oral daily  dextrose 5% + sodium chloride 0.45%. 1000 milliLiter(s) IV Continuous <Continuous>  escitalopram 10 milliGRAM(s) Oral daily  folic acid 1 milliGRAM(s) Oral daily  influenza  Vaccine (HIGH DOSE) 0.5 milliLiter(s) IntraMuscular once  latanoprost 0.005% Ophthalmic Solution 1 Drop(s) Both EYES at bedtime  melatonin 3 milliGRAM(s) Oral at bedtime PRN  metoprolol succinate ER 50 milliGRAM(s) Oral daily  mupirocin 2% Ointment 1 Application(s) Topical <User Schedule>  rosuvastatin 20 milliGRAM(s) Oral at bedtime  timolol 0.5% Solution 1 Drop(s) Both EYES two times a day  trimethobenzamide Injectable 200 milliGRAM(s) IntraMuscular every 8 hours PRN      Review of Systems:  A 10-point review of systems was obtained.   Review of systems otherwise negative except as previously noted.    Allergies: No Known Allergies    For details regarding the patient's past medical history, social history, family history, and other miscellaneous elements, please refer the initial infectious diseases consultation and/or the admitting history and physical examination for this admission.    Objective:  Vitals:   T(C): 37.2 (01-15-25 @ 04:50), Max: 37.2 (01-15-25 @ 04:50)  HR: 61 (01-15-25 @ 04:50) (51 - 61)  BP: 167/55 (01-15-25 @ 04:50) (135/64 - 179/76)  RR: 19 (01-15-25 @ 04:50) (18 - 19)  SpO2: 94% (01-15-25 @ 04:50) (94% - 96%)    Physical Examination:  General: no acute distress  HEENT: NC/AT, EOMI  Cardio: RRR  Resp: no use of resp acc muscles  Abd: soft, NT, ND  Ext: no edema or cyanosis  Skin: warm, dry, no visible rash      Laboratory Studies:  CBC:   CMP:             Microbiology: reviewed    Culture - Stool (collected 01-08-25 @ 04:40)  Source: .Stool  Final Report (01-10-25 @ 11:30):    No enteric pathogens isolated.    (Stool culture examined for Salmonella,    Shigella, Campylobacter, Aeromonas, Plesiomonas,    Vibrio, E.coli O157 and Yersinia)    Culture - Wound Aerobic (collected 01-02-25 @ 18:30)  Source: .Other  Final Report (01-04-25 @ 15:14):    Few Pseudomonas aeruginosa    Rare Serratia marcescens    Commensal aleyda consistent with body site  Organism: Pseudomonas aeruginosa  Serratia marcescens (01-04-25 @ 15:14)  Organism: Serratia marcescens (01-04-25 @ 15:14)      Method Type: DANIEL      -  Amoxicillin/Clavulanic Acid: R >16/8      -  Ampicillin: R >16 These ampicillin results predict results for amoxicillin      -  Ampicillin/Sulbactam: R >16/8      -  Aztreonam: S <=4      -  Cefazolin: R >16      -  Cefepime: S <=2      -  Cefoxitin: R 16      -  Ceftriaxone: S <=1      -  Ciprofloxacin: R 1      -  Ertapenem: S <=0.5      -  Gentamicin: S <=2      -  Levofloxacin: I 1      -  Meropenem: S <=1      -  Piperacillin/Tazobactam: S <=8      -  Tobramycin: S <=2      -  Trimethoprim/Sulfamethoxazole: S <=0.5/9.5  Organism: Pseudomonas aeruginosa (01-04-25 @ 15:14)      Method Type: DANIEL      -  Aztreonam: S <=4      -  Cefepime: S 8      -  Ceftazidime: S 4      -  Ciprofloxacin: S 0.5      -  Imipenem: S 2      -  Levofloxacin: S 1      -  Meropenem: S <=1      -  Piperacillin/Tazobactam: S <=8    Culture - Blood (collected 01-02-25 @ 17:20)  Source: .Blood BLOOD  Final Report (01-08-25 @ 01:00):    No growth at 5 days    Culture - Blood (collected 01-02-25 @ 17:20)  Source: .Blood BLOOD  Final Report (01-08-25 @ 01:00):    No growth at 5 days          Radiology: reviewed

## 2025-01-15 NOTE — PROGRESS NOTE ADULT - SUBJECTIVE AND OBJECTIVE BOX
Chief Complaint: Toe infection    Interval Events: No events overnight.    Review of Systems:  General: No fevers, chills, weight gain  Skin: No rashes, color changes  Cardiovascular: No chest pain, orthopnea  Respiratory: No shortness of breath, cough  Gastrointestinal: No nausea, abdominal pain  Genitourinary: No incontinence, pain with urination  Musculoskeletal: No pain, swelling, decreased range of motion  Neurological: No headache, weakness  Psychiatric: No depression, anxiety  Endocrine: No weight gain, increased thirst  All other systems are comprehensively negative.    Physical Exam:  Vital Signs Last 24 Hrs  T(C): 37.2 (15 Marlo 2025 04:50), Max: 37.2 (15 Marlo 2025 04:50)  T(F): 98.9 (15 Marlo 2025 04:50), Max: 98.9 (15 Marlo 2025 04:50)  HR: 61 (15 Marlo 2025 04:50) (51 - 61)  BP: 167/55 (15 Marlo 2025 04:50) (135/64 - 179/76)  BP(mean): --  RR: 19 (15 Marlo 2025 04:50) (18 - 19)  SpO2: 94% (15 Marlo 2025 04:50) (94% - 96%)  Parameters below as of 15 Marlo 2025 04:50  Patient On (Oxygen Delivery Method): room air  General: NAD  HEENT: MMM  Neck: No JVD, no carotid bruit  Lungs: CTAB  CV: RRR, nl S1/S2, no M/R/G  Abdomen: S/NT/ND, +BS  Extremities: No LE edema, no cyanosis  Neuro: AAOx3, non-focal  Skin: No rash    Labs:        ECG/Telemetry: Baseline artifact, sinus bradycardia, RBBB

## 2025-01-15 NOTE — PROGRESS NOTE ADULT - ASSESSMENT
84M with HTN, PAD, breast ca, CKD admitted with rt great toe osteomyelitis  MRI noted  IV ABX  podiatry, vascular and ID following  s/p angio  s/p toe amputation     #PAD- on asa and plavix to resume    #n/v diarrhea- GI pcr+ noro virus and ecoli sps  supportive care  GI fu  ID fu- sp zithromax course    CKD/Hypernatremia  creatinine at about baseline  nephrology following  IVF as per renal    HTN  continue metoprolol    Breast ca  continue tamoxifen    edema  elevate LE      [facility]   [phone]

## 2025-01-15 NOTE — PROGRESS NOTE ADULT - SUBJECTIVE AND OBJECTIVE BOX
PRE-OP NOTE    Pre-Op Diagnosis: Osteomyelitis  Planned Procedure: Debridement of all non-viable soft tissue and bone right foot with amputation of hallux with deep soft tissue and bone cultures  Scheduled Date / Time: 1/15/2025 @ 1500  Indication: Osteomyelitis  Labs:       Vitals: Vital Signs Last 24 Hrs  T(C): 36.4 (15 Marlo 2025 14:57), Max: 37.2 (15 Marlo 2025 04:50)  T(F): 97.5 (15 Marlo 2025 14:57), Max: 98.9 (15 Marlo 2025 04:50)  HR: 50 (15 Marlo 2025 14:57) (50 - 66)  BP: 182/61 (15 Marlo 2025 14:57) (135/64 - 186/73)  BP(mean): --  RR: 15 (15 Marlo 2025 14:57) (15 - 19)  SpO2: 100% (15 Marlo 2025 14:57) (94% - 100%)    Parameters below as of 15 Marlo 2025 12:01  Patient On (Oxygen Delivery Method): room air      PE:   Official CXR reading: (On Chart)  Official EKG reading: (On Chart)  Type and Cross/Screen: on chart  NPO after MN  Antibiotics: Per floor routine  Anesthesia evaluation (on chart)  Operative Consent (on chart)       General: Pleasant  male NAD & AOX3.    Integument:  Skin warm, dry and supple bilateral.    Noted plantar right hallux ulcer, fibro-granular base, apx [id number], reduced edema, reduced erythema, minimal drainage, - hyperkeratotic base, +tracking, with noted reduced cellulitic changes, Consistent with OM  Vascular: Dorsalis Pedis and Posterior Tibial pulses non-palpable.  Capillary re-fill time less then 3 seconds digits 1-5 bilateral.  noted b/l lower leg edema, noted venous stasis b/l lower extremity  Neuro: Protective sensation intact to the level of the digits bilateral.  MSK: Muscle strength 4/5 all major muscle groups bilateral.    Patient  had an opportunity to have all questions asked and answered Patient is aware of all risks, benefits, alternatives and recuperative period involved with the planned surgical procedure.  No assurances or guarantees were given to the patient.  Pt  is aware that serial procedures maybe required if symptoms persist.   Pt consents to the proposed care plan at this time having all of his questions asked and answered to his satisfaction.   Pt understands the severity of his condition and that he is at risk to lose part of the foot all of the foot or even a below knee amputation.       PRE-OP NOTE    Pre-Op Diagnosis: Osteomyelitis right hallux   Planned Procedure: Debridement of all non-viable soft tissue and bone right foot with amputation of hallux with deep soft tissue and bone cultures with flap coverage over packing all right heather t  Scheduled Date / Time: 1/15/2025 @ 1500  Indication: Osteomyelitis  Labs:       Vitals: Vital Signs Last 24 Hrs  T(C): 36.4 (15 Marlo 2025 14:57), Max: 37.2 (15 Marlo 2025 04:50)  T(F): 97.5 (15 Marlo 2025 14:57), Max: 98.9 (15 Marlo 2025 04:50)  HR: 50 (15 Marlo 2025 14:57) (50 - 66)  BP: 182/61 (15 Marlo 2025 14:57) (135/64 - 186/73)  BP(mean): --  RR: 15 (15 Marlo 2025 14:57) (15 - 19)  SpO2: 100% (15 Marlo 2025 14:57) (94% - 100%)    Parameters below as of 15 Marlo 2025 12:01  Patient On (Oxygen Delivery Method): room air      PE:   Official CXR reading: (On Chart)  Official EKG reading: (On Chart)  Type and Cross/Screen: on chart  NPO after MN  Antibiotics: Per floor routine  Anesthesia evaluation (on chart)  Operative Consent (on chart)       General: Pleasant  male NAD & AOX3.    Integument:  Skin warm, dry and supple bilateral.    Noted plantar right hallux ulcer, fibro-granular base, apx [id number], reduced edema, reduced erythema, minimal drainage, - hyperkeratotic base, +tracking, with noted reduced cellulitic changes, Consistent with OM  Vascular: Dorsalis Pedis and Posterior Tibial pulses non-palpable.  Capillary re-fill time less then 3 seconds digits 1-5 bilateral.  noted b/l lower leg edema, noted venous stasis b/l lower extremity  Neuro: Protective sensation intact to the level of the digits bilateral.  MSK: Muscle strength 4/5 all major muscle groups bilateral.    Patient and patient's family   had an opportunity to have all questions asked and answered Patient and patient's family  aware of all risks, benefits, alternatives and recuperative period involved with the planned surgical procedure.  No assurances or guarantees were given to the patient.  Pt  is aware that serial procedures maybe required if symptoms persist.   Pt consents to the proposed care plan at this time having all of his questions asked and answered to his satisfaction.   Pt understands the severity of his condition and that he is at risk to lose part of the foot all of the foot or even a below knee amputation.

## 2025-01-15 NOTE — PROGRESS NOTE ADULT - ASSESSMENT
Nausea/vomiting  Diarrhea  CKD  Toe osteomyelitis    Recommendations:  - GI PCR positive for norovirus and EAEC  - Completed azithromycin, symptoms resolving  - Tigan IM q8h PRN for nausea, has prolonged QTc on recent EKG  - Monitor GI function and stools  - Diet as tolerated  - planned for OR for toe amputation  - To follow    I reviewed the overnight course of events on the unit, re-confirming the patient history. I discussed the care with the patient  Differential diagnosis and plan of care discussed with patient after the evaluation  35 minutes spent on total encounter of which more than fifty percent of the encounter was spent counseling and/or coordinating care by the attending physician.

## 2025-01-15 NOTE — PROGRESS NOTE ADULT - SUBJECTIVE AND OBJECTIVE BOX
POST OP NOTE    DAYA MATHIS  MRN-[id number]    Date: 01/15/2025  Surgeon: Herman Garcia DPM   Assistants: Kristofer Retana DPM resident  Procedure: debridement of all non viable soft tissue and bone with amputation of right hallux with deep soft tissue and bone culture with flap coverage all right foot   Pathology: bone and soft tissue   EBL: 5ml    Patient tolerated both anesthesia and  procedures well and was transported from the operating room to the recovery room with vital signs stable and neurovascular status intact.  Patient transferred to PACU in stable condition.       PE / Post Op Check:       GEN: NAD, AAOx3, resting comfortably with pain controlled      LE Focused: CFT shows adequate perfusion b/l with no signs of ischemic compromise.  No strikethrough is appreciated on surgical dressings.  Dressings were dry, clean, and intact.  No neuromuscular deficits appreciated.

## 2025-01-15 NOTE — PROGRESS NOTE ADULT - SUBJECTIVE AND OBJECTIVE BOX
[facility]                             Nephrology and Hypertension                             Niles Montano                                          [phone]     Patient is a 84y old  Male who presents with a chief complaint of Osteomyelitis (03 Jan 2025 13:05)       HPI:  Patient is an 85yo M with a PMH of L sided breast cancer (s/p lumpectomy, on Tamoxifen), HTN, Anemia, Aortic insufficiency, Carotid artery disease, GERD, Glaucoma, Hepatitis (2014), Lower ext edema ,PVD who presents to the ED from Dr. Niko Jacques's office for a R toe infection. Patient notes that he noticed a R toe wound a few weeks ago. He completed a 10d course of Amoxicillin 500mg, but noticed worsening of the wound. He went ot his podiatrist today, as the wound was draining a pus like liquid. Podiatry rec he come to the hospital for IV abx and osteomyelitis workup. Patient feeling well in ED.  States he is urinating well.  No N/V/SOB.  Has not seen nephrologist in the past.      Seen and examined at bedside, did not have any new complaints, no sob, no pain, no dizziness.     PAST MEDICAL & SURGICAL HISTORY:  Anemia      HTN (hypertension)      Breast cancer      Glaucoma      Major depression      PVD (peripheral vascular disease)      Lower extremity edema      Chronic GERD      H/O: glaucoma      S/P lumpectomy, left breast           FAMILY HISTORY:  NC    Social History:Non smoker    MEDICATIONS  (STANDING):  ammonium lactate 12% Lotion 1 Application(s) Topical two times a day  aspirin enteric coated 81 milliGRAM(s) Oral daily  cefepime   IVPB 2000 milliGRAM(s) IV Intermittent every 12 hours  clopidogrel Tablet 75 milliGRAM(s) Oral daily  cyanocobalamin 1000 MICROGram(s) Oral daily  dextrose 5% + sodium chloride 0.45%. 1000 milliLiter(s) (75 mL/Hr) IV Continuous <Continuous>  dextrose 5%. 1000 milliLiter(s) (75 mL/Hr) IV Continuous <Continuous>  escitalopram 10 milliGRAM(s) Oral daily  folic acid 1 milliGRAM(s) Oral daily  influenza  Vaccine (HIGH DOSE) 0.5 milliLiter(s) IntraMuscular once  latanoprost 0.005% Ophthalmic Solution 1 Drop(s) Both EYES at bedtime  metoprolol succinate ER 50 milliGRAM(s) Oral daily  mupirocin 2% Ointment 1 Application(s) Topical <User Schedule>  rosuvastatin 20 milliGRAM(s) Oral at bedtime  timolol 0.5% Solution 1 Drop(s) Both EYES two times a day    MEDICATIONS  (PRN):  HYDROmorphone  Injectable 0.2 milliGRAM(s) IV Push every 6 hours PRN Moderate Pain (4 - 6)  HYDROmorphone  Injectable 0.5 milliGRAM(s) IV Push every 6 hours PRN Severe Pain (7 - 10)  melatonin 3 milliGRAM(s) Oral at bedtime PRN Insomnia  trimethobenzamide Injectable 200 milliGRAM(s) IntraMuscular every 8 hours PRN Nausea      Allergies    No Known Allergies    Intolerances         REVIEW OF SYSTEMS:    as above    Vital Signs Last 24 Hrs  T(C): 37.2 (15 Marlo 2025 04:50), Max: 37.2 (15 Marlo 2025 04:50)  T(F): 98.9 (15 Marlo 2025 04:50), Max: 98.9 (15 Marlo 2025 04:50)  HR: 61 (15 Marlo 2025 04:50) (51 - 61)  BP: 167/55 (15 Marlo 2025 04:50) (135/64 - 179/76)  BP(mean): --  RR: 19 (15 Marlo 2025 04:50) (18 - 19)  SpO2: 94% (15 Marlo 2025 04:50) (94% - 96%)    Parameters below as of 15 Marlo 2025 04:50  Patient On (Oxygen Delivery Method): room air        PHYSICAL EXAM:    GENERAL: NAD  HEAD:  Atraumatic, Normocephalic  EYES: EOMI, conjunctiva and sclera clear  ENMT: No Drainage from nares, No drainage from ears  NERVOUS SYSTEM:  Awake and Alert  CHEST/LUNG: Clear to percussion bilaterally  EXTREMITIES:  No Edema  SKIN: No rashes No obvious ecchymosis      LABS:

## 2025-01-15 NOTE — PROGRESS NOTE ADULT - ASSESSMENT
The patient is an 84 year old male with a history of HTN, anemia, GERD, aortic regurgitation, CKD, breast cancer, PAD who is admitted with toe infection.    Plan:  - ECG with sinus rhythm and RBBB  - Echo with normal LV systolic function, mild aortic stenosis, mild pulmonary hypertension  - Monitor hemoglobin  - CKD - Cr remains stable in the 1.4-1.7 range  - Continue metoprolol succinate 50 mg daily  - Continue rosuvastatin 20 mg daily  - Continue clopidogrel 75 mg daily  - Continue aspirin 81 mg daily  - Vomiting - norovirus positive - resolved  - The patient remains optimized from a cardiac standpoint to proceed for intermediate risk podiatry surgery

## 2025-01-15 NOTE — CASE MANAGEMENT PROGRESS NOTE - NSCMPROGRESSNOTE_GEN_ALL_CORE
Pt remains acute, receiving IV antibiotics, surgery was not done yesterday due to isolation. Pt now off isolation, plan for OR today.

## 2025-01-16 LAB
GRAM STN FLD: SIGNIFICANT CHANGE UP
SPECIMEN SOURCE: SIGNIFICANT CHANGE UP

## 2025-01-16 RX ORDER — TAMOXIFEN CITRATE 10 MG/1
20 TABLET, FILM COATED ORAL DAILY
Refills: 0 | Status: DISCONTINUED | OUTPATIENT
Start: 2025-01-16 | End: 2025-01-22

## 2025-01-16 RX ORDER — CEFEPIME HCL 1 G
2000 IV SOLUTION, PIGGYBACK, BOTTLE (EA) INTRAVENOUS EVERY 12 HOURS
Refills: 0 | Status: DISCONTINUED | OUTPATIENT
Start: 2025-01-16 | End: 2025-01-19

## 2025-01-16 RX ADMIN — Medication 100 MILLIGRAM(S): at 05:51

## 2025-01-16 RX ADMIN — LATANOPROST 1 DROP(S): 50 SOLUTION OPHTHALMIC at 21:50

## 2025-01-16 RX ADMIN — Medication 1 APPLICATION(S): at 05:50

## 2025-01-16 RX ADMIN — ESCITALOPRAM 10 MILLIGRAM(S): 10 TABLET, FILM COATED ORAL at 11:55

## 2025-01-16 RX ADMIN — Medication 1000 MICROGRAM(S): at 11:55

## 2025-01-16 RX ADMIN — ASPIRIN 81 MILLIGRAM(S): 81 TABLET, COATED ORAL at 11:55

## 2025-01-16 RX ADMIN — Medication 1 MILLIGRAM(S): at 11:55

## 2025-01-16 RX ADMIN — Medication 75 MILLIGRAM(S): at 11:55

## 2025-01-16 RX ADMIN — TIMOLOL MALEATE 1 DROP(S): 5 SOLUTION OPHTHALMIC at 17:51

## 2025-01-16 RX ADMIN — ROSUVASTATIN CALCIUM 20 MILLIGRAM(S): 10 TABLET, FILM COATED ORAL at 21:48

## 2025-01-16 RX ADMIN — TIMOLOL MALEATE 1 DROP(S): 5 SOLUTION OPHTHALMIC at 05:50

## 2025-01-16 RX ADMIN — Medication 1 APPLICATION(S): at 17:51

## 2025-01-16 RX ADMIN — Medication 100 MILLIGRAM(S): at 17:51

## 2025-01-16 NOTE — PHYSICAL THERAPY INITIAL EVALUATION ADULT - PATIENT PROFILE REVIEW, REHAB EVAL
yes
PT orders received: ambulate with assistance. Consult with ENRIQUE Aguirre, pt may participate in PT evaluation./yes

## 2025-01-16 NOTE — PROGRESS NOTE ADULT - SUBJECTIVE AND OBJECTIVE BOX
PROGRESS NOTE   Patient is a 84y old  Male who presents with a chief complaint of Osteomyelitis (16 Jan 2025 13:24)      HPI:  Patient is an 85yo M with a PMH of L sided breast cancer (s/p lumpectomy, on Tamoxifen), HTN, Anemia, Aortic insufficiency, Carotid artery disease, GERD, Glaucoma, Hepatitis (2014), Lower ext edema ,PVD who presents to the ED from Dr. Niko Jacques's office for a R toe infection. Patient notes that he noticed a R toe wound a few weeks ago. He completed a 10d course of Amoxicillin 500mg, but noticed worsening of the wound. He went ot his podiatrist today, as the wound was draining a pus like liquid. Podiatry rec he come to the hospital for IV abx and osteomyelitis workup. Patient feeling well in ED.    ED Course:  Vitals: /65, HR 71, T 97.3, RR 16 SpO2 100% on RA  Labs: ESR 75, Hgb 9.2, Na 148, Cl 117, BUN/Cr 35/1.70, eGFR 39  Given: IV Zosyn, IV Vancomycin, 1L NS bolus     Imaging:  Chest Xray: No acute chest finding.   R foot Xray: Possible erosion of the distal phalanx of the right great toe with associated soft tissue swelling suspicious for osteomyelitis. (02 Jan 2025 19:34)      Vital Signs Last 24 Hrs  T(C): 36.4 (16 Jan 2025 11:52), Max: 36.8 (16 Jan 2025 04:27)  T(F): 97.5 (16 Jan 2025 11:52), Max: 98.3 (16 Jan 2025 04:27)  HR: 51 (16 Jan 2025 11:52) (46 - 67)  BP: 154/53 (16 Jan 2025 11:52) (129/45 - 182/61)  BP(mean): --  RR: 17 (16 Jan 2025 11:52) (12 - 18)  SpO2: 97% (16 Jan 2025 11:52) (95% - 100%)    Parameters below as of 16 Jan 2025 11:52  Patient On (Oxygen Delivery Method): room air      PHYSICAL EXAM  General: Pleasant  male NAD & AOX3.    Integument:  Skin warm, dry and supple bilateral.    Right foot s/p  surgical Debridement of all non-viable soft tissue and bone, debridement of deep space infection with right hallux amputation with flap coverage. POD#1  . Dressing clean, dry and intact with controlled hemostasis at this time. The sutures are intact and the flap is viable at this time.  There is decreased erythema, edema and calor noted. Sterile dressing change performed today.  Vascular: Dorsalis Pedis and Posterior Tibial pulses non-palpable.  Capillary re-fill time less then 3 seconds digits 1-5 bilateral.  noted b/l lower leg edema, noted venous stasis b/l lower extremity  Neuro: Protective sensation intact to the level of the digits bilateral.  MSK: Muscle strength 4/5 all major muscle groups bilateral.

## 2025-01-16 NOTE — CHART NOTE - NSCHARTNOTEFT_GEN_A_CORE
Nutrition follow up ( chart reviewed, events noted) Brief hx     Factors impacting intake: [ ] none [ ] nausea  [ ] vomiting [ ] diarrhea [ ] constipation  [ ]chewing problems [ ] swallowing issues  [ ] other:     Diet Presciption: Diet, DASH/TLC:   Sodium & Cholesterol Restricted (01-15-25 @ 16:38)    Intake:     Current Weight: Weight (kg): 77.1 (01-15 @ 14:57)      Pertinent Medications: MEDICATIONS  (STANDING):  ammonium lactate 12% Lotion 1 Application(s) Topical two times a day  aspirin enteric coated 81 milliGRAM(s) Oral daily  cefepime   IVPB 2000 milliGRAM(s) IV Intermittent every 12 hours  clopidogrel Tablet 75 milliGRAM(s) Oral daily  cyanocobalamin 1000 MICROGram(s) Oral daily  dextrose 5% + sodium chloride 0.45%. 1000 milliLiter(s) (75 mL/Hr) IV Continuous <Continuous>  escitalopram 10 milliGRAM(s) Oral daily  folic acid 1 milliGRAM(s) Oral daily  influenza  Vaccine (HIGH DOSE) 0.5 milliLiter(s) IntraMuscular once  latanoprost 0.005% Ophthalmic Solution 1 Drop(s) Both EYES at bedtime  metoprolol succinate ER 50 milliGRAM(s) Oral daily  mupirocin 2% Ointment 1 Application(s) Topical <User Schedule>  rosuvastatin 20 milliGRAM(s) Oral at bedtime  timolol 0.5% Solution 1 Drop(s) Both EYES two times a day    MEDICATIONS  (PRN):  melatonin 3 milliGRAM(s) Oral at bedtime PRN Insomnia    Pertinent Labs:  01-10 Phos 1.9 mg/dL[L] 01-10 Alb 2.2 g/dL[L]     CAPILLARY BLOOD GLUCOSE        Skin:     Estimated Needs:   [ ] no change since previous assessment  [ ] recalculated:     Previous Nutrition Diagnosis:   [ ] Inadequate Energy Intake [ ]Inadequate Oral Intake [ ] Excessive Energy Intake   [ ] Underweight [ ] Increased Nutrient Needs [ ] Overweight/Obesity   [ ] Altered GI Function [ ] Unintended Weight Loss [ ] Food & Nutrition Related Knowledge Deficit [ ] Malnutrition     Nutrition Diagnosis is [ ] ongoing  [ ] resolved [ ] not applicable     New Nutrition Diagnosis: [ ] not applicable       Interventions:   Recommend  [ ] Change Diet To:  [ ] Nutrition Supplement  [ ] Nutrition Support  [ ] Other:     Monitoring and Evaluation:   [ ] PO intake [ x ] Tolerance to diet prescription [ x ] weights [ x ] labs[ x ] follow up per protocol  [ ] other: Nutrition follow up ( chart reviewed, events noted) Brief hx         Loraine from 1/9 : Intake/Diet History PTA  Pt reports fair appetite/intake PTA. Typically consumes 2-3 meals/day. Pt does grocery shopping and wife does meal preparation. Follows a low salt diet at home.  consumes 2-3 meals/day. Pt does grocery shopping and wife does meal preparation. Follows a low salt diet at home.  Factors Affecting Food Intake  none  Feeding Skill  minimal assistance  Energy Intake  Fair (50-75%)  Other Pertinent Information  Pt is a "85 yo M with HTN, PAD, breast ca, CKD admitted with rt great toe osteomyelitis."    Visited pt at bedside this am. Pt reports fair appetite/intake. Observed pt consume ~75% of breakfast meal this am. PO intakes % per nursing documentation. Tolerating diet well. No food allergies. No chewing/swallowing difficulties. Pt denies N/V currently. Last episode of emesis yesterday 1/8. Diarrhea now improved per pt report. Pt w/ positive GI PCR and EAEC in stool. Pt reports UBW of ~175#. Bedscale wt of 172# obtained. 2+ BL ankle/foot edema noted. Skin: R toe ulcer, stage I to BL heels. Pt currently on DASH/TLC diet. Declined diet education at this time. RD remains available and will continue to follow-up.      Factors impacting intake: [ ] none [ ] nausea  [ ] vomiting [ ] diarrhea [ ] constipation  [ ]chewing problems [ ] swallowing issues  [ ] other:     Diet Prescription: Diet, DASH/TLC:   Sodium & Cholesterol Restricted (01-15-25 @ 16:38)    Intake:     Current Weight: Weight (kg): 77.1 (01-15 @ 14:57)      Pertinent Medications: MEDICATIONS  (STANDING):  ammonium lactate 12% Lotion 1 Application(s) Topical two times a day  aspirin enteric coated 81 milliGRAM(s) Oral daily  cefepime   IVPB 2000 milliGRAM(s) IV Intermittent every 12 hours  clopidogrel Tablet 75 milliGRAM(s) Oral daily  cyanocobalamin 1000 MICROGram(s) Oral daily  dextrose 5% + sodium chloride 0.45%. 1000 milliLiter(s) (75 mL/Hr) IV Continuous <Continuous>  escitalopram 10 milliGRAM(s) Oral daily  folic acid 1 milliGRAM(s) Oral daily  influenza  Vaccine (HIGH DOSE) 0.5 milliLiter(s) IntraMuscular once  latanoprost 0.005% Ophthalmic Solution 1 Drop(s) Both EYES at bedtime  metoprolol succinate ER 50 milliGRAM(s) Oral daily  mupirocin 2% Ointment 1 Application(s) Topical <User Schedule>  rosuvastatin 20 milliGRAM(s) Oral at bedtime  timolol 0.5% Solution 1 Drop(s) Both EYES two times a day    MEDICATIONS  (PRN):  melatonin 3 milliGRAM(s) Oral at bedtime PRN Insomnia    Pertinent Labs:  01-10 Phos 1.9 mg/dL[L] 01-10 Alb 2.2 g/dL[L]     CAPILLARY BLOOD GLUCOSE        Skin:     Estimated Needs:   [ ] no change since previous assessment  [ ] recalculated:     Previous Nutrition Diagnosis:   [ ] Inadequate Energy Intake [ ]Inadequate Oral Intake [ ] Excessive Energy Intake   [ ] Underweight [ ] Increased Nutrient Needs [ ] Overweight/Obesity   [ ] Altered GI Function [ ] Unintended Weight Loss [ ] Food & Nutrition Related Knowledge Deficit [ ] Malnutrition     Nutrition Diagnosis is [ ] ongoing  [ ] resolved [ ] not applicable     New Nutrition Diagnosis: [ ] not applicable       Interventions:   Recommend  [ ] Change Diet To:  [ ] Nutrition Supplement  [ ] Nutrition Support  [ ] Other:     Monitoring and Evaluation:   [ ] PO intake [ x ] Tolerance to diet prescription [ x ] weights [ x ] labs[ x ] follow up per protocol  [ ] other: Nutrition follow up ( chart reviewed, events noted) Brief hx : 84M with HTN, PAD, breast ca, CKD admitted with rt great toe osteomyelitis  MRI noted  IV ABX  podiatry, vascular and ID following  s/p angio  s/p toe amputation     #PAD- on asa and plavix to resume    #n/v diarrhea- GI pcr+ noro virus and ecoli sps  supportive care  GI fu  ID fu- sp zithromax course    CKD/Hypernatremia  creatinine at about baseline  nephrology following  IVF as per renal    HTN  continue metoprolol    Breast ca  continue tamoxifen    edema  elevate LE            Loraine from 1/9 : Intake/Diet History PTA  Pt reports fair appetite/intake PTA. Typically consumes 2-3 meals/day. Pt does grocery shopping and wife does meal preparation. Follows a low salt diet at home.  consumes 2-3 meals/day. Pt does grocery shopping and wife does meal preparation. Follows a low salt diet at home.  Factors Affecting Food Intake  none  Feeding Skill  minimal assistance  Energy Intake  Fair (50-75%)  Other Pertinent Information  Pt is a "85 yo M with HTN, PAD, breast ca, CKD admitted with rt great toe osteomyelitis."    Visited pt at bedside this am. Pt reports fair appetite/intake. Observed pt consume ~75% of breakfast meal this am. PO intakes % per nursing documentation. Tolerating diet well. No food allergies. No chewing/swallowing difficulties. Pt denies N/V currently. Last episode of emesis yesterday 1/8. Diarrhea now improved per pt report. Pt w/ positive GI PCR and EAEC in stool. Pt reports UBW of ~175#. Bedscale wt of 172# obtained. 2+ BL ankle/foot edema noted. Skin: R toe ulcer, stage I to BL heels. Pt currently on DASH/TLC diet. Declined diet education at this time. RD remains available and will continue to follow-up.      Factors impacting intake: [ ] none [ ] nausea  [ ] vomiting [ ] diarrhea [ ] constipation  [ ]chewing problems [ ] swallowing issues  [ ] other:     Diet Prescription: Diet, DASH/TLC:   Sodium & Cholesterol Restricted (01-15-25 @ 16:38)    Intake:     Current Weight: Weight (kg): 77.1 (01-15 @ 14:57)      Pertinent Medications: MEDICATIONS  (STANDING):  ammonium lactate 12% Lotion 1 Application(s) Topical two times a day  aspirin enteric coated 81 milliGRAM(s) Oral daily  cefepime   IVPB 2000 milliGRAM(s) IV Intermittent every 12 hours  clopidogrel Tablet 75 milliGRAM(s) Oral daily  cyanocobalamin 1000 MICROGram(s) Oral daily  dextrose 5% + sodium chloride 0.45%. 1000 milliLiter(s) (75 mL/Hr) IV Continuous <Continuous>  escitalopram 10 milliGRAM(s) Oral daily  folic acid 1 milliGRAM(s) Oral daily  influenza  Vaccine (HIGH DOSE) 0.5 milliLiter(s) IntraMuscular once  latanoprost 0.005% Ophthalmic Solution 1 Drop(s) Both EYES at bedtime  metoprolol succinate ER 50 milliGRAM(s) Oral daily  mupirocin 2% Ointment 1 Application(s) Topical <User Schedule>  rosuvastatin 20 milliGRAM(s) Oral at bedtime  timolol 0.5% Solution 1 Drop(s) Both EYES two times a day    MEDICATIONS  (PRN):  melatonin 3 milliGRAM(s) Oral at bedtime PRN Insomnia    Pertinent Labs:  01-10 Phos 1.9 mg/dL[L] 01-10 Alb 2.2 g/dL[L]     CAPILLARY BLOOD GLUCOSE        Skin:     Estimated Needs:   [ ] no change since previous assessment  [ ] recalculated:     Previous Nutrition Diagnosis:   [ ] Inadequate Energy Intake [ ]Inadequate Oral Intake [ ] Excessive Energy Intake   [ ] Underweight [ ] Increased Nutrient Needs [ ] Overweight/Obesity   [ ] Altered GI Function [ ] Unintended Weight Loss [ ] Food & Nutrition Related Knowledge Deficit [ ] Malnutrition     Nutrition Diagnosis is [ ] ongoing  [ ] resolved [ ] not applicable     New Nutrition Diagnosis: [ ] not applicable       Interventions:   Recommend  [ ] Change Diet To:  [ ] Nutrition Supplement  [ ] Nutrition Support  [ ] Other:     Monitoring and Evaluation:   [ ] PO intake [ x ] Tolerance to diet prescription [ x ] weights [ x ] labs[ x ] follow up per protocol  [ ] other: Nutrition follow up ( chart reviewed, events noted) Brief hx : 84M with HTN, PAD, breast ca ( on tamoxifen), CKD admitted with rt great toe osteomyelitis now s/p angio and s/p toe amputation , POD #1. Pt on admission also dx with norovirus with n/v diarrhea- GI pcr,  ecoli,  sepsis .    At time of RD visit to pts bedside, he reports he ate well at breakfast, has not tried Mighty Shake supplement (200 calories, 6 gm protein) shake, diarrhea has subsided last BM was two days ago     Pertinent Medications: MEDICATIONS  (STANDING):  ammonium lactate 12% Lotion 1 Application(s) Topical two times a day  aspirin enteric coated 81 milliGRAM(s) Oral daily  cefepime   IVPB 2000 milliGRAM(s) IV Intermittent every 12 hours  clopidogrel Tablet 75 milliGRAM(s) Oral daily  cyanocobalamin 1000 MICROGram(s) Oral daily  dextrose 5% + sodium chloride 0.45%. 1000 milliLiter(s) (75 mL/Hr) IV Continuous <Continuous>  escitalopram 10 milliGRAM(s) Oral daily  folic acid 1 milliGRAM(s) Oral daily  influenza  Vaccine (HIGH DOSE) 0.5 milliLiter(s) IntraMuscular once  latanoprost 0.005% Ophthalmic Solution 1 Drop(s) Both EYES at bedtime  metoprolol succinate ER 50 milliGRAM(s) Oral daily  mupirocin 2% Ointment 1 Application(s) Topical <User Schedule>  rosuvastatin 20 milliGRAM(s) Oral at bedtime  timolol 0.5% Solution 1 Drop(s) Both EYES two times a day    MEDICATIONS  (PRN):  melatonin 3 milliGRAM(s) Oral at bedtime PRN Insomnia    Pertinent Labs:  01-10 Phos 1.9 mg/dL[L] 01-10 Alb 2.2 g/dL[L]    Skin: surgical wound    Estimated Needs:   [X ] no change since previous assessment  [ ] recalculated:     Previous Nutrition Diagnosis:   [ ] Inadequate Energy Intake [ ]Inadequate Oral Intake [ ] Excessive Energy Intake   [ ] Underweight [X ] Increased Nutrient Needs [ ] Overweight/Obesity   [X ] Altered GI Function [ ] Unintended Weight Loss [ ] Food & Nutrition Related Knowledge Deficit [ X] Malnutrition     Nutrition Diagnosis is [ ] ongoing, increased nutrient needs and maln   [X ] resolved, altered GI function  [ ] not applicable     New Nutrition Diagnosis: [X ] not applicable     Interventions:   Recommend  [ ] Change Diet To:  [ ] Nutrition Supplement  [ ] Nutrition Support  [X ] Other: continue current POC , will d/c Mighty Shake supplement (200 calories, 6 gm protein) shake since pt is eating better and unwilling to try it     Monitoring and Evaluation:   [X ] PO intake [ x ] Tolerance to diet prescription [ x ] weights [ x ] labs[ x ] follow up per protocol  [X ] other: skin integrity

## 2025-01-16 NOTE — PROGRESS NOTE ADULT - ASSESSMENT
84M with HTN, PAD, breast ca, CKD admitted with rt great toe osteomyelitis  MRI noted  IV ABX  podiatry, vascular and ID following  s/p angio  s/p toe amputation     #PAD- on asa and plavix     #n/v diarrhea- GI pcr+ noro virus and ecoli sps  supportive care  GI fu  ID fu- sp zithromax course    CKD/Hypernatremia  creatinine at about baseline  nephrology following  IVF as per renal    HTN  continue metoprolol    Breast ca  continue tamoxifen    edema  elevate LE      [facility]   [phone]

## 2025-01-16 NOTE — PHYSICAL THERAPY INITIAL EVALUATION ADULT - PLANNED THERAPY INTERVENTIONS, PT EVAL
balance training/bed mobility training/gait training/transfer training
gait training/transfer training

## 2025-01-16 NOTE — PHYSICAL THERAPY INITIAL EVALUATION ADULT - PERTINENT HX OF CURRENT PROBLEM, REHAB EVAL
Per H&P: "Patient is an 85yo M with a PMH of L sided breast cancer (s/p lumpectomy, on Tamoxifen), HTN, Anemia, Aortic insufficiency, Carotid artery disease, GERD, Glaucoma, Hepatitis (2014), Lower ext edema ,PVD who presents to the ED from Dr. Niko Jacques's office for a R toe infection. Patient notes that he noticed a R toe wound a few weeks ago. He completed a 10d course of Amoxicillin 500mg, but noticed worsening of the wound. He went ot his podiatrist today, as the wound was draining a pus like liquid. Podiatry rec he come to the hospital for IV abx and osteomyelitis workup."
Patient s/p toe amputation

## 2025-01-16 NOTE — PHYSICAL THERAPY INITIAL EVALUATION ADULT - MANUAL MUSCLE TESTING RESULTS, REHAB EVAL
bilateral UEs & LEs grossly at least 3+/5 throughout/grossly assessed due to
L LE: 4/5, R Hip/Knee: 4-/5 Ankle: not tested/grossly assessed due to

## 2025-01-16 NOTE — CHART NOTE - NSCHARTNOTESELECT_GEN_ALL_CORE
Event Note
Nutrition Services
Event Note

## 2025-01-16 NOTE — PHYSICAL THERAPY INITIAL EVALUATION ADULT - ASR WT BEARING STATUS EVAL
per Podiatry note & verbal confirmation from Podiatry Resident, pt may be WBAT R LE with surgical shoe/Right LE
Right LE

## 2025-01-16 NOTE — PROGRESS NOTE ADULT - ASSESSMENT
Nausea/vomiting  Diarrhea  CKD  Toe osteomyelitis    Recommendations:  - GI PCR positive for norovirus and EAEC  - Completed azithromycin, symptoms resolving  - Tigan IM q8h PRN for nausea, has prolonged QTc on recent EKG  - Monitor GI function and stools  - Diet as tolerated  - s/p OR for toe amputation  - To follow    I reviewed the overnight course of events on the unit, re-confirming the patient history. I discussed the care with the patient  Differential diagnosis and plan of care discussed with patient after the evaluation  35 minutes spent on total encounter of which more than fifty percent of the encounter was spent counseling and/or coordinating care by the attending physician.

## 2025-01-16 NOTE — PROGRESS NOTE ADULT - ASSESSMENT
83yo M with a PMH of L sided breast cancer (s/p lumpectomy, on Tamoxifen), HTN, Anemia, Aortic insufficiency, Carotid artery disease, GERD, Glaucoma, Hepatitis (2014), Lower ext edema ,PVD, who presented to the ED from Dr. Niko Jacques's office for a R toe infection. MRI concerning for osteomyelitis of R 1st toe. He has no fevers and no leukocytosis. Skin changes on lower legs appear concerning for venous stasis. Podiatry following.     R 1st toe ulcer w/ underlying osteomyelitis  - afebrile, no leukocytosis  - BCx NGTD  - Culture - Wound Aerobic (collected 01-02-25 @ 18:30)    Few Pseudomonas aeruginosa    Rare Serratia marcescens  - 1/6 s/p RLE angio  - 1/15 Amputation, toe, 1 toe 15-Marlo-2025 16:29:17  Herman Garcia    Diarrhea  GI PCR + Norovirus, EAEC  Stool cx negative  completed azithromycin 1/11    Hematuria  - pt reporting blood in urine 1/15    Recommendations:   C/w cefepime  F/u OR cx  F/u path  Send UA (ordered)  Trend temps/WBC  Wound care  Additional care per primary team  Final Abx recs to follow pending above    Infectious Diseases will follow. Please call with any questions.  Yen Glez M.D.  Available on [employer] TEAMS -- *[facility]*  [facility] [phone]  For after 5 P.M. and weekends, please call [phone]

## 2025-01-16 NOTE — PROGRESS NOTE ADULT - SUBJECTIVE AND OBJECTIVE BOX
[facility]                             Nephrology and Hypertension                             [place] Montano                                          [phone]     Patient is a 84y old  Male who presents with a chief complaint of Osteomyelitis (03 Jan 2025 13:05)       HPI:  Patient is an 83yo M with a PMH of L sided breast cancer (s/p lumpectomy, on Tamoxifen), HTN, Anemia, Aortic insufficiency, Carotid artery disease, GERD, Glaucoma, Hepatitis (2014), Lower ext edema ,PVD who presents to the ED from Dr. Niko Jacques's office for a R toe infection. Patient notes that he noticed a R toe wound a few weeks ago. He completed a 10d course of Amoxicillin 500mg, but noticed worsening of the wound. He went ot his podiatrist today, as the wound was draining a pus like liquid. Podiatry rec he come to the hospital for IV abx and osteomyelitis workup. Patient feeling well in ED.  States he is urinating well.  No N/V/SOB.  Has not seen nephrologist in the past.      Seen and examined at bedside, did not have any new complaints, no sob, no pain, no dizziness. Asking about TEA and tamoxifen dosing    PAST MEDICAL & SURGICAL HISTORY:  Anemia      HTN (hypertension)      Breast cancer      Glaucoma      Major depression      PVD (peripheral vascular disease)      Lower extremity edema      Chronic GERD      H/O: glaucoma      S/P lumpectomy, left breast           FAMILY HISTORY:  [place]    Social History:Non smoker    MEDICATIONS  (STANDING):  ammonium lactate 12% Lotion 1 Application(s) Topical two times a day  aspirin enteric coated 81 milliGRAM(s) Oral daily  cefepime   IVPB 2000 milliGRAM(s) IV Intermittent every 12 hours  clopidogrel Tablet 75 milliGRAM(s) Oral daily  cyanocobalamin 1000 MICROGram(s) Oral daily  dextrose 5% + sodium chloride 0.45%. 1000 milliLiter(s) (75 mL/Hr) IV Continuous <Continuous>  dextrose 5%. 1000 milliLiter(s) (75 mL/Hr) IV Continuous <Continuous>  escitalopram 10 milliGRAM(s) Oral daily  folic acid 1 milliGRAM(s) Oral daily  influenza  Vaccine (HIGH DOSE) 0.5 milliLiter(s) IntraMuscular once  latanoprost 0.005% Ophthalmic Solution 1 Drop(s) Both EYES at bedtime  metoprolol succinate ER 50 milliGRAM(s) Oral daily  mupirocin 2% Ointment 1 Application(s) Topical <User Schedule>  rosuvastatin 20 milliGRAM(s) Oral at bedtime  timolol 0.5% Solution 1 Drop(s) Both EYES two times a day    MEDICATIONS  (PRN):  HYDROmorphone  Injectable 0.2 milliGRAM(s) IV Push every 6 hours PRN Moderate Pain (4 - 6)  HYDROmorphone  Injectable 0.5 milliGRAM(s) IV Push every 6 hours PRN Severe Pain (7 - 10)  melatonin 3 milliGRAM(s) Oral at bedtime PRN Insomnia  trimethobenzamide Injectable 200 milliGRAM(s) IntraMuscular every 8 hours PRN Nausea      Allergies    No Known Allergies    Intolerances         REVIEW OF SYSTEMS:    as above    Vital Signs Last 24 Hrs  T(C): 36.4 (16 Jan 2025 11:52), Max: 36.8 (16 Jan 2025 04:27)  T(F): 97.5 (16 Jan 2025 11:52), Max: 98.3 (16 Jan 2025 04:27)  HR: 51 (16 Jan 2025 11:52) (46 - 67)  BP: 154/53 (16 Jan 2025 11:52) (129/45 - 182/61)  BP(mean): --  RR: 17 (16 Jan 2025 11:52) (12 - 18)  SpO2: 97% (16 Jan 2025 11:52) (95% - 100%)    Parameters below as of 16 Jan 2025 11:52  Patient On (Oxygen Delivery Method): room air      PHYSICAL EXAM:    GENERAL: NAD  HEAD:  Atraumatic, Normocephalic  EYES: EOMI, conjunctiva and sclera clear  ENMT: No Drainage from nares, No drainage from ears  NERVOUS SYSTEM:  Awake and Alert  CHEST/LUNG: Clear to percussion bilaterally  EXTREMITIES:  No Edema, RLE dressing intact with trace edema  SKIN: No rashes No obvious ecchymosis      LABS:  no labs since 1/13/25

## 2025-01-16 NOTE — PHYSICAL THERAPY INITIAL EVALUATION ADULT - CRITERIA FOR SKILLED THERAPEUTIC INTERVENTIONS
impairments found/functional limitations in following categories/rehab potential/therapy frequency
impairments found

## 2025-01-16 NOTE — PHYSICAL THERAPY INITIAL EVALUATION ADULT - ADDITIONAL COMMENTS
Pt lives with his wife in a private house, 0 GEGE. Patient was independent in all ADLs and ambulated independently without device. Patient was issued a RW.
Pt lives with his wife in a private house, 0 GEGE. Patient reports being independent in ADLs and ambulation prior to admission.

## 2025-01-16 NOTE — PROGRESS NOTE ADULT - PROBLEM SELECTOR PLAN 1
Discussed diagnosis and treatment with patient and patient's son . He understands the severity of patient's condition and  that he is at risk to lose part of the right foot, all the foot or below the knee amputation. All questions were asked and answered for patient and patient's family satisfaction   Patient is  s/p  surgical debridement of all non-viable soft tissue and bone with right hallux amputation with flap coverage . DOS 1/15/2025  Post op wound stable, sutures intact coapted. Prognosis is guarded   Pending OR WCx and bone pathology  Continue IV Abx and follow ID recommendations   Dressing changed today. Keep the postop dressing clean/dry/intact  Partial weight bearing on right foot  Recs elevation on bilateral lower extremities  Pt may require serial surgical debridements/amputation if symptoms persist.   radiographs reviewed and noted all operative regions in good position  Medical management as per primary team  Will discuss with all attending

## 2025-01-16 NOTE — PHYSICAL THERAPY INITIAL EVALUATION ADULT - GAIT TRAINING, PT EVAL
Patient will ambulate >200 feet independently with rolling walker by 2 weeks to allow for increased independence in the community.
In 1-2 sessions will assess patient's ability to ambulate

## 2025-01-16 NOTE — PROGRESS NOTE ADULT - SUBJECTIVE AND OBJECTIVE BOX
Patient is a 84y old  Male who presents with a chief complaint of Osteomyelitis (16 Jan 2025 14:15)        INTERVAL HPI/OVERNIGHT EVENTS:   no complaints  pt seen and examined         Vital Signs Last 24 Hrs  T(C): 37.1 (16 Jan 2025 20:27), Max: 37.1 (16 Jan 2025 20:27)  T(F): 98.8 (16 Jan 2025 20:27), Max: 98.8 (16 Jan 2025 20:27)  HR: 92 (16 Jan 2025 20:27) (51 - 92)  BP: 110/68 (16 Jan 2025 20:27) (110/68 - 164/56)  BP(mean): --  RR: 18 (16 Jan 2025 20:27) (17 - 18)  SpO2: 92% (16 Jan 2025 20:27) (92% - 97%)    Parameters below as of 16 Jan 2025 20:27  Patient On (Oxygen Delivery Method): room air        ammonium lactate 12% Lotion 1 Application(s) Topical two times a day  aspirin enteric coated 81 milliGRAM(s) Oral daily  cefepime   IVPB 2000 milliGRAM(s) IV Intermittent every 12 hours  clopidogrel Tablet 75 milliGRAM(s) Oral daily  cyanocobalamin 1000 MICROGram(s) Oral daily  dextrose 5% + sodium chloride 0.45%. 1000 milliLiter(s) IV Continuous <Continuous>  escitalopram 10 milliGRAM(s) Oral daily  folic acid 1 milliGRAM(s) Oral daily  influenza  Vaccine (HIGH DOSE) 0.5 milliLiter(s) IntraMuscular once  latanoprost 0.005% Ophthalmic Solution 1 Drop(s) Both EYES at bedtime  melatonin 3 milliGRAM(s) Oral at bedtime PRN  metoprolol succinate ER 50 milliGRAM(s) Oral daily  mupirocin 2% Ointment 1 Application(s) Topical <User Schedule>  rosuvastatin 20 milliGRAM(s) Oral at bedtime  tamoxifen 20 milliGRAM(s) Oral daily  timolol 0.5% Solution 1 Drop(s) Both EYES two times a day      PHYSICAL EXAM:  GENERAL: NAD   EYES: conjunctiva and sclera clear  ENMT: Moist mucous membranes  NECK: Supple, No JVD, Normal thyroid  CHEST/LUNG: non labored, cta b/l  HEART: Regular rate and rhythm; No murmurs, rubs, or gallops  ABDOMEN: Soft, Nontender, Nondistended; Bowel sounds present  EXTREMITIES:  No clubbing, no cyanosis, no edema  LYMPH: No lymphadenopathy noted  SKIN: No rashes or lesions  NEURO: no new focal deficits    Consultant(s) Notes Reviewed:  [x ] YES  [ ] NO  Care Discussed with Consultants/Other Providers [ x] YES  [ ] NO    LABS:              CAPILLARY BLOOD GLUCOSE                Culture - Tissue with Gram Stain (collected 15 Marlo 2025 16:09)  Source: Tissue  Gram Stain (16 Jan 2025 05:53):    No polymorphonuclear cells seen per low power field    No organisms seen per oil power field  Preliminary Report (16 Jan 2025 16:30):    No growth    Culture - Tissue with Gram Stain (collected 15 Marlo 2025 16:09)  Source: Tissue  Gram Stain (16 Jan 2025 05:49):    No polymorphonuclear cells seen per low power field    No organisms seen per oil power field  Preliminary Report (16 Jan 2025 16:26):    No growth    Culture - Tissue with Gram Stain (collected 15 Marlo 2025 16:09)  Source: Tissue  Gram Stain (16 Jan 2025 05:49):    No polymorphonuclear cells seen per low power field    No organisms seen per oil power field  Preliminary Report (16 Jan 2025 16:28):    No growth        RADIOLOGY & ADDITIONAL TESTS:    Imaging Personally Reviewed  Reviewed consultants input

## 2025-01-16 NOTE — PROGRESS NOTE ADULT - ASSESSMENT
The patient is an 84 year old male with a history of HTN, anemia, GERD, aortic regurgitation, CKD, breast cancer, PAD who is admitted with toe infection.    Plan:  - ECG with sinus rhythm and RBBB  - Echo with normal LV systolic function, mild aortic stenosis, mild pulmonary hypertension  - Monitor hemoglobin  - CKD - Cr remains stable in the 1.4-1.7 range  - Continue metoprolol succinate 50 mg daily  - Continue rosuvastatin 20 mg daily  - Continue clopidogrel 75 mg daily  - Continue aspirin 81 mg daily  - Vomiting - norovirus positive - resolved  - Podiatry follow-up

## 2025-01-16 NOTE — PROGRESS NOTE ADULT - SUBJECTIVE AND OBJECTIVE BOX
Chief Complaint: Toe infection    Interval Events: No events overnight.    Review of Systems:  General: No fevers, chills, weight gain  Skin: No rashes, color changes  Cardiovascular: No chest pain, orthopnea  Respiratory: No shortness of breath, cough  Gastrointestinal: No nausea, abdominal pain  Genitourinary: No incontinence, pain with urination  Musculoskeletal: No pain, swelling, decreased range of motion  Neurological: No headache, weakness  Psychiatric: No depression, anxiety  Endocrine: No weight gain, increased thirst  All other systems are comprehensively negative.    Physical Exam:  Vital Signs Last 24 Hrs  T(C): 36.8 (16 Jan 2025 04:27), Max: 36.8 (15 Marlo 2025 12:01)  T(F): 98.3 (16 Jan 2025 04:27), Max: 98.3 (16 Jan 2025 04:27)  HR: 56 (16 Jan 2025 06:09) (46 - 67)  BP: 164/56 (16 Jan 2025 04:27) (129/45 - 186/73)  BP(mean): --  RR: 18 (16 Jan 2025 04:27) (12 - 19)  SpO2: 95% (16 Jan 2025 04:27) (95% - 100%)  Parameters below as of 16 Jan 2025 04:27  Patient On (Oxygen Delivery Method): room air  General: NAD  HEENT: MMM  Neck: No JVD, no carotid bruit  Lungs: CTAB  CV: RRR, nl S1/S2, no M/R/G  Abdomen: S/NT/ND, +BS  Extremities: No LE edema, no cyanosis  Neuro: AAOx3, non-focal  Skin: No rash    Labs:        ECG/Telemetry: Sinus rhythm

## 2025-01-16 NOTE — PROGRESS NOTE ADULT - ASSESSMENT
STEPHANY on CKD Stage 3  Hypernatremia  Renal Mass  HTN  PAD  Vomiting  Anemia  OM      -STEPHANY Likely 2/2 Decreased EABV, SCr down as of last labs   -Hypernatremia is resolved, s/p D5W, no labs recently, check bmp in am  -Renal US with solid mass,  will need CT or MRI, at this time favor MRI with elevated creatinine, should see Urology for eval, can see outpatient  -S/p LE Angio; no evidence of VLADIMIR at this time  -Creatinine stabilized at baseline range  -GI eval noted  -S/P amputation of the right great toe with bone biopsy of the 1st metatarsal head on 1/15. STEPHANY on CKD Stage 3  Hypernatremia  Renal Mass  HTN  PAD  Vomiting  Anemia  OM      -STEPHANY Likely 2/2 Decreased EABV, SCr down as of last labs   -Hypernatremia is resolved, s/p D5W, no labs recently, check bmp in am  -Renal US with solid mass,  will need CT or MRI, at this time favor MRI with elevated creatinine, should see Urology for eval, can see outpatient  -S/p LE Angio; no evidence of VLADIMIR at this time  -Creatinine stabilized at baseline range  -GI eval noted  -S/P amputation of the right great toe with bone biopsy of the 1st metatarsal head on 1/15.  -Gets TEA per heme, is asking about this, will defer to heme since he is on active po chemo and it is given by their service outpatient

## 2025-01-16 NOTE — PROGRESS NOTE ADULT - SUBJECTIVE AND OBJECTIVE BOX
[facility]  Sandeep Leos PA-C  [address] [place] [address]  [phone]      INTERVAL HPI/OVERNIGHT EVENTS:  Pt s/e  No diarrhea    MEDICATIONS  (STANDING):  ammonium lactate 12% Lotion 1 Application(s) Topical two times a day  aspirin enteric coated 81 milliGRAM(s) Oral daily  cefepime   IVPB 2000 milliGRAM(s) IV Intermittent every 12 hours  clopidogrel Tablet 75 milliGRAM(s) Oral daily  cyanocobalamin 1000 MICROGram(s) Oral daily  dextrose 5% + sodium chloride 0.45%. 1000 milliLiter(s) (75 mL/Hr) IV Continuous <Continuous>  escitalopram 10 milliGRAM(s) Oral daily  folic acid 1 milliGRAM(s) Oral daily  influenza  Vaccine (HIGH DOSE) 0.5 milliLiter(s) IntraMuscular once  latanoprost 0.005% Ophthalmic Solution 1 Drop(s) Both EYES at bedtime  metoprolol succinate ER 50 milliGRAM(s) Oral daily  mupirocin 2% Ointment 1 Application(s) Topical <User Schedule>  rosuvastatin 20 milliGRAM(s) Oral at bedtime  timolol 0.5% Solution 1 Drop(s) Both EYES two times a day    MEDICATIONS  (PRN):  melatonin 3 milliGRAM(s) Oral at bedtime PRN Insomnia      Allergies  No Known Allergies      PHYSICAL EXAM:   Vital Signs:  Vital Signs Last 24 Hrs  T(C): 36.4 (2025 11:52), Max: 36.8 (2025 04:27)  T(F): 97.5 (2025 11:52), Max: 98.3 (2025 04:27)  HR: 51 (2025 11:52) (46 - 67)  BP: 154/53 (2025 11:52) (129/45 - 182/61)  BP(mean): --  RR: 17 (2025 11:52) (12 - 18)  SpO2: 97% (2025 11:52) (95% - 100%)    Parameters below as of 2025 11:52  Patient On (Oxygen Delivery Method): room air      Daily Height in cm: 167 (15 Marlo 2025 14:57)    Daily Weight in k.2 (2025 04:27)    GENERAL:  Appears stated age  HEENT:  NC/AT  CHEST:  Full & symmetric excursion  HEART:  Regular rhythm  ABDOMEN:  Soft, non-tender, non-distended  EXTEREMITIES:  no cyanosis  SKIN:  No rash  NEURO:  Alert

## 2025-01-16 NOTE — PHYSICAL THERAPY INITIAL EVALUATION ADULT - TRANSFER TRAINING, PT EVAL
Patient will perform sit<->stand transfer independently with rolling walker by 2 weeks to allow patient to get on/off toilet safely.
In 1-2 session will assess patient's ability to transfer

## 2025-01-16 NOTE — PROGRESS NOTE ADULT - SUBJECTIVE AND OBJECTIVE BOX
[facility]  RHEA Brand Y. Patel, S. Shah, G. [facility]  [phone]    Name: DAYA MATHIS  Age: 84y  Gender: Male  MRN: [id number]    Interval History:  No acute overnight events.   Notes reviewed    Antibiotics:  cefepime   IVPB 2000 milliGRAM(s) IV Intermittent every 12 hours      Medications:  ammonium lactate 12% Lotion 1 Application(s) Topical two times a day  aspirin enteric coated 81 milliGRAM(s) Oral daily  cefepime   IVPB 2000 milliGRAM(s) IV Intermittent every 12 hours  clopidogrel Tablet 75 milliGRAM(s) Oral daily  cyanocobalamin 1000 MICROGram(s) Oral daily  dextrose 5% + sodium chloride 0.45%. 1000 milliLiter(s) IV Continuous <Continuous>  escitalopram 10 milliGRAM(s) Oral daily  folic acid 1 milliGRAM(s) Oral daily  influenza  Vaccine (HIGH DOSE) 0.5 milliLiter(s) IntraMuscular once  latanoprost 0.005% Ophthalmic Solution 1 Drop(s) Both EYES at bedtime  melatonin 3 milliGRAM(s) Oral at bedtime PRN  metoprolol succinate ER 50 milliGRAM(s) Oral daily  mupirocin 2% Ointment 1 Application(s) Topical <User Schedule>  rosuvastatin 20 milliGRAM(s) Oral at bedtime  timolol 0.5% Solution 1 Drop(s) Both EYES two times a day      Review of Systems:  A 10-point review of systems was obtained.   Review of systems otherwise negative except as previously noted.    Allergies: No Known Allergies    For details regarding the patient's past medical history, social history, family history, and other miscellaneous elements, please refer the initial infectious diseases consultation and/or the admitting history and physical examination for this admission.    Objective:  Vitals:   T(C): 36.8 (01-16-25 @ 04:27), Max: 36.8 (01-15-25 @ 12:01)  HR: 56 (01-16-25 @ 06:09) (46 - 67)  BP: 164/56 (01-16-25 @ 04:27) (129/45 - 186/73)  RR: 18 (01-16-25 @ 04:27) (12 - 19)  SpO2: 95% (01-16-25 @ 04:27) (95% - 100%)    Physical Examination:  General: no acute distress  HEENT: NC/AT, EOMI  Cardio: RRR  Resp: decreased breath sounds  Abd: soft, NT, ND  Ext: foot examined w/ podiatry at bedside  Skin: warm, dry, no visible rash      Laboratory Studies:  CBC:   CMP:             Microbiology: reviewed    Culture - Tissue with Gram Stain (collected 01-15-25 @ 16:09)  Source: Tissue  Gram Stain (01-16-25 @ 05:53):    No polymorphonuclear cells seen per low power field    No organisms seen per oil power field    Culture - Tissue with Gram Stain (collected 01-15-25 @ 16:09)  Source: Tissue  Gram Stain (01-16-25 @ 05:49):    No polymorphonuclear cells seen per low power field    No organisms seen per oil power field    Culture - Tissue with Gram Stain (collected 01-15-25 @ 16:09)  Source: Tissue  Gram Stain (01-16-25 @ 05:49):    No polymorphonuclear cells seen per low power field    No organisms seen per oil power field    Culture - Stool (collected 01-08-25 @ 04:40)  Source: .Stool  Final Report (01-10-25 @ 11:30):    No enteric pathogens isolated.    (Stool culture examined for Salmonella,    Shigella, Campylobacter, Aeromonas, Plesiomonas,    Vibrio, E.coli O157 and Yersinia)    Culture - Wound Aerobic (collected 01-02-25 @ 18:30)  Source: .Other  Final Report (01-04-25 @ 15:14):    Few Pseudomonas aeruginosa    Rare Serratia marcescens    Commensal aleyda consistent with body site  Organism: Pseudomonas aeruginosa  Serratia marcescens (01-04-25 @ 15:14)  Organism: Serratia marcescens (01-04-25 @ 15:14)      -  Levofloxacin: I 1      -  Tobramycin: S <=2      -  Aztreonam: S <=4      -  Gentamicin: S <=2      -  Cefazolin: R >16      -  Cefepime: S <=2      -  Piperacillin/Tazobactam: S <=8      -  Ciprofloxacin: R 1      -  Ceftriaxone: S <=1      -  Ampicillin: R >16 These ampicillin results predict results for amoxicillin      Method Type: DANIEL      -  Meropenem: S <=1      -  Ampicillin/Sulbactam: R >16/8      -  Cefoxitin: R 16      -  Amoxicillin/Clavulanic Acid: R >16/8      -  Trimethoprim/Sulfamethoxazole: S <=0.5/9.5      -  Ertapenem: S <=0.5  Organism: Pseudomonas aeruginosa (01-04-25 @ 15:14)      -  Levofloxacin: S 1      -  Aztreonam: S <=4      -  Cefepime: S 8      -  Piperacillin/Tazobactam: S <=8      -  Ciprofloxacin: S 0.5      -  Imipenem: S 2      Method Type: DANIEL      -  Meropenem: S <=1      -  Ceftazidime: S 4    Culture - Blood (collected 01-02-25 @ 17:20)  Source: .Blood BLOOD  Final Report (01-08-25 @ 01:00):    No growth at 5 days    Culture - Blood (collected 01-02-25 @ 17:20)  Source: .Blood BLOOD  Final Report (01-08-25 @ 01:00):    No growth at 5 days          Radiology: reviewed

## 2025-01-17 LAB
ANION GAP SERPL CALC-SCNC: 5 MMOL/L — SIGNIFICANT CHANGE UP (ref 5–17)
BUN SERPL-MCNC: 34 MG/DL — HIGH (ref 7–23)
CALCIUM SERPL-MCNC: 8.6 MG/DL — SIGNIFICANT CHANGE UP (ref 8.5–10.1)
CHLORIDE SERPL-SCNC: 114 MMOL/L — HIGH (ref 96–108)
CO2 SERPL-SCNC: 24 MMOL/L — SIGNIFICANT CHANGE UP (ref 22–31)
CREAT SERPL-MCNC: 1.7 MG/DL — HIGH (ref 0.5–1.3)
EGFR: 39 ML/MIN/1.73M2 — LOW
GLUCOSE SERPL-MCNC: 105 MG/DL — HIGH (ref 70–99)
GRAM STN FLD: ABNORMAL
GRAM STN FLD: ABNORMAL
HCT VFR BLD CALC: 26.6 % — LOW (ref 39–50)
HGB BLD-MCNC: 8.5 G/DL — LOW (ref 13–17)
MCHC RBC-ENTMCNC: 29.5 PG — SIGNIFICANT CHANGE UP (ref 27–34)
MCHC RBC-ENTMCNC: 32 G/DL — SIGNIFICANT CHANGE UP (ref 32–36)
MCV RBC AUTO: 92.4 FL — SIGNIFICANT CHANGE UP (ref 80–100)
NRBC # BLD: 0 /100 WBCS — SIGNIFICANT CHANGE UP (ref 0–0)
NRBC BLD-RTO: 0 /100 WBCS — SIGNIFICANT CHANGE UP (ref 0–0)
PLATELET # BLD AUTO: 153 K/UL — SIGNIFICANT CHANGE UP (ref 150–400)
POTASSIUM SERPL-MCNC: 4.8 MMOL/L — SIGNIFICANT CHANGE UP (ref 3.5–5.3)
POTASSIUM SERPL-SCNC: 4.8 MMOL/L — SIGNIFICANT CHANGE UP (ref 3.5–5.3)
RBC # BLD: 2.88 M/UL — LOW (ref 4.2–5.8)
RBC # FLD: 16.6 % — HIGH (ref 10.3–14.5)
SODIUM SERPL-SCNC: 143 MMOL/L — SIGNIFICANT CHANGE UP (ref 135–145)
WBC # BLD: 8.52 K/UL — SIGNIFICANT CHANGE UP (ref 3.8–10.5)
WBC # FLD AUTO: 8.52 K/UL — SIGNIFICANT CHANGE UP (ref 3.8–10.5)

## 2025-01-17 RX ORDER — SODIUM CHLORIDE 9 G/ML
1000 INJECTION, SOLUTION INTRAVENOUS
Refills: 0 | Status: DISCONTINUED | OUTPATIENT
Start: 2025-01-17 | End: 2025-01-22

## 2025-01-17 RX ADMIN — LATANOPROST 1 DROP(S): 50 SOLUTION OPHTHALMIC at 21:03

## 2025-01-17 RX ADMIN — TAMOXIFEN CITRATE 20 MILLIGRAM(S): 10 TABLET, FILM COATED ORAL at 11:58

## 2025-01-17 RX ADMIN — ROSUVASTATIN CALCIUM 20 MILLIGRAM(S): 10 TABLET, FILM COATED ORAL at 21:03

## 2025-01-17 RX ADMIN — Medication 1 MILLIGRAM(S): at 11:58

## 2025-01-17 RX ADMIN — ASPIRIN 81 MILLIGRAM(S): 81 TABLET, COATED ORAL at 11:58

## 2025-01-17 RX ADMIN — Medication 1 APPLICATION(S): at 06:14

## 2025-01-17 RX ADMIN — Medication 75 MILLIGRAM(S): at 11:58

## 2025-01-17 RX ADMIN — TIMOLOL MALEATE 1 DROP(S): 5 SOLUTION OPHTHALMIC at 17:26

## 2025-01-17 RX ADMIN — Medication 50 MILLIGRAM(S): at 06:13

## 2025-01-17 RX ADMIN — Medication 1000 MICROGRAM(S): at 11:58

## 2025-01-17 RX ADMIN — Medication 100 MILLIGRAM(S): at 06:13

## 2025-01-17 RX ADMIN — Medication 100 MILLIGRAM(S): at 17:21

## 2025-01-17 RX ADMIN — Medication 1 APPLICATION(S): at 17:25

## 2025-01-17 RX ADMIN — TIMOLOL MALEATE 1 DROP(S): 5 SOLUTION OPHTHALMIC at 06:14

## 2025-01-17 RX ADMIN — ESCITALOPRAM 10 MILLIGRAM(S): 10 TABLET, FILM COATED ORAL at 11:58

## 2025-01-17 NOTE — PROGRESS NOTE ADULT - SUBJECTIVE AND OBJECTIVE BOX
Chief Complaint: Toe infection    Interval Events: No events overnight.    Review of Systems:  General: No fevers, chills, weight gain  Skin: No rashes, color changes  Cardiovascular: No chest pain, orthopnea  Respiratory: No shortness of breath, cough  Gastrointestinal: No nausea, abdominal pain  Genitourinary: No incontinence, pain with urination  Musculoskeletal: No pain, swelling, decreased range of motion  Neurological: No headache, weakness  Psychiatric: No depression, anxiety  Endocrine: No weight gain, increased thirst  All other systems are comprehensively negative.    Physical Exam:  Vital Signs Last 24 Hrs  T(C): 36.9 (17 Jan 2025 04:58), Max: 37.1 (16 Jan 2025 20:27)  T(F): 98.4 (17 Jan 2025 04:58), Max: 98.8 (16 Jan 2025 20:27)  HR: 62 (17 Jan 2025 06:10) (51 - 92)  BP: 157/55 (17 Jan 2025 04:58) (110/68 - 157/55)  BP(mean): --  RR: 18 (17 Jan 2025 04:58) (17 - 18)  SpO2: 94% (17 Jan 2025 04:58) (92% - 97%)  Parameters below as of 17 Jan 2025 04:58  Patient On (Oxygen Delivery Method): room air  General: NAD  HEENT: MMM  Neck: No JVD, no carotid bruit  Lungs: CTAB  CV: RRR, nl S1/S2, no M/R/G  Abdomen: S/NT/ND, +BS  Extremities: No LE edema, no cyanosis  Neuro: AAOx3, non-focal  Skin: No rash    Labs:        ECG/Telemetry: Sinus rhythm

## 2025-01-17 NOTE — PROGRESS NOTE ADULT - SUBJECTIVE AND OBJECTIVE BOX
[facility]  RHEA Brand Y. Patel, S. Shah, G. [facility]  [phone]    Name: DAYA MATHIS  Age: 84y  Gender: Male  MRN: [id number]    Interval History:  No acute overnight events.   Afebrile  Reporting no blood in urine last 2 days  Notes reviewed    Antibiotics:  cefepime   IVPB 2000 milliGRAM(s) IV Intermittent every 12 hours      Medications:  ammonium lactate 12% Lotion 1 Application(s) Topical two times a day  aspirin enteric coated 81 milliGRAM(s) Oral daily  cefepime   IVPB 2000 milliGRAM(s) IV Intermittent every 12 hours  clopidogrel Tablet 75 milliGRAM(s) Oral daily  cyanocobalamin 1000 MICROGram(s) Oral daily  escitalopram 10 milliGRAM(s) Oral daily  folic acid 1 milliGRAM(s) Oral daily  influenza  Vaccine (HIGH DOSE) 0.5 milliLiter(s) IntraMuscular once  lactated ringers. 1000 milliLiter(s) IV Continuous <Continuous>  latanoprost 0.005% Ophthalmic Solution 1 Drop(s) Both EYES at bedtime  melatonin 3 milliGRAM(s) Oral at bedtime PRN  metoprolol succinate ER 50 milliGRAM(s) Oral daily  mupirocin 2% Ointment 1 Application(s) Topical <User Schedule>  rosuvastatin 20 milliGRAM(s) Oral at bedtime  tamoxifen 20 milliGRAM(s) Oral daily  timolol 0.5% Solution 1 Drop(s) Both EYES two times a day      Review of Systems:  A 10-point review of systems was obtained.   Review of systems otherwise negative except as previously noted.    Allergies: No Known Allergies    For details regarding the patient's past medical history, social history, family history, and other miscellaneous elements, please refer the initial infectious diseases consultation and/or the admitting history and physical examination for this admission.    Objective:  Vitals:   T(C): 36.6 (01-17-25 @ 12:11), Max: 37.1 (01-16-25 @ 20:27)  HR: 50 (01-17-25 @ 12:11) (50 - 92)  BP: 138/53 (01-17-25 @ 12:11) (110/68 - 157/55)  RR: 18 (01-17-25 @ 12:11) (18 - 18)  SpO2: 99% (01-17-25 @ 12:11) (92% - 99%)    Physical Examination:  General: no acute distress  HEENT: NC/AT, EOMI  Cardio: RRR  Resp: decreased breath sounds  Abd: soft, NT, ND  Ext: no edema or cyanosis  Skin: warm, dry, no visible rash      Laboratory Studies:  CBC:                       8.5    8.52  )-----------( 153      ( 17 Jan 2025 08:45 )             26.6     CMP: 01-17    143  |  114[H]  |  34[H]  ----------------------------<  105[H]  4.8   |  24  |  1.70[H]    Ca    8.6      17 Jan 2025 08:45        Urinalysis Basic - ( 17 Jan 2025 08:45 )    Color: x / Appearance: x / SG: x / pH: x  Gluc: 105 mg/dL / Ketone: x  / Bili: x / Urobili: x   Blood: x / Protein: x / Nitrite: x   Leuk Esterase: x / RBC: x / WBC x   Sq Epi: x / Non Sq Epi: x / Bacteria: x        Microbiology: reviewed    Culture - Wound Aerobic/Anaerobic (collected 01-15-25 @ 16:09)  Source: .Surgical Swab  Preliminary Report (01-17-25 @ 09:44):    Growth in fluid media only Gram positive cocci in pairs    Culture - Tissue with Gram Stain (collected 01-15-25 @ 16:09)  Source: Tissue  Gram Stain (01-16-25 @ 05:53):    No polymorphonuclear cells seen per low power field    No organisms seen per oil power field  Preliminary Report (01-16-25 @ 16:30):    No growth    Culture - Tissue with Gram Stain (collected 01-15-25 @ 16:09)  Source: Tissue  Gram Stain (01-16-25 @ 05:49):    No polymorphonuclear cells seen per low power field    No organisms seen per oil power field  Preliminary Report (01-16-25 @ 16:26):    No growth    Culture - Tissue with Gram Stain (collected 01-15-25 @ 16:09)  Source: Tissue  Gram Stain (01-16-25 @ 05:49):    No polymorphonuclear cells seen per low power field    No organisms seen per oil power field  Preliminary Report (01-16-25 @ 16:28):    No growth    Culture - Stool (collected 01-08-25 @ 04:40)  Source: .Stool  Final Report (01-10-25 @ 11:30):    No enteric pathogens isolated.    (Stool culture examined for Salmonella,    Shigella, Campylobacter, Aeromonas, Plesiomonas,    Vibrio, E.coli O157 and Yersinia)          Radiology: reviewed

## 2025-01-17 NOTE — PROGRESS NOTE ADULT - SUBJECTIVE AND OBJECTIVE BOX
[facility]  Sandeep Leos PA-C  [address] [place] [address]  [phone]      INTERVAL HPI/OVERNIGHT EVENTS:  Pt s/e  Diarrhea resolved    MEDICATIONS  (STANDING):  ammonium lactate 12% Lotion 1 Application(s) Topical two times a day  aspirin enteric coated 81 milliGRAM(s) Oral daily  cefepime   IVPB 2000 milliGRAM(s) IV Intermittent every 12 hours  clopidogrel Tablet 75 milliGRAM(s) Oral daily  cyanocobalamin 1000 MICROGram(s) Oral daily  dextrose 5% + sodium chloride 0.45%. 1000 milliLiter(s) (75 mL/Hr) IV Continuous <Continuous>  escitalopram 10 milliGRAM(s) Oral daily  folic acid 1 milliGRAM(s) Oral daily  influenza  Vaccine (HIGH DOSE) 0.5 milliLiter(s) IntraMuscular once  latanoprost 0.005% Ophthalmic Solution 1 Drop(s) Both EYES at bedtime  metoprolol succinate ER 50 milliGRAM(s) Oral daily  mupirocin 2% Ointment 1 Application(s) Topical <User Schedule>  rosuvastatin 20 milliGRAM(s) Oral at bedtime  tamoxifen 20 milliGRAM(s) Oral daily  timolol 0.5% Solution 1 Drop(s) Both EYES two times a day    MEDICATIONS  (PRN):  melatonin 3 milliGRAM(s) Oral at bedtime PRN Insomnia      Allergies  No Known Allergies      PHYSICAL EXAM:   Vital Signs:  Vital Signs Last 24 Hrs  T(C): 36.9 (2025 04:58), Max: 37.1 (2025 20:27)  T(F): 98.4 (2025 04:58), Max: 98.8 (2025 20:27)  HR: 62 (2025 06:10) (56 - 92)  BP: 157/55 (2025 04:58) (110/68 - 157/55)  BP(mean): --  RR: 18 (2025 04:58) (18 - 18)  SpO2: 94% (2025 04:58) (92% - 94%)    Parameters below as of 2025 04:58  Patient On (Oxygen Delivery Method): room air      Daily     Daily Weight in k.9 (2025 04:58)    GENERAL:  Appears stated age  HEENT:  NC/AT  CHEST:  Full & symmetric excursion  HEART:  Regular rhythm  ABDOMEN:  Soft, non-tender, non-distended  EXTEREMITIES:  no cyanosis  SKIN:  No rash  NEURO:  Alert      LABS:                        8.5    8.52  )-----------( 153      ( 2025 08:45 )             26.6     01-    143  |  114[H]  |  34[H]  ----------------------------<  105[H]  4.8   |  24  |  1.70[H]    Ca    8.6      2025 08:45        Urinalysis Basic - ( 2025 08:45 )    Color: x / Appearance: x / SG: x / pH: x  Gluc: 105 mg/dL / Ketone: x  / Bili: x / Urobili: x   Blood: x / Protein: x / Nitrite: x   Leuk Esterase: x / RBC: x / WBC x   Sq Epi: x / Non Sq Epi: x / Bacteria: x

## 2025-01-17 NOTE — PROGRESS NOTE ADULT - ASSESSMENT
83yo M with a PMH of L sided breast cancer (s/p lumpectomy, on Tamoxifen), HTN, Anemia, Aortic insufficiency, Carotid artery disease, GERD, Glaucoma, Hepatitis (2014), Lower ext edema ,PVD, who presented to the ED from Dr. Niko Jacques's office for a R toe infection. MRI concerning for osteomyelitis of R 1st toe. He has no fevers and no leukocytosis. Skin changes on lower legs appear concerning for venous stasis. Podiatry following.     R 1st toe ulcer w/ underlying osteomyelitis  - afebrile, no leukocytosis  - BCx NGTD  - Culture - Wound Aerobic (collected 01-02-25 @ 18:30)    Few Pseudomonas aeruginosa    Rare Serratia marcescens  - 1/6 s/p RLE angio  - 1/15 Amputation, toe, 1 toe 15-Marlo-2025 16:29:17  Herman Garcia   -- OR cx growth in fluid cx   Growth in fluid media only Gram positive cocci in pairs    Diarrhea  GI PCR + Norovirus, EAEC  Stool cx negative  completed azithromycin 1/11    Hematuria  - pt reporting blood in urine 1/15    Recommendations:   C/w cefepime  F/u final OR cx  F/u path  Trend temps/WBC  Wound care  Additional care per primary team  Final Abx recs to follow pending above    Dr. Castro covering weekend service from 1/18/25-1/20/25  I will resume care 1/21/25  Infectious Diseases will follow. Please call with any questions.  Yen Glez M.D.  Available on [employer] TEAMS -- *PREFERRED*  [facility] [phone]  For after 5 P.M. and weekends, please call [phone]

## 2025-01-17 NOTE — PROGRESS NOTE ADULT - PROBLEM SELECTOR PLAN 1
Discussed diagnosis and treatment with patient and patient's son . He understands the severity of patient's condition and  that he is at risk to lose part of the right foot, all the foot or below the knee amputation. All questions were asked and answered for patient and patient's family satisfaction.  Patient is  s/p  surgical debridement of all non-viable soft tissue and bone with right hallux amputation with flap coverage . DOS 1/15/2025.  Post op wound stable, sutures intact coapted. Prognosis is guarded.   Pending OR WCx and bone pathology.  Continue IV Abx and follow ID recommendations.   Dressing changed today. Keep the postop dressing clean/dry/intact  Partial weight bearing to right foot.  Recs elevation on bilateral lower extremities  Pt may require serial surgical debridements/amputation if symptoms persist.   Medical management as per primary team.  Will discuss with all attendings.

## 2025-01-17 NOTE — PROGRESS NOTE ADULT - ASSESSMENT
STEPHANY on CKD Stage 3  Hypernatremia  Renal Mass  HTN  PAD  Vomiting  Anemia  OM      -STEPHANY Likely 2/2 Decreased EABV, SCr down as of last labs   -Hypernatremia is resolved, s/p D5W, no labs recently, check bmp in am  -Renal US with solid mass,  will need CT or MRI, at this time favor MRI with elevated creatinine, should see Urology for eval, can see outpatient  -S/p LE Angio; no evidence of VLADIMIR at this time  -Creatinine stabilized at baseline range  -GI eval noted  -S/P amputation of the right great toe with bone biopsy of the 1st metatarsal head on 1/15.  -Gets TEA per heme, is asking about this, will defer to heme since he is on active po chemo and it is given by their service outpatient  -Creatinine worsened, check bladder scan and start gentle IVF

## 2025-01-17 NOTE — PROGRESS NOTE ADULT - SUBJECTIVE AND OBJECTIVE BOX
PROGRESS NOTE   Patient is a 84y old  Male who presents with a chief complaint of Osteomyelitis (16 Jan 2025 22:51)      HPI:  Patient is an 85yo M with a PMH of L sided breast cancer (s/p lumpectomy, on Tamoxifen), HTN, Anemia, Aortic insufficiency, Carotid artery disease, GERD, Glaucoma, Hepatitis (2014), Lower ext edema ,PVD who presents to the ED from Dr. Niko Jacques's office for a R toe infection. Patient notes that he noticed a R toe wound a few weeks ago. He completed a 10d course of Amoxicillin 500mg, but noticed worsening of the wound. He went ot his podiatrist today, as the wound was draining a pus like liquid. Podiatry rec he come to the hospital for IV abx and osteomyelitis workup. Patient feeling well in ED.    ED Course:  Vitals: /65, HR 71, T 97.3, RR 16 SpO2 100% on RA  Labs: ESR 75, Hgb 9.2, Na 148, Cl 117, BUN/Cr 35/1.70, eGFR 39  Given: IV Zosyn, IV Vancomycin, 1L NS bolus     Imaging:  Chest Xray: No acute chest finding.   R foot Xray: Possible erosion of the distal phalanx of the right great toe with associated soft tissue swelling suspicious for osteomyelitis. (02 Jan 2025 19:34)      Vital Signs Last 24 Hrs  T(C): 36.9 (17 Jan 2025 04:58), Max: 37.1 (16 Jan 2025 20:27)  T(F): 98.4 (17 Jan 2025 04:58), Max: 98.8 (16 Jan 2025 20:27)  HR: 62 (17 Jan 2025 06:10) (51 - 92)  BP: 157/55 (17 Jan 2025 04:58) (110/68 - 157/55)  BP(mean): --  RR: 18 (17 Jan 2025 04:58) (17 - 18)  SpO2: 94% (17 Jan 2025 04:58) (92% - 97%)    Parameters below as of 17 Jan 2025 04:58  Patient On (Oxygen Delivery Method): room air                            PHYSICAL EXAM  General: Pleasant  male NAD & AOX3.    Integument:  Skin warm, dry and supple bilateral.    Right foot s/p  surgical Debridement of all non-viable soft tissue and bone, debridement of deep space infection with right hallux amputation with flap coverage. POD#2 . Dressing clean, dry and intact with controlled hemostasis at this time. The sutures are intact and the flap is viable at this time.  There is decreased erythema, edema and calor noted. Sterile dressing change performed today.  Vascular: Dorsalis Pedis and Posterior Tibial pulses non-palpable.  Capillary re-fill time less then 3 seconds digits 1-5 bilateral.  noted b/l lower leg edema, noted venous stasis b/l lower extremity  Neuro: Protective sensation intact to the level of the digits bilateral.  MSK: Muscle strength 4/5 all major muscle groups bilateral.

## 2025-01-17 NOTE — CASE MANAGEMENT PROGRESS NOTE - NSCMPROGRESSNOTE_GEN_ALL_CORE
Discussed pt in rounds, Pt remains acute, S/P toe amputation, receiving IV antibiotics. Awaiting pathology results.

## 2025-01-17 NOTE — PROGRESS NOTE ADULT - SUBJECTIVE AND OBJECTIVE BOX
Patient is a 84y old  Male who presents with a chief complaint of Osteomyelitis (17 Jan 2025 14:46)        INTERVAL HPI/OVERNIGHT EVENTS:   no complaints  pt seen and examined         Vital Signs Last 24 Hrs  T(C): 36.6 (17 Jan 2025 12:11), Max: 37.1 (16 Jan 2025 20:27)  T(F): 97.9 (17 Jan 2025 12:11), Max: 98.8 (16 Jan 2025 20:27)  HR: 50 (17 Jan 2025 12:11) (50 - 92)  BP: 138/53 (17 Jan 2025 12:11) (110/68 - 157/55)  BP(mean): --  RR: 18 (17 Jan 2025 12:11) (18 - 18)  SpO2: 99% (17 Jan 2025 12:11) (92% - 99%)    Parameters below as of 17 Jan 2025 12:11  Patient On (Oxygen Delivery Method): room air        ammonium lactate 12% Lotion 1 Application(s) Topical two times a day  aspirin enteric coated 81 milliGRAM(s) Oral daily  cefepime   IVPB 2000 milliGRAM(s) IV Intermittent every 12 hours  clopidogrel Tablet 75 milliGRAM(s) Oral daily  cyanocobalamin 1000 MICROGram(s) Oral daily  escitalopram 10 milliGRAM(s) Oral daily  folic acid 1 milliGRAM(s) Oral daily  influenza  Vaccine (HIGH DOSE) 0.5 milliLiter(s) IntraMuscular once  lactated ringers. 1000 milliLiter(s) IV Continuous <Continuous>  latanoprost 0.005% Ophthalmic Solution 1 Drop(s) Both EYES at bedtime  melatonin 3 milliGRAM(s) Oral at bedtime PRN  metoprolol succinate ER 50 milliGRAM(s) Oral daily  mupirocin 2% Ointment 1 Application(s) Topical <User Schedule>  rosuvastatin 20 milliGRAM(s) Oral at bedtime  tamoxifen 20 milliGRAM(s) Oral daily  timolol 0.5% Solution 1 Drop(s) Both EYES two times a day      PHYSICAL EXAM:  GENERAL: NAD   EYES: conjunctiva and sclera clear  ENMT: Moist mucous membranes  NECK: Supple, No JVD, Normal thyroid  CHEST/LUNG: non labored, cta b/l  HEART: Regular rate and rhythm; No murmurs, rubs, or gallops  ABDOMEN: Soft, Nontender, Nondistended; Bowel sounds present  EXTREMITIES:  No clubbing, no cyanosis, no edema  LYMPH: No lymphadenopathy noted  SKIN: No rashes or lesions  NEURO: no new focal deficits    Consultant(s) Notes Reviewed:  [x ] YES  [ ] NO  Care Discussed with Consultants/Other Providers [ x] YES  [ ] NO    LABS:                        8.5    8.52  )-----------( 153      ( 17 Jan 2025 08:45 )             26.6     01-17    143  |  114[H]  |  34[H]  ----------------------------<  105[H]  4.8   |  24  |  1.70[H]    Ca    8.6      17 Jan 2025 08:45        Urinalysis Basic - ( 17 Jan 2025 08:45 )    Color: x / Appearance: x / SG: x / pH: x  Gluc: 105 mg/dL / Ketone: x  / Bili: x / Urobili: x   Blood: x / Protein: x / Nitrite: x   Leuk Esterase: x / RBC: x / WBC x   Sq Epi: x / Non Sq Epi: x / Bacteria: x      CAPILLARY BLOOD GLUCOSE            Urinalysis Basic - ( 17 Jan 2025 08:45 )    Color: x / Appearance: x / SG: x / pH: x  Gluc: 105 mg/dL / Ketone: x  / Bili: x / Urobili: x   Blood: x / Protein: x / Nitrite: x   Leuk Esterase: x / RBC: x / WBC x   Sq Epi: x / Non Sq Epi: x / Bacteria: x        Culture - Tissue with Gram Stain (collected 15 Marlo 2025 16:09)  Source: Tissue  Gram Stain (16 Jan 2025 05:53):    No polymorphonuclear cells seen per low power field    No organisms seen per oil power field  Preliminary Report (16 Jan 2025 16:30):    No growth    Culture - Wound Aerobic/Anaerobic (collected 15 Marlo 2025 16:09)  Source: .Surgical Swab  Preliminary Report (17 Jan 2025 09:44):    Growth in fluid media only Gram positive cocci in pairs    Culture - Tissue with Gram Stain (collected 15 Marlo 2025 16:09)  Source: Tissue  Gram Stain (16 Jan 2025 05:49):    No polymorphonuclear cells seen per low power field    No organisms seen per oil power field  Preliminary Report (16 Jan 2025 16:26):    No growth    Culture - Tissue with Gram Stain (collected 15 Marlo 2025 16:09)  Source: Tissue  Gram Stain (16 Jan 2025 05:49):    No polymorphonuclear cells seen per low power field    No organisms seen per oil power field  Preliminary Report (16 Jan 2025 16:28):    No growth        RADIOLOGY & ADDITIONAL TESTS:    Imaging Personally Reviewed  Reviewed consultants input

## 2025-01-17 NOTE — PROGRESS NOTE ADULT - SUBJECTIVE AND OBJECTIVE BOX
[facility]                             Nephrology and Hypertension                             Niles Montano                                          [phone]     Patient is a 84y old  Male who presents with a chief complaint of Osteomyelitis (03 Jan 2025 13:05)       HPI:  Patient is an 85yo M with a PMH of L sided breast cancer (s/p lumpectomy, on Tamoxifen), HTN, Anemia, Aortic insufficiency, Carotid artery disease, GERD, Glaucoma, Hepatitis (2014), Lower ext edema ,PVD who presents to the ED from Dr. Niko Jacques's office for a R toe infection. Patient notes that he noticed a R toe wound a few weeks ago. He completed a 10d course of Amoxicillin 500mg, but noticed worsening of the wound. He went ot his podiatrist today, as the wound was draining a pus like liquid. Podiatry rec he come to the hospital for IV abx and osteomyelitis workup. Patient feeling well in ED.  States he is urinating well.  No N/V/SOB.  Has not seen nephrologist in the past.      Seen and examined at bedside, did not have any new complaints, no sob, no pain, no dizziness.     PAST MEDICAL & SURGICAL HISTORY:  Anemia      HTN (hypertension)      Breast cancer      Glaucoma      Major depression      PVD (peripheral vascular disease)      Lower extremity edema      Chronic GERD      H/O: glaucoma      S/P lumpectomy, left breast           FAMILY HISTORY:  NC    Social History:Non smoker    MEDICATIONS  (STANDING):  ammonium lactate 12% Lotion 1 Application(s) Topical two times a day  aspirin enteric coated 81 milliGRAM(s) Oral daily  cefepime   IVPB 2000 milliGRAM(s) IV Intermittent every 12 hours  clopidogrel Tablet 75 milliGRAM(s) Oral daily  cyanocobalamin 1000 MICROGram(s) Oral daily  dextrose 5% + sodium chloride 0.45%. 1000 milliLiter(s) (75 mL/Hr) IV Continuous <Continuous>  dextrose 5%. 1000 milliLiter(s) (75 mL/Hr) IV Continuous <Continuous>  escitalopram 10 milliGRAM(s) Oral daily  folic acid 1 milliGRAM(s) Oral daily  influenza  Vaccine (HIGH DOSE) 0.5 milliLiter(s) IntraMuscular once  latanoprost 0.005% Ophthalmic Solution 1 Drop(s) Both EYES at bedtime  metoprolol succinate ER 50 milliGRAM(s) Oral daily  mupirocin 2% Ointment 1 Application(s) Topical <User Schedule>  rosuvastatin 20 milliGRAM(s) Oral at bedtime  timolol 0.5% Solution 1 Drop(s) Both EYES two times a day    MEDICATIONS  (PRN):  HYDROmorphone  Injectable 0.2 milliGRAM(s) IV Push every 6 hours PRN Moderate Pain (4 - 6)  HYDROmorphone  Injectable 0.5 milliGRAM(s) IV Push every 6 hours PRN Severe Pain (7 - 10)  melatonin 3 milliGRAM(s) Oral at bedtime PRN Insomnia  trimethobenzamide Injectable 200 milliGRAM(s) IntraMuscular every 8 hours PRN Nausea      Allergies    No Known Allergies    Intolerances         REVIEW OF SYSTEMS:    as above    ICU Vital Signs Last 24 Hrs  T(C): 36.6 (17 Jan 2025 12:11), Max: 37.1 (16 Jan 2025 20:27)  T(F): 97.9 (17 Jan 2025 12:11), Max: 98.8 (16 Jan 2025 20:27)  HR: 50 (17 Jan 2025 12:11) (50 - 92)  BP: 138/53 (17 Jan 2025 12:11) (110/68 - 157/55)  BP(mean): --  ABP: --  ABP(mean): --  RR: 18 (17 Jan 2025 12:11) (18 - 18)  SpO2: 99% (17 Jan 2025 12:11) (92% - 99%)    O2 Parameters below as of 17 Jan 2025 12:11  Patient On (Oxygen Delivery Method): room air            PHYSICAL EXAM:    GENERAL: NAD  HEAD:  Atraumatic, Normocephalic  EYES: EOMI, conjunctiva and sclera clear  ENMT: No Drainage from nares, No drainage from ears  NERVOUS SYSTEM:  Awake and Alert  CHEST/LUNG: Clear to percussion bilaterally  EXTREMITIES:  No Edema, RLE dressing intact with trace edema  SKIN: No rashes No obvious ecchymosis      LABS:                        8.5    8.52  )-----------( 153      ( 17 Jan 2025 08:45 )             26.6     01-17    143  |  114[H]  |  34[H]  ----------------------------<  105[H]  4.8   |  24  |  1.70[H]    Ca    8.6      17 Jan 2025 08:45        Urinalysis Basic - ( 17 Jan 2025 08:45 )    Color: x / Appearance: x / SG: x / pH: x  Gluc: 105 mg/dL / Ketone: x  / Bili: x / Urobili: x   Blood: x / Protein: x / Nitrite: x   Leuk Esterase: x / RBC: x / WBC x   Sq Epi: x / Non Sq Epi: x / Bacteria: x

## 2025-01-17 NOTE — PROGRESS NOTE ADULT - ASSESSMENT
84M with HTN, PAD, breast ca, CKD admitted with rt great toe osteomyelitis  MRI noted  IV ABX  podiatry, vascular and ID following  s/p angio  s/p toe amputation     #PAD- on asa and plavix     #n/v diarrhea- GI pcr+ noro virus and ecoli sps  supportive care  GI fu  ID fu- sp zithromax course    CKD/Hypernatremia  creatinine at about baseline  nephrology following  IVF as per renal    HTN  continue metoprolol    Breast ca  continue tamoxifen    edema  elevate LE      [facility]   [phone]

## 2025-01-18 LAB
-  CLINDAMYCIN: SIGNIFICANT CHANGE UP
-  ERYTHROMYCIN: SIGNIFICANT CHANGE UP
-  GENTAMICIN: SIGNIFICANT CHANGE UP
-  OXACILLIN: SIGNIFICANT CHANGE UP
-  PENICILLIN: SIGNIFICANT CHANGE UP
-  RIFAMPIN: SIGNIFICANT CHANGE UP
-  TETRACYCLINE: SIGNIFICANT CHANGE UP
-  TRIMETHOPRIM/SULFAMETHOXAZOLE: SIGNIFICANT CHANGE UP
-  VANCOMYCIN: SIGNIFICANT CHANGE UP
ALBUMIN SERPL ELPH-MCNC: 2.3 G/DL — LOW (ref 3.3–5)
ALP SERPL-CCNC: 52 U/L — SIGNIFICANT CHANGE UP (ref 40–120)
ALT FLD-CCNC: 11 U/L — LOW (ref 12–78)
ANION GAP SERPL CALC-SCNC: 2 MMOL/L — LOW (ref 5–17)
AST SERPL-CCNC: 11 U/L — LOW (ref 15–37)
BILIRUB SERPL-MCNC: 0.3 MG/DL — SIGNIFICANT CHANGE UP (ref 0.2–1.2)
BUN SERPL-MCNC: 39 MG/DL — HIGH (ref 7–23)
CALCIUM SERPL-MCNC: 8.4 MG/DL — LOW (ref 8.5–10.1)
CHLORIDE SERPL-SCNC: 112 MMOL/L — HIGH (ref 96–108)
CO2 SERPL-SCNC: 26 MMOL/L — SIGNIFICANT CHANGE UP (ref 22–31)
CREAT SERPL-MCNC: 1.6 MG/DL — HIGH (ref 0.5–1.3)
EGFR: 42 ML/MIN/1.73M2 — LOW
GLUCOSE SERPL-MCNC: 101 MG/DL — HIGH (ref 70–99)
HCT VFR BLD CALC: 26.4 % — LOW (ref 39–50)
HGB BLD-MCNC: 8.3 G/DL — LOW (ref 13–17)
MAGNESIUM SERPL-MCNC: 2.2 MG/DL — SIGNIFICANT CHANGE UP (ref 1.6–2.6)
MCHC RBC-ENTMCNC: 29 PG — SIGNIFICANT CHANGE UP (ref 27–34)
MCHC RBC-ENTMCNC: 31.4 G/DL — LOW (ref 32–36)
MCV RBC AUTO: 92.3 FL — SIGNIFICANT CHANGE UP (ref 80–100)
METHOD TYPE: SIGNIFICANT CHANGE UP
NRBC # BLD: 0 /100 WBCS — SIGNIFICANT CHANGE UP (ref 0–0)
NRBC BLD-RTO: 0 /100 WBCS — SIGNIFICANT CHANGE UP (ref 0–0)
PHOSPHATE SERPL-MCNC: 3.1 MG/DL — SIGNIFICANT CHANGE UP (ref 2.5–4.5)
PLATELET # BLD AUTO: 159 K/UL — SIGNIFICANT CHANGE UP (ref 150–400)
POTASSIUM SERPL-MCNC: 4.4 MMOL/L — SIGNIFICANT CHANGE UP (ref 3.5–5.3)
POTASSIUM SERPL-SCNC: 4.4 MMOL/L — SIGNIFICANT CHANGE UP (ref 3.5–5.3)
PROT SERPL-MCNC: 5.8 G/DL — LOW (ref 6–8.3)
RBC # BLD: 2.86 M/UL — LOW (ref 4.2–5.8)
RBC # FLD: 16.4 % — HIGH (ref 10.3–14.5)
SODIUM SERPL-SCNC: 140 MMOL/L — SIGNIFICANT CHANGE UP (ref 135–145)
WBC # BLD: 8.87 K/UL — SIGNIFICANT CHANGE UP (ref 3.8–10.5)
WBC # FLD AUTO: 8.87 K/UL — SIGNIFICANT CHANGE UP (ref 3.8–10.5)

## 2025-01-18 RX ORDER — BISACODYL 5 MG
5 TABLET, DELAYED RELEASE (ENTERIC COATED) ORAL AT BEDTIME
Refills: 0 | Status: DISCONTINUED | OUTPATIENT
Start: 2025-01-18 | End: 2025-01-22

## 2025-01-18 RX ORDER — FERROUS SULFATE 325(65) MG
325 TABLET ORAL DAILY
Refills: 0 | Status: DISCONTINUED | OUTPATIENT
Start: 2025-01-18 | End: 2025-01-22

## 2025-01-18 RX ORDER — POLYETHYLENE GLYCOL 3350 17 G/17G
17 POWDER, FOR SOLUTION ORAL DAILY
Refills: 0 | Status: DISCONTINUED | OUTPATIENT
Start: 2025-01-18 | End: 2025-01-22

## 2025-01-18 RX ORDER — MAGNESIUM HYDROXIDE 400 MG/5ML
30 SUSPENSION, ORAL (FINAL DOSE FORM) ORAL DAILY
Refills: 0 | Status: DISCONTINUED | OUTPATIENT
Start: 2025-01-18 | End: 2025-01-22

## 2025-01-18 RX ADMIN — ROSUVASTATIN CALCIUM 20 MILLIGRAM(S): 10 TABLET, FILM COATED ORAL at 21:35

## 2025-01-18 RX ADMIN — Medication 1000 MICROGRAM(S): at 11:12

## 2025-01-18 RX ADMIN — Medication 100 MILLIGRAM(S): at 17:06

## 2025-01-18 RX ADMIN — Medication 100 MILLIGRAM(S): at 05:48

## 2025-01-18 RX ADMIN — TAMOXIFEN CITRATE 20 MILLIGRAM(S): 10 TABLET, FILM COATED ORAL at 11:12

## 2025-01-18 RX ADMIN — Medication 1 APPLICATION(S): at 17:07

## 2025-01-18 RX ADMIN — MUPIROCIN 1 APPLICATION(S): 2 CREAM TOPICAL at 08:28

## 2025-01-18 RX ADMIN — Medication 30 MILLILITER(S): at 05:51

## 2025-01-18 RX ADMIN — Medication 1 MILLIGRAM(S): at 11:12

## 2025-01-18 RX ADMIN — POLYETHYLENE GLYCOL 3350 17 GRAM(S): 17 POWDER, FOR SOLUTION ORAL at 11:12

## 2025-01-18 RX ADMIN — LATANOPROST 1 DROP(S): 50 SOLUTION OPHTHALMIC at 21:36

## 2025-01-18 RX ADMIN — Medication 75 MILLIGRAM(S): at 11:13

## 2025-01-18 RX ADMIN — ESCITALOPRAM 10 MILLIGRAM(S): 10 TABLET, FILM COATED ORAL at 11:13

## 2025-01-18 RX ADMIN — ASPIRIN 81 MILLIGRAM(S): 81 TABLET, COATED ORAL at 11:12

## 2025-01-18 RX ADMIN — TIMOLOL MALEATE 1 DROP(S): 5 SOLUTION OPHTHALMIC at 17:06

## 2025-01-18 RX ADMIN — TIMOLOL MALEATE 1 DROP(S): 5 SOLUTION OPHTHALMIC at 05:47

## 2025-01-18 RX ADMIN — Medication 1 APPLICATION(S): at 05:47

## 2025-01-18 RX ADMIN — Medication 325 MILLIGRAM(S): at 16:06

## 2025-01-18 RX ADMIN — Medication 5 MILLIGRAM(S): at 21:35

## 2025-01-18 NOTE — PROGRESS NOTE ADULT - SUBJECTIVE AND OBJECTIVE BOX
[facility]  Lance Castro MD PhD, Mariah Matias MD, Yen Glez MD, Glenn Barba MD, Samuel More MD  and providing coverage with Graciela Portillo MD  Providing Infectious Disease Consultations at [facility], [facility], [facility], [facility]'s    Office# [phone] to schedule follow up appointments  Answering Service for urgent calls or New Consults [phone]  Cell# to text for urgent issues Lance Castro [phone]     infectious diseases progress note:    DAYA MATHIS is a 84y y. o. Male patient    Overnight and events of the last 24hrs reviewed    Allergies    No Known Allergies    Intolerances        ANTIBIOTICS/RELEVANT:  antimicrobials  cefepime   IVPB 2000 milliGRAM(s) IV Intermittent every 12 hours    immunologic:  influenza  Vaccine (HIGH DOSE) 0.5 milliLiter(s) IntraMuscular once    OTHER:  ammonium lactate 12% Lotion 1 Application(s) Topical two times a day  aspirin enteric coated 81 milliGRAM(s) Oral daily  bisacodyl 5 milliGRAM(s) Oral at bedtime  clopidogrel Tablet 75 milliGRAM(s) Oral daily  cyanocobalamin 1000 MICROGram(s) Oral daily  escitalopram 10 milliGRAM(s) Oral daily  ferrous    sulfate 325 milliGRAM(s) Oral daily  folic acid 1 milliGRAM(s) Oral daily  lactated ringers. 1000 milliLiter(s) IV Continuous <Continuous>  latanoprost 0.005% Ophthalmic Solution 1 Drop(s) Both EYES at bedtime  magnesium hydroxide Suspension 30 milliLiter(s) Oral daily PRN  melatonin 3 milliGRAM(s) Oral at bedtime PRN  metoprolol succinate ER 50 milliGRAM(s) Oral daily  mupirocin 2% Ointment 1 Application(s) Topical <User Schedule>  polyethylene glycol 3350 17 Gram(s) Oral daily  rosuvastatin 20 milliGRAM(s) Oral at bedtime  tamoxifen 20 milliGRAM(s) Oral daily  timolol 0.5% Solution 1 Drop(s) Both EYES two times a day      Objective:  Vital Signs Last 24 Hrs  T(C): 37.1 (18 Jan 2025 11:45), Max: 37.1 (18 Jan 2025 11:45)  T(F): 98.7 (18 Jan 2025 11:45), Max: 98.7 (18 Jan 2025 11:45)  HR: 58 (18 Jan 2025 11:45) (57 - 66)  BP: 146/58 (18 Jan 2025 11:45) (146/58 - 169/69)  BP(mean): --  RR: 18 (18 Jan 2025 11:45) (18 - 19)  SpO2: 97% (18 Jan 2025 11:45) (96% - 97%)    Parameters below as of 18 Jan 2025 11:45  Patient On (Oxygen Delivery Method): room air        T(C): 37.1 (01-18-25 @ 11:45), Max: 37.1 (01-16-25 @ 20:27)  T(C): 37.1 (01-18-25 @ 11:45), Max: 37.1 (01-16-25 @ 20:27)  T(C): 37.1 (01-18-25 @ 11:45), Max: 37.2 (01-15-25 @ 04:50)    PHYSICAL EXAM:  HEENT: NC atraumatic  Neck: supple  Respiratory: no accessory muscle use, breathing comfortably  Cardiovascular: distant  Gastrointestinal: normal appearing, nondistended  Extremities: no clubbing, no cyanosis,        LABS:                          8.3    8.87  )-----------( 159      ( 18 Jan 2025 08:10 )             26.4       WBC  8.87 01-18 @ 08:10  8.52 01-17 @ 08:45  5.47 01-13 @ 08:00  5.82 01-12 @ 09:32      01-18    140  |  112[H]  |  39[H]  ----------------------------<  101[H]  4.4   |  26  |  1.60[H]    Ca    8.4[L]      18 Jan 2025 08:10  Phos  3.1     01-18  Mg     2.2     01-18    TPro  5.8[L]  /  Alb  2.3[L]  /  TBili  0.3  /  DBili  x   /  AST  11[L]  /  ALT  11[L]  /  AlkPhos  52  01-18      Creatinine: 1.60 mg/dL (01-18-25 @ 08:10)  Creatinine: 1.70 mg/dL (01-17-25 @ 08:45)  Creatinine: 1.20 mg/dL (01-13-25 @ 08:00)  Creatinine: 1.30 mg/dL (01-12-25 @ 09:32)        Urinalysis Basic - ( 18 Jan 2025 08:10 )    Color: x / Appearance: x / SG: x / pH: x  Gluc: 101 mg/dL / Ketone: x  / Bili: x / Urobili: x   Blood: x / Protein: x / Nitrite: x   Leuk Esterase: x / RBC: x / WBC x   Sq Epi: x / Non Sq Epi: x / Bacteria: x            INFLAMMATORY MARKERS      MICROBIOLOGY:              RADIOLOGY & ADDITIONAL STUDIES:

## 2025-01-18 NOTE — PROGRESS NOTE ADULT - ASSESSMENT
83yo M with L sided breast cancer (s/p lumpectomy, on Tamoxifen), HTN, Anemia, Aortic insufficiency, Carotid artery disease, GERD, Glaucoma, Hepatitis (2014), Lower ext edema ,PVD, who presented to the ED from Dr. Niko Jacques's office for a R toe infection. MRI concerning for osteomyelitis of R 1st toe. He has no fevers and no leukocytosis. Skin changes on lower legs appear concerning for venous stasis. Podiatry following.     R 1st toe ulcer w/ underlying osteomyelitis  - afebrile, no leukocytosis  - BCx NGTD  - Culture - Wound Aerobic (collected 01-02-25 @ 18:30)    Few Pseudomonas aeruginosa, Rare Serratia marcescens  - 1/6 s/p RLE angio  - 1/15 Amputation, toe, 1 toe 15-Marlo-2025 16:29:17  Herman Garcia   -- OR cx growth in fluid cx   Growth in fluid media only Gram positive cocci in pairs  F/u final OR cx  F/u path-in LAB  CEFEPIME-continue for now    Diarrhea  GI PCR + Norovirus, EAEC  Stool cx negative  completed azithromycin 1/11    Hematuria  - pt reporting blood in urine 1/15    Thank you for consulting us and involving us in the management of this most interesting and challenging case.  In addition to reviewing history, imaging, documents, labs, microbiology, took into account antibiotic stewardship, local antibiogram and infection control strategies and potential transmission issues.    We will follow along in the care of this patient. Please contact me by texting me directly on my cell# at [phone] using TEAMS or call our answering service at [phone] with any concerns.

## 2025-01-18 NOTE — PROGRESS NOTE ADULT - SUBJECTIVE AND OBJECTIVE BOX
Patient is a 84y old  Male who presents with a chief complaint of Osteomyelitis (18 Jan 2025 17:21)        INTERVAL HPI/OVERNIGHT EVENTS:   no complaints  pt seen and examined         Vital Signs Last 24 Hrs  T(C): 37.1 (18 Jan 2025 11:45), Max: 37.1 (18 Jan 2025 11:45)  T(F): 98.7 (18 Jan 2025 11:45), Max: 98.7 (18 Jan 2025 11:45)  HR: 58 (18 Jan 2025 11:45) (57 - 66)  BP: 146/58 (18 Jan 2025 11:45) (146/58 - 169/69)  BP(mean): --  RR: 18 (18 Jan 2025 11:45) (18 - 19)  SpO2: 97% (18 Jan 2025 11:45) (96% - 97%)    Parameters below as of 18 Jan 2025 11:45  Patient On (Oxygen Delivery Method): room air        ammonium lactate 12% Lotion 1 Application(s) Topical two times a day  aspirin enteric coated 81 milliGRAM(s) Oral daily  bisacodyl 5 milliGRAM(s) Oral at bedtime  cefepime   IVPB 2000 milliGRAM(s) IV Intermittent every 12 hours  clopidogrel Tablet 75 milliGRAM(s) Oral daily  cyanocobalamin 1000 MICROGram(s) Oral daily  escitalopram 10 milliGRAM(s) Oral daily  ferrous    sulfate 325 milliGRAM(s) Oral daily  folic acid 1 milliGRAM(s) Oral daily  influenza  Vaccine (HIGH DOSE) 0.5 milliLiter(s) IntraMuscular once  lactated ringers. 1000 milliLiter(s) IV Continuous <Continuous>  latanoprost 0.005% Ophthalmic Solution 1 Drop(s) Both EYES at bedtime  magnesium hydroxide Suspension 30 milliLiter(s) Oral daily PRN  melatonin 3 milliGRAM(s) Oral at bedtime PRN  metoprolol succinate ER 50 milliGRAM(s) Oral daily  mupirocin 2% Ointment 1 Application(s) Topical <User Schedule>  polyethylene glycol 3350 17 Gram(s) Oral daily  rosuvastatin 20 milliGRAM(s) Oral at bedtime  tamoxifen 20 milliGRAM(s) Oral daily  timolol 0.5% Solution 1 Drop(s) Both EYES two times a day      PHYSICAL EXAM:  GENERAL: NAD   EYES: conjunctiva and sclera clear  ENMT: Moist mucous membranes  NECK: Supple, No JVD, Normal thyroid  CHEST/LUNG: non labored, cta b/l  HEART: Regular rate and rhythm; No murmurs, rubs, or gallops  ABDOMEN: Soft, Nontender, Nondistended; Bowel sounds present  EXTREMITIES:  No clubbing, no cyanosis, no edema  LYMPH: No lymphadenopathy noted  SKIN: No rashes or lesions  NEURO: no new focal deficits    Consultant(s) Notes Reviewed:  [x ] YES  [ ] NO  Care Discussed with Consultants/Other Providers [ x] YES  [ ] NO    LABS:                        8.3    8.87  )-----------( 159      ( 18 Jan 2025 08:10 )             26.4     01-18    140  |  112[H]  |  39[H]  ----------------------------<  101[H]  4.4   |  26  |  1.60[H]    Ca    8.4[L]      18 Jan 2025 08:10  Phos  3.1     01-18  Mg     2.2     01-18    TPro  5.8[L]  /  Alb  2.3[L]  /  TBili  0.3  /  DBili  x   /  AST  11[L]  /  ALT  11[L]  /  AlkPhos  52  01-18      Urinalysis Basic - ( 18 Jan 2025 08:10 )    Color: x / Appearance: x / SG: x / pH: x  Gluc: 101 mg/dL / Ketone: x  / Bili: x / Urobili: x   Blood: x / Protein: x / Nitrite: x   Leuk Esterase: x / RBC: x / WBC x   Sq Epi: x / Non Sq Epi: x / Bacteria: x      CAPILLARY BLOOD GLUCOSE            Urinalysis Basic - ( 18 Jan 2025 08:10 )    Color: x / Appearance: x / SG: x / pH: x  Gluc: 101 mg/dL / Ketone: x  / Bili: x / Urobili: x   Blood: x / Protein: x / Nitrite: x   Leuk Esterase: x / RBC: x / WBC x   Sq Epi: x / Non Sq Epi: x / Bacteria: x          RADIOLOGY & ADDITIONAL TESTS:    Imaging Personally Reviewed  Reviewed consultants input

## 2025-01-18 NOTE — PROGRESS NOTE ADULT - ASSESSMENT
STEPHANY on CKD Stage 3  Hypernatremia  Renal Mass  HTN  PAD  Vomiting  Anemia  OM      -STEPHANY Likely 2/2 Decreased EABV, SCr down as of last labs   -Hypernatremia is resolved, s/p D5W, no labs recently, check bmp in am  -Renal US with solid mass,  will need CT or MRI, at this time favor MRI with elevated creatinine, should see Urology for eval, can see outpatient-he is aware or mass and need for outpatient follow up  -S/p LE Angio; no evidence of VLADIMIR at this time  -Creatinine stabilized at baseline range  -GI eval noted  -S/P amputation of the right great toe with bone biopsy of the 1st metatarsal head on 1/15.  -Gets TEA per heme, is asking about this, will defer to heme since he is on active po chemo and it is given by their service outpatient  -Creatinine worsened, check bladder scan and start gentle IVF

## 2025-01-18 NOTE — PROGRESS NOTE ADULT - SUBJECTIVE AND OBJECTIVE BOX
[facility]                             Nephrology and Hypertension                             Niles Montano                                          [phone]     Patient is a 84y old  Male who presents with a chief complaint of Osteomyelitis (03 Jan 2025 13:05)       HPI:  Patient is an 83yo M with a PMH of L sided breast cancer (s/p lumpectomy, on Tamoxifen), HTN, Anemia, Aortic insufficiency, Carotid artery disease, GERD, Glaucoma, Hepatitis (2014), Lower ext edema ,PVD who presents to the ED from Dr. Niko Jacques's office for a R toe infection. Patient notes that he noticed a R toe wound a few weeks ago. He completed a 10d course of Amoxicillin 500mg, but noticed worsening of the wound. He went ot his podiatrist today, as the wound was draining a pus like liquid. Podiatry rec he come to the hospital for IV abx and osteomyelitis workup. Patient feeling well in ED.  States he is urinating well.  No N/V/SOB.  Has not seen nephrologist in the past.      Seen and examined at bedside, did not have any new complaints, no sob, no pain, no dizziness.     PAST MEDICAL & SURGICAL HISTORY:  Anemia      HTN (hypertension)      Breast cancer      Glaucoma      Major depression      PVD (peripheral vascular disease)      Lower extremity edema      Chronic GERD      H/O: glaucoma      S/P lumpectomy, left breast           FAMILY HISTORY:  [place]    Social History:Non smoker    MEDICATIONS  (STANDING):  ammonium lactate 12% Lotion 1 Application(s) Topical two times a day  aspirin enteric coated 81 milliGRAM(s) Oral daily  cefepime   IVPB 2000 milliGRAM(s) IV Intermittent every 12 hours  clopidogrel Tablet 75 milliGRAM(s) Oral daily  cyanocobalamin 1000 MICROGram(s) Oral daily  dextrose 5% + sodium chloride 0.45%. 1000 milliLiter(s) (75 mL/Hr) IV Continuous <Continuous>  dextrose 5%. 1000 milliLiter(s) (75 mL/Hr) IV Continuous <Continuous>  escitalopram 10 milliGRAM(s) Oral daily  folic acid 1 milliGRAM(s) Oral daily  influenza  Vaccine (HIGH DOSE) 0.5 milliLiter(s) IntraMuscular once  latanoprost 0.005% Ophthalmic Solution 1 Drop(s) Both EYES at bedtime  metoprolol succinate ER 50 milliGRAM(s) Oral daily  mupirocin 2% Ointment 1 Application(s) Topical <User Schedule>  rosuvastatin 20 milliGRAM(s) Oral at bedtime  timolol 0.5% Solution 1 Drop(s) Both EYES two times a day    MEDICATIONS  (PRN):  HYDROmorphone  Injectable 0.2 milliGRAM(s) IV Push every 6 hours PRN Moderate Pain (4 - 6)  HYDROmorphone  Injectable 0.5 milliGRAM(s) IV Push every 6 hours PRN Severe Pain (7 - 10)  melatonin 3 milliGRAM(s) Oral at bedtime PRN Insomnia  trimethobenzamide Injectable 200 milliGRAM(s) IntraMuscular every 8 hours PRN Nausea      Allergies    No Known Allergies    Intolerances         REVIEW OF SYSTEMS:    as above    ICU Vital Signs Last 24 Hrs  T(C): 36.6 (17 Jan 2025 12:11), Max: 37.1 (16 Jan 2025 20:27)  T(F): 97.9 (17 Jan 2025 12:11), Max: 98.8 (16 Jan 2025 20:27)  HR: 50 (17 Jan 2025 12:11) (50 - 92)  BP: 138/53 (17 Jan 2025 12:11) (110/68 - 157/55)  BP(mean): --  ABP: --  ABP(mean): --  RR: 18 (17 Jan 2025 12:11) (18 - 18)  SpO2: 99% (17 Jan 2025 12:11) (92% - 99%)    O2 Parameters below as of 17 Jan 2025 12:11  Patient On (Oxygen Delivery Method): room air            PHYSICAL EXAM:    GENERAL: NAD  HEAD:  Atraumatic, Normocephalic  EYES: EOMI, conjunctiva and sclera clear  ENMT: No Drainage from nares, No drainage from ears  NERVOUS SYSTEM:  Awake and Alert  CHEST/LUNG: Clear to percussion bilaterally  EXTREMITIES:  No Edema, RLE dressing intact with trace edema  SKIN: No rashes No obvious ecchymosis      LABS:                        8.5    8.52  )-----------( 153      ( 17 Jan 2025 08:45 )             26.6     01-17    143  |  114[H]  |  34[H]  ----------------------------<  105[H]  4.8   |  24  |  1.70[H]    Ca    8.6      17 Jan 2025 08:45        Urinalysis Basic - ( 17 Jan 2025 08:45 )    Color: x / Appearance: x / SG: x / pH: x  Gluc: 105 mg/dL / Ketone: x  / Bili: x / Urobili: x   Blood: x / Protein: x / Nitrite: x   Leuk Esterase: x / RBC: x / WBC x   Sq Epi: x / Non Sq Epi: x / Bacteria: x

## 2025-01-18 NOTE — PROGRESS NOTE ADULT - SUBJECTIVE AND OBJECTIVE BOX
PROGRESS NOTE   Patient is a 84y old  Male who presents with a chief complaint of Osteomyelitis (17 Jan 2025 19:07)      HPI:  Patient is an 83yo M with a PMH of L sided breast cancer (s/p lumpectomy, on Tamoxifen), HTN, Anemia, Aortic insufficiency, Carotid artery disease, GERD, Glaucoma, Hepatitis (2014), Lower ext edema ,PVD who presents to the ED from Dr. Niko Jacques's office for a R toe infection. Patient notes that he noticed a R toe wound a few weeks ago. He completed a 10d course of Amoxicillin 500mg, but noticed worsening of the wound. He went ot his podiatrist today, as the wound was draining a pus like liquid. Podiatry rec he come to the hospital for IV abx and osteomyelitis workup. Patient feeling well in ED.    ED Course:  Vitals: /65, HR 71, T 97.3, RR 16 SpO2 100% on RA  Labs: ESR 75, Hgb 9.2, Na 148, Cl 117, BUN/Cr 35/1.70, eGFR 39  Given: IV Zosyn, IV Vancomycin, 1L NS bolus     Imaging:  Chest Xray: No acute chest finding.   R foot Xray: Possible erosion of the distal phalanx of the right great toe with associated soft tissue swelling suspicious for osteomyelitis. (02 Jan 2025 19:34)      Vital Signs Last 24 Hrs  T(C): 36.4 (18 Jan 2025 05:20), Max: 36.6 (17 Jan 2025 12:11)  T(F): 97.6 (18 Jan 2025 05:20), Max: 97.9 (17 Jan 2025 12:11)  HR: 57 (18 Jan 2025 05:20) (50 - 66)  BP: 169/69 (18 Jan 2025 05:20) (138/53 - 169/69)  BP(mean): --  RR: 19 (18 Jan 2025 05:20) (18 - 19)  SpO2: 96% (18 Jan 2025 05:20) (96% - 99%)    Parameters below as of 18 Jan 2025 05:20  Patient On (Oxygen Delivery Method): room air                              8.3    8.87  )-----------( 159      ( 18 Jan 2025 08:10 )             26.4               01-17    143  |  114[H]  |  34[H]  ----------------------------<  105[H]  4.8   |  24  |  1.70[H]    Ca    8.6      17 Jan 2025 08:45  Phos  3.1     01-18  Mg     2.2     01-18        PHYSICAL EXAM  General: Pleasant  male NAD & AOX3.    Integument:  Skin warm, dry and supple bilateral.    Right foot s/p  surgical Debridement of all non-viable soft tissue and bone, debridement of deep space infection with right hallux amputation with flap coverage. POD#3 . Dressing clean, dry and intact with controlled hemostasis at this time. The sutures are intact and the flap is viable at this time.  There is decreased erythema, edema and calor noted. Sterile dressing change performed today.  Vascular: Dorsalis Pedis and Posterior Tibial pulses non-palpable.  Capillary re-fill time less then 3 seconds digits 1-5 bilateral.  noted b/l lower leg edema, noted venous stasis b/l lower extremity  Neuro: Protective sensation intact to the level of the digits bilateral.  MSK: Muscle strength 4/5 all major muscle groups bilateral.     PROGRESS NOTE   Patient is a 84y old  Male who presents with a chief complaint of Osteomyelitis (17 Jan 2025 19:07)      HPI:  Patient is an 85yo M with a PMH of L sided breast cancer (s/p lumpectomy, on Tamoxifen), HTN, Anemia, Aortic insufficiency, Carotid artery disease, GERD, Glaucoma, Hepatitis (2014), Lower ext edema ,PVD who presents to the ED from Dr. Niko Jacques's office for a R toe infection. Patient notes that he noticed a R toe wound a few weeks ago. He completed a 10d course of Amoxicillin 500mg, but noticed worsening of the wound. He went ot his podiatrist today, as the wound was draining a pus like liquid. Podiatry rec he come to the hospital for IV abx and osteomyelitis workup. Patient feeling well in ED.    ED Course:  Vitals: /65, HR 71, T 97.3, RR 16 SpO2 100% on RA  Labs: ESR 75, Hgb 9.2, Na 148, Cl 117, BUN/Cr 35/1.70, eGFR 39  Given: IV Zosyn, IV Vancomycin, 1L NS bolus     Imaging:  Chest Xray: No acute chest finding.   R foot Xray: Possible erosion of the distal phalanx of the right great toe with associated soft tissue swelling suspicious for osteomyelitis. (02 Jan 2025 19:34)      Vital Signs Last 24 Hrs  T(C): 36.4 (18 Jan 2025 05:20), Max: 36.6 (17 Jan 2025 12:11)  T(F): 97.6 (18 Jan 2025 05:20), Max: 97.9 (17 Jan 2025 12:11)  HR: 57 (18 Jan 2025 05:20) (50 - 66)  BP: 169/69 (18 Jan 2025 05:20) (138/53 - 169/69)  BP(mean): --  RR: 19 (18 Jan 2025 05:20) (18 - 19)  SpO2: 96% (18 Jan 2025 05:20) (96% - 99%)    Parameters below as of 18 Jan 2025 05:20  Patient On (Oxygen Delivery Method): room air                              8.3    8.87  )-----------( 159      ( 18 Jan 2025 08:10 )             26.4               01-17    143  |  114[H]  |  34[H]  ----------------------------<  105[H]  4.8   |  24  |  1.70[H]    Ca    8.6      17 Jan 2025 08:45  Phos  3.1     01-18  Mg     2.2     01-18        PHYSICAL EXAM  General: Pleasant  male NAD & AOX3.    Integument:  Skin warm, dry and supple bilateral.    Right foot s/p  surgical Debridement of all non-viable soft tissue and bone, debridement of deep space infection with right hallux amputation with flap coverage. POD#3 . Dressing clean, dry and intact with controlled hemostasis at this time. The sutures are intact and the flap is viable at this time.  There is decreased erythema, edema and calor noted. Sterile dressing change performed today. the region of concern is responding favorable to the current care plan.   Vascular: Dorsalis Pedis and Posterior Tibial pulses non-palpable.  Capillary re-fill time less then 3 seconds digits 1-5 bilateral.  noted b/l lower leg edema, noted venous stasis b/l lower extremity  Neuro: Protective sensation intact to the level of the digits bilateral.  MSK: Muscle strength 4/5 all major muscle groups bilateral.

## 2025-01-18 NOTE — PROGRESS NOTE ADULT - ASSESSMENT
84M with HTN, PAD, breast ca, CKD admitted with rt great toe osteomyelitis  MRI noted  IV ABX  podiatry, vascular and ID following  s/p angio  s/p toe amputation     #PAD- on asa and plavix     #n/v diarrhea- GI pcr+ noro virus and ecoli sps  supportive care  GI fu  ID fu- sp zithromax course    CKD/Hypernatremia  nephrology following  kan  avoid nephrotoxins    HTN  continue metoprolol    Breast ca  continue tamoxifen    edema  elevate LE      [facility]   [phone]

## 2025-01-19 LAB
ANION GAP SERPL CALC-SCNC: 8 MMOL/L — SIGNIFICANT CHANGE UP (ref 5–17)
BUN SERPL-MCNC: 37 MG/DL — HIGH (ref 7–23)
CALCIUM SERPL-MCNC: 8.8 MG/DL — SIGNIFICANT CHANGE UP (ref 8.5–10.1)
CHLORIDE SERPL-SCNC: 109 MMOL/L — HIGH (ref 96–108)
CO2 SERPL-SCNC: 23 MMOL/L — SIGNIFICANT CHANGE UP (ref 22–31)
CREAT SERPL-MCNC: 1.8 MG/DL — HIGH (ref 0.5–1.3)
EGFR: 36 ML/MIN/1.73M2 — LOW
GLUCOSE SERPL-MCNC: 127 MG/DL — HIGH (ref 70–99)
HCT VFR BLD CALC: 26.7 % — LOW (ref 39–50)
HGB BLD-MCNC: 8.3 G/DL — LOW (ref 13–17)
MCHC RBC-ENTMCNC: 28.7 PG — SIGNIFICANT CHANGE UP (ref 27–34)
MCHC RBC-ENTMCNC: 31.1 G/DL — LOW (ref 32–36)
MCV RBC AUTO: 92.4 FL — SIGNIFICANT CHANGE UP (ref 80–100)
NRBC # BLD: 0 /100 WBCS — SIGNIFICANT CHANGE UP (ref 0–0)
NRBC BLD-RTO: 0 /100 WBCS — SIGNIFICANT CHANGE UP (ref 0–0)
PLATELET # BLD AUTO: 162 K/UL — SIGNIFICANT CHANGE UP (ref 150–400)
POTASSIUM SERPL-MCNC: 4.6 MMOL/L — SIGNIFICANT CHANGE UP (ref 3.5–5.3)
POTASSIUM SERPL-SCNC: 4.6 MMOL/L — SIGNIFICANT CHANGE UP (ref 3.5–5.3)
RBC # BLD: 2.89 M/UL — LOW (ref 4.2–5.8)
RBC # FLD: 16.1 % — HIGH (ref 10.3–14.5)
SODIUM SERPL-SCNC: 140 MMOL/L — SIGNIFICANT CHANGE UP (ref 135–145)
WBC # BLD: 10.97 K/UL — HIGH (ref 3.8–10.5)
WBC # FLD AUTO: 10.97 K/UL — HIGH (ref 3.8–10.5)

## 2025-01-19 RX ORDER — VANCOMYCIN HYDROCHLORIDE 50 MG/ML
1000 KIT ORAL EVERY 24 HOURS
Refills: 0 | Status: DISCONTINUED | OUTPATIENT
Start: 2025-01-19 | End: 2025-01-22

## 2025-01-19 RX ADMIN — Medication 50 MILLIGRAM(S): at 05:55

## 2025-01-19 RX ADMIN — LATANOPROST 1 DROP(S): 50 SOLUTION OPHTHALMIC at 21:09

## 2025-01-19 RX ADMIN — TIMOLOL MALEATE 1 DROP(S): 5 SOLUTION OPHTHALMIC at 06:04

## 2025-01-19 RX ADMIN — Medication 1000 MICROGRAM(S): at 11:22

## 2025-01-19 RX ADMIN — TIMOLOL MALEATE 1 DROP(S): 5 SOLUTION OPHTHALMIC at 17:18

## 2025-01-19 RX ADMIN — Medication 325 MILLIGRAM(S): at 11:21

## 2025-01-19 RX ADMIN — VANCOMYCIN HYDROCHLORIDE 250 MILLIGRAM(S): KIT at 20:58

## 2025-01-19 RX ADMIN — TAMOXIFEN CITRATE 20 MILLIGRAM(S): 10 TABLET, FILM COATED ORAL at 11:21

## 2025-01-19 RX ADMIN — Medication 100 MILLIGRAM(S): at 17:17

## 2025-01-19 RX ADMIN — Medication 1 APPLICATION(S): at 06:03

## 2025-01-19 RX ADMIN — MUPIROCIN 1 APPLICATION(S): 2 CREAM TOPICAL at 08:16

## 2025-01-19 RX ADMIN — ESCITALOPRAM 10 MILLIGRAM(S): 10 TABLET, FILM COATED ORAL at 11:22

## 2025-01-19 RX ADMIN — Medication 5 MILLIGRAM(S): at 21:09

## 2025-01-19 RX ADMIN — ASPIRIN 81 MILLIGRAM(S): 81 TABLET, COATED ORAL at 11:22

## 2025-01-19 RX ADMIN — ROSUVASTATIN CALCIUM 20 MILLIGRAM(S): 10 TABLET, FILM COATED ORAL at 21:09

## 2025-01-19 RX ADMIN — POLYETHYLENE GLYCOL 3350 17 GRAM(S): 17 POWDER, FOR SOLUTION ORAL at 11:22

## 2025-01-19 RX ADMIN — Medication 100 MILLIGRAM(S): at 05:55

## 2025-01-19 RX ADMIN — Medication 75 MILLIGRAM(S): at 11:22

## 2025-01-19 RX ADMIN — Medication 1 MILLIGRAM(S): at 11:22

## 2025-01-19 RX ADMIN — Medication 1 APPLICATION(S): at 17:17

## 2025-01-19 NOTE — PROGRESS NOTE ADULT - SUBJECTIVE AND OBJECTIVE BOX
[facility]  Sandeep Leos PA-C  [address] [place] [address]  [phone]      INTERVAL HPI/OVERNIGHT EVENTS:  Pt s/e, denies having abdominal pain  tolerating diet no n/v reported   Diarrhea resolved, no BM feels the urge    MEDICATIONS  (STANDING):  ammonium lactate 12% Lotion 1 Application(s) Topical two times a day  aspirin enteric coated 81 milliGRAM(s) Oral daily  bisacodyl 5 milliGRAM(s) Oral at bedtime  cefepime   IVPB 2000 milliGRAM(s) IV Intermittent every 12 hours  clopidogrel Tablet 75 milliGRAM(s) Oral daily  cyanocobalamin 1000 MICROGram(s) Oral daily  escitalopram 10 milliGRAM(s) Oral daily  folic acid 1 milliGRAM(s) Oral daily  influenza  Vaccine (HIGH DOSE) 0.5 milliLiter(s) IntraMuscular once  lactated ringers. 1000 milliLiter(s) (60 mL/Hr) IV Continuous <Continuous>  latanoprost 0.005% Ophthalmic Solution 1 Drop(s) Both EYES at bedtime  metoprolol succinate ER 50 milliGRAM(s) Oral daily  mupirocin 2% Ointment 1 Application(s) Topical <User Schedule>  polyethylene glycol 3350 17 Gram(s) Oral daily  rosuvastatin 20 milliGRAM(s) Oral at bedtime  tamoxifen 20 milliGRAM(s) Oral daily  timolol 0.5% Solution 1 Drop(s) Both EYES two times a day    MEDICATIONS  (PRN):  magnesium hydroxide Suspension 30 milliLiter(s) Oral daily PRN Constipation  melatonin 3 milliGRAM(s) Oral at bedtime PRN Insomnia    melatonin 3 milliGRAM(s) Oral at bedtime PRN Insomnia      Allergies  No Known Allergies      PHYSICAL EXAM:   Vital Signs Last 24 Hrs  T(C): 37.2 (2025 12:32), Max: 37.4 (2025 05:20)  T(F): 98.9 (2025 12:32), Max: 99.4 (2025 05:20)  HR: 58 (2025 12:32) (58 - 81)  BP: 163/71 (2025 12:32) (160/71 - 167/60)  BP(mean): --  RR: 18 (2025 12:32) (18 - 18)  SpO2: 92% (2025 12:32) (92% - 95%)    Parameters below as of 2025 12:32  Patient On (Oxygen Delivery Method): room air        Daily     Daily Weight in k.9 (2025 04:58)    GENERAL:  Appears stated age  HEENT:  NC/AT  CHEST:  Full & symmetric excursion  HEART:  Regular rhythm  ABDOMEN:  Soft, non-tender, non-distended  EXTEREMITIES:  no cyanosis  SKIN:  No rash  NEURO:  Alert      LABS:                        8.3    10.97 )-----------( 162      ( 2025 08:26 )             26.7     01-19    140  |  109[H]  |  37[H]  ----------------------------<  127[H]  4.6   |  23  |  1.80[H]    Ca    8.8      2025 08:26  Phos  3.1     01-18  Mg     2.2     -18    TPro  5.8[L]  /  Alb  2.3[L]  /  TBili  0.3  /  DBili  x   /  AST  11[L]  /  ALT  11[L]  /  AlkPhos  52  01-18              Urinalysis Basic - ( 2025 08:45 )    Color: x / Appearance: x / SG: x / pH: x  Gluc: 105 mg/dL / Ketone: x  / Bili: x / Urobili: x   Blood: x / Protein: x / Nitrite: x   Leuk Esterase: x / RBC: x / WBC x   Sq Epi: x / Non Sq Epi: x / Bacteria: x

## 2025-01-19 NOTE — PROGRESS NOTE ADULT - SUBJECTIVE AND OBJECTIVE BOX
PROGRESS NOTE   Patient is a 84y old  Male who presents with a chief complaint of Osteomyelitis (18 Jan 2025 18:22)      HPI:  Patient is an 83yo M with a PMH of L sided breast cancer (s/p lumpectomy, on Tamoxifen), HTN, Anemia, Aortic insufficiency, Carotid artery disease, GERD, Glaucoma, Hepatitis (2014), Lower ext edema ,PVD who presents to the ED from Dr. Niko Jacques's office for a R toe infection. Patient notes that he noticed a R toe wound a few weeks ago. He completed a 10d course of Amoxicillin 500mg, but noticed worsening of the wound. He went ot his podiatrist today, as the wound was draining a pus like liquid. Podiatry rec he come to the hospital for IV abx and osteomyelitis workup. Patient feeling well in ED.    ED Course:  Vitals: /65, HR 71, T 97.3, RR 16 SpO2 100% on RA  Labs: ESR 75, Hgb 9.2, Na 148, Cl 117, BUN/Cr 35/1.70, eGFR 39  Given: IV Zosyn, IV Vancomycin, 1L NS bolus     Imaging:  Chest Xray: No acute chest finding.   R foot Xray: Possible erosion of the distal phalanx of the right great toe with associated soft tissue swelling suspicious for osteomyelitis. (02 Jan 2025 19:34)      Vital Signs Last 24 Hrs  T(C): 37.2 (19 Jan 2025 12:32), Max: 37.4 (19 Jan 2025 05:20)  T(F): 98.9 (19 Jan 2025 12:32), Max: 99.4 (19 Jan 2025 05:20)  HR: 58 (19 Jan 2025 12:32) (58 - 81)  BP: 163/71 (19 Jan 2025 12:32) (160/71 - 167/60)  BP(mean): --  RR: 18 (19 Jan 2025 12:32) (18 - 18)  SpO2: 92% (19 Jan 2025 12:32) (92% - 95%)    Parameters below as of 19 Jan 2025 12:32  Patient On (Oxygen Delivery Method): room air                              8.3    10.97 )-----------( 162      ( 19 Jan 2025 08:26 )             26.7               01-19    140  |  109[H]  |  37[H]  ----------------------------<  127[H]  4.6   |  23  |  1.80[H]    Ca    8.8      19 Jan 2025 08:26  Phos  3.1     01-18  Mg     2.2     01-18    TPro  5.8[L]  /  Alb  2.3[L]  /  TBili  0.3  /  DBili  x   /  AST  11[L]  /  ALT  11[L]  /  AlkPhos  52  01-18      PHYSICAL EXAM  General: Pleasant  male NAD & AOX3.    Integument:  Skin warm, dry and supple bilateral.    Right foot s/p  surgical Debridement of all non-viable soft tissue and bone, debridement of deep space infection with right hallux amputation with flap coverage. POD#4 . Dressing clean, dry and intact with controlled hemostasis at this time. The sutures are intact and the flap is viable at this time.  There is decreased erythema, edema and calor noted. Sterile dressing change performed today.  Vascular: Dorsalis Pedis and Posterior Tibial pulses non-palpable.  Capillary re-fill time less then 3 seconds digits 1-5 bilateral.  noted b/l lower leg edema, noted venous stasis b/l lower extremity  Neuro: Protective sensation intact to the level of the digits bilateral.  MSK: Muscle strength 4/5 all major muscle groups bilateral.     PROGRESS NOTE   Patient is a 84y old  Male who presents with a chief complaint of Osteomyelitis (18 Jan 2025 18:22)      HPI:  Patient is an 85yo M with a PMH of L sided breast cancer (s/p lumpectomy, on Tamoxifen), HTN, Anemia, Aortic insufficiency, Carotid artery disease, GERD, Glaucoma, Hepatitis (2014), Lower ext edema ,PVD who presents to the ED from Dr. Niko Jacques's office for a R toe infection. Patient notes that he noticed a R toe wound a few weeks ago. He completed a 10d course of Amoxicillin 500mg, but noticed worsening of the wound. He went ot his podiatrist today, as the wound was draining a pus like liquid. Podiatry rec he come to the hospital for IV abx and osteomyelitis workup. Patient feeling well in ED.    ED Course:  Vitals: /65, HR 71, T 97.3, RR 16 SpO2 100% on RA  Labs: ESR 75, Hgb 9.2, Na 148, Cl 117, BUN/Cr 35/1.70, eGFR 39  Given: IV Zosyn, IV Vancomycin, 1L NS bolus     Imaging:  Chest Xray: No acute chest finding.   R foot Xray: Possible erosion of the distal phalanx of the right great toe with associated soft tissue swelling suspicious for osteomyelitis. (02 Jan 2025 19:34)      Vital Signs Last 24 Hrs  T(C): 37.2 (19 Jan 2025 12:32), Max: 37.4 (19 Jan 2025 05:20)  T(F): 98.9 (19 Jan 2025 12:32), Max: 99.4 (19 Jan 2025 05:20)  HR: 58 (19 Jan 2025 12:32) (58 - 81)  BP: 163/71 (19 Jan 2025 12:32) (160/71 - 167/60)  BP(mean): --  RR: 18 (19 Jan 2025 12:32) (18 - 18)  SpO2: 92% (19 Jan 2025 12:32) (92% - 95%)    Parameters below as of 19 Jan 2025 12:32  Patient On (Oxygen Delivery Method): room air                              8.3    10.97 )-----------( 162      ( 19 Jan 2025 08:26 )             26.7               01-19    140  |  109[H]  |  37[H]  ----------------------------<  127[H]  4.6   |  23  |  1.80[H]    Ca    8.8      19 Jan 2025 08:26  Phos  3.1     01-18  Mg     2.2     01-18    TPro  5.8[L]  /  Alb  2.3[L]  /  TBili  0.3  /  DBili  x   /  AST  11[L]  /  ALT  11[L]  /  AlkPhos  52  01-18      PHYSICAL EXAM  General: Pleasant  male NAD & AOX3.    Integument:  Skin warm, dry and supple bilateral.    Right foot s/p  surgical Debridement of all non-viable soft tissue and bone, debridement of deep space infection with right hallux amputation with flap coverage. POD#4 . Dressing clean, dry and intact with controlled hemostasis at this time. The sutures are intact and the flap is viable at this time.  There is decreased erythema, edema and calor noted. Sterile dressing change performed today. The region of concern is respoinding favorable to the current care plan.   Vascular: Dorsalis Pedis and Posterior Tibial pulses non-palpable.  Capillary re-fill time less then 3 seconds digits 1-5 bilateral.  noted b/l lower leg edema, noted venous stasis b/l lower extremity  Neuro: Protective sensation intact to the level of the digits bilateral.  MSK: Muscle strength 4/5 all major muscle groups bilateral.

## 2025-01-19 NOTE — PROGRESS NOTE ADULT - PROBLEM SELECTOR PLAN 1
Discussed diagnosis and treatment with patient and patient's son . He understands the severity of patient's condition and  that he is at risk to lose part of the right foot, all the foot or below the knee amputation. All questions were asked and answered for patient and patient's family satisfaction.  Patient is  s/p  surgical debridement of all non-viable soft tissue and bone with right hallux amputation with flap coverage . DOS 1/15/2025.  Post op wound stable, sutures intact coapted. Prognosis is guarded.   Pending surgical pathology.  Continue IV Abx and follow ID recommendations.   Dressing changed today. Keep the postop dressing clean/dry/intact  Partial weight bearing to right foot heel touch.  Recs elevation on bilateral lower extremities  Pt may require serial surgical debridements/amputation if symptoms persist.   Medical management as per primary team.  Will discuss with all attendings.

## 2025-01-19 NOTE — PROGRESS NOTE ADULT - ASSESSMENT
STEPHANY on CKD Stage 3  Hypernatremia  Renal Mass  HTN  PAD  Vomiting  Anemia  OM      -STEPHANY Likely 2/2 Decreased EABV, SCr down as of last labs   -Hypernatremia is resolved, s/p D5W, no labs recently, check bmp in am  -Renal US with solid mass,  will need CT or MRI, at this time favor MRI with elevated creatinine, should see Urology for eval, can see outpatient-he is aware or mass and need for outpatient follow up  -S/p LE Angio; no evidence of VLADIMIR at this time  -Creatinine stabilized at baseline range  -GI eval noted  -S/P amputation of the right great toe with bone biopsy of the 1st metatarsal head on 1/15.  -Gets TEA per heme, is asking about this, will defer to heme since he is on active po chemo and it is given by their service outpatient  -Creatinine appears to be stabilizing

## 2025-01-19 NOTE — PROGRESS NOTE ADULT - SUBJECTIVE AND OBJECTIVE BOX
[facility]                             Nephrology and Hypertension                             Niles Montano                                          [phone]     Patient is a 84y old  Male who presents with a chief complaint of Osteomyelitis (03 Jan 2025 13:05)       HPI:  Patient is an 83yo M with a PMH of L sided breast cancer (s/p lumpectomy, on Tamoxifen), HTN, Anemia, Aortic insufficiency, Carotid artery disease, GERD, Glaucoma, Hepatitis (2014), Lower ext edema ,PVD who presents to the ED from Dr. Niko Jacques's office for a R toe infection. Patient notes that he noticed a R toe wound a few weeks ago. He completed a 10d course of Amoxicillin 500mg, but noticed worsening of the wound. He went ot his podiatrist today, as the wound was draining a pus like liquid. Podiatry rec he come to the hospital for IV abx and osteomyelitis workup. Patient feeling well in ED.  States he is urinating well.  No N/V/SOB.  Has not seen nephrologist in the past.      Seen and examined at bedside, did not have any new complaints, no sob, no pain, no dizziness.     PAST MEDICAL & SURGICAL HISTORY:  Anemia      HTN (hypertension)      Breast cancer      Glaucoma      Major depression      PVD (peripheral vascular disease)      Lower extremity edema      Chronic GERD      H/O: glaucoma      S/P lumpectomy, left breast           FAMILY HISTORY:  NC    Social History:Non smoker    MEDICATIONS  (STANDING):  ammonium lactate 12% Lotion 1 Application(s) Topical two times a day  aspirin enteric coated 81 milliGRAM(s) Oral daily  cefepime   IVPB 2000 milliGRAM(s) IV Intermittent every 12 hours  clopidogrel Tablet 75 milliGRAM(s) Oral daily  cyanocobalamin 1000 MICROGram(s) Oral daily  dextrose 5% + sodium chloride 0.45%. 1000 milliLiter(s) (75 mL/Hr) IV Continuous <Continuous>  dextrose 5%. 1000 milliLiter(s) (75 mL/Hr) IV Continuous <Continuous>  escitalopram 10 milliGRAM(s) Oral daily  folic acid 1 milliGRAM(s) Oral daily  influenza  Vaccine (HIGH DOSE) 0.5 milliLiter(s) IntraMuscular once  latanoprost 0.005% Ophthalmic Solution 1 Drop(s) Both EYES at bedtime  metoprolol succinate ER 50 milliGRAM(s) Oral daily  mupirocin 2% Ointment 1 Application(s) Topical <User Schedule>  rosuvastatin 20 milliGRAM(s) Oral at bedtime  timolol 0.5% Solution 1 Drop(s) Both EYES two times a day    MEDICATIONS  (PRN):  HYDROmorphone  Injectable 0.2 milliGRAM(s) IV Push every 6 hours PRN Moderate Pain (4 - 6)  HYDROmorphone  Injectable 0.5 milliGRAM(s) IV Push every 6 hours PRN Severe Pain (7 - 10)  melatonin 3 milliGRAM(s) Oral at bedtime PRN Insomnia  trimethobenzamide Injectable 200 milliGRAM(s) IntraMuscular every 8 hours PRN Nausea      Allergies    No Known Allergies    Intolerances         REVIEW OF SYSTEMS:    as above    ICU Vital Signs Last 24 Hrs  T(C): 37.2 (19 Jan 2025 12:32), Max: 37.4 (19 Jan 2025 05:20)  T(F): 98.9 (19 Jan 2025 12:32), Max: 99.4 (19 Jan 2025 05:20)  HR: 58 (19 Jan 2025 12:32) (58 - 81)  BP: 163/71 (19 Jan 2025 12:32) (160/71 - 167/60)  BP(mean): --  ABP: --  ABP(mean): --  RR: 18 (19 Jan 2025 12:32) (18 - 18)  SpO2: 92% (19 Jan 2025 12:32) (92% - 95%)    O2 Parameters below as of 19 Jan 2025 12:32  Patient On (Oxygen Delivery Method): room air      PHYSICAL EXAM:    GENERAL: NAD  HEAD:  Atraumatic, Normocephalic  EYES: EOMI, conjunctiva and sclera clear  ENMT: No Drainage from nares, No drainage from ears  NERVOUS SYSTEM:  Awake and Alert  CHEST/LUNG: Clear to percussion bilaterally  EXTREMITIES:  No Edema, RLE dressing intact with trace edema  SKIN: No rashes No obvious ecchymosis      LABS:                                 8.3    10.97 )-----------( 162      ( 19 Jan 2025 08:26 )             26.7     01-19    140  |  109[H]  |  37[H]  ----------------------------<  127[H]  4.6   |  23  |  1.80[H]    Ca    8.8      19 Jan 2025 08:26  Phos  3.1     01-18  Mg     2.2     01-18    TPro  5.8[L]  /  Alb  2.3[L]  /  TBili  0.3  /  DBili  x   /  AST  11[L]  /  ALT  11[L]  /  AlkPhos  52  01-18      Urinalysis Basic - ( 19 Jan 2025 08:26 )    Color: x / Appearance: x / SG: x / pH: x  Gluc: 127 mg/dL / Ketone: x  / Bili: x / Urobili: x   Blood: x / Protein: x / Nitrite: x   Leuk Esterase: x / RBC: x / WBC x   Sq Epi: x / Non Sq Epi: x / Bacteria: x

## 2025-01-19 NOTE — PROGRESS NOTE ADULT - ASSESSMENT
83yo M with L sided breast cancer (s/p lumpectomy, on Tamoxifen), HTN, Anemia, Aortic insufficiency, Carotid artery disease, GERD, Glaucoma, Hepatitis (2014), Lower ext edema ,PVD, who presented to the ED from Dr. Niko Jacques's office for a R toe infection. MRI concerning for osteomyelitis of R 1st toe. He has no fevers and no leukocytosis. Skin changes on lower legs appear concerning for venous stasis. Podiatry following.     R 1st toe ulcer w/ underlying osteomyelitis  - afebrile, no leukocytosis  - BCx NGTD  - Culture - Wound Aerobic (collected 01-02-25 @ 18:30)    Few Pseudomonas aeruginosa, Rare Serratia marcescens  - 1/6 s/p RLE angio  - 1/15 Amputation, toe, 1 toe 15-Marlo-2025 16:29:17  Herman Garcia  Culture - Tissue with Gram Stain (01.15.25 @ 16:09) Rare Staphylococcus epidermidis  -  Oxacillin: R >2, -  Vancomycin: S 1, -  Clindamycin: S <=0.25-  Rifampin: S <=1    -  Tetracycline: S <=4 -  Trimethoprim/Sulfamethoxazole: S <=0.5/9.5  F/u path-in LAB  CEFEPIME-->changed to Vanco 1/19 but noted oral options if path is clear    Diarrhea  GI PCR + Norovirus, EAEC  Stool cx negative  completed azithromycin 1/11    Hematuria  - pt reporting blood in urine 1/15    Thank you for consulting us and involving us in the management of this most interesting and challenging case.  In addition to reviewing history, imaging, documents, labs, microbiology, took into account antibiotic stewardship, local antibiogram and infection control strategies and potential transmission issues.    We will follow along in the care of this patient. Please contact me by texting me directly on my cell# at [phone] using TEAMS or call our answering service at [phone] with any concerns.

## 2025-01-19 NOTE — PROGRESS NOTE ADULT - SUBJECTIVE AND OBJECTIVE BOX
Patient is a 84y old  Male who presents with a chief complaint of Osteomyelitis (19 Jan 2025 17:14)        INTERVAL HPI/OVERNIGHT EVENTS:   no complaints  pt seen and examined         Vital Signs Last 24 Hrs  T(C): 37.2 (19 Jan 2025 12:32), Max: 37.4 (19 Jan 2025 05:20)  T(F): 98.9 (19 Jan 2025 12:32), Max: 99.4 (19 Jan 2025 05:20)  HR: 58 (19 Jan 2025 12:32) (58 - 81)  BP: 163/71 (19 Jan 2025 12:32) (160/71 - 167/60)  BP(mean): --  RR: 18 (19 Jan 2025 12:32) (18 - 18)  SpO2: 92% (19 Jan 2025 12:32) (92% - 95%)    Parameters below as of 19 Jan 2025 12:32  Patient On (Oxygen Delivery Method): room air        ammonium lactate 12% Lotion 1 Application(s) Topical two times a day  aspirin enteric coated 81 milliGRAM(s) Oral daily  bisacodyl 5 milliGRAM(s) Oral at bedtime  clopidogrel Tablet 75 milliGRAM(s) Oral daily  cyanocobalamin 1000 MICROGram(s) Oral daily  escitalopram 10 milliGRAM(s) Oral daily  ferrous    sulfate 325 milliGRAM(s) Oral daily  folic acid 1 milliGRAM(s) Oral daily  influenza  Vaccine (HIGH DOSE) 0.5 milliLiter(s) IntraMuscular once  lactated ringers. 1000 milliLiter(s) IV Continuous <Continuous>  latanoprost 0.005% Ophthalmic Solution 1 Drop(s) Both EYES at bedtime  magnesium hydroxide Suspension 30 milliLiter(s) Oral daily PRN  melatonin 3 milliGRAM(s) Oral at bedtime PRN  metoprolol succinate ER 50 milliGRAM(s) Oral daily  mupirocin 2% Ointment 1 Application(s) Topical <User Schedule>  polyethylene glycol 3350 17 Gram(s) Oral daily  rosuvastatin 20 milliGRAM(s) Oral at bedtime  tamoxifen 20 milliGRAM(s) Oral daily  timolol 0.5% Solution 1 Drop(s) Both EYES two times a day  vancomycin  IVPB 1000 milliGRAM(s) IV Intermittent every 24 hours      PHYSICAL EXAM:  GENERAL: NAD   EYES: conjunctiva and sclera clear  ENMT: Moist mucous membranes  NECK: Supple, No JVD, Normal thyroid  CHEST/LUNG: non labored, cta b/l  HEART: Regular rate and rhythm; No murmurs, rubs, or gallops  ABDOMEN: Soft, Nontender, Nondistended; Bowel sounds present  EXTREMITIES:  No clubbing, no cyanosis, no edema  LYMPH: No lymphadenopathy noted  SKIN: No rashes or lesions  NEURO: no new focal deficits    Consultant(s) Notes Reviewed:  [x ] YES  [ ] NO  Care Discussed with Consultants/Other Providers [ x] YES  [ ] NO    LABS:                        8.3    10.97 )-----------( 162      ( 19 Jan 2025 08:26 )             26.7     01-19    140  |  109[H]  |  37[H]  ----------------------------<  127[H]  4.6   |  23  |  1.80[H]    Ca    8.8      19 Jan 2025 08:26  Phos  3.1     01-18  Mg     2.2     01-18    TPro  5.8[L]  /  Alb  2.3[L]  /  TBili  0.3  /  DBili  x   /  AST  11[L]  /  ALT  11[L]  /  AlkPhos  52  01-18      Urinalysis Basic - ( 19 Jan 2025 08:26 )    Color: x / Appearance: x / SG: x / pH: x  Gluc: 127 mg/dL / Ketone: x  / Bili: x / Urobili: x   Blood: x / Protein: x / Nitrite: x   Leuk Esterase: x / RBC: x / WBC x   Sq Epi: x / Non Sq Epi: x / Bacteria: x      CAPILLARY BLOOD GLUCOSE            Urinalysis Basic - ( 19 Jan 2025 08:26 )    Color: x / Appearance: x / SG: x / pH: x  Gluc: 127 mg/dL / Ketone: x  / Bili: x / Urobili: x   Blood: x / Protein: x / Nitrite: x   Leuk Esterase: x / RBC: x / WBC x   Sq Epi: x / Non Sq Epi: x / Bacteria: x          RADIOLOGY & ADDITIONAL TESTS:    Imaging Personally Reviewed  Reviewed consultants input

## 2025-01-19 NOTE — PROGRESS NOTE ADULT - ASSESSMENT
84M with HTN, PAD, breast ca, CKD admitted with rt great toe osteomyelitis  MRI noted  IV ABX  podiatry, vascular and ID following  s/p angio  s/p toe amputation     #PAD- on asa and plavix     #n/v diarrhea- GI pcr+ noro virus and ecoli sps  supportive care  GI fu  ID fu- sp zithromax course    CKD/Hypernatremia  nephrology following  kan  avoid nephrotoxins    HTN  continue metoprolol    Breast ca  continue tamoxifen    edema  elevate LE      [facility]   [phone]

## 2025-01-19 NOTE — PROGRESS NOTE ADULT - SUBJECTIVE AND OBJECTIVE BOX
[facility]  Lance Castro MD PhD, Mariah Matias MD, Yen Glez MD, Glenn Barba MD, Samuel More MD  and providing coverage with Graciela Portillo MD  Providing Infectious Disease Consultations at [facility], [facility], [facility], [facility]    Office# [phone] to schedule follow up appointments  Answering Service for urgent calls or New Consults [phone]  Cell# to text for urgent issues Lance Castro [phone]     infectious diseases progress note:    DAYA MATHIS is a 84y y. o. Male patient    Overnight and events of the last 24hrs reviewed    Allergies    No Known Allergies    Intolerances        ANTIBIOTICS/RELEVANT:  antimicrobials  cefepime   IVPB 2000 milliGRAM(s) IV Intermittent every 12 hours    immunologic:  influenza  Vaccine (HIGH DOSE) 0.5 milliLiter(s) IntraMuscular once    OTHER:  ammonium lactate 12% Lotion 1 Application(s) Topical two times a day  aspirin enteric coated 81 milliGRAM(s) Oral daily  bisacodyl 5 milliGRAM(s) Oral at bedtime  clopidogrel Tablet 75 milliGRAM(s) Oral daily  cyanocobalamin 1000 MICROGram(s) Oral daily  escitalopram 10 milliGRAM(s) Oral daily  ferrous    sulfate 325 milliGRAM(s) Oral daily  folic acid 1 milliGRAM(s) Oral daily  lactated ringers. 1000 milliLiter(s) IV Continuous <Continuous>  latanoprost 0.005% Ophthalmic Solution 1 Drop(s) Both EYES at bedtime  magnesium hydroxide Suspension 30 milliLiter(s) Oral daily PRN  melatonin 3 milliGRAM(s) Oral at bedtime PRN  metoprolol succinate ER 50 milliGRAM(s) Oral daily  mupirocin 2% Ointment 1 Application(s) Topical <User Schedule>  polyethylene glycol 3350 17 Gram(s) Oral daily  rosuvastatin 20 milliGRAM(s) Oral at bedtime  tamoxifen 20 milliGRAM(s) Oral daily  timolol 0.5% Solution 1 Drop(s) Both EYES two times a day      Objective:  Vital Signs Last 24 Hrs  T(C): 37.2 (19 Jan 2025 12:32), Max: 37.4 (19 Jan 2025 05:20)  T(F): 98.9 (19 Jan 2025 12:32), Max: 99.4 (19 Jan 2025 05:20)  HR: 58 (19 Jan 2025 12:32) (58 - 81)  BP: 163/71 (19 Jan 2025 12:32) (160/71 - 167/60)  BP(mean): --  RR: 18 (19 Jan 2025 12:32) (18 - 18)  SpO2: 92% (19 Jan 2025 12:32) (92% - 95%)    Parameters below as of 19 Jan 2025 12:32  Patient On (Oxygen Delivery Method): room air        T(C): 37.2 (01-19-25 @ 12:32), Max: 37.4 (01-19-25 @ 05:20)  T(C): 37.2 (01-19-25 @ 12:32), Max: 37.4 (01-19-25 @ 05:20)  T(C): 37.2 (01-19-25 @ 12:32), Max: 37.4 (01-19-25 @ 05:20)    PHYSICAL EXAM:  HEENT: NC atraumatic  Neck: supple  Respiratory: no accessory muscle use, breathing comfortably  Cardiovascular: distant  Gastrointestinal: normal appearing, nondistended  Extremities: no clubbing, no cyanosis,        LABS:                          8.3    10.97 )-----------( 162      ( 19 Jan 2025 08:26 )             26.7       WBC  10.97 01-19 @ 08:26  8.87 01-18 @ 08:10  8.52 01-17 @ 08:45  5.47 01-13 @ 08:00      01-19    140  |  109[H]  |  37[H]  ----------------------------<  127[H]  4.6   |  23  |  1.80[H]    Ca    8.8      19 Jan 2025 08:26  Phos  3.1     01-18  Mg     2.2     01-18    TPro  5.8[L]  /  Alb  2.3[L]  /  TBili  0.3  /  DBili  x   /  AST  11[L]  /  ALT  11[L]  /  AlkPhos  52  01-18      Creatinine: 1.80 mg/dL (01-19-25 @ 08:26)  Creatinine: 1.60 mg/dL (01-18-25 @ 08:10)  Creatinine: 1.70 mg/dL (01-17-25 @ 08:45)  Creatinine: 1.20 mg/dL (01-13-25 @ 08:00)        Urinalysis Basic - ( 19 Jan 2025 08:26 )    Color: x / Appearance: x / SG: x / pH: x  Gluc: 127 mg/dL / Ketone: x  / Bili: x / Urobili: x   Blood: x / Protein: x / Nitrite: x   Leuk Esterase: x / RBC: x / WBC x   Sq Epi: x / Non Sq Epi: x / Bacteria: x            INFLAMMATORY MARKERS      MICROBIOLOGY:    Culture - Tissue with Gram Stain (01.15.25 @ 16:09)    Gram Stain:   No polymorphonuclear cells seen per low power field  No organisms seen per oil power field   Specimen Source: Tissue   Culture Results:   Rare Staphylococcus epidermidis  See previous culture [id number]    Culture - Tissue with Gram Stain (01.15.25 @ 16:09)    -  Clindamycin: S <=0.25   -  Rifampin: S <=1 Should not be used as monotherapy   -  Tetracycline: S <=4   -  Trimethoprim/Sulfamethoxazole: S <=0.5/9.5   -  Vancomycin: S 1   Gram Stain:   No polymorphonuclear cells seen per low power field  No organisms seen per oil power field   -  Oxacillin: R >2   -  Penicillin: R >2   -  Erythromycin: R >4   -  Gentamicin: S <=4 Should not be used as monotherapy   Specimen Source: Tissue   Culture Results:   Rare Staphylococcus epidermidis   Organism Identification: Staphylococcus epidermidis   Organism: Staphylococcus epidermidis   Method Type: DANIEL          RADIOLOGY & ADDITIONAL STUDIES:

## 2025-01-20 LAB
-  CLINDAMYCIN: SIGNIFICANT CHANGE UP
-  ERYTHROMYCIN: SIGNIFICANT CHANGE UP
-  GENTAMICIN: SIGNIFICANT CHANGE UP
-  OXACILLIN: SIGNIFICANT CHANGE UP
-  PENICILLIN: SIGNIFICANT CHANGE UP
-  RIFAMPIN: SIGNIFICANT CHANGE UP
-  TETRACYCLINE: SIGNIFICANT CHANGE UP
-  TRIMETHOPRIM/SULFAMETHOXAZOLE: SIGNIFICANT CHANGE UP
-  VANCOMYCIN: SIGNIFICANT CHANGE UP
CULTURE RESULTS: ABNORMAL
GRAM STN FLD: ABNORMAL
METHOD TYPE: SIGNIFICANT CHANGE UP
ORGANISM # SPEC MICROSCOPIC CNT: ABNORMAL
ORGANISM # SPEC MICROSCOPIC CNT: ABNORMAL
ORGANISM # SPEC MICROSCOPIC CNT: SIGNIFICANT CHANGE UP
ORGANISM # SPEC MICROSCOPIC CNT: SIGNIFICANT CHANGE UP
SPECIMEN SOURCE: SIGNIFICANT CHANGE UP

## 2025-01-20 RX ADMIN — TIMOLOL MALEATE 1 DROP(S): 5 SOLUTION OPHTHALMIC at 05:40

## 2025-01-20 RX ADMIN — ASPIRIN 81 MILLIGRAM(S): 81 TABLET, COATED ORAL at 11:51

## 2025-01-20 RX ADMIN — Medication 50 MILLIGRAM(S): at 05:40

## 2025-01-20 RX ADMIN — ESCITALOPRAM 10 MILLIGRAM(S): 10 TABLET, FILM COATED ORAL at 11:51

## 2025-01-20 RX ADMIN — TIMOLOL MALEATE 1 DROP(S): 5 SOLUTION OPHTHALMIC at 17:31

## 2025-01-20 RX ADMIN — Medication 1 APPLICATION(S): at 17:31

## 2025-01-20 RX ADMIN — Medication 75 MILLIGRAM(S): at 11:51

## 2025-01-20 RX ADMIN — Medication 1 APPLICATION(S): at 05:39

## 2025-01-20 RX ADMIN — LATANOPROST 1 DROP(S): 50 SOLUTION OPHTHALMIC at 22:17

## 2025-01-20 RX ADMIN — Medication 1000 MICROGRAM(S): at 11:51

## 2025-01-20 RX ADMIN — Medication 5 MILLIGRAM(S): at 21:38

## 2025-01-20 RX ADMIN — POLYETHYLENE GLYCOL 3350 17 GRAM(S): 17 POWDER, FOR SOLUTION ORAL at 11:52

## 2025-01-20 RX ADMIN — ROSUVASTATIN CALCIUM 20 MILLIGRAM(S): 10 TABLET, FILM COATED ORAL at 21:38

## 2025-01-20 RX ADMIN — Medication 1 MILLIGRAM(S): at 11:51

## 2025-01-20 RX ADMIN — Medication 325 MILLIGRAM(S): at 11:51

## 2025-01-20 RX ADMIN — TAMOXIFEN CITRATE 20 MILLIGRAM(S): 10 TABLET, FILM COATED ORAL at 11:51

## 2025-01-20 RX ADMIN — VANCOMYCIN HYDROCHLORIDE 250 MILLIGRAM(S): KIT at 21:38

## 2025-01-20 NOTE — PROGRESS NOTE ADULT - ASSESSMENT
The patient is an 84 year old male with a history of HTN, anemia, GERD, aortic regurgitation, CKD, breast cancer, PAD who is admitted with toe infection.    Plan:  - ECG with sinus rhythm and RBBB  - Echo with normal LV systolic function, mild aortic stenosis, mild pulmonary hypertension  - Monitor hemoglobin  - CKD - Cr remains stable in the 1.4-1.7 range  - Continue metoprolol succinate 50 mg daily  - Continue rosuvastatin 20 mg daily  - Continue clopidogrel 75 mg daily  - Continue aspirin 81 mg daily  - Vomiting - norovirus positive - resolved  - Podiatry follow-up  - Discharge planning

## 2025-01-20 NOTE — CASE MANAGEMENT PROGRESS NOTE - NSCMPROGRESSNOTE_GEN_ALL_CORE
Discussed patient with Dr. Avery this AM. Patient remains acute on Vanco IV while awaiting OR path results to determine antibiotic plan. s/p toe amput on 1/15. CM met with patient at bedside who chooses [facility]. Referral updated. CM will continue to follow.

## 2025-01-20 NOTE — PROGRESS NOTE ADULT - SUBJECTIVE AND OBJECTIVE BOX
[facility]  Sandeep Leos PA-C  [address] [place] [address]  [phone]      INTERVAL HPI/OVERNIGHT EVENTS:  Pt s/e  No further diarrhea    MEDICATIONS  (STANDING):  ammonium lactate 12% Lotion 1 Application(s) Topical two times a day  aspirin enteric coated 81 milliGRAM(s) Oral daily  bisacodyl 5 milliGRAM(s) Oral at bedtime  clopidogrel Tablet 75 milliGRAM(s) Oral daily  cyanocobalamin 1000 MICROGram(s) Oral daily  escitalopram 10 milliGRAM(s) Oral daily  ferrous    sulfate 325 milliGRAM(s) Oral daily  folic acid 1 milliGRAM(s) Oral daily  influenza  Vaccine (HIGH DOSE) 0.5 milliLiter(s) IntraMuscular once  lactated ringers. 1000 milliLiter(s) (60 mL/Hr) IV Continuous <Continuous>  latanoprost 0.005% Ophthalmic Solution 1 Drop(s) Both EYES at bedtime  metoprolol succinate ER 50 milliGRAM(s) Oral daily  mupirocin 2% Ointment 1 Application(s) Topical <User Schedule>  polyethylene glycol 3350 17 Gram(s) Oral daily  rosuvastatin 20 milliGRAM(s) Oral at bedtime  tamoxifen 20 milliGRAM(s) Oral daily  timolol 0.5% Solution 1 Drop(s) Both EYES two times a day  vancomycin  IVPB 1000 milliGRAM(s) IV Intermittent every 24 hours    MEDICATIONS  (PRN):  magnesium hydroxide Suspension 30 milliLiter(s) Oral daily PRN Constipation  melatonin 3 milliGRAM(s) Oral at bedtime PRN Insomnia      Allergies    No Known Allergies      PHYSICAL EXAM:   Vital Signs:  Vital Signs Last 24 Hrs  T(C): 37.2 (2025 05:00), Max: 37.2 (2025 12:32)  T(F): 99 (2025 05:00), Max: 99 (2025 05:00)  HR: 62 (2025 05:45) (57 - 62)  BP: 142/57 (2025 05:00) (134/53 - 163/71)  BP(mean): --  RR: 18 (2025 05:00) (18 - 18)  SpO2: 94% (2025 05:00) (92% - 96%)    Parameters below as of 2025 05:00  Patient On (Oxygen Delivery Method): nasal cannula  O2 Flow (L/min): 2    Daily     Daily Weight in k.8 (2025 05:00)    GENERAL:  Appears stated age  HEENT:  NC/AT  CHEST:  Full & symmetric excursion  HEART:  Regular rhythm  ABDOMEN:  Soft, non-tender, non-distended  EXTEREMITIES:  no cyanosis  SKIN:  No rash  NEURO:  Alert      LABS:                        8.3    10.97 )-----------( 162      ( 2025 08:26 )             26.7     01-    140  |  109[H]  |  37[H]  ----------------------------<  127[H]  4.6   |  23  |  1.80[H]    Ca    8.8      2025 08:26        Urinalysis Basic - ( 2025 08:26 )    Color: x / Appearance: x / SG: x / pH: x  Gluc: 127 mg/dL / Ketone: x  / Bili: x / Urobili: x   Blood: x / Protein: x / Nitrite: x   Leuk Esterase: x / RBC: x / WBC x   Sq Epi: x / Non Sq Epi: x / Bacteria: x

## 2025-01-20 NOTE — PROGRESS NOTE ADULT - SUBJECTIVE AND OBJECTIVE BOX
Patient is a 84y old  Male who presents with a chief complaint of Osteomyelitis (20 Jan 2025 16:56)        INTERVAL HPI/OVERNIGHT EVENTS:   no complaints  pt seen and examined         Vital Signs Last 24 Hrs  T(C): 36.8 (20 Jan 2025 14:40), Max: 37.2 (20 Jan 2025 05:00)  T(F): 98.2 (20 Jan 2025 14:40), Max: 99 (20 Jan 2025 05:00)  HR: 65 (20 Jan 2025 14:40) (57 - 65)  BP: 149/50 (20 Jan 2025 14:40) (134/53 - 149/50)  BP(mean): --  RR: 18 (20 Jan 2025 14:40) (18 - 18)  SpO2: 94% (20 Jan 2025 14:40) (94% - 96%)    Parameters below as of 20 Jan 2025 14:40  Patient On (Oxygen Delivery Method): room air        ammonium lactate 12% Lotion 1 Application(s) Topical two times a day  aspirin enteric coated 81 milliGRAM(s) Oral daily  bisacodyl 5 milliGRAM(s) Oral at bedtime  clopidogrel Tablet 75 milliGRAM(s) Oral daily  cyanocobalamin 1000 MICROGram(s) Oral daily  escitalopram 10 milliGRAM(s) Oral daily  ferrous    sulfate 325 milliGRAM(s) Oral daily  folic acid 1 milliGRAM(s) Oral daily  influenza  Vaccine (HIGH DOSE) 0.5 milliLiter(s) IntraMuscular once  lactated ringers. 1000 milliLiter(s) IV Continuous <Continuous>  latanoprost 0.005% Ophthalmic Solution 1 Drop(s) Both EYES at bedtime  magnesium hydroxide Suspension 30 milliLiter(s) Oral daily PRN  melatonin 3 milliGRAM(s) Oral at bedtime PRN  metoprolol succinate ER 50 milliGRAM(s) Oral daily  mupirocin 2% Ointment 1 Application(s) Topical <User Schedule>  polyethylene glycol 3350 17 Gram(s) Oral daily  rosuvastatin 20 milliGRAM(s) Oral at bedtime  tamoxifen 20 milliGRAM(s) Oral daily  timolol 0.5% Solution 1 Drop(s) Both EYES two times a day  vancomycin  IVPB 1000 milliGRAM(s) IV Intermittent every 24 hours      PHYSICAL EXAM:  GENERAL: NAD   EYES: conjunctiva and sclera clear  ENMT: Moist mucous membranes  NECK: Supple, No JVD, Normal thyroid  CHEST/LUNG: non labored, cta b/l  HEART: Regular rate and rhythm; No murmurs, rubs, or gallops  ABDOMEN: Soft, Nontender, Nondistended; Bowel sounds present  EXTREMITIES:  No clubbing, no cyanosis, no edema  LYMPH: No lymphadenopathy noted  SKIN: No rashes or lesions  NEURO: no new focal deficits    Consultant(s) Notes Reviewed:  [x ] YES  [ ] NO  Care Discussed with Consultants/Other Providers [ x] YES  [ ] NO    LABS:                        8.3    10.97 )-----------( 162      ( 19 Jan 2025 08:26 )             26.7     01-19    140  |  109[H]  |  37[H]  ----------------------------<  127[H]  4.6   |  23  |  1.80[H]    Ca    8.8      19 Jan 2025 08:26        Urinalysis Basic - ( 19 Jan 2025 08:26 )    Color: x / Appearance: x / SG: x / pH: x  Gluc: 127 mg/dL / Ketone: x  / Bili: x / Urobili: x   Blood: x / Protein: x / Nitrite: x   Leuk Esterase: x / RBC: x / WBC x   Sq Epi: x / Non Sq Epi: x / Bacteria: x      CAPILLARY BLOOD GLUCOSE            Urinalysis Basic - ( 19 Jan 2025 08:26 )    Color: x / Appearance: x / SG: x / pH: x  Gluc: 127 mg/dL / Ketone: x  / Bili: x / Urobili: x   Blood: x / Protein: x / Nitrite: x   Leuk Esterase: x / RBC: x / WBC x   Sq Epi: x / Non Sq Epi: x / Bacteria: x          RADIOLOGY & ADDITIONAL TESTS:    Imaging Personally Reviewed  Reviewed consultants input

## 2025-01-20 NOTE — PROGRESS NOTE ADULT - SUBJECTIVE AND OBJECTIVE BOX
[facility]  Lance Castro MD PhD, Mariah Matias MD, Yen Glez MD, Glenn Barba MD, Samuel More MD  and providing coverage with Graciela Portillo MD  Providing Infectious Disease Consultations at [facility], [facility], [facility]'s    Office# [phone] to schedule follow up appointments  Answering Service for urgent calls or New Consults [phone]  Cell# to text for urgent issues Lance Castro [phone]     infectious diseases progress note:    DAYA MATHIS is a 84y y. o. Male patient    Overnight and events of the last 24hrs reviewed    Allergies    No Known Allergies    Intolerances        ANTIBIOTICS/RELEVANT:  antimicrobials  vancomycin  IVPB 1000 milliGRAM(s) IV Intermittent every 24 hours    immunologic:  influenza  Vaccine (HIGH DOSE) 0.5 milliLiter(s) IntraMuscular once    OTHER:  ammonium lactate 12% Lotion 1 Application(s) Topical two times a day  aspirin enteric coated 81 milliGRAM(s) Oral daily  bisacodyl 5 milliGRAM(s) Oral at bedtime  clopidogrel Tablet 75 milliGRAM(s) Oral daily  cyanocobalamin 1000 MICROGram(s) Oral daily  escitalopram 10 milliGRAM(s) Oral daily  ferrous    sulfate 325 milliGRAM(s) Oral daily  folic acid 1 milliGRAM(s) Oral daily  lactated ringers. 1000 milliLiter(s) IV Continuous <Continuous>  latanoprost 0.005% Ophthalmic Solution 1 Drop(s) Both EYES at bedtime  magnesium hydroxide Suspension 30 milliLiter(s) Oral daily PRN  melatonin 3 milliGRAM(s) Oral at bedtime PRN  metoprolol succinate ER 50 milliGRAM(s) Oral daily  mupirocin 2% Ointment 1 Application(s) Topical <User Schedule>  polyethylene glycol 3350 17 Gram(s) Oral daily  rosuvastatin 20 milliGRAM(s) Oral at bedtime  tamoxifen 20 milliGRAM(s) Oral daily  timolol 0.5% Solution 1 Drop(s) Both EYES two times a day      Objective:  Vital Signs Last 24 Hrs  T(C): 37.2 (20 Jan 2025 05:00), Max: 37.2 (19 Jan 2025 12:32)  T(F): 99 (20 Jan 2025 05:00), Max: 99 (20 Jan 2025 05:00)  HR: 62 (20 Jan 2025 05:45) (57 - 62)  BP: 142/57 (20 Jan 2025 05:00) (134/53 - 163/71)  BP(mean): --  RR: 18 (20 Jan 2025 05:00) (18 - 18)  SpO2: 94% (20 Jan 2025 05:00) (92% - 96%)    Parameters below as of 20 Jan 2025 05:00  Patient On (Oxygen Delivery Method): nasal cannula  O2 Flow (L/min): 2      T(C): 37.2 (01-20-25 @ 05:00), Max: 37.4 (01-19-25 @ 05:20)  T(C): 37.2 (01-20-25 @ 05:00), Max: 37.4 (01-19-25 @ 05:20)  T(C): 37.2 (01-20-25 @ 05:00), Max: 37.4 (01-19-25 @ 05:20)    PHYSICAL EXAM:  HEENT: NC atraumatic  Neck: supple  Respiratory: no accessory muscle use, breathing comfortably  Cardiovascular: distant  Gastrointestinal: normal appearing, nondistended  Extremities: no clubbing, no cyanosis,        LABS:                          8.3    10.97 )-----------( 162      ( 19 Jan 2025 08:26 )             26.7       WBC  10.97 01-19 @ 08:26  8.87 01-18 @ 08:10  8.52 01-17 @ 08:45      01-19    140  |  109[H]  |  37[H]  ----------------------------<  127[H]  4.6   |  23  |  1.80[H]    Ca    8.8      19 Jan 2025 08:26        Creatinine: 1.80 mg/dL (01-19-25 @ 08:26)  Creatinine: 1.60 mg/dL (01-18-25 @ 08:10)  Creatinine: 1.70 mg/dL (01-17-25 @ 08:45)        Urinalysis Basic - ( 19 Jan 2025 08:26 )    Color: x / Appearance: x / SG: x / pH: x  Gluc: 127 mg/dL / Ketone: x  / Bili: x / Urobili: x   Blood: x / Protein: x / Nitrite: x   Leuk Esterase: x / RBC: x / WBC x   Sq Epi: x / Non Sq Epi: x / Bacteria: x            INFLAMMATORY MARKERS      MICROBIOLOGY:              RADIOLOGY & ADDITIONAL STUDIES:

## 2025-01-20 NOTE — PROGRESS NOTE ADULT - ASSESSMENT
84M with HTN, PAD, breast ca, CKD admitted with rt great toe osteomyelitis  MRI noted  IV ABX  podiatry, vascular and ID following  s/p angio  s/p toe amputation   path pending    #PAD- on asa and plavix     #n/v diarrhea- GI pcr+ noro virus and ecoli sps  supportive care  GI fu  ID fu- sp zithromax course    CKD/Hypernatremia  nephrology following  kan  avoid nephrotoxins    HTN  continue metoprolol    Breast ca  continue tamoxifen    edema  elevate LE      [facility]   [phone]

## 2025-01-20 NOTE — PROGRESS NOTE ADULT - ASSESSMENT
85yo M with L sided breast cancer (s/p lumpectomy, on Tamoxifen), HTN, Anemia, Aortic insufficiency, Carotid artery disease, GERD, Glaucoma, Hepatitis (2014), Lower ext edema ,PVD, who presented to the ED from Dr. Niko Jacques's office for a R toe infection. MRI concerning for osteomyelitis of R 1st toe. He has no fevers and no leukocytosis. Skin changes on lower legs appear concerning for venous stasis. Podiatry following.     R 1st toe ulcer w/ underlying osteomyelitis  - afebrile, no leukocytosis  - BCx NGTD  - Culture - Wound Aerobic (collected 01-02-25 @ 18:30)    Few Pseudomonas aeruginosa, Rare Serratia marcescens  - 1/6 s/p RLE angio  - 1/15 Amputation, toe, 1 toe 15-Marlo-2025 16:29:17  Herman Garcia  Culture - Tissue with Gram Stain (01.15.25 @ 16:09) Rare Staphylococcus epidermidis  -  Oxacillin: R >2, -  Vancomycin: S 1, -  Clindamycin: S <=0.25-  Rifampin: S <=1    -  Tetracycline: S <=4 -  Trimethoprim/Sulfamethoxazole: S <=0.5/9.5  F/u path-in LAB  CEFEPIME-->changed to Vanco 1/19 but noted oral options if path is clear    Diarrhea  GI PCR + Norovirus, EAEC  Stool cx negative  completed azithromycin 1/11    Hematuria  - pt reporting blood in urine 1/15    Thank you for consulting us and involving us in the management of this most interesting and challenging case.  In addition to reviewing history, imaging, documents, labs, microbiology, took into account antibiotic stewardship, local antibiogram and infection control strategies and potential transmission issues.    We will follow along in the care of this patient. Please contact me by texting me directly on my cell# at [phone] using TEAMS or call our answering service at [phone] with any concerns.    Starting tomorrow Dr Yen Glez will be assuming care of this patient so please contact her with any questions, concerns or new micro data.

## 2025-01-20 NOTE — PROGRESS NOTE ADULT - SUBJECTIVE AND OBJECTIVE BOX
PROGRESS NOTE   Patient is a 84y old  Male who presents with a chief complaint of Osteomyelitis (19 Jan 2025 18:45)      HPI:  Patient is an 85yo M with a PMH of L sided breast cancer (s/p lumpectomy, on Tamoxifen), HTN, Anemia, Aortic insufficiency, Carotid artery disease, GERD, Glaucoma, Hepatitis (2014), Lower ext edema ,PVD who presents to the ED from Dr. Niko Jacques's office for a R toe infection. Patient notes that he noticed a R toe wound a few weeks ago. He completed a 10d course of Amoxicillin 500mg, but noticed worsening of the wound. He went ot his podiatrist today, as the wound was draining a pus like liquid. Podiatry rec he come to the hospital for IV abx and osteomyelitis workup. Patient feeling well in ED.    ED Course:  Vitals: /65, HR 71, T 97.3, RR 16 SpO2 100% on RA  Labs: ESR 75, Hgb 9.2, Na 148, Cl 117, BUN/Cr 35/1.70, eGFR 39  Given: IV Zosyn, IV Vancomycin, 1L NS bolus     Imaging:  Chest Xray: No acute chest finding.   R foot Xray: Possible erosion of the distal phalanx of the right great toe with associated soft tissue swelling suspicious for osteomyelitis. (02 Jan 2025 19:34)      Vital Signs Last 24 Hrs  T(C): 37.2 (20 Jan 2025 05:00), Max: 37.2 (19 Jan 2025 12:32)  T(F): 99 (20 Jan 2025 05:00), Max: 99 (20 Jan 2025 05:00)  HR: 62 (20 Jan 2025 05:45) (57 - 62)  BP: 142/57 (20 Jan 2025 05:00) (134/53 - 163/71)  BP(mean): --  RR: 18 (20 Jan 2025 05:00) (18 - 18)  SpO2: 94% (20 Jan 2025 05:00) (92% - 96%)    Parameters below as of 20 Jan 2025 05:00  Patient On (Oxygen Delivery Method): nasal cannula  O2 Flow (L/min): 2                            8.3    10.97 )-----------( 162      ( 19 Jan 2025 08:26 )             26.7               01-19    140  |  109[H]  |  37[H]  ----------------------------<  127[H]  4.6   |  23  |  1.80[H]    Ca    8.8      19 Jan 2025 08:26        PHYSICAL EXAM  General: Pleasant  male NAD & AOX3.    Integument:  Skin warm, dry and supple bilateral.    Right foot s/p  surgical Debridement of all non-viable soft tissue and bone, debridement of deep space infection with right hallux amputation with flap coverage. POD#5 . Dressing clean, dry and intact with controlled hemostasis at this time. The sutures are intact and the flap is viable at this time.  There is decreased erythema, edema and calor noted. Sterile dressing change performed today. The region of concern is respoinding favorable to the current care plan.   Vascular: Dorsalis Pedis and Posterior Tibial pulses non-palpable.  Capillary re-fill time less then 3 seconds digits 1-5 bilateral.  noted b/l lower leg edema, noted venous stasis b/l lower extremity  Neuro: Protective sensation intact to the level of the digits bilateral.  MSK: Muscle strength 4/5 all major muscle groups bilateral.     PROGRESS NOTE   Patient is a 84y old  Male who presents with a chief complaint of Osteomyelitis (19 Jan 2025 18:45)      HPI:  Patient is an 83yo M with a PMH of L sided breast cancer (s/p lumpectomy, on Tamoxifen), HTN, Anemia, Aortic insufficiency, Carotid artery disease, GERD, Glaucoma, Hepatitis (2014), Lower ext edema ,PVD who presents to the ED from Dr. Niko Jacques's office for a R toe infection. Patient notes that he noticed a R toe wound a few weeks ago. He completed a 10d course of Amoxicillin 500mg, but noticed worsening of the wound. He went ot his podiatrist today, as the wound was draining a pus like liquid. Podiatry rec he come to the hospital for IV abx and osteomyelitis workup. Patient feeling well in ED.    ED Course:  Vitals: /65, HR 71, T 97.3, RR 16 SpO2 100% on RA  Labs: ESR 75, Hgb 9.2, Na 148, Cl 117, BUN/Cr 35/1.70, eGFR 39  Given: IV Zosyn, IV Vancomycin, 1L NS bolus     Imaging:  Chest Xray: No acute chest finding.   R foot Xray: Possible erosion of the distal phalanx of the right great toe with associated soft tissue swelling suspicious for osteomyelitis. (02 Jan 2025 19:34)      Vital Signs Last 24 Hrs  T(C): 37.2 (20 Jan 2025 05:00), Max: 37.2 (19 Jan 2025 12:32)  T(F): 99 (20 Jan 2025 05:00), Max: 99 (20 Jan 2025 05:00)  HR: 62 (20 Jan 2025 05:45) (57 - 62)  BP: 142/57 (20 Jan 2025 05:00) (134/53 - 163/71)  BP(mean): --  RR: 18 (20 Jan 2025 05:00) (18 - 18)  SpO2: 94% (20 Jan 2025 05:00) (92% - 96%)    Parameters below as of 20 Jan 2025 05:00  Patient On (Oxygen Delivery Method): nasal cannula  O2 Flow (L/min): 2                            8.3    10.97 )-----------( 162      ( 19 Jan 2025 08:26 )             26.7               01-19    140  |  109[H]  |  37[H]  ----------------------------<  127[H]  4.6   |  23  |  1.80[H]    Ca    8.8      19 Jan 2025 08:26        PHYSICAL EXAM  General: Pleasant  male NAD & AOX3.    Integument:  Skin warm, dry and supple bilateral.    Right foot s/p  surgical Debridement of all non-viable soft tissue and bone, debridement of deep space infection with right hallux amputation with flap coverage. POD#5 . Dressing clean, dry and intact with controlled hemostasis at this time. The sutures are intact and the flap is viable at this time.  There is decreased erythema, edema and calor noted. Sterile dressing change performed today. The region of concern is responding favorable to the current care plan.   Vascular: Dorsalis Pedis and Posterior Tibial pulses non-palpable.  Capillary re-fill time less then 3 seconds digits 1-5 bilateral.  noted b/l lower leg edema, noted venous stasis b/l lower extremity  Neuro: Protective sensation intact to the level of the digits bilateral.  MSK: Muscle strength 4/5 all major muscle groups bilateral.

## 2025-01-20 NOTE — PROGRESS NOTE ADULT - SUBJECTIVE AND OBJECTIVE BOX
[facility]                             Nephrology and Hypertension                             Niles Montano                                          [phone]     Patient is a 84y old  Male who presents with a chief complaint of Osteomyelitis (03 Jan 2025 13:05)       HPI:  Patient is an 85yo M with a PMH of L sided breast cancer (s/p lumpectomy, on Tamoxifen), HTN, Anemia, Aortic insufficiency, Carotid artery disease, GERD, Glaucoma, Hepatitis (2014), Lower ext edema ,PVD who presents to the ED from Dr. Niko Jacques's office for a R toe infection. Patient notes that he noticed a R toe wound a few weeks ago. He completed a 10d course of Amoxicillin 500mg, but noticed worsening of the wound. He went ot his podiatrist today, as the wound was draining a pus like liquid. Podiatry rec he come to the hospital for IV abx and osteomyelitis workup. Patient feeling well in ED.  States he is urinating well.  No N/V/SOB.  Has not seen nephrologist in the past.      Seen and examined at bedside, did not have any new complaints, no sob, no pain, no dizziness. Eating better and feeling stronger    PAST MEDICAL & SURGICAL HISTORY:  Anemia      HTN (hypertension)      Breast cancer      Glaucoma      Major depression      PVD (peripheral vascular disease)      Lower extremity edema      Chronic GERD      H/O: glaucoma      S/P lumpectomy, left breast           FAMILY HISTORY:  NC    Social History:Non smoker    MEDICATIONS  (STANDING):  ammonium lactate 12% Lotion 1 Application(s) Topical two times a day  aspirin enteric coated 81 milliGRAM(s) Oral daily  cefepime   IVPB 2000 milliGRAM(s) IV Intermittent every 12 hours  clopidogrel Tablet 75 milliGRAM(s) Oral daily  cyanocobalamin 1000 MICROGram(s) Oral daily  dextrose 5% + sodium chloride 0.45%. 1000 milliLiter(s) (75 mL/Hr) IV Continuous <Continuous>  dextrose 5%. 1000 milliLiter(s) (75 mL/Hr) IV Continuous <Continuous>  escitalopram 10 milliGRAM(s) Oral daily  folic acid 1 milliGRAM(s) Oral daily  influenza  Vaccine (HIGH DOSE) 0.5 milliLiter(s) IntraMuscular once  latanoprost 0.005% Ophthalmic Solution 1 Drop(s) Both EYES at bedtime  metoprolol succinate ER 50 milliGRAM(s) Oral daily  mupirocin 2% Ointment 1 Application(s) Topical <User Schedule>  rosuvastatin 20 milliGRAM(s) Oral at bedtime  timolol 0.5% Solution 1 Drop(s) Both EYES two times a day    MEDICATIONS  (PRN):  HYDROmorphone  Injectable 0.2 milliGRAM(s) IV Push every 6 hours PRN Moderate Pain (4 - 6)  HYDROmorphone  Injectable 0.5 milliGRAM(s) IV Push every 6 hours PRN Severe Pain (7 - 10)  melatonin 3 milliGRAM(s) Oral at bedtime PRN Insomnia  trimethobenzamide Injectable 200 milliGRAM(s) IntraMuscular every 8 hours PRN Nausea      Allergies    No Known Allergies    Intolerances         REVIEW OF SYSTEMS:    as above    ICU Vital Signs Last 24 Hrs  T(C): 36.8 (20 Jan 2025 14:40), Max: 37.2 (20 Jan 2025 05:00)  T(F): 98.2 (20 Jan 2025 14:40), Max: 99 (20 Jan 2025 05:00)  HR: 65 (20 Jan 2025 14:40) (57 - 65)  BP: 149/50 (20 Jan 2025 14:40) (134/53 - 149/50)  BP(mean): --  ABP: --  ABP(mean): --  RR: 18 (20 Jan 2025 14:40) (18 - 18)  SpO2: 94% (20 Jan 2025 14:40) (94% - 96%)    O2 Parameters below as of 20 Jan 2025 14:40  Patient On (Oxygen Delivery Method): room air              PHYSICAL EXAM:    GENERAL: NAD  HEAD:  Atraumatic, Normocephalic  EYES: EOMI, conjunctiva and sclera clear  ENMT: No Drainage from nares, No drainage from ears  NERVOUS SYSTEM:  Awake and Alert  CHEST/LUNG: Clear to percussion bilaterally  EXTREMITIES:  No Edema, RLE dressing intact with trace edema  SKIN: No rashes No obvious ecchymosis      LABS:                                            8.3    10.97 )-----------( 162      ( 19 Jan 2025 08:26 )             26.7     01-19    140  |  109[H]  |  37[H]  ----------------------------<  127[H]  4.6   |  23  |  1.80[H]    Ca    8.8      19 Jan 2025 08:26        Urinalysis Basic - ( 19 Jan 2025 08:26 )    Color: x / Appearance: x / SG: x / pH: x  Gluc: 127 mg/dL / Ketone: x  / Bili: x / Urobili: x   Blood: x / Protein: x / Nitrite: x   Leuk Esterase: x / RBC: x / WBC x   Sq Epi: x / Non Sq Epi: x / Bacteria: x

## 2025-01-20 NOTE — PROGRESS NOTE ADULT - SUBJECTIVE AND OBJECTIVE BOX
Chief Complaint: Toe infection    Interval Events: No events overnight. Sleeping.    Review of Systems:  General: No fevers, chills, weight gain  Skin: No rashes, color changes  Cardiovascular: No chest pain, orthopnea  Respiratory: No shortness of breath, cough  Gastrointestinal: No nausea, abdominal pain  Genitourinary: No incontinence, pain with urination  Musculoskeletal: No pain, swelling, decreased range of motion  Neurological: No headache, weakness  Psychiatric: No depression, anxiety  Endocrine: No weight gain, increased thirst  All other systems are comprehensively negative.    Physical Exam:  Vital Signs Last 24 Hrs  T(C): 37.2 (20 Jan 2025 05:00), Max: 37.2 (19 Jan 2025 12:32)  T(F): 99 (20 Jan 2025 05:00), Max: 99 (20 Jan 2025 05:00)  HR: 62 (20 Jan 2025 05:45) (57 - 62)  BP: 142/57 (20 Jan 2025 05:00) (134/53 - 163/71)  BP(mean): --  RR: 18 (20 Jan 2025 05:00) (18 - 18)  SpO2: 94% (20 Jan 2025 05:00) (92% - 96%)  Parameters below as of 20 Jan 2025 05:00  Patient On (Oxygen Delivery Method): nasal cannula  O2 Flow (L/min): 2  General: NAD  HEENT: MMM  Neck: No JVD, no carotid bruit  Lungs: CTAB  CV: RRR, nl S1/S2, no M/R/G  Abdomen: S/NT/ND, +BS  Extremities: No LE edema, no cyanosis  Neuro: AAOx3, non-focal  Skin: No rash    Labs:    01-19    140  |  109[H]  |  37[H]  ----------------------------<  127[H]  4.6   |  23  |  1.80[H]    Ca    8.8      19 Jan 2025 08:26                          8.3    10.97 )-----------( 162      ( 19 Jan 2025 08:26 )             26.7       ECG/Telemetry: Sinus rhythm

## 2025-01-20 NOTE — PROGRESS NOTE ADULT - ASSESSMENT
STEPHANY on CKD Stage 3  Hypernatremia  Renal Mass  HTN  PAD  Vomiting  Anemia  OM      -STEPHANY Likely 2/2 Decreased EABV, SCr down as of last labs   -Hypernatremia is resolved, s/p D5W, no labs recently, check bmp in am  -Renal US with solid mass,  will need CT or MRI, at this time favor MRI with elevated creatinine, should see Urology for eval, can see outpatient-he is aware or mass and need for outpatient follow up  -S/p LE Angio; no evidence of VLADIMIR at this time  -GI eval noted  -S/P amputation of the right great toe with bone biopsy of the 1st metatarsal head on 1/15.  -Gets TEA per heme, is asking about this, will defer to heme since he is on active po chemo and it is given by their service outpatient  -Creatinine labile

## 2025-01-21 LAB
ANION GAP SERPL CALC-SCNC: 4 MMOL/L — LOW (ref 5–17)
BUN SERPL-MCNC: 41 MG/DL — HIGH (ref 7–23)
CALCIUM SERPL-MCNC: 8.6 MG/DL — SIGNIFICANT CHANGE UP (ref 8.5–10.1)
CHLORIDE SERPL-SCNC: 110 MMOL/L — HIGH (ref 96–108)
CO2 SERPL-SCNC: 24 MMOL/L — SIGNIFICANT CHANGE UP (ref 22–31)
CREAT SERPL-MCNC: 1.7 MG/DL — HIGH (ref 0.5–1.3)
CULTURE RESULTS: ABNORMAL
EGFR: 39 ML/MIN/1.73M2 — LOW
GLUCOSE SERPL-MCNC: 104 MG/DL — HIGH (ref 70–99)
HCT VFR BLD CALC: 24.8 % — LOW (ref 39–50)
HGB BLD-MCNC: 8 G/DL — LOW (ref 13–17)
MAGNESIUM SERPL-MCNC: 2.5 MG/DL — SIGNIFICANT CHANGE UP (ref 1.6–2.6)
MCHC RBC-ENTMCNC: 29.5 PG — SIGNIFICANT CHANGE UP (ref 27–34)
MCHC RBC-ENTMCNC: 32.3 G/DL — SIGNIFICANT CHANGE UP (ref 32–36)
MCV RBC AUTO: 91.5 FL — SIGNIFICANT CHANGE UP (ref 80–100)
NRBC # BLD: 0 /100 WBCS — SIGNIFICANT CHANGE UP (ref 0–0)
NRBC BLD-RTO: 0 /100 WBCS — SIGNIFICANT CHANGE UP (ref 0–0)
PHOSPHATE SERPL-MCNC: 3.4 MG/DL — SIGNIFICANT CHANGE UP (ref 2.5–4.5)
PLATELET # BLD AUTO: 159 K/UL — SIGNIFICANT CHANGE UP (ref 150–400)
POTASSIUM SERPL-MCNC: 4.1 MMOL/L — SIGNIFICANT CHANGE UP (ref 3.5–5.3)
POTASSIUM SERPL-SCNC: 4.1 MMOL/L — SIGNIFICANT CHANGE UP (ref 3.5–5.3)
RBC # BLD: 2.71 M/UL — LOW (ref 4.2–5.8)
RBC # FLD: 15.4 % — HIGH (ref 10.3–14.5)
SODIUM SERPL-SCNC: 138 MMOL/L — SIGNIFICANT CHANGE UP (ref 135–145)
SPECIMEN SOURCE: SIGNIFICANT CHANGE UP
VANCOMYCIN TROUGH SERPL-MCNC: 12.5 UG/ML — SIGNIFICANT CHANGE UP (ref 10–20)
WBC # BLD: 8.38 K/UL — SIGNIFICANT CHANGE UP (ref 3.8–10.5)
WBC # FLD AUTO: 8.38 K/UL — SIGNIFICANT CHANGE UP (ref 3.8–10.5)

## 2025-01-21 RX ADMIN — Medication 50 MILLIGRAM(S): at 05:32

## 2025-01-21 RX ADMIN — Medication 1 APPLICATION(S): at 05:32

## 2025-01-21 RX ADMIN — TIMOLOL MALEATE 1 DROP(S): 5 SOLUTION OPHTHALMIC at 05:33

## 2025-01-21 RX ADMIN — Medication 75 MILLIGRAM(S): at 11:24

## 2025-01-21 RX ADMIN — TAMOXIFEN CITRATE 20 MILLIGRAM(S): 10 TABLET, FILM COATED ORAL at 11:25

## 2025-01-21 RX ADMIN — ROSUVASTATIN CALCIUM 20 MILLIGRAM(S): 10 TABLET, FILM COATED ORAL at 22:31

## 2025-01-21 RX ADMIN — VANCOMYCIN HYDROCHLORIDE 250 MILLIGRAM(S): KIT at 22:31

## 2025-01-21 RX ADMIN — Medication 1000 MICROGRAM(S): at 11:24

## 2025-01-21 RX ADMIN — ESCITALOPRAM 10 MILLIGRAM(S): 10 TABLET, FILM COATED ORAL at 11:24

## 2025-01-21 RX ADMIN — ASPIRIN 81 MILLIGRAM(S): 81 TABLET, COATED ORAL at 11:24

## 2025-01-21 RX ADMIN — TIMOLOL MALEATE 1 DROP(S): 5 SOLUTION OPHTHALMIC at 17:38

## 2025-01-21 RX ADMIN — POLYETHYLENE GLYCOL 3350 17 GRAM(S): 17 POWDER, FOR SOLUTION ORAL at 11:23

## 2025-01-21 RX ADMIN — Medication 1 MILLIGRAM(S): at 11:24

## 2025-01-21 RX ADMIN — Medication 1 APPLICATION(S): at 17:38

## 2025-01-21 RX ADMIN — Medication 325 MILLIGRAM(S): at 11:24

## 2025-01-21 NOTE — PROGRESS NOTE ADULT - ASSESSMENT
84M with HTN, PAD, breast ca, CKD admitted with rt great toe osteomyelitis  MRI noted  IV ABX  podiatry, vascular and ID following  s/p angio  s/p toe amputation   path pending    #PAD- on asa and plavix     #n/v diarrhea- GI pcr+ noro virus and ecoli sps  supportive care  GI fu  ID fu- sp zithromax course    CKD/Hypernatremia  nephrology following  kan  avoid nephrotoxins    HTN  continue metoprolol    Breast ca  continue tamoxifen    edema  elevate LE      [facility]   [phone]

## 2025-01-21 NOTE — PROGRESS NOTE ADULT - ASSESSMENT
Nausea/vomiting  Diarrhea  CKD  Toe osteomyelitis    Recommendations:  - GI PCR positive for norovirus and EAEC  - Completed azithromycin, symptoms resolving  - Tigan IM q8h PRN for nausea, has prolonged QTc on recent EKG  - Monitor GI function and stools  - Diet as tolerated  - s/p OR for toe amputation, awaiting path  - To follow    I reviewed the overnight course of events on the unit, re-confirming the patient history. I discussed the care with the patient  Differential diagnosis and plan of care discussed with patient after the evaluation  35 minutes spent on total encounter of which more than fifty percent of the encounter was spent counseling and/or coordinating care by the attending physician.

## 2025-01-21 NOTE — PROGRESS NOTE ADULT - ASSESSMENT
STEPHANY on CKD Stage 3  Hypernatremia  Renal Mass  HTN  PAD  Vomiting  Anemia  OM      -STEPHANY Likely 2/2 Decreased EABV, SCr down as of last labs   -Hypernatremia is resolved, s/p D5W  -Renal US with solid mass,  will need CT or MRI, at this time favor MRI with elevated creatinine, should see Urology for eval, can see outpatient-he is aware or mass and need for outpatient follow up  -S/p LE Angio; no evidence of VLADIMIR at this time  -GI eval noted  -S/P amputation of the right great toe with bone biopsy of the 1st metatarsal head on 1/15.  -Gets TEA per heme, is asking about this, will defer to heme since he is on active po chemo and it is given by their service outpatient  -Creatinine labile, down to 1.7  -Sodium normal range  -Daily chem

## 2025-01-21 NOTE — PROGRESS NOTE ADULT - SUBJECTIVE AND OBJECTIVE BOX
Patient is a 84y old  Male who presents with a chief complaint of Osteomyelitis (21 Jan 2025 12:15)        INTERVAL HPI/OVERNIGHT EVENTS:   no complaints  pt seen and examined         Vital Signs Last 24 Hrs  T(C): 36.6 (21 Jan 2025 05:05), Max: 36.6 (21 Jan 2025 05:05)  T(F): 97.9 (21 Jan 2025 05:05), Max: 97.9 (21 Jan 2025 05:05)  HR: 55 (21 Jan 2025 05:05) (55 - 55)  BP: 168/68 (21 Jan 2025 05:05) (168/68 - 168/68)  BP(mean): --  RR: 18 (21 Jan 2025 05:05) (18 - 18)  SpO2: 96% (21 Jan 2025 05:05) (96% - 96%)    Parameters below as of 21 Jan 2025 05:05  Patient On (Oxygen Delivery Method): room air        ammonium lactate 12% Lotion 1 Application(s) Topical two times a day  aspirin enteric coated 81 milliGRAM(s) Oral daily  bisacodyl 5 milliGRAM(s) Oral at bedtime  clopidogrel Tablet 75 milliGRAM(s) Oral daily  cyanocobalamin 1000 MICROGram(s) Oral daily  escitalopram 10 milliGRAM(s) Oral daily  ferrous    sulfate 325 milliGRAM(s) Oral daily  folic acid 1 milliGRAM(s) Oral daily  influenza  Vaccine (HIGH DOSE) 0.5 milliLiter(s) IntraMuscular once  lactated ringers. 1000 milliLiter(s) IV Continuous <Continuous>  latanoprost 0.005% Ophthalmic Solution 1 Drop(s) Both EYES at bedtime  magnesium hydroxide Suspension 30 milliLiter(s) Oral daily PRN  melatonin 3 milliGRAM(s) Oral at bedtime PRN  metoprolol succinate ER 50 milliGRAM(s) Oral daily  mupirocin 2% Ointment 1 Application(s) Topical <User Schedule>  polyethylene glycol 3350 17 Gram(s) Oral daily  rosuvastatin 20 milliGRAM(s) Oral at bedtime  tamoxifen 20 milliGRAM(s) Oral daily  timolol 0.5% Solution 1 Drop(s) Both EYES two times a day  vancomycin  IVPB 1000 milliGRAM(s) IV Intermittent every 24 hours      PHYSICAL EXAM:  GENERAL: NAD   EYES: conjunctiva and sclera clear  ENMT: Moist mucous membranes  NECK: Supple, No JVD, Normal thyroid  CHEST/LUNG: non labored, cta b/l  HEART: Regular rate and rhythm; No murmurs, rubs, or gallops  ABDOMEN: Soft, Nontender, Nondistended; Bowel sounds present  EXTREMITIES:  No clubbing, no cyanosis, no edema  LYMPH: No lymphadenopathy noted  SKIN: No rashes or lesions  NEURO: no new focal deficits    Consultant(s) Notes Reviewed:  [x ] YES  [ ] NO  Care Discussed with Consultants/Other Providers [ x] YES  [ ] NO    LABS:                        8.0    8.38  )-----------( 159      ( 21 Jan 2025 07:30 )             24.8     01-21    138  |  110[H]  |  41[H]  ----------------------------<  104[H]  4.1   |  24  |  1.70[H]    Ca    8.6      21 Jan 2025 07:30  Phos  3.4     01-21  Mg     2.5     01-21        Urinalysis Basic - ( 21 Jan 2025 07:30 )    Color: x / Appearance: x / SG: x / pH: x  Gluc: 104 mg/dL / Ketone: x  / Bili: x / Urobili: x   Blood: x / Protein: x / Nitrite: x   Leuk Esterase: x / RBC: x / WBC x   Sq Epi: x / Non Sq Epi: x / Bacteria: x      CAPILLARY BLOOD GLUCOSE            Urinalysis Basic - ( 21 Jan 2025 07:30 )    Color: x / Appearance: x / SG: x / pH: x  Gluc: 104 mg/dL / Ketone: x  / Bili: x / Urobili: x   Blood: x / Protein: x / Nitrite: x   Leuk Esterase: x / RBC: x / WBC x   Sq Epi: x / Non Sq Epi: x / Bacteria: x          RADIOLOGY & ADDITIONAL TESTS:    Imaging Personally Reviewed  Reviewed consultants input

## 2025-01-21 NOTE — PROGRESS NOTE ADULT - SUBJECTIVE AND OBJECTIVE BOX
[facility]  Sandeep Leos PA-C  [address] [place] [address]  [phone]      INTERVAL HPI/OVERNIGHT EVENTS:  Pt s/e  No diarrhea  Tolerating diet  Awaiting path results    MEDICATIONS  (STANDING):  ammonium lactate 12% Lotion 1 Application(s) Topical two times a day  aspirin enteric coated 81 milliGRAM(s) Oral daily  bisacodyl 5 milliGRAM(s) Oral at bedtime  clopidogrel Tablet 75 milliGRAM(s) Oral daily  cyanocobalamin 1000 MICROGram(s) Oral daily  escitalopram 10 milliGRAM(s) Oral daily  ferrous    sulfate 325 milliGRAM(s) Oral daily  folic acid 1 milliGRAM(s) Oral daily  influenza  Vaccine (HIGH DOSE) 0.5 milliLiter(s) IntraMuscular once  lactated ringers. 1000 milliLiter(s) (60 mL/Hr) IV Continuous <Continuous>  latanoprost 0.005% Ophthalmic Solution 1 Drop(s) Both EYES at bedtime  metoprolol succinate ER 50 milliGRAM(s) Oral daily  mupirocin 2% Ointment 1 Application(s) Topical <User Schedule>  polyethylene glycol 3350 17 Gram(s) Oral daily  rosuvastatin 20 milliGRAM(s) Oral at bedtime  tamoxifen 20 milliGRAM(s) Oral daily  timolol 0.5% Solution 1 Drop(s) Both EYES two times a day  vancomycin  IVPB 1000 milliGRAM(s) IV Intermittent every 24 hours    MEDICATIONS  (PRN):  magnesium hydroxide Suspension 30 milliLiter(s) Oral daily PRN Constipation  melatonin 3 milliGRAM(s) Oral at bedtime PRN Insomnia      Allergies    No Known Allergies      PHYSICAL EXAM:   Vital Signs:  Vital Signs Last 24 Hrs  T(C): 36.6 (2025 05:05), Max: 36.8 (2025 14:40)  T(F): 97.9 (2025 05:05), Max: 98.2 (2025 14:40)  HR: 55 (2025 05:05) (54 - 65)  BP: 168/68 (2025 05:05) (149/50 - 168/68)  BP(mean): --  RR: 18 (2025 05:05) (18 - 18)  SpO2: 96% (2025 05:05) (94% - 98%)    Parameters below as of 2025 05:05  Patient On (Oxygen Delivery Method): room air      Daily     Daily Weight in k.2 (2025 05:05)    GENERAL:  Appears stated age  HEENT:  NC/AT  CHEST:  Full & symmetric excursion  HEART:  Regular rhythm  ABDOMEN:  Soft, non-tender, non-distended  EXTEREMITIES:  no cyanosis  SKIN:  No rash  NEURO:  Alert      LABS:                        8.0    8.38  )-----------( 159      ( 2025 07:30 )             24.8     01-21    138  |  110[H]  |  41[H]  ----------------------------<  104[H]  4.1   |  24  |  1.70[H]    Ca    8.6      2025 07:30  Phos  3.4       Mg     2.5     -21        Urinalysis Basic - ( 2025 07:30 )    Color: x / Appearance: x / SG: x / pH: x  Gluc: 104 mg/dL / Ketone: x  / Bili: x / Urobili: x   Blood: x / Protein: x / Nitrite: x   Leuk Esterase: x / RBC: x / WBC x   Sq Epi: x / Non Sq Epi: x / Bacteria: x

## 2025-01-21 NOTE — PROGRESS NOTE ADULT - SUBJECTIVE AND OBJECTIVE BOX
[facility]  RHEA Brand Y. Patel, S. Shah, G. [facility]  [phone]    Name: DAYA MATHIS  Age: 84y  Gender: Male  MRN: [id number]    Interval History:  Patient seen and examined at bedside  No acute overnight events. Afebrile  Notes reviewed    Antibiotics:  vancomycin  IVPB 1000 milliGRAM(s) IV Intermittent every 24 hours      Medications:  ammonium lactate 12% Lotion 1 Application(s) Topical two times a day  aspirin enteric coated 81 milliGRAM(s) Oral daily  bisacodyl 5 milliGRAM(s) Oral at bedtime  clopidogrel Tablet 75 milliGRAM(s) Oral daily  cyanocobalamin 1000 MICROGram(s) Oral daily  escitalopram 10 milliGRAM(s) Oral daily  ferrous    sulfate 325 milliGRAM(s) Oral daily  folic acid 1 milliGRAM(s) Oral daily  influenza  Vaccine (HIGH DOSE) 0.5 milliLiter(s) IntraMuscular once  lactated ringers. 1000 milliLiter(s) IV Continuous <Continuous>  latanoprost 0.005% Ophthalmic Solution 1 Drop(s) Both EYES at bedtime  magnesium hydroxide Suspension 30 milliLiter(s) Oral daily PRN  melatonin 3 milliGRAM(s) Oral at bedtime PRN  metoprolol succinate ER 50 milliGRAM(s) Oral daily  mupirocin 2% Ointment 1 Application(s) Topical <User Schedule>  polyethylene glycol 3350 17 Gram(s) Oral daily  rosuvastatin 20 milliGRAM(s) Oral at bedtime  tamoxifen 20 milliGRAM(s) Oral daily  timolol 0.5% Solution 1 Drop(s) Both EYES two times a day  vancomycin  IVPB 1000 milliGRAM(s) IV Intermittent every 24 hours      Review of Systems:  A 10-point review of systems was obtained.     Pertinent positives and negatives--  Constitutional: No fevers. No Chills. No Rigors.   Cardiovascular: No chest pain. No palpitations.  Respiratory: No shortness of breath. No cough.  Gastrointestinal: No nausea or vomiting. No diarrhea or constipation.   Psychiatric: Pleasant. Appropriate affect.    Review of systems otherwise negative except as previously noted.    Allergies: No Known Allergies    For details regarding the patient's past medical history, social history, family history, and other miscellaneous elements, please refer the initial infectious diseases consultation and/or the admitting history and physical examination for this admission.    Objective:  Vitals:   T(C): 36.6 (01-21-25 @ 05:05), Max: 36.8 (01-20-25 @ 14:40)  HR: 55 (01-21-25 @ 05:05) (54 - 65)  BP: 168/68 (01-21-25 @ 05:05) (149/50 - 168/68)  RR: 18 (01-21-25 @ 05:05) (18 - 18)  SpO2: 96% (01-21-25 @ 05:05) (94% - 98%)    Physical Examination:  General: no acute distress  HEENT: NC/AT, EOMI  Cardio: S1, S2 heard, RRR, no murmurs  Resp: breath sounds heard bilaterally  Abd: soft, NT, ND  Ext: RLE slightly swollen, foot in dressing  Skin: warm, dry, no visible rash      Laboratory Studies:  CBC:                       8.0    8.38  )-----------( 159      ( 21 Jan 2025 07:30 )             24.8     CMP: 01-21    138  |  110[H]  |  41[H]  ----------------------------<  104[H]  4.1   |  24  |  1.70[H]    Ca    8.6      21 Jan 2025 07:30  Phos  3.4     01-21  Mg     2.5     01-21        Urinalysis Basic - ( 21 Jan 2025 07:30 )    Color: x / Appearance: x / SG: x / pH: x  Gluc: 104 mg/dL / Ketone: x  / Bili: x / Urobili: x   Blood: x / Protein: x / Nitrite: x   Leuk Esterase: x / RBC: x / WBC x   Sq Epi: x / Non Sq Epi: x / Bacteria: x        Microbiology: reviewed    Culture - Tissue with Gram Stain (collected 01-15-25 @ 16:09)  Source: Tissue  Gram Stain (01-20-25 @ 22:26):    No polymorphonuclear cells seen per low power field    No organisms seen per oil power field  Final Report (01-20-25 @ 22:26):    Growth in fluid media only Staphylococcus epidermidis  Organism: Staphylococcus epidermidis (01-20-25 @ 22:26)  Organism: Staphylococcus epidermidis (01-20-25 @ 22:26)      Method Type: DANIEL      -  Clindamycin: S 0.5      -  Erythromycin: R >4      -  Gentamicin: S <=4 Should not be used as monotherapy      -  Oxacillin: R >2      -  Penicillin: R >2      -  Rifampin: S <=1 Should not be used as monotherapy      -  Tetracycline: S <=4      -  Trimethoprim/Sulfamethoxazole: S <=0.5/9.5      -  Vancomycin: S 2    Culture - Tissue with Gram Stain (collected 01-15-25 @ 16:09)  Source: Tissue  Gram Stain (01-17-25 @ 20:16):    No polymorphonuclear cells seen per low power field    No organisms seen per oil power field  Final Report (01-20-25 @ 18:38):    Rare Staphylococcus epidermidis    See previous culture [id number]    Culture - Tissue with Gram Stain (collected 01-15-25 @ 16:09)  Source: Tissue  Gram Stain (01-17-25 @ 20:09):    No polymorphonuclear cells seen per low power field    No organisms seen per oil power field  Final Report (01-20-25 @ 18:50):    Rare Staphylococcus epidermidis  Organism: Staphylococcus epidermidis (01-20-25 @ 18:50)  Organism: Staphylococcus epidermidis (01-20-25 @ 18:50)      Method Type: DANIEL      -  Clindamycin: S <=0.25      -  Erythromycin: R >4      -  Gentamicin: S <=4 Should not be used as monotherapy      -  Oxacillin: R >2      -  Penicillin: R >2      -  Rifampin: S <=1 Should not be used as monotherapy      -  Tetracycline: S <=4      -  Trimethoprim/Sulfamethoxazole: S <=0.5/9.5      -  Vancomycin: S 1    Culture - Wound Aerobic/Anaerobic (collected 01-15-25 @ 16:09)  Source: .Surgical Swab  Final Report (01-21-25 @ 07:53):    Growth in fluid media only Staphylococcus epidermidis    "Susceptibilities not performed"    Culture - Stool (collected 01-08-25 @ 04:40)  Source: .Stool  Final Report (01-10-25 @ 11:30):    No enteric pathogens isolated.    (Stool culture examined for Salmonella,    Shigella, Campylobacter, Aeromonas, Plesiomonas,    Vibrio, E.coli O157 and Yersinia)          Radiology: reviewed

## 2025-01-21 NOTE — PROGRESS NOTE ADULT - SUBJECTIVE AND OBJECTIVE BOX
PROGRESS NOTE   Patient is a 84y old  Male who presents with a chief complaint of Osteomyelitis (20 Jan 2025 17:57)      HPI:  Patient is an 83yo M with a PMH of L sided breast cancer (s/p lumpectomy, on Tamoxifen), HTN, Anemia, Aortic insufficiency, Carotid artery disease, GERD, Glaucoma, Hepatitis (2014), Lower ext edema ,PVD who presents to the ED from Dr. Niko Jacques's office for a R toe infection. Patient notes that he noticed a R toe wound a few weeks ago. He completed a 10d course of Amoxicillin 500mg, but noticed worsening of the wound. He went ot his podiatrist today, as the wound was draining a pus like liquid. Podiatry rec he come to the hospital for IV abx and osteomyelitis workup. Patient feeling well in ED.    ED Course:  Vitals: /65, HR 71, T 97.3, RR 16 SpO2 100% on RA  Labs: ESR 75, Hgb 9.2, Na 148, Cl 117, BUN/Cr 35/1.70, eGFR 39  Given: IV Zosyn, IV Vancomycin, 1L NS bolus     Imaging:  Chest Xray: No acute chest finding.   R foot Xray: Possible erosion of the distal phalanx of the right great toe with associated soft tissue swelling suspicious for osteomyelitis. (02 Jan 2025 19:34)      Vital Signs Last 24 Hrs  T(C): 36.6 (21 Jan 2025 05:05), Max: 36.8 (20 Jan 2025 14:40)  T(F): 97.9 (21 Jan 2025 05:05), Max: 98.2 (20 Jan 2025 14:40)  HR: 55 (21 Jan 2025 05:05) (54 - 65)  BP: 168/68 (21 Jan 2025 05:05) (149/50 - 168/68)  BP(mean): --  RR: 18 (21 Jan 2025 05:05) (18 - 18)  SpO2: 96% (21 Jan 2025 05:05) (94% - 98%)    Parameters below as of 21 Jan 2025 05:05  Patient On (Oxygen Delivery Method): room air                              8.0    8.38  )-----------( 159      ( 21 Jan 2025 07:30 )             24.8               01-21    138  |  110[H]  |  41[H]  ----------------------------<  104[H]  4.1   |  24  |  1.70[H]    Ca    8.6      21 Jan 2025 07:30  Phos  3.4     01-21  Mg     2.5     01-21          PHYSICAL EXAM  General: Pleasant  male NAD & AOX3.    Integument:  Skin warm, dry and supple bilateral.    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 Dressing clean, dry and intact with controlled hemostasis at this time. The sutures are intact and the flap is viable at this time.  There is decreased erythema, edema and calor noted. Sterile dressing change performed today. The region of concern is respoinding favorable to the current care plan.   Vascular: Dorsalis Pedis and Posterior Tibial pulses non-palpable.  Capillary re-fill time less then 3 seconds digits 1-5 bilateral.  noted b/l lower leg edema, noted venous stasis b/l lower extremity  Neuro: Protective sensation intact to the level of the digits bilateral.  MSK: Muscle strength 4/5 all major muscle groups bilateral.     PROGRESS NOTE   Patient is a 84y old  Male who presents with a chief complaint of Osteomyelitis (20 Jan 2025 17:57)      HPI:  Patient is an 85yo M with a PMH of L sided breast cancer (s/p lumpectomy, on Tamoxifen), HTN, Anemia, Aortic insufficiency, Carotid artery disease, GERD, Glaucoma, Hepatitis (2014), Lower ext edema ,PVD who presents to the ED from Dr. Niko Jacques's office for a R toe infection. Patient notes that he noticed a R toe wound a few weeks ago. He completed a 10d course of Amoxicillin 500mg, but noticed worsening of the wound. He went ot his podiatrist today, as the wound was draining a pus like liquid. Podiatry rec he come to the hospital for IV abx and osteomyelitis workup. Patient feeling well in ED.    ED Course:  Vitals: /65, HR 71, T 97.3, RR 16 SpO2 100% on RA  Labs: ESR 75, Hgb 9.2, Na 148, Cl 117, BUN/Cr 35/1.70, eGFR 39  Given: IV Zosyn, IV Vancomycin, 1L NS bolus     Imaging:  Chest Xray: No acute chest finding.   R foot Xray: Possible erosion of the distal phalanx of the right great toe with associated soft tissue swelling suspicious for osteomyelitis. (02 Jan 2025 19:34)      Vital Signs Last 24 Hrs  T(C): 36.6 (21 Jan 2025 05:05), Max: 36.8 (20 Jan 2025 14:40)  T(F): 97.9 (21 Jan 2025 05:05), Max: 98.2 (20 Jan 2025 14:40)  HR: 55 (21 Jan 2025 05:05) (54 - 65)  BP: 168/68 (21 Jan 2025 05:05) (149/50 - 168/68)  BP(mean): --  RR: 18 (21 Jan 2025 05:05) (18 - 18)  SpO2: 96% (21 Jan 2025 05:05) (94% - 98%)    Parameters below as of 21 Jan 2025 05:05  Patient On (Oxygen Delivery Method): room air                              8.0    8.38  )-----------( 159      ( 21 Jan 2025 07:30 )             24.8               01-21    138  |  110[H]  |  41[H]  ----------------------------<  104[H]  4.1   |  24  |  1.70[H]    Ca    8.6      21 Jan 2025 07:30  Phos  3.4     01-21  Mg     2.5     01-21          PHYSICAL EXAM  General: Pleasant  male NAD & AOX3.    Integument:  Skin warm, dry and supple bilateral.    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 Dressing clean, dry and intact with controlled hemostasis at this time. The sutures are intact and the flap is viable at this time.  There is decreased erythema, edema and calor noted. Sterile dressing change performed today. The region of concern is respoinding favorable to the current care plan.   Vascular: Dorsalis Pedis and Posterior Tibial pulses non-palpable.  Capillary re-fill time less then 3 seconds digits 1-5 bilateral.  noted b/l lower leg edema, noted venous stasis b/l lower extremity  Neuro: Protective sensation intact to the level of the digits bilateral.  MSK: Muscle strength 4/5 all major muscle groups bilateral.

## 2025-01-21 NOTE — PROGRESS NOTE ADULT - ASSESSMENT
83yo M with L sided breast cancer (s/p lumpectomy, on Tamoxifen), HTN, Anemia, Aortic insufficiency, Carotid artery disease, GERD, Glaucoma, Hepatitis (2014), Lower ext edema ,PVD, who presented to the ED from Dr. Niko Jacques's office for a R toe infection. MRI concerning for osteomyelitis of R 1st toe. He has no fevers and no leukocytosis. Skin changes on lower legs appear concerning for venous stasis. Podiatry following.     R 1st toe ulcer w/ underlying osteomyelitis  - afebrile, no leukocytosis  - BCx NGTD  - Culture - Wound Aerobic (collected 01-02-25 @ 18:30)    Few Pseudomonas aeruginosa, Rare Serratia marcescens  - 1/6 s/p RLE angio  - 1/15 Amputation, toe, 1 toe 15-Marlo-2025 16:29:17  Herman Garcia  Culture - Tissue with Gram Stain (01.15.25 @ 16:09) Rare Staphylococcus epidermidis  -  Oxacillin: R >2, -  Vancomycin: S 1, -  Clindamycin: S <=0.25-  Rifampin: S <=1    -  Tetracycline: S <=4 -  Trimethoprim/Sulfamethoxazole: S <=0.5/9.5    Diarrhea  GI PCR + Norovirus, EAEC  Stool cx negative  completed azithromycin 1/11    Hematuria  - pt reporting blood in urine 1/15; resolved    Recommendations:   s/p CEFEPIME-->changed to Vanco 1/19   F/u path-in LAB  local wound care  final recs to follow pending above    D/w patient  Infectious Diseases will follow. Please call with any questions.  Yen Glez M.D.  Available on [employer] TEAMS -- *[facility]*  [facility] [phone]  For after 5 P.M. and weekends, please call [phone]

## 2025-01-21 NOTE — PROGRESS NOTE ADULT - SUBJECTIVE AND OBJECTIVE BOX
Chief Complaint: Toe infection    Interval Events: No events overnight. Sleeping.    Review of Systems:  General: No fevers, chills, weight gain  Skin: No rashes, color changes  Cardiovascular: No chest pain, orthopnea  Respiratory: No shortness of breath, cough  Gastrointestinal: No nausea, abdominal pain  Genitourinary: No incontinence, pain with urination  Musculoskeletal: No pain, swelling, decreased range of motion  Neurological: No headache, weakness  Psychiatric: No depression, anxiety  Endocrine: No weight gain, increased thirst  All other systems are comprehensively negative.    Physical Exam:  Vital Signs Last 24 Hrs  T(C): 36.6 (21 Jan 2025 05:05), Max: 36.8 (20 Jan 2025 14:40)  T(F): 97.9 (21 Jan 2025 05:05), Max: 98.2 (20 Jan 2025 14:40)  HR: 55 (21 Jan 2025 05:05) (54 - 65)  BP: 168/68 (21 Jan 2025 05:05) (149/50 - 168/68)  BP(mean): --  RR: 18 (21 Jan 2025 05:05) (18 - 18)  SpO2: 96% (21 Jan 2025 05:05) (94% - 98%)  Parameters below as of 21 Jan 2025 05:05  Patient On (Oxygen Delivery Method): room air  General: NAD  HEENT: MMM  Neck: No JVD, no carotid bruit  Lungs: CTAB  CV: RRR, nl S1/S2, no M/R/G  Abdomen: S/NT/ND, +BS  Extremities: No LE edema, no cyanosis  Neuro: AAOx3, non-focal  Skin: No rash    Labs:    01-21    138  |  110[H]  |  41[H]  ----------------------------<  104[H]  4.1   |  24  |  1.70[H]    Ca    8.6      21 Jan 2025 07:30  Phos  3.4     01-21  Mg     2.5     01-21                          8.0    8.38  )-----------( 159      ( 21 Jan 2025 07:30 )             24.8       ECG/Telemetry: Sinus rhythm

## 2025-01-21 NOTE — PROGRESS NOTE ADULT - SUBJECTIVE AND OBJECTIVE BOX
[facility]                             Nephrology and Hypertension                             Niles Montano                                          [phone]     Patient is a 84y old  Male who presents with a chief complaint of Osteomyelitis (03 Jan 2025 13:05)       HPI:  Patient is an 83yo M with a PMH of L sided breast cancer (s/p lumpectomy, on Tamoxifen), HTN, Anemia, Aortic insufficiency, Carotid artery disease, GERD, Glaucoma, Hepatitis (2014), Lower ext edema ,PVD who presents to the ED from Dr. Niko Jacques's office for a R toe infection. Patient notes that he noticed a R toe wound a few weeks ago. He completed a 10d course of Amoxicillin 500mg, but noticed worsening of the wound. He went ot his podiatrist today, as the wound was draining a pus like liquid. Podiatry rec he come to the hospital for IV abx and osteomyelitis workup. Patient feeling well in ED.  States he is urinating well.  No N/V/SOB.  Has not seen nephrologist in the past.      No acute events noted    PAST MEDICAL & SURGICAL HISTORY:  Anemia      HTN (hypertension)      Breast cancer      Glaucoma      Major depression      PVD (peripheral vascular disease)      Lower extremity edema      Chronic GERD      H/O: glaucoma      S/P lumpectomy, left breast           FAMILY HISTORY:  NC    Social History:Non smoker    MEDICATIONS  (STANDING):  ammonium lactate 12% Lotion 1 Application(s) Topical two times a day  aspirin enteric coated 81 milliGRAM(s) Oral daily  cefepime   IVPB 2000 milliGRAM(s) IV Intermittent every 12 hours  clopidogrel Tablet 75 milliGRAM(s) Oral daily  cyanocobalamin 1000 MICROGram(s) Oral daily  dextrose 5% + sodium chloride 0.45%. 1000 milliLiter(s) (75 mL/Hr) IV Continuous <Continuous>  dextrose 5%. 1000 milliLiter(s) (75 mL/Hr) IV Continuous <Continuous>  escitalopram 10 milliGRAM(s) Oral daily  folic acid 1 milliGRAM(s) Oral daily  influenza  Vaccine (HIGH DOSE) 0.5 milliLiter(s) IntraMuscular once  latanoprost 0.005% Ophthalmic Solution 1 Drop(s) Both EYES at bedtime  metoprolol succinate ER 50 milliGRAM(s) Oral daily  mupirocin 2% Ointment 1 Application(s) Topical <User Schedule>  rosuvastatin 20 milliGRAM(s) Oral at bedtime  timolol 0.5% Solution 1 Drop(s) Both EYES two times a day    MEDICATIONS  (PRN):  HYDROmorphone  Injectable 0.2 milliGRAM(s) IV Push every 6 hours PRN Moderate Pain (4 - 6)  HYDROmorphone  Injectable 0.5 milliGRAM(s) IV Push every 6 hours PRN Severe Pain (7 - 10)  melatonin 3 milliGRAM(s) Oral at bedtime PRN Insomnia  trimethobenzamide Injectable 200 milliGRAM(s) IntraMuscular every 8 hours PRN Nausea      Allergies    No Known Allergies    Intolerances         REVIEW OF SYSTEMS:    as above    Vital Signs Last 24 Hrs  T(C): 36.6 (21 Jan 2025 05:05), Max: 36.8 (20 Jan 2025 14:40)  T(F): 97.9 (21 Jan 2025 05:05), Max: 98.2 (20 Jan 2025 14:40)  HR: 55 (21 Jan 2025 05:05) (54 - 65)  BP: 168/68 (21 Jan 2025 05:05) (149/50 - 168/68)  BP(mean): --  RR: 18 (21 Jan 2025 05:05) (18 - 18)  SpO2: 96% (21 Jan 2025 05:05) (94% - 98%)    Parameters below as of 21 Jan 2025 05:05  Patient On (Oxygen Delivery Method): room air              PHYSICAL EXAM:    GENERAL: NAD  HEAD:  Atraumatic, Normocephalic  EYES: EOMI, conjunctiva and sclera clear  ENMT: No Drainage from nares, No drainage from ears  NERVOUS SYSTEM:  Awake and Alert  CHEST/LUNG: Clear to percussion bilaterally  EXTREMITIES:  No Edema, RLE dressing intact with trace edema  SKIN: No rashes No obvious ecchymosis      LABS:                                               8.0    8.38  )-----------( 159      ( 21 Jan 2025 07:30 )             24.8     01-21    138  |  110[H]  |  41[H]  ----------------------------<  104[H]  4.1   |  24  |  1.70[H]    Ca    8.6      21 Jan 2025 07:30  Phos  3.4     01-21  Mg     2.5     01-21        Urinalysis Basic - ( 21 Jan 2025 07:30 )    Color: x / Appearance: x / SG: x / pH: x  Gluc: 104 mg/dL / Ketone: x  / Bili: x / Urobili: x   Blood: x / Protein: x / Nitrite: x   Leuk Esterase: x / RBC: x / WBC x   Sq Epi: x / Non Sq Epi: x / Bacteria: x

## 2025-01-22 ENCOUNTER — TRANSCRIPTION ENCOUNTER (OUTPATIENT)
Age: 85
End: 2025-01-22

## 2025-01-22 VITALS
HEART RATE: 81 BPM | OXYGEN SATURATION: 100 % | RESPIRATION RATE: 16 BRPM | SYSTOLIC BLOOD PRESSURE: 177 MMHG | DIASTOLIC BLOOD PRESSURE: 55 MMHG

## 2025-01-22 LAB
ANION GAP SERPL CALC-SCNC: 3 MMOL/L — LOW (ref 5–17)
BUN SERPL-MCNC: 46 MG/DL — HIGH (ref 7–23)
CALCIUM SERPL-MCNC: 8.7 MG/DL — SIGNIFICANT CHANGE UP (ref 8.5–10.1)
CHLORIDE SERPL-SCNC: 110 MMOL/L — HIGH (ref 96–108)
CO2 SERPL-SCNC: 26 MMOL/L — SIGNIFICANT CHANGE UP (ref 22–31)
CREAT SERPL-MCNC: 1.9 MG/DL — HIGH (ref 0.5–1.3)
EGFR: 34 ML/MIN/1.73M2 — LOW
GLUCOSE SERPL-MCNC: 110 MG/DL — HIGH (ref 70–99)
HCT VFR BLD CALC: 26.8 % — LOW (ref 39–50)
HGB BLD-MCNC: 8.4 G/DL — LOW (ref 13–17)
MCHC RBC-ENTMCNC: 28.9 PG — SIGNIFICANT CHANGE UP (ref 27–34)
MCHC RBC-ENTMCNC: 31.3 G/DL — LOW (ref 32–36)
MCV RBC AUTO: 92.1 FL — SIGNIFICANT CHANGE UP (ref 80–100)
NRBC # BLD: 0 /100 WBCS — SIGNIFICANT CHANGE UP (ref 0–0)
NRBC BLD-RTO: 0 /100 WBCS — SIGNIFICANT CHANGE UP (ref 0–0)
PLATELET # BLD AUTO: 210 K/UL — SIGNIFICANT CHANGE UP (ref 150–400)
POTASSIUM SERPL-MCNC: 4.9 MMOL/L — SIGNIFICANT CHANGE UP (ref 3.5–5.3)
POTASSIUM SERPL-SCNC: 4.9 MMOL/L — SIGNIFICANT CHANGE UP (ref 3.5–5.3)
RBC # BLD: 2.91 M/UL — LOW (ref 4.2–5.8)
RBC # FLD: 15.5 % — HIGH (ref 10.3–14.5)
SODIUM SERPL-SCNC: 139 MMOL/L — SIGNIFICANT CHANGE UP (ref 135–145)
SURGICAL PATHOLOGY STUDY: SIGNIFICANT CHANGE UP
WBC # BLD: 8.81 K/UL — SIGNIFICANT CHANGE UP (ref 3.8–10.5)
WBC # FLD AUTO: 8.81 K/UL — SIGNIFICANT CHANGE UP (ref 3.8–10.5)

## 2025-01-22 PROCEDURE — 36573 INSJ PICC RS&I 5 YR+: CPT

## 2025-01-22 RX ORDER — LEVOBUNOLOL HCL 0.5 %
1 DROPS OPHTHALMIC (EYE)
Refills: 0 | DISCHARGE

## 2025-01-22 RX ORDER — ASPIRIN 81 MG/1
1 TABLET, COATED ORAL
Qty: 15 | Refills: 0
Start: 2025-01-22 | End: 2025-02-05

## 2025-01-22 RX ORDER — POLYETHYLENE GLYCOL 3350 17 G/17G
17 POWDER, FOR SOLUTION ORAL
Qty: 1 | Refills: 0
Start: 2025-01-22 | End: 2025-01-31

## 2025-01-22 RX ORDER — VANCOMYCIN HYDROCHLORIDE 50 MG/ML
1000 KIT ORAL ONCE
Refills: 0 | Status: COMPLETED | OUTPATIENT
Start: 2025-01-22 | End: 2025-01-22

## 2025-01-22 RX ORDER — ROSUVASTATIN CALCIUM 10 MG/1
1 TABLET, FILM COATED ORAL
Qty: 15 | Refills: 0
Start: 2025-01-22 | End: 2025-02-05

## 2025-01-22 RX ORDER — VANCOMYCIN HYDROCHLORIDE 50 MG/ML
1 KIT ORAL
Qty: 0 | Refills: 0 | DISCHARGE
Start: 2025-01-22

## 2025-01-22 RX ORDER — FERROUS SULFATE 325(65) MG
1 TABLET ORAL
Qty: 10 | Refills: 0
Start: 2025-01-22 | End: 2025-01-31

## 2025-01-22 RX ADMIN — Medication 1000 MICROGRAM(S): at 11:37

## 2025-01-22 RX ADMIN — Medication 1 APPLICATION(S): at 18:05

## 2025-01-22 RX ADMIN — POLYETHYLENE GLYCOL 3350 17 GRAM(S): 17 POWDER, FOR SOLUTION ORAL at 11:38

## 2025-01-22 RX ADMIN — Medication 325 MILLIGRAM(S): at 11:37

## 2025-01-22 RX ADMIN — ASPIRIN 81 MILLIGRAM(S): 81 TABLET, COATED ORAL at 11:37

## 2025-01-22 RX ADMIN — Medication 1 APPLICATION(S): at 11:41

## 2025-01-22 RX ADMIN — VANCOMYCIN HYDROCHLORIDE 250 MILLIGRAM(S): KIT at 18:04

## 2025-01-22 RX ADMIN — MUPIROCIN 1 APPLICATION(S): 2 CREAM TOPICAL at 11:40

## 2025-01-22 RX ADMIN — TAMOXIFEN CITRATE 20 MILLIGRAM(S): 10 TABLET, FILM COATED ORAL at 11:37

## 2025-01-22 RX ADMIN — ESCITALOPRAM 10 MILLIGRAM(S): 10 TABLET, FILM COATED ORAL at 11:37

## 2025-01-22 RX ADMIN — Medication 75 MILLIGRAM(S): at 11:38

## 2025-01-22 RX ADMIN — TIMOLOL MALEATE 1 DROP(S): 5 SOLUTION OPHTHALMIC at 18:05

## 2025-01-22 RX ADMIN — Medication 1 MILLIGRAM(S): at 11:37

## 2025-01-22 RX ADMIN — Medication 50 MILLIGRAM(S): at 05:58

## 2025-01-22 RX ADMIN — TIMOLOL MALEATE 1 DROP(S): 5 SOLUTION OPHTHALMIC at 05:59

## 2025-01-22 NOTE — DISCHARGE NOTE PROVIDER - DISCHARGE DATE
Care Transitions Outreach Attempt    Call within 2 business days of discharge: Yes   Attempted to reach patient for transitions of care follow up. Unable to reach patient. Patient: Kaci Tamayo Patient : 1955 MRN: [id number]    Last Discharge [address]       Complaint Diagnosis Description Type Department Provider    21 Cough Acute on chronic congestive heart failure, unspecified heart failure type ([facility] [address] . .. ED to Hosp-Admission (Discharged) (ADMIT) [id number] Bah MD; Janette Posey. .. Was this an external facility discharge?  No       Noted following upcoming appointments from discharge chart review:   [facility] follow up appointment(s):   Future Appointments   Date Time Provider Lizabeth Ennis   12/15/2021 11:30 AM Jose Manuel Aguero MD [facility] BS AMB   2021  9:40 AM JAIDA Camp BS AMB   2022  2:45 PM REMOTE1MIRTA BS AMB   2022 11:45 AM REMOTE1MIRTA BS AMB   2022  1:00 PM ECHOTWOMIRTA BS AMB   2022  2:20 PM PACEMAKER3MIRTA BS AMB   2022  2:40 PM MD ADAM Buck BS AMB     Non-[facility] follow up appointment(s): none 22-Jan-2025

## 2025-01-22 NOTE — DISCHARGE NOTE PROVIDER - PROVIDER TOKENS
PROVIDER:[TOKEN:[[id number]],FOLLOWUP:[2 weeks]],PROVIDER:[TOKEN:[[id number]],FOLLOWUP:[1 week]],PROVIDER:[TOKEN:[[id number]],FOLLOWUP:[1-3 days]],PROVIDER:[TOKEN:[[id number]],FOLLOWUP:[2 weeks]]

## 2025-01-22 NOTE — PROGRESS NOTE ADULT - ASSESSMENT
85yo M with L sided breast cancer (s/p lumpectomy, on Tamoxifen), HTN, Anemia, Aortic insufficiency, Carotid artery disease, GERD, Glaucoma, Hepatitis (2014), Lower ext edema ,PVD, who presented to the ED from Dr. Niko Jacques's office for a R toe infection. MRI concerning for osteomyelitis of R 1st toe. He has no fevers and no leukocytosis. Skin changes on lower legs appear concerning for venous stasis. Podiatry following.     R 1st toe ulcer w/ underlying osteomyelitis  - afebrile, no leukocytosis  - BCx NGTD  - Culture - Wound Aerobic (collected 01-02-25 @ 18:30)    Few Pseudomonas aeruginosa, Rare Serratia marcescens  - 1/6 s/p RLE angio  - 1/15 Amputation, toe, 1 toe 15-Marlo-2025 16:29:17  Herman Garcia  Culture - Tissue with Gram Stain (01.15.25 @ 16:09) Rare Staphylococcus epidermidis  -  Oxacillin: R >2, -  Vancomycin: S 1  - s/p CEFEPIME-->changed to Vanco 1/19   - Path positive for acute/chronic OM    Diarrhea  GI PCR + Norovirus, EAEC  Stool cx negative  completed azithromycin 1/11    Hematuria  - pt reporting blood in urine 1/15; resolved    Recommendations:   Will need PICC line and x6 wks IV Abx w/ vancomycin x6 wk course until 3/1/2025  --Vancomycin 1gm q24h  --PICC ordered  local wound care, podiatry f/u    Patient evaluated with face-to-face time in addition to reviewing history, labs, microbiology, and imaging.   Antibiotic stewardship, local antibiogram, infection control strategies and potential transmission issues taken into consideration at time of treatment decision making process.   Thank you for allowing us to participate in the care of your patient.  D/w patient at bedside  Infectious Diseases will follow. Please call with any questions.  Yen Glez M.D.  Available on Microsoft TEAMS -- *[facility]*  [facility] [phone]  For after 5 P.M. and weekends, please call [phone]         85yo M with L sided breast cancer (s/p lumpectomy, on Tamoxifen), HTN, Anemia, Aortic insufficiency, Carotid artery disease, GERD, Glaucoma, Hepatitis (2014), Lower ext edema ,PVD, who presented to the ED from Dr. Niko Jacques's office for a R toe infection. MRI concerning for osteomyelitis of R 1st toe. He has no fevers and no leukocytosis. Skin changes on lower legs appear concerning for venous stasis. Podiatry following.     R 1st toe ulcer w/ underlying osteomyelitis  - afebrile, no leukocytosis  - BCx NGTD  - Culture - Wound Aerobic (collected 01-02-25 @ 18:30)    Few Pseudomonas aeruginosa, Rare Serratia marcescens  - 1/6 s/p RLE angio  - 1/15 Amputation, toe, 1 toe 15-Marlo-2025 16:29:17  Herman Garcia  Culture - Tissue with Gram Stain (01.15.25 @ 16:09) Rare Staphylococcus epidermidis  -  Oxacillin: R >2, -  Vancomycin: S 1  - s/p CEFEPIME-->changed to Vanco 1/19   - Path positive for acute/chronic OM    Diarrhea  GI PCR + Norovirus, EAEC  Stool cx negative  completed azithromycin 1/11    Hematuria  - pt reporting blood in urine 1/15; resolved    Recommendations:   Will need PICC line and x6 wks IV Abx w/ vancomycin x6 wk course until 3/1/2025  --PICC ordered  --will need weekly CMP, CBC, ESR, CRP   --orders called into region care  local wound care, podiatry f/u    patient can call for f/u Dr. Villeda [phone] in 1 week's time    Patient evaluated with face-to-face time in addition to reviewing history, labs, microbiology, and imaging.   Antibiotic stewardship, local antibiogram, infection control strategies and potential transmission issues taken into consideration at time of treatment decision making process.   Thank you for allowing us to participate in the care of your patient.  D/w patient at bedside  Infectious Diseases will follow. Please call with any questions.  Yen Glez M.D.  Available on [employer] TEAMS -- *PREFERRED*  [facility] [phone]  For after 5 P.M. and weekends, please call [phone]

## 2025-01-22 NOTE — PROGRESS NOTE ADULT - SUBJECTIVE AND OBJECTIVE BOX
Chief Complaint: Toe infection    Interval Events: No events overnight. Sleeping.    Review of Systems:  General: No fevers, chills, weight gain  Skin: No rashes, color changes  Cardiovascular: No chest pain, orthopnea  Respiratory: No shortness of breath, cough  Gastrointestinal: No nausea, abdominal pain  Genitourinary: No incontinence, pain with urination  Musculoskeletal: No pain, swelling, decreased range of motion  Neurological: No headache, weakness  Psychiatric: No depression, anxiety  Endocrine: No weight gain, increased thirst  All other systems are comprehensively negative.    Physical Exam:  Vital Signs Last 24 Hrs  T(C): 36.9 (22 Jan 2025 04:40), Max: 36.9 (22 Jan 2025 04:40)  T(F): 98.5 (22 Jan 2025 04:40), Max: 98.5 (22 Jan 2025 04:40)  HR: 62 (22 Jan 2025 04:40) (62 - 63)  BP: 143/57 (22 Jan 2025 04:40) (143/57 - 149/71)  BP(mean): --  RR: 17 (22 Jan 2025 04:40) (17 - 18)  SpO2: 94% (22 Jan 2025 04:40) (94% - 95%)  Parameters below as of 22 Jan 2025 04:40  Patient On (Oxygen Delivery Method): room air  General: NAD  HEENT: MMM  Neck: No JVD, no carotid bruit  Lungs: CTAB  CV: RRR, nl S1/S2, no M/R/G  Abdomen: S/NT/ND, +BS  Extremities: No LE edema, no cyanosis  Neuro: AAOx3, non-focal  Skin: No rash    Labs:    01-21    138  |  110[H]  |  41[H]  ----------------------------<  104[H]  4.1   |  24  |  1.70[H]    Ca    8.6      21 Jan 2025 07:30  Phos  3.4     01-21  Mg     2.5     01-21                          8.0    8.38  )-----------( 159      ( 21 Jan 2025 07:30 )             24.8       ECG/Telemetry: Sinus rhythm

## 2025-01-22 NOTE — PROGRESS NOTE ADULT - ASSESSMENT
Nausea/vomiting  Diarrhea  CKD  Toe osteomyelitis    Recommendations:  - GI PCR positive for norovirus and EAEC  - Completed azithromycin, symptoms resolving  - Tigan IM q8h PRN for nausea, has prolonged QTc on recent EKG  - Monitor GI function and stools  - Diet as tolerated  - s/p OR for toe amputation, care per podiatry  - To follow    I reviewed the overnight course of events on the unit, re-confirming the patient history. I discussed the care with the patient  Differential diagnosis and plan of care discussed with patient after the evaluation  35 minutes spent on total encounter of which more than fifty percent of the encounter was spent counseling and/or coordinating care by the attending physician.

## 2025-01-22 NOTE — PROGRESS NOTE ADULT - ASSESSMENT
STEPHANY on CKD Stage 3  Hypernatremia  Renal Mass  HTN  PAD  Vomiting  Anemia  OM      -STEPHANY Likely 2/2 Decreased EABV, SCr down as of last labs   -Hypernatremia is resolved, s/p D5W  -Renal US with solid mass,  will need CT or MRI, at this time favor MRI with elevated creatinine, should see Urology for eval, can see outpatient-he is aware or mass and need for outpatient follow up  -S/p LE Angio; no evidence of VLADIMIR at this time  -GI eval noted  -S/P amputation of the right great toe with bone biopsy of the 1st metatarsal head on 1/15.  -Gets TEA per heme, is asking about this, will defer to heme since he is on active po chemo and it is given by their service outpatient  -Creatinine labile but relatively stable  -Sodium normal range  -Daily chem    DC planning

## 2025-01-22 NOTE — PROGRESS NOTE ADULT - PROBLEM SELECTOR PLAN 1
Discussed diagnosis and treatment with patient and patient's son . He understands the severity of patient's condition and  that he is at risk to lose part of the right foot, all the foot or below the knee amputation. All questions were asked and answered for patient and patient's family satisfaction.  Patient is  s/p  surgical debridement of all non-viable soft tissue and bone with right hallux amputation with flap coverage . DOS 1/15/2025.  Post op wound stable, sutures intact coapted. Prognosis is guarded.   Surgical pathology report demonstrated OM in proximal bone margin.  Patient will require PICC line placement and 6 weeks IV abx per ID recs.  Continue IV Abx and follow ID recommendations.   Dressing changed today. Keep the postop dressing clean/dry/intact  Partial weight bearing to right foot heel touch.  Recs elevation on bilateral lower extremities  Pt may require serial surgical debridements/amputation if symptoms persist.   Medical management as per primary team.  Patient instructed to follow-up within 3 days of discharge with Dr. Herman Garcia at his office in [place].   Will discuss with all attendings.

## 2025-01-22 NOTE — DISCHARGE NOTE PROVIDER - NSDCFUADDAPPT_GEN_ALL_CORE_FT
pt is not medically ready to resume his job/work duties until furthur clearence from pcp or podiatry in 1-2 weeks

## 2025-01-22 NOTE — PROGRESS NOTE ADULT - ATTENDING COMMENTS
reviewed medical history, physical exam and care plan   note read and reviewed

## 2025-01-22 NOTE — PROGRESS NOTE ADULT - PROBLEM SELECTOR PLAN 2
Patient advised to f/u with Dr. Herman Garcia in [place] 3-5 days post discharge from hospital.  Please call [phone] to make an appointment.  Please keep dressings clean, dry, and intact until f/u in clinic.    If dressings get wet or fall off, please change with:  1. adaptic  2. gauze  3. abd pad  4. dolly  5. ace bandage

## 2025-01-22 NOTE — DISCHARGE NOTE PROVIDER - NSDCMRMEDTOKEN_GEN_ALL_CORE_FT
cyanocobalamin 100 mcg oral tablet: 1 tab(s) orally once a day  escitalopram 10 mg oral tablet: 1 tab(s) orally once a day  folic acid: 1 milligram(s) orally once a day  furosemide 80 mg oral tablet: 1 tab(s) orally once a day  latanoprost 0.005% ophthalmic solution: 1 drop(s) in each eye once a day (at bedtime)  Levobunolol 0.5% ophthalmic solution: 1 drop(s) in each affected eye once a day  metoprolol succinate 50 mg oral capsule, extended release: 1 cap(s) orally once a day  tamoxifen 20 mg oral tablet: 1 tab(s) orally once a day   aspirin 81 mg oral capsule: 1 cap(s) orally once a day  cyanocobalamin 100 mcg oral tablet: 1 tab(s) orally once a day  escitalopram 10 mg oral tablet: 1 tab(s) orally once a day  ferrous sulfate 325 mg (65 mg elemental iron) oral delayed release tablet: 1 tab(s) orally once a day  folic acid: 1 milligram(s) orally once a day  latanoprost 0.005% ophthalmic solution: 1 drop(s) in each eye once a day (at bedtime)  metoprolol succinate 50 mg oral capsule, extended release: 1 cap(s) orally once a day  MiraLax oral powder for reconstitution: 17 gram(s) orally once a day as needed for  constipation  Plavix 75 mg oral tablet: 1 tab(s) orally once a day  rosuvastatin 20 mg oral capsule: 1 cap(s) orally once a day (at bedtime)  tamoxifen 20 mg oral tablet: 1 tab(s) orally once a day  vancomycin 1 g intravenous injection: 1 gram(s) intravenous once a day for 6 weeks

## 2025-01-22 NOTE — PROGRESS NOTE ADULT - SUBJECTIVE AND OBJECTIVE BOX
PROGRESS NOTE   Patient is a 84y old  Male who presents with a chief complaint of Osteomyelitis (21 Jan 2025 21:35)      HPI:  Patient is an 83yo M with a PMH of L sided breast cancer (s/p lumpectomy, on Tamoxifen), HTN, Anemia, Aortic insufficiency, Carotid artery disease, GERD, Glaucoma, Hepatitis (2014), Lower ext edema ,PVD who presents to the ED from Dr. Niko Jacques's office for a R toe infection. Patient notes that he noticed a R toe wound a few weeks ago. He completed a 10d course of Amoxicillin 500mg, but noticed worsening of the wound. He went ot his podiatrist today, as the wound was draining a pus like liquid. Podiatry rec he come to the hospital for IV abx and osteomyelitis workup. Patient feeling well in ED.    ED Course:  Vitals: /65, HR 71, T 97.3, RR 16 SpO2 100% on RA  Labs: ESR 75, Hgb 9.2, Na 148, Cl 117, BUN/Cr 35/1.70, eGFR 39  Given: IV Zosyn, IV Vancomycin, 1L NS bolus     Imaging:  Chest Xray: No acute chest finding.   R foot Xray: Possible erosion of the distal phalanx of the right great toe with associated soft tissue swelling suspicious for osteomyelitis. (02 Jan 2025 19:34)      Vital Signs Last 24 Hrs  T(C): 36.9 (22 Jan 2025 04:40), Max: 36.9 (22 Jan 2025 04:40)  T(F): 98.5 (22 Jan 2025 04:40), Max: 98.5 (22 Jan 2025 04:40)  HR: 62 (22 Jan 2025 04:40) (62 - 63)  BP: 143/57 (22 Jan 2025 04:40) (143/57 - 149/71)  BP(mean): --  RR: 17 (22 Jan 2025 04:40) (17 - 18)  SpO2: 94% (22 Jan 2025 04:40) (94% - 95%)    Parameters below as of 22 Jan 2025 04:40  Patient On (Oxygen Delivery Method): room air                              8.0    8.38  )-----------( 159      ( 21 Jan 2025 07:30 )             24.8               01-21    138  |  110[H]  |  41[H]  ----------------------------<  104[H]  4.1   |  24  |  1.70[H]    Ca    8.6      21 Jan 2025 07:30  Phos  3.4     01-21  Mg     2.5     01-21        PHYSICAL EXAM  General: Pleasant  male NAD & AOX3.    Integument:  Skin warm, dry and supple bilateral.    POD#7 Right foot s/p  surgical Debridement of all non-viable soft tissue and bone, debridement of deep space infection with right hallux amputation with flap coverage . Dressing clean, dry and intact with controlled hemostasis at this time. The sutures are intact and the flap is viable at this time.  There is decreased erythema, edema and calor noted. Sterile dressing change performed today. The region of concern is respoinding favorable to the current care plan.   Vascular: Dorsalis Pedis and Posterior Tibial pulses non-palpable.  Capillary re-fill time less then 3 seconds digits 1-5 bilateral.  noted b/l lower leg edema, noted venous stasis b/l lower extremity  Neuro: Protective sensation intact to the level of the digits bilateral.  MSK: Muscle strength 4/5 all major muscle groups bilateral.

## 2025-01-22 NOTE — CAREGIVER ENGAGEMENT NOTE - CAREGIVER OUTREACH NOTES - FREE TEXT
Patient provided CM with permission to speak to his daughter Erika or son Marco [phone] to discuss transition planning when medically cleared. 
Pt gave permission for CM to speak with his son Marco castillo IV infusion, left message with CM contact information.

## 2025-01-22 NOTE — PROGRESS NOTE ADULT - SUBJECTIVE AND OBJECTIVE BOX
[facility]  Sandeep Leos PA-C  [address] [place] [address]  [phone]      INTERVAL HPI/OVERNIGHT EVENTS:  Pt s/e  No diarrhea  Tolerating diet    MEDICATIONS  (STANDING):  ammonium lactate 12% Lotion 1 Application(s) Topical two times a day  aspirin enteric coated 81 milliGRAM(s) Oral daily  bisacodyl 5 milliGRAM(s) Oral at bedtime  clopidogrel Tablet 75 milliGRAM(s) Oral daily  cyanocobalamin 1000 MICROGram(s) Oral daily  escitalopram 10 milliGRAM(s) Oral daily  ferrous    sulfate 325 milliGRAM(s) Oral daily  folic acid 1 milliGRAM(s) Oral daily  influenza  Vaccine (HIGH DOSE) 0.5 milliLiter(s) IntraMuscular once  lactated ringers. 1000 milliLiter(s) (60 mL/Hr) IV Continuous <Continuous>  latanoprost 0.005% Ophthalmic Solution 1 Drop(s) Both EYES at bedtime  metoprolol succinate ER 50 milliGRAM(s) Oral daily  mupirocin 2% Ointment 1 Application(s) Topical <User Schedule>  polyethylene glycol 3350 17 Gram(s) Oral daily  rosuvastatin 20 milliGRAM(s) Oral at bedtime  tamoxifen 20 milliGRAM(s) Oral daily  timolol 0.5% Solution 1 Drop(s) Both EYES two times a day  vancomycin  IVPB 1000 milliGRAM(s) IV Intermittent every 24 hours    MEDICATIONS  (PRN):  magnesium hydroxide Suspension 30 milliLiter(s) Oral daily PRN Constipation  melatonin 3 milliGRAM(s) Oral at bedtime PRN Insomnia      Allergies  No Known Allergies    PHYSICAL EXAM:   Vital Signs:  Vital Signs Last 24 Hrs  T(C): 36.9 (2025 04:40), Max: 36.9 (2025 04:40)  T(F): 98.5 (2025 04:40), Max: 98.5 (2025 04:40)  HR: 62 (2025 04:40) (62 - 63)  BP: 143/57 (2025 04:40) (143/57 - 149/71)  BP(mean): --  RR: 17 (2025 04:40) (17 - 18)  SpO2: 94% (2025 04:40) (94% - 95%)    Parameters below as of 2025 04:40  Patient On (Oxygen Delivery Method): room air      Daily     Daily Weight in k.2 (2025 04:40)    GENERAL:  Appears stated age  HEENT:  NC/AT  CHEST:  Full & symmetric excursion  HEART:  Regular rhythm  ABDOMEN:  Soft, non-tender, non-distended  EXTEREMITIES:  no cyanosis  SKIN:  No rash  NEURO:  Alert      LABS:                        8.0    8.38  )-----------( 159      ( 2025 07:30 )             24.8     01-21    138  |  110[H]  |  41[H]  ----------------------------<  104[H]  4.1   |  24  |  1.70[H]    Ca    8.6      2025 07:30  Phos  3.4     01-  Mg     2.5     21        Urinalysis Basic - ( 2025 07:30 )    Color: x / Appearance: x / SG: x / pH: x  Gluc: 104 mg/dL / Ketone: x  / Bili: x / Urobili: x   Blood: x / Protein: x / Nitrite: x   Leuk Esterase: x / RBC: x / WBC x   Sq Epi: x / Non Sq Epi: x / Bacteria: x

## 2025-01-22 NOTE — DISCHARGE NOTE PROVIDER - CARE PROVIDER_API CALL
Eliud Sutherland  Internal Medicine  [address] [facility] [facility], [address]  [place] [address]  Phone: [phone]  Fax: [phone]  Follow Up Time: 2 weeks    Rodger Barba  Hematology  [address] [facility] [address] [facility], [address]  [place] [address]  Phone: [phone]  Fax: [phone]  Follow Up Time: 1 week    Tr Garcia  Podiatric Medicine and Surgery  [address] [place] [id number]  Phone: [phone]  Fax: [phone]  Follow Up Time: 1-3 days    Venkata Perez  Nephrology  [address] [place] [address], [address]  [place] [address]  Phone: [phone]  Fax: [phone]  Follow Up Time: 2 weeks

## 2025-01-22 NOTE — PROGRESS NOTE ADULT - ASSESSMENT
The patient is an 84 year old male with a history of HTN, anemia, GERD, aortic regurgitation, CKD, breast cancer, PAD who is admitted with toe infection.    Plan:  - ECG with sinus rhythm and RBBB  - Echo with normal LV systolic function, mild aortic stenosis, mild pulmonary hypertension  - Monitor hemoglobin  - Continue metoprolol succinate 50 mg daily  - Continue rosuvastatin 20 mg daily  - Continue clopidogrel 75 mg daily  - Continue aspirin 81 mg daily  - Vomiting - norovirus positive - resolved  - Podiatry follow-up  - Discharge planning

## 2025-01-22 NOTE — PROGRESS NOTE ADULT - PROBLEM SELECTOR PROBLEM 2
Osteomyelitis of ankle or foot, acute

## 2025-01-22 NOTE — CASE MANAGEMENT PROGRESS NOTE - NSCMPROGRESSNOTE_GEN_ALL_CORE
Discussed pt in rounds. pt will need PICC line for long term IV Vanco, six weeks per ID. CM met with patient, discussed above, pt is in agreement with home IV. CM discussed that a family member will need to learn how to administer the IV as the infusion nurse will not be coming to house on a daily basis. Patient requested that CM call his son Marco, CM contacted him, left message with CM contact information, awaiting return call. Pt agreeable for CM to send infusion referral to [facility]. Referral sent awaiting response.

## 2025-01-22 NOTE — BRIEF OPERATIVE NOTE - NSICDXBRIEFPREOP_GEN_ALL_CORE_FT
PRE-OP DIAGNOSIS:  Osteomyelitis of toe of right foot 22-Jan-2025 16:53:23  Tr Garsia  
PRE-OP DIAGNOSIS:  Diabetic foot ulcer 15-Marlo-2025 16:29:56  Herman Garcia  Chronic osteomyelitis 15-Marlo-2025 16:30:06  Herman Garcia

## 2025-01-22 NOTE — BRIEF OPERATIVE NOTE - NSICDXBRIEFPOSTOP_GEN_ALL_CORE_FT
POST-OP DIAGNOSIS:  Diabetic foot ulcer 15-Marlo-2025 16:30:18  Herman Garcia  Chronic osteomyelitis 15-Marlo-2025 16:30:38  Herman Garcia  
POST-OP DIAGNOSIS:  Osteomyelitis of toe of right foot 22-Jan-2025 16:53:37  Tr Garsia

## 2025-01-22 NOTE — PROGRESS NOTE ADULT - SUBJECTIVE AND OBJECTIVE BOX
[facility]                             Nephrology and Hypertension                             Niles Montano                                          [phone]     Patient is a 84y old  Male who presents with a chief complaint of Osteomyelitis (03 Jan 2025 13:05)       HPI:  Patient is an 85yo M with a PMH of L sided breast cancer (s/p lumpectomy, on Tamoxifen), HTN, Anemia, Aortic insufficiency, Carotid artery disease, GERD, Glaucoma, Hepatitis (2014), Lower ext edema ,PVD who presents to the ED from Dr. Niko Jacques's office for a R toe infection. Patient notes that he noticed a R toe wound a few weeks ago. He completed a 10d course of Amoxicillin 500mg, but noticed worsening of the wound. He went ot his podiatrist today, as the wound was draining a pus like liquid. Podiatry rec he come to the hospital for IV abx and osteomyelitis workup. Patient feeling well in ED.  States he is urinating well.  No N/V/SOB.  Has not seen nephrologist in the past.      No acute events noted    PAST MEDICAL & SURGICAL HISTORY:  Anemia      HTN (hypertension)      Breast cancer      Glaucoma      Major depression      PVD (peripheral vascular disease)      Lower extremity edema      Chronic GERD      H/O: glaucoma      S/P lumpectomy, left breast           FAMILY HISTORY:  [place]    Social History:Non smoker    MEDICATIONS  (STANDING):  ammonium lactate 12% Lotion 1 Application(s) Topical two times a day  aspirin enteric coated 81 milliGRAM(s) Oral daily  cefepime   IVPB 2000 milliGRAM(s) IV Intermittent every 12 hours  clopidogrel Tablet 75 milliGRAM(s) Oral daily  cyanocobalamin 1000 MICROGram(s) Oral daily  dextrose 5% + sodium chloride 0.45%. 1000 milliLiter(s) (75 mL/Hr) IV Continuous <Continuous>  dextrose 5%. 1000 milliLiter(s) (75 mL/Hr) IV Continuous <Continuous>  escitalopram 10 milliGRAM(s) Oral daily  folic acid 1 milliGRAM(s) Oral daily  influenza  Vaccine (HIGH DOSE) 0.5 milliLiter(s) IntraMuscular once  latanoprost 0.005% Ophthalmic Solution 1 Drop(s) Both EYES at bedtime  metoprolol succinate ER 50 milliGRAM(s) Oral daily  mupirocin 2% Ointment 1 Application(s) Topical <User Schedule>  rosuvastatin 20 milliGRAM(s) Oral at bedtime  timolol 0.5% Solution 1 Drop(s) Both EYES two times a day    MEDICATIONS  (PRN):  HYDROmorphone  Injectable 0.2 milliGRAM(s) IV Push every 6 hours PRN Moderate Pain (4 - 6)  HYDROmorphone  Injectable 0.5 milliGRAM(s) IV Push every 6 hours PRN Severe Pain (7 - 10)  melatonin 3 milliGRAM(s) Oral at bedtime PRN Insomnia  trimethobenzamide Injectable 200 milliGRAM(s) IntraMuscular every 8 hours PRN Nausea      Allergies    No Known Allergies    Intolerances         REVIEW OF SYSTEMS:    as above    Vital Signs Last 24 Hrs  T(C): 36.3 (22 Jan 2025 11:49), Max: 36.9 (22 Jan 2025 04:40)  T(F): 97.3 (22 Jan 2025 11:49), Max: 98.5 (22 Jan 2025 04:40)  HR: 78 (22 Jan 2025 11:49) (62 - 78)  BP: 171/65 (22 Jan 2025 11:49) (143/57 - 171/65)  BP(mean): --  RR: 18 (22 Jan 2025 11:49) (17 - 18)  SpO2: 94% (22 Jan 2025 11:49) (94% - 95%)    Parameters below as of 22 Jan 2025 11:49  Patient On (Oxygen Delivery Method): room air        PHYSICAL EXAM:    GENERAL: NAD  HEAD:  Atraumatic, Normocephalic  EYES: EOMI, conjunctiva and sclera clear  ENMT: No Drainage from nares, No drainage from ears  NERVOUS SYSTEM:  Awake and Alert  CHEST/LUNG: Clear to percussion bilaterally  EXTREMITIES:  No Edema, RLE dressing intact with trace edema  SKIN: No rashes No obvious ecchymosis      LABS:                                               8.0    8.38  )-----------( 159      ( 21 Jan 2025 07:30 )             24.8     01-21    138  |  110[H]  |  41[H]  ----------------------------<  104[H]  4.1   |  24  |  1.70[H]    Ca    8.6      21 Jan 2025 07:30  Phos  3.4     01-21  Mg     2.5     01-21        Urinalysis Basic - ( 21 Jan 2025 07:30 )    Color: x / Appearance: x / SG: x / pH: x  Gluc: 104 mg/dL / Ketone: x  / Bili: x / Urobili: x   Blood: x / Protein: x / Nitrite: x   Leuk Esterase: x / RBC: x / WBC x   Sq Epi: x / Non Sq Epi: x / Bacteria: x

## 2025-01-22 NOTE — PROGRESS NOTE ADULT - SUBJECTIVE AND OBJECTIVE BOX
[facility]  RHEA Brand Y. Patel, S. Shah, G. [facility]  [phone]    Name: DAYA MATHIS  Age: 84y  Gender: Male  MRN: [id number]    Interval History:  No acute overnight events.   Notes reviewed    Antibiotics:  vancomycin  IVPB 1000 milliGRAM(s) IV Intermittent every 24 hours      Medications:  ammonium lactate 12% Lotion 1 Application(s) Topical two times a day  aspirin enteric coated 81 milliGRAM(s) Oral daily  bisacodyl 5 milliGRAM(s) Oral at bedtime  clopidogrel Tablet 75 milliGRAM(s) Oral daily  cyanocobalamin 1000 MICROGram(s) Oral daily  escitalopram 10 milliGRAM(s) Oral daily  ferrous    sulfate 325 milliGRAM(s) Oral daily  folic acid 1 milliGRAM(s) Oral daily  influenza  Vaccine (HIGH DOSE) 0.5 milliLiter(s) IntraMuscular once  lactated ringers. 1000 milliLiter(s) IV Continuous <Continuous>  latanoprost 0.005% Ophthalmic Solution 1 Drop(s) Both EYES at bedtime  magnesium hydroxide Suspension 30 milliLiter(s) Oral daily PRN  melatonin 3 milliGRAM(s) Oral at bedtime PRN  metoprolol succinate ER 50 milliGRAM(s) Oral daily  mupirocin 2% Ointment 1 Application(s) Topical <User Schedule>  polyethylene glycol 3350 17 Gram(s) Oral daily  rosuvastatin 20 milliGRAM(s) Oral at bedtime  tamoxifen 20 milliGRAM(s) Oral daily  timolol 0.5% Solution 1 Drop(s) Both EYES two times a day  vancomycin  IVPB 1000 milliGRAM(s) IV Intermittent every 24 hours      Review of Systems:  A 10-point review of systems was obtained.   Review of systems otherwise negative except as previously noted.    Allergies: No Known Allergies    For details regarding the patient's past medical history, social history, family history, and other miscellaneous elements, please refer the initial infectious diseases consultation and/or the admitting history and physical examination for this admission.    Objective:  Vitals:   T(C): 36.9 (01-22-25 @ 04:40), Max: 36.9 (01-22-25 @ 04:40)  HR: 62 (01-22-25 @ 04:40) (62 - 63)  BP: 143/57 (01-22-25 @ 04:40) (143/57 - 149/71)  RR: 17 (01-22-25 @ 04:40) (17 - 18)  SpO2: 94% (01-22-25 @ 04:40) (94% - 95%)    Physical Examination:  General: no acute distress  HEENT: NC/AT, EOMI  Cardio: RRR  Resp: no use of resp acc muscles  Abd: soft, NT, ND  Ext: foot in dressing  Skin: warm, dry, no visible rash      Laboratory Studies:  CBC:                       8.0    8.38  )-----------( 159      ( 21 Jan 2025 07:30 )             24.8     CMP: 01-21    138  |  110[H]  |  41[H]  ----------------------------<  104[H]  4.1   |  24  |  1.70[H]    Ca    8.6      21 Jan 2025 07:30  Phos  3.4     01-21  Mg     2.5     01-21        Urinalysis Basic - ( 21 Jan 2025 07:30 )    Color: x / Appearance: x / SG: x / pH: x  Gluc: 104 mg/dL / Ketone: x  / Bili: x / Urobili: x   Blood: x / Protein: x / Nitrite: x   Leuk Esterase: x / RBC: x / WBC x   Sq Epi: x / Non Sq Epi: x / Bacteria: x        Microbiology: reviewed    Culture - Tissue with Gram Stain (collected 01-15-25 @ 16:09)  Source: Tissue  Gram Stain (01-20-25 @ 22:26):    No polymorphonuclear cells seen per low power field    No organisms seen per oil power field  Final Report (01-20-25 @ 22:26):    Growth in fluid media only Staphylococcus epidermidis  Organism: Staphylococcus epidermidis (01-20-25 @ 22:26)  Organism: Staphylococcus epidermidis (01-20-25 @ 22:26)      Method Type: DANIEL      -  Clindamycin: S 0.5      -  Erythromycin: R >4      -  Gentamicin: S <=4 Should not be used as monotherapy      -  Oxacillin: R >2      -  Penicillin: R >2      -  Rifampin: S <=1 Should not be used as monotherapy      -  Tetracycline: S <=4      -  Trimethoprim/Sulfamethoxazole: S <=0.5/9.5      -  Vancomycin: S 2    Culture - Tissue with Gram Stain (collected 01-15-25 @ 16:09)  Source: Tissue  Gram Stain (01-17-25 @ 20:16):    No polymorphonuclear cells seen per low power field    No organisms seen per oil power field  Final Report (01-20-25 @ 18:38):    Rare Staphylococcus epidermidis    See previous culture [id number]    Culture - Tissue with Gram Stain (collected 01-15-25 @ 16:09)  Source: Tissue  Gram Stain (01-17-25 @ 20:09):    No polymorphonuclear cells seen per low power field    No organisms seen per oil power field  Final Report (01-20-25 @ 18:50):    Rare Staphylococcus epidermidis  Organism: Staphylococcus epidermidis (01-20-25 @ 18:50)  Organism: Staphylococcus epidermidis (01-20-25 @ 18:50)      Method Type: DANIEL      -  Clindamycin: S <=0.25      -  Erythromycin: R >4      -  Gentamicin: S <=4 Should not be used as monotherapy      -  Oxacillin: R >2      -  Penicillin: R >2      -  Rifampin: S <=1 Should not be used as monotherapy      -  Tetracycline: S <=4      -  Trimethoprim/Sulfamethoxazole: S <=0.5/9.5      -  Vancomycin: S 1    Culture - Wound Aerobic/Anaerobic (collected 01-15-25 @ 16:09)  Source: .Surgical Swab  Final Report (01-21-25 @ 07:53):    Growth in fluid media only Staphylococcus epidermidis    "Susceptibilities not performed"          Radiology: reviewed

## 2025-01-22 NOTE — BRIEF OPERATIVE NOTE - NSICDXBRIEFPROCEDURE_GEN_ALL_CORE_FT
PROCEDURES:  Insertion of peripherally inserted central catheter (PICC) with imaging guidance 22-Jan-2025 16:53:00  Tr Garsia  
PROCEDURES:  Amputation, toe, 1 toe 15-Marlo-2025 16:29:17  Herman Garcia

## 2025-01-22 NOTE — DISCHARGE NOTE PROVIDER - HOSPITAL COURSE
Event Note
85yo M with L sided breast cancer (s/p lumpectomy, on Tamoxifen), HTN, Anemia, Aortic insufficiency, Carotid artery disease, GERD, Glaucoma, Hepatitis (2014), Lower ext edema ,PVD, who presented to the ED from Dr. Niko Jacques's office for a R toe infection. MRI concerning for osteomyelitis of R 1st toe. He has no fevers and no leukocytosis. Skin changes on lower legs appear concerning for venous stasis. Podiatry following.      US Kidney and Bladder   Right kidney: 8.8 cm. No renal mass, hydronephrosis or calculi.  Left kidney: 8.1 cm. No renal mass, hydronephrosis or calculi. 2.1 x 2.3   x 2.2 cm mid/lower pole and 2.9 x 2.0 x 3.2 cm lower pole cyst. 1.6 x 1.6   x 1.8 cm echogenic solid lesion noted in the lateral midpole.    1.8 cm left renal solid echogenic mass, further characterization with a   renal CT/MR with contrast is recommended.      Rt 1st toe ulcer w/ underlying osteomyelitis  - afebrile, no leukocytosis  - BCx NGTD  - Culture - Wound Aerobic (collected 01-02-25 @ 18:30)    Few Pseudomonas aeruginosa, Rare Serratia marcescens  - 1/6 s/p RLE angio  - 1/15 Amputation, toe, 1 toe 15-Marlo-2025 16:29:17  Herman Garcia  Culture - Tissue with Gram Stain (01.15.25 @ 16:09) Rare Staphylococcus epidermidis  -  Oxacillin: R >2, -  Vancomycin: S 1  - s/p CEFEPIME-->changed to Vanco 1/19   - Path positive for acute/chronic OM    Hematuria- pt reporting blood in urine 1/15; resolved spontaneously  STEPHANY on CKD Stage 3- STEPHANY Likely 2/2 Decreased EABV, creatinine labile but stable  -Hypernatremia is resolved, s/p D5W  -Renal leison- Renal US with solid mass,    will need outpt MRI, at this time favor MRI with elevated creatinine, should see Urology for eval, can see outpatient-pt and son aware of mass and need for outpatient follow up  anemia- Gets TEA per heme, outpt heme onc Dr Jameson anguiano    Diarrhea  GI PCR + Norovirus, EAEC  Stool cx negative  completed azithromycin 1/11

## 2025-01-22 NOTE — PROGRESS NOTE ADULT - PROVIDER SPECIALTY LIST ADULT
Cardiology
Gastroenterology
Hospitalist
Hospitalist
Infectious Disease
Internal Medicine
Internal Medicine
Nephrology
Podiatry
Surgery
Cardiology
Gastroenterology
Hospitalist
Infectious Disease
Nephrology
Podiatry
Vascular Surgery
Cardiology
Gastroenterology
Hospitalist
Internal Medicine
Nephrology
Podiatry
Surgery
Vascular Surgery
Vascular Surgery
Cardiology-Oncology
Gastroenterology
Hospitalist
Infectious Disease
Internal Medicine
Nephrology
Podiatry

## 2025-01-22 NOTE — PROGRESS NOTE ADULT - PROBLEM SELECTOR PROBLEM 1
Open wound of toe of right foot
Osteomyelitis of ankle or foot, acute
Osteomyelitis of ankle or foot, acute
Open wound of toe of right foot
Osteomyelitis of ankle or foot, acute
Osteomyelitis of ankle or foot, acute
Open wound of toe of right foot
Osteomyelitis of ankle or foot, acute
Open wound of toe of right foot
Osteomyelitis of ankle or foot, acute
Open wound of toe of right foot

## 2025-01-22 NOTE — DISCHARGE NOTE PROVIDER - CARE PROVIDERS DIRECT ADDRESSES
[email or web address],[email or web address],DirectAddress_Unknown

## 2025-01-22 NOTE — DISCHARGE NOTE PROVIDER - NSDCCPCAREPLAN_GEN_ALL_CORE_FT
PRINCIPAL DISCHARGE DIAGNOSIS  Diagnosis: Toe infection  Assessment and Plan of Treatment: aw R toe infection. MRI concerning for osteomyelitis of R 1st toe  Rt 1st toe ulcer w/ underlying osteomyelitis  - afebrile, no leukocytosis  - BCx NGTD  - Culture - Wound Aerobic (collected 01-02-25 @ 18:30)    Few Pseudomonas aeruginosa, Rare Serratia marcescens  - 1/6 s/p RLE angio  - 1/15 Amputation, toe, 1 toe 15-Marlo-2025 16:29:17  Herman Garcia  Culture - Tissue with Gram Stain (01.15.25 @ 16:09) Rare Staphylococcus epidermidis  -  Oxacillin: R >2, -  Vancomycin: S 1  - s/p CEFEPIME-->changed to Vanco 1/19   - Path positive for acute/chronic OM  Diarrhea  GI PCR + Norovirus, EAEC  Stool cx negative  completed azithromycin 1/11  LABS:                        8.4    8.81  )-----------( 210      ( 22 Jan 2025 14:30 )             26.8   01-22  139  |  110[H]  |  46[H]  ----------------------------<  110[H]  4.9   |  26  |  1.90[H]  Ca    8.7      22 Jan 2025 14:30  Phos  3.4     01-21  Mg     2.5     01-21        SECONDARY DISCHARGE DIAGNOSES  Diagnosis: Renal lesion  Assessment and Plan of Treatment:     Diagnosis: Acute renal failure with other specified pathological kidney lesion superimposed on stage 2 chronic kidney disease  Assessment and Plan of Treatment:   STEPHANY on CKD Stage 3- STEPHANY Likely 2/2 Decreased EABV, creatinine labile but stable  -Hypernatremia is resolved, s/p D5W  -Renal lesion- Renal US with solid mass as below findings   will need outpt MRI, at this time favor MRI with elevated creatinine, should see Urology and hemeonc  for eval, can see outpatient-pt and son aware of mass and need for outpatient follow up  anemia- Gets TEA per heme, outpt heme onc Dr Jameosn anguiano   US Kidney and Bladder   Right kidney: 8.8 cm. No renal mass, hydronephrosis or calculi.  Left kidney: 8.1 cm. No renal mass, hydronephrosis or calculi. 2.1 x 2.3   x 2.2 cm mid/lower pole and 2.9 x 2.0 x 3.2 cm lower pole cyst. 1.6 x 1.6   x 1.8 cm echogenic solid lesion noted in the lateral midpole.  1.8 cm left renal solid echogenic mass, further characterization with a   renal CT/MR with contrast is recommended.

## 2025-01-24 ENCOUNTER — EMERGENCY (EMERGENCY)
Facility: HOSPITAL | Age: 85
LOS: 1 days | Discharge: HOME | End: 2025-01-24
Attending: EMERGENCY MEDICINE | Admitting: EMERGENCY MEDICINE
Payer: MEDICARE

## 2025-01-24 VITALS
SYSTOLIC BLOOD PRESSURE: 185 MMHG | HEART RATE: 58 BPM | OXYGEN SATURATION: 98 % | TEMPERATURE: 98 F | WEIGHT: 179.9 LBS | DIASTOLIC BLOOD PRESSURE: 70 MMHG | RESPIRATION RATE: 16 BRPM | HEIGHT: 66 IN

## 2025-01-24 DIAGNOSIS — Z98.890 OTHER SPECIFIED POSTPROCEDURAL STATES: Chronic | ICD-10-CM

## 2025-01-24 PROBLEM — K21.9 GASTRO-ESOPHAGEAL REFLUX DISEASE WITHOUT ESOPHAGITIS: Chronic | Status: ACTIVE | Noted: 2025-01-02

## 2025-01-24 PROBLEM — D64.9 ANEMIA, UNSPECIFIED: Chronic | Status: ACTIVE | Noted: 2025-01-02

## 2025-01-24 PROBLEM — I10 ESSENTIAL (PRIMARY) HYPERTENSION: Chronic | Status: ACTIVE | Noted: 2025-01-02

## 2025-01-24 PROBLEM — C50.919 MALIGNANT NEOPLASM OF UNSPECIFIED SITE OF UNSPECIFIED FEMALE BREAST: Chronic | Status: ACTIVE | Noted: 2025-01-02

## 2025-01-24 PROBLEM — H40.9 UNSPECIFIED GLAUCOMA: Chronic | Status: ACTIVE | Noted: 2025-01-02

## 2025-01-24 PROBLEM — I73.9 PERIPHERAL VASCULAR DISEASE, UNSPECIFIED: Chronic | Status: ACTIVE | Noted: 2025-01-02

## 2025-01-24 PROBLEM — Z86.69 PERSONAL HISTORY OF OTHER DISEASES OF THE NERVOUS SYSTEM AND SENSE ORGANS: Chronic | Status: ACTIVE | Noted: 2025-01-02

## 2025-01-24 PROBLEM — R60.0 LOCALIZED EDEMA: Chronic | Status: ACTIVE | Noted: 2025-01-02

## 2025-01-24 PROBLEM — F32.9 MAJOR DEPRESSIVE DISORDER, SINGLE EPISODE, UNSPECIFIED: Chronic | Status: ACTIVE | Noted: 2025-01-02

## 2025-01-24 LAB
ALBUMIN SERPL ELPH-MCNC: 2.5 G/DL — LOW (ref 3.3–5)
ALP SERPL-CCNC: 72 U/L — SIGNIFICANT CHANGE UP (ref 40–120)
ALT FLD-CCNC: 18 U/L — SIGNIFICANT CHANGE UP (ref 12–78)
ANION GAP SERPL CALC-SCNC: 11 MMOL/L — SIGNIFICANT CHANGE UP (ref 5–17)
APTT BLD: 29.1 SEC — SIGNIFICANT CHANGE UP (ref 24.5–35.6)
AST SERPL-CCNC: 17 U/L — SIGNIFICANT CHANGE UP (ref 15–37)
BASOPHILS # BLD AUTO: 0.03 K/UL — SIGNIFICANT CHANGE UP (ref 0–0.2)
BASOPHILS NFR BLD AUTO: 0.4 % — SIGNIFICANT CHANGE UP (ref 0–2)
BILIRUB SERPL-MCNC: 0.3 MG/DL — SIGNIFICANT CHANGE UP (ref 0.2–1.2)
BUN SERPL-MCNC: 49 MG/DL — HIGH (ref 7–23)
CALCIUM SERPL-MCNC: 9.4 MG/DL — SIGNIFICANT CHANGE UP (ref 8.5–10.1)
CHLORIDE SERPL-SCNC: 107 MMOL/L — SIGNIFICANT CHANGE UP (ref 96–108)
CO2 SERPL-SCNC: 23 MMOL/L — SIGNIFICANT CHANGE UP (ref 22–31)
CREAT SERPL-MCNC: 2.6 MG/DL — HIGH (ref 0.5–1.3)
EGFR: 23 ML/MIN/1.73M2 — LOW
EOSINOPHIL # BLD AUTO: 0.12 K/UL — SIGNIFICANT CHANGE UP (ref 0–0.5)
EOSINOPHIL NFR BLD AUTO: 1.7 % — SIGNIFICANT CHANGE UP (ref 0–6)
GLUCOSE SERPL-MCNC: 103 MG/DL — HIGH (ref 70–99)
HCT VFR BLD CALC: 28.1 % — LOW (ref 39–50)
HGB BLD-MCNC: 9 G/DL — LOW (ref 13–17)
IMM GRANULOCYTES NFR BLD AUTO: 0.1 % — SIGNIFICANT CHANGE UP (ref 0–0.9)
INR BLD: 1.09 RATIO — SIGNIFICANT CHANGE UP (ref 0.85–1.16)
LYMPHOCYTES # BLD AUTO: 1.33 K/UL — SIGNIFICANT CHANGE UP (ref 1–3.3)
LYMPHOCYTES # BLD AUTO: 18.3 % — SIGNIFICANT CHANGE UP (ref 13–44)
MCHC RBC-ENTMCNC: 29 PG — SIGNIFICANT CHANGE UP (ref 27–34)
MCHC RBC-ENTMCNC: 32 G/DL — SIGNIFICANT CHANGE UP (ref 32–36)
MCV RBC AUTO: 90.6 FL — SIGNIFICANT CHANGE UP (ref 80–100)
MONOCYTES # BLD AUTO: 0.82 K/UL — SIGNIFICANT CHANGE UP (ref 0–0.9)
MONOCYTES NFR BLD AUTO: 11.3 % — SIGNIFICANT CHANGE UP (ref 2–14)
NEUTROPHILS # BLD AUTO: 4.94 K/UL — SIGNIFICANT CHANGE UP (ref 1.8–7.4)
NEUTROPHILS NFR BLD AUTO: 68.2 % — SIGNIFICANT CHANGE UP (ref 43–77)
NRBC # BLD: 0 /100 WBCS — SIGNIFICANT CHANGE UP (ref 0–0)
PLATELET # BLD AUTO: 210 K/UL — SIGNIFICANT CHANGE UP (ref 150–400)
POTASSIUM SERPL-MCNC: 4.2 MMOL/L — SIGNIFICANT CHANGE UP (ref 3.5–5.3)
POTASSIUM SERPL-SCNC: 4.2 MMOL/L — SIGNIFICANT CHANGE UP (ref 3.5–5.3)
PROT SERPL-MCNC: 7.4 G/DL — SIGNIFICANT CHANGE UP (ref 6–8.3)
PROTHROM AB SERPL-ACNC: 12.9 SEC — SIGNIFICANT CHANGE UP (ref 9.9–13.4)
RBC # BLD: 3.1 M/UL — LOW (ref 4.2–5.8)
RBC # FLD: 15.2 % — HIGH (ref 10.3–14.5)
SODIUM SERPL-SCNC: 141 MMOL/L — SIGNIFICANT CHANGE UP (ref 135–145)
WBC # BLD: 7.25 K/UL — SIGNIFICANT CHANGE UP (ref 3.8–10.5)
WBC # FLD AUTO: 7.25 K/UL — SIGNIFICANT CHANGE UP (ref 3.8–10.5)

## 2025-01-24 PROCEDURE — 73130 X-RAY EXAM OF HAND: CPT | Mod: 26,RT

## 2025-01-24 PROCEDURE — 71045 X-RAY EXAM CHEST 1 VIEW: CPT | Mod: 26

## 2025-01-24 PROCEDURE — 93010 ELECTROCARDIOGRAM REPORT: CPT

## 2025-01-24 PROCEDURE — 99285 EMERGENCY DEPT VISIT HI MDM: CPT

## 2025-01-24 PROCEDURE — 70450 CT HEAD/BRAIN W/O DYE: CPT | Mod: 26

## 2025-01-24 PROCEDURE — 72125 CT NECK SPINE W/O DYE: CPT | Mod: 26

## 2025-01-24 RX ORDER — ROSUVASTATIN 40 MG/1
20 TABLET, FILM COATED ORAL AT BEDTIME
Refills: 0 | Status: DISCONTINUED | OUTPATIENT
Start: 2025-01-24 | End: 2025-01-28

## 2025-01-24 RX ORDER — VANCOMYCIN HYDROCHLORIDE 5 G/100ML
1000 INJECTION, POWDER, LYOPHILIZED, FOR SOLUTION INTRAVENOUS DAILY
Refills: 0 | Status: DISCONTINUED | OUTPATIENT
Start: 2025-01-25 | End: 2025-01-28

## 2025-01-24 RX ORDER — TAMOXIFEN CITRATE 10 MG/5ML
20 LIQUID ORAL DAILY
Refills: 0 | Status: DISCONTINUED | OUTPATIENT
Start: 2025-01-24 | End: 2025-01-28

## 2025-01-24 RX ORDER — METOPROLOL TARTRATE 50 MG
50 TABLET ORAL DAILY
Refills: 0 | Status: DISCONTINUED | OUTPATIENT
Start: 2025-01-24 | End: 2025-01-28

## 2025-01-24 RX ORDER — CLOPIDOGREL BISULFATE 75 MG/1
75 TABLET, FILM COATED ORAL ONCE
Refills: 0 | Status: COMPLETED | OUTPATIENT
Start: 2025-01-24 | End: 2025-01-24

## 2025-01-24 RX ORDER — ACETAMINOPHEN 80 MG/.8ML
650 SOLUTION/ DROPS ORAL EVERY 6 HOURS
Refills: 0 | Status: DISCONTINUED | OUTPATIENT
Start: 2025-01-24 | End: 2025-01-28

## 2025-01-24 RX ORDER — ASPIRIN 81 MG
81 TABLET, DELAYED RELEASE (ENTERIC COATED) ORAL DAILY
Refills: 0 | Status: DISCONTINUED | OUTPATIENT
Start: 2025-01-24 | End: 2025-01-28

## 2025-01-24 RX ORDER — ESCITALOPRAM OXALATE 10 MG/1
10 TABLET ORAL DAILY
Refills: 0 | Status: DISCONTINUED | OUTPATIENT
Start: 2025-01-24 | End: 2025-01-28

## 2025-01-24 RX ORDER — FERROUS SULFATE 325(65) MG
325 TABLET ORAL DAILY
Refills: 0 | Status: DISCONTINUED | OUTPATIENT
Start: 2025-01-24 | End: 2025-01-28

## 2025-01-24 RX ORDER — VANCOMYCIN HYDROCHLORIDE 5 G/100ML
1000 INJECTION, POWDER, LYOPHILIZED, FOR SOLUTION INTRAVENOUS ONCE
Refills: 0 | Status: COMPLETED | OUTPATIENT
Start: 2025-01-24 | End: 2025-01-24

## 2025-01-24 RX ORDER — SODIUM CHLORIDE 9 MG/ML
1000 INJECTION, SOLUTION INTRAMUSCULAR; INTRAVENOUS; SUBCUTANEOUS ONCE
Refills: 0 | Status: COMPLETED | OUTPATIENT
Start: 2025-01-24 | End: 2025-01-24

## 2025-01-24 RX ORDER — BACITRACIN 500 UNIT/G
1 OINTMENT (GRAM) TOPICAL ONCE
Refills: 0 | Status: COMPLETED | OUTPATIENT
Start: 2025-01-24 | End: 2025-01-24

## 2025-01-24 RX ORDER — CYANOCOBALAMIN 1000 UG/ML
1000 INJECTION, SOLUTION INTRAMUSCULAR; SUBCUTANEOUS DAILY
Refills: 0 | Status: DISCONTINUED | OUTPATIENT
Start: 2025-01-24 | End: 2025-01-28

## 2025-01-24 RX ORDER — VITAMIN A 10000 UNIT
1 TABLET ORAL DAILY
Refills: 0 | Status: DISCONTINUED | OUTPATIENT
Start: 2025-01-24 | End: 2025-01-28

## 2025-01-24 RX ADMIN — Medication 1 APPLICATION(S): at 17:40

## 2025-01-24 RX ADMIN — ROSUVASTATIN 20 MILLIGRAM(S): 40 TABLET, FILM COATED ORAL at 21:55

## 2025-01-24 RX ADMIN — SODIUM CHLORIDE 1000 MILLILITER(S): 9 INJECTION, SOLUTION INTRAMUSCULAR; INTRAVENOUS; SUBCUTANEOUS at 19:35

## 2025-01-24 RX ADMIN — CLOPIDOGREL BISULFATE 75 MILLIGRAM(S): 75 TABLET, FILM COATED ORAL at 21:55

## 2025-01-24 RX ADMIN — VANCOMYCIN HYDROCHLORIDE 250 MILLIGRAM(S): 5 INJECTION, POWDER, LYOPHILIZED, FOR SOLUTION INTRAVENOUS at 17:40

## 2025-01-24 RX ADMIN — TAMOXIFEN CITRATE 20 MILLIGRAM(S): 10 LIQUID ORAL at 21:55

## 2025-01-24 NOTE — ED ADULT NURSE NOTE - OBJECTIVE STATEMENT
A/Ox4.  Noted abrasion to top posterior head.  No noted swelling.  ICe applied.  HOB raised.  Noted Dsg to R foot.  Pt recently admitted for osteomylitis.  Noted PICC to R upper arm.

## 2025-01-24 NOTE — ED PROVIDER NOTE - PATIENT PORTAL LINK FT
You can access the FollowMyHealth Patient Portal offered by [facility] by registering at the following website: [email or web address]. By joining [facility]’s FollowMyHealth portal, you will also be able to view your health information using other applications (apps) compatible with our system.

## 2025-01-24 NOTE — ED ADULT TRIAGE NOTE - CHIEF COMPLAINT QUOTE
Patient is from home. S/P Fall last night. Denies LOC. Sustained a laceration to back of the head . Patient is on Plavix and aspirin

## 2025-01-24 NOTE — ED PROVIDER NOTE - OBJECTIVE STATEMENT
84-year-old male PMH of left-sided breast CA, status postlumpectomy, on tamoxifen, HTN, anemia, CAD, GERD, glaucoma, lower extremity edema, PVD, right toe infection, MRI concerning for osteomyelitis, on IV vancomycin 1 g, had a slip and fall yesterday, was told today to go to the emergency department, patient states that he feels well, denies any pain, no loss of consciousness.

## 2025-01-24 NOTE — ED PROVIDER NOTE - CLINICAL SUMMARY MEDICAL DECISION MAKING FREE TEXT BOX
84-year-old male presented to the emergency department status post fall, hit his head yesterday, sent into the emergency department by ambulance, family unable to take care of patient at home, currently being treated for osteomyelitis of right foot, IV vancomycin, will send CBC, CMP, coagulation studies, CT head, CT cervical spine, x-ray chest, x-ray right hand, call social work, PT consult

## 2025-01-24 NOTE — ED PROVIDER NOTE - PROGRESS NOTE DETAILS
spoke with son Francisco, [phone], states he fell yesterday, unable to care for him at home, due for his vancomycin today, states he spoke with  Estevan Perera and told to call for ambulance and bring him back to [place] to be re-admitted spoke with Estevan, social work, aware patient in ER, awaiting PT consult for the AM spoke with son Francisco, [phone], states he fell yesterday, unable to care for him at home, due for his vancomycin today, states he spoke with  Estevan Perear and told to call for ambulance and bring him back to [place] to be re-admitted, explained to son that the patient will be here overnight in the ER, awaiting labs, CT head, vanco, social work and PT consult Patient's creatinine slightly increased to 2.6, given IVF bolus and will repeat in the morning. It is likely related to recently taking IV vanco for his osteo. patient is otherwise stable without any complaints, pending PT evaluation and SW following Patient was seen and evaluated by social work and patient will be transferred/discharged to [facility].

## 2025-01-24 NOTE — CONSULT NOTE ADULT - SUBJECTIVE AND OBJECTIVE BOX
[facility]  RHEA Brand S. Shah, Y. Patel, G. [facility]  [phone]    DAYA MATHIS  84y, Male  [id number]    HPI--  HPI: 83yo M with L sided breast cancer (s/p lumpectomy, on Tamoxifen), HTN, Anemia, Aortic insufficiency, Carotid artery disease, GERD, Glaucoma, Hepatitis (2014), Lower ext edema ,PVD, who presented to the ED s/p fall w/ head lac to back. Denies LOC    Of note recent admission R toe infection. MRI concerning for osteomyelitis of R 1st toe. 1/6 s/p RLE angio. 1/15 Amputation, toe, 1 toe 15-Marlo-2025 16:29:17. Path was positive for OM.   Culture - Tissue with Gram Stain (01.15.25 @ 16:09) Rare Staphylococcus epidermidis  -  Oxacillin: R >2, -  Vancomycin: S 1. Was discharged on 1/22 on vancomycin via PICC until 3/1/25  Hospital course was c/b norovirus.     Active Medications--    Antimicrobials:     Immunologic:     ROS:  unable to obtain    Allergies: No Known Allergies    PMH -- Anemia    HTN (hypertension)    Breast cancer    Glaucoma    Major depression    PVD (peripheral vascular disease)    Lower extremity edema    Chronic GERD    H/O: glaucoma      PSH -- S/P lumpectomy, left breast      FH --   Social History --  EtOH: denies   Tobacco: denies   Drug Use: denies     Travel/Environmental/Occupational History:    Physical Exam--  Vital Signs Last 24 Hrs  T(F): 98.2 (24 Jan 2025 12:57), Max: 98.2 (24 Jan 2025 12:57)  HR: 58 (24 Jan 2025 12:57) (58 - 58)  BP: 185/70 (24 Jan 2025 12:57) (185/70 - 185/70)  RR: 16 (24 Jan 2025 12:57) (16 - 16)  SpO2: 98% (24 Jan 2025 12:57) (98% - 98%)  General: nontoxic-appearing, no acute distress  HEENT: NC/AT, EOMI  Lungs: CTA  Heart: Regular rate and rhythm.   Abdomen: Soft. Nondistended. Nontender.   Extremities: foot in dressing  Skin: Warm. Dry. Good turgor. No rash.     Laboratory & Imaging Data:  CBC:   CMP:             Microbiology: reviewed    Culture - Tissue with Gram Stain (collected 01-15-25 @ 16:09)  Source: Tissue  Gram Stain (01-20-25 @ 22:26):    No polymorphonuclear cells seen per low power field    No organisms seen per oil power field  Final Report (01-20-25 @ 22:26):    Growth in fluid media only Staphylococcus epidermidis  Organism: Staphylococcus epidermidis (01-20-25 @ 22:26)  Organism: Staphylococcus epidermidis (01-20-25 @ 22:26)      Method Type: DANIEL      -  Clindamycin: S 0.5      -  Erythromycin: R >4      -  Gentamicin: S <=4 Should not be used as monotherapy      -  Oxacillin: R >2      -  Penicillin: R >2      -  Rifampin: S <=1 Should not be used as monotherapy      -  Tetracycline: S <=4      -  Trimethoprim/Sulfamethoxazole: S <=0.5/9.5      -  Vancomycin: S 2    Culture - Tissue with Gram Stain (collected 01-15-25 @ 16:09)  Source: Tissue  Gram Stain (01-17-25 @ 20:16):    No polymorphonuclear cells seen per low power field    No organisms seen per oil power field  Final Report (01-20-25 @ 18:38):    Rare Staphylococcus epidermidis    See previous culture [id number]    Culture - Tissue with Gram Stain (collected 01-15-25 @ 16:09)  Source: Tissue  Gram Stain (01-17-25 @ 20:09):    No polymorphonuclear cells seen per low power field    No organisms seen per oil power field  Final Report (01-20-25 @ 18:50):    Rare Staphylococcus epidermidis  Organism: Staphylococcus epidermidis (01-20-25 @ 18:50)  Organism: Staphylococcus epidermidis (01-20-25 @ 18:50)      Method Type: DANIEL      -  Clindamycin: S <=0.25      -  Erythromycin: R >4      -  Gentamicin: S <=4 Should not be used as monotherapy      -  Oxacillin: R >2      -  Penicillin: R >2      -  Rifampin: S <=1 Should not be used as monotherapy      -  Tetracycline: S <=4      -  Trimethoprim/Sulfamethoxazole: S <=0.5/9.5      -  Vancomycin: S 1    Culture - Wound Aerobic/Anaerobic (collected 01-15-25 @ 16:09)  Source: .Surgical Swab  Final Report (01-21-25 @ 07:53):    Growth in fluid media only Staphylococcus epidermidis    "Susceptibilities not performed"          Radiology: reviewed

## 2025-01-24 NOTE — ED PROVIDER NOTE - PROVIDER TOKENS
Patient with one or more new problems requiring additional work-up/treatment. PROVIDER:[TOKEN:[[id number]]]

## 2025-01-24 NOTE — CONSULT NOTE ADULT - ASSESSMENT
83yo M with L sided breast cancer (s/p lumpectomy, on Tamoxifen), HTN, Anemia, Aortic insufficiency, Carotid artery disease, GERD, Glaucoma, Hepatitis (2014), Lower ext edema ,PVD, who presented to the ED s/p fall w/ head lac to back. Denies LOC    Of note recent admission R toe infection. MRI concerning for osteomyelitis of R 1st toe. 1/6 s/p RLE angio. 1/15 Amputation, toe, 1 toe 15-Marlo-2025 16:29:17. Path was positive for OM.   Culture - Tissue with Gram Stain (01.15.25 @ 16:09) Rare Staphylococcus epidermidis  -  Oxacillin: R >2, -  Vancomycin: S 1. Was discharged on 1/22 on vancomycin via PICC until 3/1/25  Hospital course was c/b norovirus/EAEC, resolved.     Recommendations:   If admitted, please continue w/ vancomycin 1gm q24h.   local wound care, podiatry f/u    Dr. Matias covering weekend service from 1/25/25-1/26/25  I will resume care 1/27/25  Infectious Diseases will follow. Please call with any questions.  Yen Glez M.D.  Available on [employer] TEAMS -- *PREFERRED*  [facility] [phone]  For after 5 P.M. and weekends, please call [phone]

## 2025-01-24 NOTE — ED PROVIDER NOTE - CARE PROVIDER_API CALL
Miguelangel Costa  Infectious Disease  [address] [place] [address]  Phone: [phone]  Fax: [phone]  Follow Up Time:

## 2025-01-25 LAB
ANION GAP SERPL CALC-SCNC: 5 MMOL/L — SIGNIFICANT CHANGE UP (ref 5–17)
ANION GAP SERPL CALC-SCNC: 7 MMOL/L — SIGNIFICANT CHANGE UP (ref 5–17)
BUN SERPL-MCNC: 51 MG/DL — HIGH (ref 7–23)
BUN SERPL-MCNC: 53 MG/DL — HIGH (ref 7–23)
CALCIUM SERPL-MCNC: 8.5 MG/DL — SIGNIFICANT CHANGE UP (ref 8.5–10.1)
CALCIUM SERPL-MCNC: 8.7 MG/DL — SIGNIFICANT CHANGE UP (ref 8.5–10.1)
CHLORIDE SERPL-SCNC: 113 MMOL/L — HIGH (ref 96–108)
CHLORIDE SERPL-SCNC: 114 MMOL/L — HIGH (ref 96–108)
CO2 SERPL-SCNC: 23 MMOL/L — SIGNIFICANT CHANGE UP (ref 22–31)
CO2 SERPL-SCNC: 23 MMOL/L — SIGNIFICANT CHANGE UP (ref 22–31)
CREAT SERPL-MCNC: 2.5 MG/DL — HIGH (ref 0.5–1.3)
CREAT SERPL-MCNC: 2.6 MG/DL — HIGH (ref 0.5–1.3)
EGFR: 23 ML/MIN/1.73M2 — LOW
EGFR: 25 ML/MIN/1.73M2 — LOW
GLUCOSE SERPL-MCNC: 105 MG/DL — HIGH (ref 70–99)
GLUCOSE SERPL-MCNC: 119 MG/DL — HIGH (ref 70–99)
POTASSIUM SERPL-MCNC: 4.3 MMOL/L — SIGNIFICANT CHANGE UP (ref 3.5–5.3)
POTASSIUM SERPL-MCNC: 4.7 MMOL/L — SIGNIFICANT CHANGE UP (ref 3.5–5.3)
POTASSIUM SERPL-SCNC: 4.3 MMOL/L — SIGNIFICANT CHANGE UP (ref 3.5–5.3)
POTASSIUM SERPL-SCNC: 4.7 MMOL/L — SIGNIFICANT CHANGE UP (ref 3.5–5.3)
SODIUM SERPL-SCNC: 142 MMOL/L — SIGNIFICANT CHANGE UP (ref 135–145)
SODIUM SERPL-SCNC: 143 MMOL/L — SIGNIFICANT CHANGE UP (ref 135–145)

## 2025-01-25 RX ORDER — CLOPIDOGREL BISULFATE 75 MG/1
75 TABLET, FILM COATED ORAL DAILY
Refills: 0 | Status: DISCONTINUED | OUTPATIENT
Start: 2025-01-26 | End: 2025-01-28

## 2025-01-25 RX ORDER — SODIUM CHLORIDE 9 MG/ML
500 INJECTION, SOLUTION INTRAMUSCULAR; INTRAVENOUS; SUBCUTANEOUS ONCE
Refills: 0 | Status: COMPLETED | OUTPATIENT
Start: 2025-01-25 | End: 2025-01-25

## 2025-01-25 RX ADMIN — Medication 1 MILLIGRAM(S): at 14:07

## 2025-01-25 RX ADMIN — VANCOMYCIN HYDROCHLORIDE 250 MILLIGRAM(S): 5 INJECTION, POWDER, LYOPHILIZED, FOR SOLUTION INTRAVENOUS at 14:11

## 2025-01-25 RX ADMIN — Medication 50 MILLIGRAM(S): at 06:55

## 2025-01-25 RX ADMIN — SODIUM CHLORIDE 500 MILLILITER(S): 9 INJECTION, SOLUTION INTRAMUSCULAR; INTRAVENOUS; SUBCUTANEOUS at 01:40

## 2025-01-25 RX ADMIN — ACETAMINOPHEN 650 MILLIGRAM(S): 80 SOLUTION/ DROPS ORAL at 16:19

## 2025-01-25 RX ADMIN — ROSUVASTATIN 20 MILLIGRAM(S): 40 TABLET, FILM COATED ORAL at 22:53

## 2025-01-25 RX ADMIN — Medication 325 MILLIGRAM(S): at 14:09

## 2025-01-25 RX ADMIN — Medication 81 MILLIGRAM(S): at 14:08

## 2025-01-25 RX ADMIN — ESCITALOPRAM OXALATE 10 MILLIGRAM(S): 10 TABLET ORAL at 14:08

## 2025-01-25 RX ADMIN — TAMOXIFEN CITRATE 20 MILLIGRAM(S): 10 LIQUID ORAL at 14:10

## 2025-01-25 RX ADMIN — CYANOCOBALAMIN 1000 MICROGRAM(S): 1000 INJECTION, SOLUTION INTRAMUSCULAR; SUBCUTANEOUS at 14:09

## 2025-01-25 NOTE — ED ADULT NURSE REASSESSMENT NOTE - AS TEMP SITE
Duplicate, just signed  
For medication: Eva 3-0.02 MG per tablet    Comments: script bin    Pharmacy loaded below: Yes  
oral
oral

## 2025-01-25 NOTE — SOCIAL WORK PROGRESS NOTE - NSSWPROGRESSNOTE_GEN_ALL_CORE
Sw was consulted to the ED for possible LETTY placement. Pt was recently at [facility] from 1/2-1/22 and was discharged home with LT Antibotics for the course of 6 weeks. Pt was weak and unable to ambulate and family brought him back to ED. Pt was also seen by PT and was recommended for LETTY as well. Sw spoke with Pt and family and they are in agreement with LETTY and were provided with a list of choices for them to review. Pt will also need EMILIA and management was informed due to EMILIA nurse being needed. ED provider informed. SW will cont to follow.

## 2025-01-25 NOTE — PHYSICAL THERAPY INITIAL EVALUATION ADULT - ADDITIONAL COMMENTS
Patient was here last week and has been using rolling walker since. However prior to recent admissionPt lives with his wife in a private house, 0 GEGE. Patient was independent in all ADLs and ambulated independently without device. Patient was issued a RW.

## 2025-01-26 VITALS
SYSTOLIC BLOOD PRESSURE: 152 MMHG | OXYGEN SATURATION: 94 % | TEMPERATURE: 99 F | DIASTOLIC BLOOD PRESSURE: 52 MMHG | HEART RATE: 58 BPM | RESPIRATION RATE: 18 BRPM

## 2025-01-26 PROCEDURE — 96374 THER/PROPH/DIAG INJ IV PUSH: CPT

## 2025-01-26 PROCEDURE — 85610 PROTHROMBIN TIME: CPT

## 2025-01-26 PROCEDURE — 72125 CT NECK SPINE W/O DYE: CPT | Mod: MC

## 2025-01-26 PROCEDURE — 99285 EMERGENCY DEPT VISIT HI MDM: CPT | Mod: 25

## 2025-01-26 PROCEDURE — 85025 COMPLETE CBC W/AUTO DIFF WBC: CPT

## 2025-01-26 PROCEDURE — 73130 X-RAY EXAM OF HAND: CPT

## 2025-01-26 PROCEDURE — 71045 X-RAY EXAM CHEST 1 VIEW: CPT

## 2025-01-26 PROCEDURE — 80053 COMPREHEN METABOLIC PANEL: CPT

## 2025-01-26 PROCEDURE — 80048 BASIC METABOLIC PNL TOTAL CA: CPT

## 2025-01-26 PROCEDURE — 93005 ELECTROCARDIOGRAM TRACING: CPT

## 2025-01-26 PROCEDURE — 70450 CT HEAD/BRAIN W/O DYE: CPT | Mod: MC

## 2025-01-26 PROCEDURE — 85730 THROMBOPLASTIN TIME PARTIAL: CPT

## 2025-01-26 PROCEDURE — 36415 COLL VENOUS BLD VENIPUNCTURE: CPT

## 2025-01-26 PROCEDURE — 97162 PT EVAL MOD COMPLEX 30 MIN: CPT

## 2025-01-26 RX ADMIN — Medication 81 MILLIGRAM(S): at 13:32

## 2025-01-26 RX ADMIN — CYANOCOBALAMIN 1000 MICROGRAM(S): 1000 INJECTION, SOLUTION INTRAMUSCULAR; SUBCUTANEOUS at 13:32

## 2025-01-26 RX ADMIN — ESCITALOPRAM OXALATE 10 MILLIGRAM(S): 10 TABLET ORAL at 13:33

## 2025-01-26 RX ADMIN — Medication 325 MILLIGRAM(S): at 13:32

## 2025-01-26 RX ADMIN — Medication 1 MILLIGRAM(S): at 13:33

## 2025-01-26 RX ADMIN — CLOPIDOGREL BISULFATE 75 MILLIGRAM(S): 75 TABLET, FILM COATED ORAL at 13:33

## 2025-01-26 RX ADMIN — TAMOXIFEN CITRATE 20 MILLIGRAM(S): 10 LIQUID ORAL at 13:33

## 2025-01-26 RX ADMIN — VANCOMYCIN HYDROCHLORIDE 250 MILLIGRAM(S): 5 INJECTION, POWDER, LYOPHILIZED, FOR SOLUTION INTRAVENOUS at 13:34

## 2025-01-26 RX ADMIN — Medication 50 MILLIGRAM(S): at 06:23

## 2025-01-26 NOTE — ED ADULT NURSE REASSESSMENT NOTE - AS PAIN REST
0 (no pain/absence of nonverbal indicators of pain)
3 (mild pain)
0 (no pain/absence of nonverbal indicators of pain)

## 2025-01-26 NOTE — ED ADULT NURSE REASSESSMENT NOTE - NSFALLHARMRISKINTERV_ED_ALL_ED
Assistance OOB with selected safe patient handling equipment if applicable/Communicate risk of Fall with Harm to all staff, patient, and family/Monitor gait and stability/Provide patient with walking aids/Provide visual cue: red socks, yellow wristband, yellow gown, etc/Reinforce activity limits and safety measures with patient and family/Bed in lowest position, wheels locked, appropriate side rails in place/Call bell, personal items and telephone in reach/Instruct patient to call for assistance before getting out of bed/chair/stretcher/Non-slip footwear applied when patient is off stretcher/[place] to call system/Physically safe environment - no spills, clutter or unnecessary equipment/Purposeful Proactive Rounding/Room/bathroom lighting operational, light cord in reach
Assistance OOB with selected safe patient handling equipment if applicable/Communicate risk of Fall with Harm to all staff, patient, and family/Provide visual cue: red socks, yellow wristband, yellow gown, etc/Reinforce activity limits and safety measures with patient and family/Toileting schedule using arm’s reach rule for commode and bathroom/Bed in lowest position, wheels locked, appropriate side rails in place/Call bell, personal items and telephone in reach/Instruct patient to call for assistance before getting out of bed/chair/stretcher/Non-slip footwear applied when patient is off stretcher/[place] to call system/Physically safe environment - no spills, clutter or unnecessary equipment/Purposeful Proactive Rounding/Room/bathroom lighting operational, light cord in reach
Assistance OOB with selected safe patient handling equipment if applicable/Assistance with ambulation/Communicate risk of Fall with Harm to all staff, patient, and family/Monitor gait and stability/Provide patient with walking aids/Provide visual cue: red socks, yellow wristband, yellow gown, etc/Reinforce activity limits and safety measures with patient and family/Bed in lowest position, wheels locked, appropriate side rails in place/Call bell, personal items and telephone in reach/Instruct patient to call for assistance before getting out of bed/chair/stretcher/Non-slip footwear applied when patient is off stretcher/[place] to call system/Physically safe environment - no spills, clutter or unnecessary equipment/Purposeful Proactive Rounding/Room/bathroom lighting operational, light cord in reach
Assistance OOB with selected safe patient handling equipment if applicable/Assistance with ambulation/Communicate risk of Fall with Harm to all staff, patient, and family/Provide patient with walking aids/Provide visual cue: red socks, yellow wristband, yellow gown, etc/Reinforce activity limits and safety measures with patient and family/Bed in lowest position, wheels locked, appropriate side rails in place/Call bell, personal items and telephone in reach/Instruct patient to call for assistance before getting out of bed/chair/stretcher/Non-slip footwear applied when patient is off stretcher/[place] to call system/Physically safe environment - no spills, clutter or unnecessary equipment/Purposeful Proactive Rounding/Room/bathroom lighting operational, light cord in reach
Assistance OOB with selected safe patient handling equipment if applicable/Assistance with ambulation/Communicate risk of Fall with Harm to all staff, patient, and family/Monitor gait and stability/Provide visual cue: red socks, yellow wristband, yellow gown, etc/Reinforce activity limits and safety measures with patient and family/Bed in lowest position, wheels locked, appropriate side rails in place/Call bell, personal items and telephone in reach/Instruct patient to call for assistance before getting out of bed/chair/stretcher/Non-slip footwear applied when patient is off stretcher/[place] to call system/Physically safe environment - no spills, clutter or unnecessary equipment/Purposeful Proactive Rounding/Room/bathroom lighting operational, light cord in reach

## 2025-01-26 NOTE — ED ADULT NURSE REASSESSMENT NOTE - GENERAL PATIENT STATE
comfortable appearance
comfortable appearance/resting/sleeping
comfortable appearance
comfortable appearance/cooperative
comfortable appearance

## 2025-01-26 NOTE — ED ADULT NURSE REASSESSMENT NOTE - PAIN RATING/NUMBER SCALE (0-10): ACTIVITY
0 (no pain/absence of nonverbal indicators of pain)
0 (no pain/absence of nonverbal indicators of pain)
3 (mild pain)
0 (no pain/absence of nonverbal indicators of pain)

## 2025-01-26 NOTE — ED ADULT NURSE REASSESSMENT NOTE - NSFALLRISKFACTORS_ED_ALL_ED
Age: 85 years old or older
Surgery: Recent surgery, recent lower limb amputation, major abdominal or thoracic surgery
Age: 85 years old or older
Age: 85 years old or older/Coagulation: Bleeding disorder either through use of anticoagulants or underlying clinical condition(s)
Other

## 2025-01-26 NOTE — ED ADULT NURSE REASSESSMENT NOTE - NSFALLASSISTNEEDED_ED_ALL_ED
Standing/Walking
Standing/Walking/Toileting/Moving from bed to stretcher
Standing/Walking/Toileting
Standing/Walking
Standing

## 2025-01-26 NOTE — ED ADULT NURSE REASSESSMENT NOTE - NSFALLRISKASMTTYPE_ED_ALL_ED
Routine Reassessment

## 2025-03-18 PROCEDURE — 81001 URINALYSIS AUTO W/SCOPE: CPT

## 2025-03-18 PROCEDURE — 85025 COMPLETE CBC W/AUTO DIFF WBC: CPT

## 2025-03-18 PROCEDURE — 84156 ASSAY OF PROTEIN URINE: CPT

## 2025-03-18 PROCEDURE — 84466 ASSAY OF TRANSFERRIN: CPT

## 2025-03-18 PROCEDURE — 83550 IRON BINDING TEST: CPT

## 2025-03-18 PROCEDURE — 93923 UPR/LXTR ART STDY 3+ LVLS: CPT

## 2025-03-18 PROCEDURE — 87070 CULTURE OTHR SPECIMN AEROBIC: CPT

## 2025-03-18 PROCEDURE — 86140 C-REACTIVE PROTEIN: CPT

## 2025-03-18 PROCEDURE — 82607 VITAMIN B-12: CPT

## 2025-03-18 PROCEDURE — 85610 PROTHROMBIN TIME: CPT

## 2025-03-18 PROCEDURE — C1894: CPT

## 2025-03-18 PROCEDURE — 93306 TTE W/DOPPLER COMPLETE: CPT

## 2025-03-18 PROCEDURE — 86900 BLOOD TYPING SEROLOGIC ABO: CPT

## 2025-03-18 PROCEDURE — 80202 ASSAY OF VANCOMYCIN: CPT

## 2025-03-18 PROCEDURE — 93970 EXTREMITY STUDY: CPT

## 2025-03-18 PROCEDURE — 82570 ASSAY OF URINE CREATININE: CPT

## 2025-03-18 PROCEDURE — 96374 THER/PROPH/DIAG INJ IV PUSH: CPT

## 2025-03-18 PROCEDURE — 82746 ASSAY OF FOLIC ACID SERUM: CPT

## 2025-03-18 PROCEDURE — 73630 X-RAY EXAM OF FOOT: CPT

## 2025-03-18 PROCEDURE — 84540 ASSAY OF URINE/UREA-N: CPT

## 2025-03-18 PROCEDURE — 87077 CULTURE AEROBIC IDENTIFY: CPT

## 2025-03-18 PROCEDURE — 36415 COLL VENOUS BLD VENIPUNCTURE: CPT

## 2025-03-18 PROCEDURE — 84133 ASSAY OF URINE POTASSIUM: CPT

## 2025-03-18 PROCEDURE — 83935 ASSAY OF URINE OSMOLALITY: CPT

## 2025-03-18 PROCEDURE — 87075 CULTR BACTERIA EXCEPT BLOOD: CPT

## 2025-03-18 PROCEDURE — 88302 TISSUE EXAM BY PATHOLOGIST: CPT

## 2025-03-18 PROCEDURE — 71045 X-RAY EXAM CHEST 1 VIEW: CPT

## 2025-03-18 PROCEDURE — 80053 COMPREHEN METABOLIC PANEL: CPT

## 2025-03-18 PROCEDURE — C1769: CPT

## 2025-03-18 PROCEDURE — 73718 MRI LOWER EXTREMITY W/O DYE: CPT | Mod: MC

## 2025-03-18 PROCEDURE — 87641 MR-STAPH DNA AMP PROBE: CPT

## 2025-03-18 PROCEDURE — 93005 ELECTROCARDIOGRAM TRACING: CPT

## 2025-03-18 PROCEDURE — 97116 GAIT TRAINING THERAPY: CPT

## 2025-03-18 PROCEDURE — 83735 ASSAY OF MAGNESIUM: CPT

## 2025-03-18 PROCEDURE — 85730 THROMBOPLASTIN TIME PARTIAL: CPT

## 2025-03-18 PROCEDURE — 97161 PT EVAL LOW COMPLEX 20 MIN: CPT

## 2025-03-18 PROCEDURE — 88311 DECALCIFY TISSUE: CPT

## 2025-03-18 PROCEDURE — 87507 IADNA-DNA/RNA PROBE TQ 12-25: CPT

## 2025-03-18 PROCEDURE — 93925 LOWER EXTREMITY STUDY: CPT

## 2025-03-18 PROCEDURE — 84100 ASSAY OF PHOSPHORUS: CPT

## 2025-03-18 PROCEDURE — 76770 US EXAM ABDO BACK WALL COMP: CPT

## 2025-03-18 PROCEDURE — 96375 TX/PRO/DX INJ NEW DRUG ADDON: CPT

## 2025-03-18 PROCEDURE — 83540 ASSAY OF IRON: CPT

## 2025-03-18 PROCEDURE — 83036 HEMOGLOBIN GLYCOSYLATED A1C: CPT

## 2025-03-18 PROCEDURE — 85652 RBC SED RATE AUTOMATED: CPT

## 2025-03-18 PROCEDURE — C1725: CPT

## 2025-03-18 PROCEDURE — 99285 EMERGENCY DEPT VISIT HI MDM: CPT

## 2025-03-18 PROCEDURE — 87186 SC STD MICRODIL/AGAR DIL: CPT

## 2025-03-18 PROCEDURE — C1751: CPT

## 2025-03-18 PROCEDURE — 85027 COMPLETE CBC AUTOMATED: CPT

## 2025-03-18 PROCEDURE — 97162 PT EVAL MOD COMPLEX 30 MIN: CPT

## 2025-03-18 PROCEDURE — 86850 RBC ANTIBODY SCREEN: CPT

## 2025-03-18 PROCEDURE — 93922 UPR/L XTREMITY ART 2 LEVELS: CPT

## 2025-03-18 PROCEDURE — 80048 BASIC METABOLIC PNL TOTAL CA: CPT

## 2025-03-18 PROCEDURE — 36573 INSJ PICC RS&I 5 YR+: CPT

## 2025-03-18 PROCEDURE — 82728 ASSAY OF FERRITIN: CPT

## 2025-03-18 PROCEDURE — 97530 THERAPEUTIC ACTIVITIES: CPT

## 2025-03-18 PROCEDURE — 87640 STAPH A DNA AMP PROBE: CPT

## 2025-03-18 PROCEDURE — 87046 STOOL CULTR AEROBIC BACT EA: CPT

## 2025-03-18 PROCEDURE — 86901 BLOOD TYPING SEROLOGIC RH(D): CPT

## 2025-03-18 PROCEDURE — 76937 US GUIDE VASCULAR ACCESS: CPT

## 2025-03-18 PROCEDURE — 76000 FLUOROSCOPY <1 HR PHYS/QHP: CPT

## 2025-03-18 PROCEDURE — 87045 FECES CULTURE AEROBIC BACT: CPT

## 2025-03-18 PROCEDURE — 84300 ASSAY OF URINE SODIUM: CPT

## 2025-03-18 PROCEDURE — 88304 TISSUE EXAM BY PATHOLOGIST: CPT

## 2025-03-18 PROCEDURE — 87040 BLOOD CULTURE FOR BACTERIA: CPT

## 2025-03-18 PROCEDURE — 83605 ASSAY OF LACTIC ACID: CPT

## 2025-03-24 ENCOUNTER — EMERGENCY (EMERGENCY)
Facility: HOSPITAL | Age: 85
LOS: 1 days | Discharge: HOME | End: 2025-03-24
Attending: EMERGENCY MEDICINE | Admitting: EMERGENCY MEDICINE
Payer: MEDICARE

## 2025-03-24 VITALS
DIASTOLIC BLOOD PRESSURE: 73 MMHG | RESPIRATION RATE: 14 BRPM | OXYGEN SATURATION: 99 % | HEIGHT: 66 IN | TEMPERATURE: 98 F | SYSTOLIC BLOOD PRESSURE: 184 MMHG | WEIGHT: 164.91 LBS | HEART RATE: 59 BPM

## 2025-03-24 VITALS
HEART RATE: 58 BPM | OXYGEN SATURATION: 97 % | SYSTOLIC BLOOD PRESSURE: 165 MMHG | DIASTOLIC BLOOD PRESSURE: 81 MMHG | TEMPERATURE: 98 F | RESPIRATION RATE: 16 BRPM

## 2025-03-24 DIAGNOSIS — Z98.890 OTHER SPECIFIED POSTPROCEDURAL STATES: Chronic | ICD-10-CM

## 2025-03-24 LAB
BASOPHILS # BLD AUTO: 0.01 K/UL — SIGNIFICANT CHANGE UP (ref 0–0.2)
BASOPHILS NFR BLD AUTO: 0.3 % — SIGNIFICANT CHANGE UP (ref 0–2)
EOSINOPHIL # BLD AUTO: 0.1 K/UL — SIGNIFICANT CHANGE UP (ref 0–0.5)
EOSINOPHIL NFR BLD AUTO: 2.6 % — SIGNIFICANT CHANGE UP (ref 0–6)
HCT VFR BLD CALC: 29.3 % — LOW (ref 39–50)
HGB BLD-MCNC: 9 G/DL — LOW (ref 13–17)
IMM GRANULOCYTES NFR BLD AUTO: 0 % — SIGNIFICANT CHANGE UP (ref 0–0.9)
LYMPHOCYTES # BLD AUTO: 0.83 K/UL — LOW (ref 1–3.3)
LYMPHOCYTES # BLD AUTO: 21.4 % — SIGNIFICANT CHANGE UP (ref 13–44)
MCHC RBC-ENTMCNC: 28 PG — SIGNIFICANT CHANGE UP (ref 27–34)
MCHC RBC-ENTMCNC: 30.7 G/DL — LOW (ref 32–36)
MCV RBC AUTO: 91.3 FL — SIGNIFICANT CHANGE UP (ref 80–100)
MONOCYTES # BLD AUTO: 0.47 K/UL — SIGNIFICANT CHANGE UP (ref 0–0.9)
MONOCYTES NFR BLD AUTO: 12.1 % — SIGNIFICANT CHANGE UP (ref 2–14)
NEUTROPHILS # BLD AUTO: 2.46 K/UL — SIGNIFICANT CHANGE UP (ref 1.8–7.4)
NEUTROPHILS NFR BLD AUTO: 63.6 % — SIGNIFICANT CHANGE UP (ref 43–77)
NRBC BLD AUTO-RTO: 0 /100 WBCS — SIGNIFICANT CHANGE UP (ref 0–0)
PLATELET # BLD AUTO: 121 K/UL — LOW (ref 150–400)
RBC # BLD: 3.21 M/UL — LOW (ref 4.2–5.8)
RBC # FLD: 17 % — HIGH (ref 10.3–14.5)
WBC # BLD: 3.87 K/UL — SIGNIFICANT CHANGE UP (ref 3.8–10.5)
WBC # FLD AUTO: 3.87 K/UL — SIGNIFICANT CHANGE UP (ref 3.8–10.5)

## 2025-03-24 PROCEDURE — 30901 CONTROL OF NOSEBLEED: CPT | Mod: LT

## 2025-03-24 PROCEDURE — 99283 EMERGENCY DEPT VISIT LOW MDM: CPT | Mod: 25

## 2025-03-24 PROCEDURE — 30903 CONTROL OF NOSEBLEED: CPT | Mod: LT

## 2025-03-24 PROCEDURE — 36415 COLL VENOUS BLD VENIPUNCTURE: CPT

## 2025-03-24 PROCEDURE — 85025 COMPLETE CBC W/AUTO DIFF WBC: CPT

## 2025-03-24 PROCEDURE — 99283 EMERGENCY DEPT VISIT LOW MDM: CPT | Mod: FS,25

## 2025-03-24 RX ADMIN — Medication 1 SPRAY(S): at 20:48

## 2025-03-24 NOTE — ED ADULT NURSE NOTE - OBJECTIVE STATEMENT
pt a/o x 4 with a calm affect c/o left nares epistaxis while on Plavix, today, which has stopped prior to arrival to ED.  MD Boyle at bedside currently.  pending initial lab results.  RN will cont to monitor patient closely.

## 2025-03-24 NOTE — ED ADULT TRIAGE NOTE - CHIEF COMPLAINT QUOTE
Pt states he has a nose bleed for 5 hrs and had to leave work. he takes Plavix. Pt went to  and was sent here but nose bleed has stopped at this time. p[t hasn't taken his meds for last two days for fear of being late to work at  [employer].

## 2025-03-24 NOTE — ED ADULT NURSE NOTE - NSFALLHARMRISKINTERV_ED_ALL_ED

## 2025-03-24 NOTE — ED PROVIDER NOTE - CARE PROVIDER_API CALL
Mitchel Dennis  Otolaryngology  [address] [facility]  [place] [address]  Phone: [phone]  Fax: [phone]  Follow Up Time: 7-10 Days

## 2025-03-24 NOTE — ED PROVIDER NOTE - OBJECTIVE STATEMENT
Patient is an 84-year-old male with past medical history of hypertension, anemia, osteomyelitis, left-sided breast cancer status postlumpectomy on tamoxifen, aortic insufficiency, CAD on Plavix, glaucoma, hepatitis presents with nosebleed.  Patient reports since earlier today he has had 5 hours of his left nostril bleeding.  Patient went to urgent care who referred him to ED for evaluation.  Patient denies headache chest pain shortness of breath fever

## 2025-03-24 NOTE — ED PROVIDER NOTE - NSFOLLOWUPINSTRUCTIONS_ED_ALL_ED_FT
Nosebleed    WHAT YOU NEED TO KNOW:    A nosebleed, or epistaxis, occurs when one or more of the blood vessels in your nose break. You may have dark or bright red blood from one or both nostrils. A nosebleed is most commonly caused by dry air or picking your nose. A direct blow to your nose, irritation from a cold or allergies, or a foreign object can also cause a nosebleed.    DISCHARGE INSTRUCTIONS:    Return to the emergency department if:    Your nasal packing is soaked with blood.    Your nose is still bleeding after 20 minutes, even after you pinch it.    You have a foul-smelling discharge coming out of your nose.    You feel so weak and dizzy that you have trouble standing up.    You have trouble breathing or talking.  Contact your healthcare provider if:    You have a fever and are vomiting.    You have pain in and around your nose that is getting worse even after you take pain medicines.    Your nasal pack is loose.    You have questions or concerns about your condition or care.  First aid:    Sit up and lean forward. This will help prevent you from swallowing blood. Spit blood and saliva into a bowl.    Apply pressure to your nose. Use 2 fingers to pinch your nose shut for 10    Apply ice on the bridge of your nose to decrease swelling and bleeding. Use a cold pack or put crushed ice in a plastic bag. Cover it with a towel to protect your skin.    Pack your nose with a cotton ball, tissue, tampon, or gauze bandage to stop the bleeding.  Medicines:    Medicines applied to a small piece of cotton and placed in your nose. Medicine may also be sprayed in or applied directly to your nose. You may need medicine to prevent an infection. If bleeding is severe, medicine may be injected into a blood vessel in your nose.    Take your medicine as directed. Contact your healthcare provider if you think your medicine is not helping or if you have side effects. Tell your provider if you are allergic to any medicine. Keep a list of the medicines, vitamins, and herbs you take. Include the amounts, and when and why you take them. Bring the list or the pill bottles to follow-up visits. Carry your medicine list with you in case of an emergency.  Prevent another nosebleed:    Keep your nose moist. Put a small amount of petroleum jelly inside your nostrils as needed. Use a saline (saltwater) nasal spray. Do not put anything else inside your nose unless your healthcare provider says it is okay. Do not use oil-based lubricants if you use oxygen therapy. They may be flammable.    Use a cool mist humidifier to increase air moisture in your home. This will help your nose stay moist.  Humidifier      Do not pick or blow your nose for at least a week. You can irritate or damage your nose if you pick it. Blowing your nose too hard may cause the bleeding to start again. Do not bend over or strain as this can cause the bleeding to start again.    Avoid irritants such as tobacco smoke or chemical sprays such as .  Follow up with your healthcare provider as directed: Any packing in your nose should be removed within 2 to 3 days. Write down your questions so you remember to ask them during your visits.    © [employer] 1973, 2025    	  back to top            © [employer] 1973, 2025

## 2025-03-24 NOTE — ED PROVIDER NOTE - ATTENDING APP SHARED VISIT CONTRIBUTION OF CARE
Patient is an 84-year-old male who has a history of prior nosebleeds, recently had his toe amputated on his left foot.  Returned back to work 2 weeks ago.  This morning he noticed he was bleeding from his left nares.  The bleeding stopped, he went to work.  But the bleeding picked up so he came to the emergency room for care and evaluation.  He denies any recent nose picking.  He says he blew his nose very hard knots when the bleeding began.  He denies any other trauma or travel.  He denies any other illness.  He does take Plavix for his heart.    On evaluation is an elderly male who is awake and alert no apparent distress.  HEENT reveals patient has irritation to the right septal nares anteriorly, and bleeding to the left septal nares anteriorly.  The bleeding is scant but oozing.  There is no oropharyngeal blood.  There is no posterior bleeding.  Remainder of examination is otherwise unremarkable.  Plan of care includes nasal packing with Merocel tampon and Afrin soaked gauze.  Patient was counseled on bleeding control.  To follow-up with ENT.  Light activity as tolerated, to avoid straining and heavy lifting.  To return if worsening bleeding or any concerns.  This chart was made with dictation software and may contain typographical errors.

## 2025-03-24 NOTE — ED PROVIDER NOTE - PATIENT PORTAL LINK FT
You can access the FollowMyHealth Patient Portal offered by [facility] by registering at the following website: [email or web address]. By joining [employer]’s FollowMyHealth portal, you will also be able to view your health information using other applications (apps) compatible with our system.

## 2025-07-23 ENCOUNTER — INPATIENT (INPATIENT)
Facility: HOSPITAL | Age: 85
LOS: 4 days | Discharge: HOME | DRG: 683 | End: 2025-07-28
Attending: INTERNAL MEDICINE | Admitting: INTERNAL MEDICINE
Payer: MEDICARE

## 2025-07-23 VITALS
TEMPERATURE: 98 F | HEART RATE: 88 BPM | HEIGHT: 66 IN | SYSTOLIC BLOOD PRESSURE: 150 MMHG | WEIGHT: 138.01 LBS | DIASTOLIC BLOOD PRESSURE: 60 MMHG | RESPIRATION RATE: 18 BRPM | OXYGEN SATURATION: 97 %

## 2025-07-23 DIAGNOSIS — Z98.890 OTHER SPECIFIED POSTPROCEDURAL STATES: Chronic | ICD-10-CM

## 2025-07-23 DIAGNOSIS — L03.90 CELLULITIS, UNSPECIFIED: ICD-10-CM

## 2025-07-23 LAB
AGGLUTINATION: PRESENT
ALBUMIN SERPL ELPH-MCNC: 2.7 G/DL — LOW (ref 3.3–5)
ALP SERPL-CCNC: 53 U/L — SIGNIFICANT CHANGE UP (ref 40–120)
ALT FLD-CCNC: 16 U/L — SIGNIFICANT CHANGE UP (ref 12–78)
ANION GAP SERPL CALC-SCNC: 5 MMOL/L — SIGNIFICANT CHANGE UP (ref 5–17)
ANISOCYTOSIS BLD QL: SLIGHT — SIGNIFICANT CHANGE UP
APTT BLD: 34.3 SEC — SIGNIFICANT CHANGE UP (ref 26.1–36.8)
AST SERPL-CCNC: 16 U/L — SIGNIFICANT CHANGE UP (ref 15–37)
BASOPHILS # BLD AUTO: 0.02 K/UL — SIGNIFICANT CHANGE UP (ref 0–0.2)
BASOPHILS # BLD MANUAL: 0 K/UL — SIGNIFICANT CHANGE UP (ref 0–0.2)
BASOPHILS NFR BLD AUTO: 0.1 % — SIGNIFICANT CHANGE UP (ref 0–2)
BASOPHILS NFR BLD MANUAL: 0 % — SIGNIFICANT CHANGE UP (ref 0–2)
BILIRUB SERPL-MCNC: 0.3 MG/DL — SIGNIFICANT CHANGE UP (ref 0.2–1.2)
BUN SERPL-MCNC: 63 MG/DL — HIGH (ref 7–23)
CALCIUM SERPL-MCNC: 9.1 MG/DL — SIGNIFICANT CHANGE UP (ref 8.5–10.1)
CHLORIDE SERPL-SCNC: 114 MMOL/L — HIGH (ref 96–108)
CO2 SERPL-SCNC: 31 MMOL/L — SIGNIFICANT CHANGE UP (ref 22–31)
CREAT SERPL-MCNC: 2.3 MG/DL — HIGH (ref 0.5–1.3)
EGFR: 27 ML/MIN/1.73M2 — LOW
EGFR: 27 ML/MIN/1.73M2 — LOW
ELLIPTOCYTES BLD QL SMEAR: SLIGHT — SIGNIFICANT CHANGE UP
EOSINOPHIL # BLD AUTO: 0.01 K/UL — SIGNIFICANT CHANGE UP (ref 0–0.5)
EOSINOPHIL # BLD MANUAL: 0 K/UL — SIGNIFICANT CHANGE UP (ref 0–0.5)
EOSINOPHIL NFR BLD AUTO: 0.1 % — SIGNIFICANT CHANGE UP (ref 0–6)
EOSINOPHIL NFR BLD MANUAL: 0 % — SIGNIFICANT CHANGE UP (ref 0–6)
GLUCOSE SERPL-MCNC: 101 MG/DL — HIGH (ref 70–99)
HCT VFR BLD CALC: 28.4 % — LOW (ref 39–50)
HGB BLD-MCNC: 8.7 G/DL — LOW (ref 13–17)
IMM GRANULOCYTES # BLD AUTO: 0.12 K/UL — HIGH (ref 0–0.07)
IMM GRANULOCYTES NFR BLD AUTO: 0.6 % — SIGNIFICANT CHANGE UP (ref 0–0.9)
INR BLD: 0.99 RATIO — SIGNIFICANT CHANGE UP (ref 0.85–1.16)
LACTATE SERPL-SCNC: 0.8 MMOL/L — SIGNIFICANT CHANGE UP (ref 0.7–2)
LYMPHOCYTES # BLD AUTO: 0.44 K/UL — LOW (ref 1–3.3)
LYMPHOCYTES # BLD MANUAL: 0.95 K/UL — LOW (ref 1–3.3)
LYMPHOCYTES NFR BLD AUTO: 2.3 % — LOW (ref 13–44)
LYMPHOCYTES NFR BLD MANUAL: 5 % — LOW (ref 13–44)
MACROCYTES BLD QL: SLIGHT — SIGNIFICANT CHANGE UP
MANUAL SMEAR VERIFICATION: SIGNIFICANT CHANGE UP
MCHC RBC-ENTMCNC: 29.5 PG — SIGNIFICANT CHANGE UP (ref 27–34)
MCHC RBC-ENTMCNC: 30.6 G/DL — LOW (ref 32–36)
MCV RBC AUTO: 96.3 FL — SIGNIFICANT CHANGE UP (ref 80–100)
MICROCYTES BLD QL: SLIGHT — SIGNIFICANT CHANGE UP
MONOCYTES # BLD AUTO: 1.1 K/UL — HIGH (ref 0–0.9)
MONOCYTES # BLD MANUAL: 0 K/UL — SIGNIFICANT CHANGE UP (ref 0–0.9)
MONOCYTES NFR BLD AUTO: 5.8 % — SIGNIFICANT CHANGE UP (ref 2–14)
MONOCYTES NFR BLD MANUAL: 0 % — LOW (ref 2–14)
NEUTROPHILS # BLD AUTO: 17.22 K/UL — HIGH (ref 1.8–7.4)
NEUTROPHILS # BLD MANUAL: 17.96 K/UL — HIGH (ref 1.8–7.4)
NEUTROPHILS NFR BLD AUTO: 91.1 % — HIGH (ref 43–77)
NEUTROPHILS NFR BLD MANUAL: 95 % — HIGH (ref 43–77)
NRBC # BLD AUTO: 0 K/UL — SIGNIFICANT CHANGE UP (ref 0–0)
NRBC # FLD: 0 K/UL — SIGNIFICANT CHANGE UP (ref 0–0)
NRBC BLD AUTO-RTO: 0 /100 WBCS — SIGNIFICANT CHANGE UP (ref 0–0)
OVALOCYTES BLD QL SMEAR: SLIGHT — SIGNIFICANT CHANGE UP
PLAT MORPH BLD: NORMAL — SIGNIFICANT CHANGE UP
PLATELET # BLD AUTO: 112 K/UL — LOW (ref 150–400)
PMV BLD: 12.1 FL — SIGNIFICANT CHANGE UP (ref 7–13)
POTASSIUM SERPL-MCNC: 3.6 MMOL/L — SIGNIFICANT CHANGE UP (ref 3.5–5.3)
POTASSIUM SERPL-SCNC: 3.6 MMOL/L — SIGNIFICANT CHANGE UP (ref 3.5–5.3)
PROT SERPL-MCNC: 6.5 G/DL — SIGNIFICANT CHANGE UP (ref 6–8.3)
PROTHROM AB SERPL-ACNC: 11.6 SEC — SIGNIFICANT CHANGE UP (ref 9.9–13.4)
RBC # BLD: 2.95 M/UL — LOW (ref 4.2–5.8)
RBC # FLD: 15.3 % — HIGH (ref 10.3–14.5)
RBC BLD AUTO: ABNORMAL
SODIUM SERPL-SCNC: 150 MMOL/L — HIGH (ref 135–145)
STOMATOCYTES BLD QL SMEAR: SLIGHT — SIGNIFICANT CHANGE UP
WBC # BLD: 18.91 K/UL — HIGH (ref 3.8–10.5)
WBC # FLD AUTO: 18.91 K/UL — HIGH (ref 3.8–10.5)

## 2025-07-23 PROCEDURE — 71045 X-RAY EXAM CHEST 1 VIEW: CPT | Mod: 26

## 2025-07-23 PROCEDURE — 99285 EMERGENCY DEPT VISIT HI MDM: CPT

## 2025-07-23 PROCEDURE — 36415 COLL VENOUS BLD VENIPUNCTURE: CPT

## 2025-07-23 PROCEDURE — 71045 X-RAY EXAM CHEST 1 VIEW: CPT

## 2025-07-23 PROCEDURE — 93970 EXTREMITY STUDY: CPT | Mod: 26

## 2025-07-23 PROCEDURE — 99222 1ST HOSP IP/OBS MODERATE 55: CPT

## 2025-07-23 PROCEDURE — 85025 COMPLETE CBC W/AUTO DIFF WBC: CPT

## 2025-07-23 PROCEDURE — 93970 EXTREMITY STUDY: CPT

## 2025-07-23 PROCEDURE — 85730 THROMBOPLASTIN TIME PARTIAL: CPT

## 2025-07-23 PROCEDURE — 85610 PROTHROMBIN TIME: CPT

## 2025-07-23 PROCEDURE — 83605 ASSAY OF LACTIC ACID: CPT

## 2025-07-23 PROCEDURE — 80053 COMPREHEN METABOLIC PANEL: CPT

## 2025-07-23 RX ORDER — ESCITALOPRAM OXALATE 20 MG/1
10 TABLET ORAL DAILY
Refills: 0 | Status: DISCONTINUED | OUTPATIENT
Start: 2025-07-23 | End: 2025-07-28

## 2025-07-23 RX ORDER — POLYETHYLENE GLYCOL 3350 17 G/17G
17 POWDER, FOR SOLUTION ORAL DAILY
Refills: 0 | Status: DISCONTINUED | OUTPATIENT
Start: 2025-07-23 | End: 2025-07-28

## 2025-07-23 RX ORDER — ONDANSETRON HCL/PF 4 MG/2 ML
4 VIAL (ML) INJECTION EVERY 8 HOURS
Refills: 0 | Status: DISCONTINUED | OUTPATIENT
Start: 2025-07-23 | End: 2025-07-28

## 2025-07-23 RX ORDER — HEPARIN SODIUM 1000 [USP'U]/ML
5000 INJECTION INTRAVENOUS; SUBCUTANEOUS
Refills: 0 | Status: DISCONTINUED | OUTPATIENT
Start: 2025-07-23 | End: 2025-07-25

## 2025-07-23 RX ORDER — TAMOXIFEN CITRATE 10 MG/1
20 TABLET, FILM COATED ORAL DAILY
Refills: 0 | Status: DISCONTINUED | OUTPATIENT
Start: 2025-07-23 | End: 2025-07-28

## 2025-07-23 RX ORDER — FOLIC ACID 1 MG/1
1 TABLET ORAL DAILY
Refills: 0 | Status: DISCONTINUED | OUTPATIENT
Start: 2025-07-23 | End: 2025-07-28

## 2025-07-23 RX ORDER — FERROUS SULFATE 137(45) MG
325 TABLET, EXTENDED RELEASE ORAL DAILY
Refills: 0 | Status: DISCONTINUED | OUTPATIENT
Start: 2025-07-23 | End: 2025-07-28

## 2025-07-23 RX ORDER — ASPIRIN 325 MG
81 TABLET ORAL DAILY
Refills: 0 | Status: DISCONTINUED | OUTPATIENT
Start: 2025-07-23 | End: 2025-07-28

## 2025-07-23 RX ORDER — ACETAMINOPHEN 500 MG/5ML
650 LIQUID (ML) ORAL EVERY 6 HOURS
Refills: 0 | Status: DISCONTINUED | OUTPATIENT
Start: 2025-07-23 | End: 2025-07-28

## 2025-07-23 RX ORDER — ROSUVASTATIN CALCIUM 20 MG/1
20 TABLET, FILM COATED ORAL AT BEDTIME
Refills: 0 | Status: DISCONTINUED | OUTPATIENT
Start: 2025-07-23 | End: 2025-07-28

## 2025-07-23 RX ORDER — MELATONIN 5 MG
3 TABLET ORAL AT BEDTIME
Refills: 0 | Status: DISCONTINUED | OUTPATIENT
Start: 2025-07-23 | End: 2025-07-28

## 2025-07-23 RX ORDER — VANCOMYCIN HCL IN 5 % DEXTROSE 1.5G/250ML
750 PLASTIC BAG, INJECTION (ML) INTRAVENOUS EVERY 12 HOURS
Refills: 0 | Status: DISCONTINUED | OUTPATIENT
Start: 2025-07-23 | End: 2025-07-23

## 2025-07-23 RX ORDER — METOPROLOL SUCCINATE 50 MG/1
25 TABLET, EXTENDED RELEASE ORAL DAILY
Refills: 0 | Status: DISCONTINUED | OUTPATIENT
Start: 2025-07-23 | End: 2025-07-28

## 2025-07-23 RX ORDER — CYANOCOBALAMIN 1000 UG/ML
1000 INJECTION INTRAMUSCULAR; SUBCUTANEOUS DAILY
Refills: 0 | Status: DISCONTINUED | OUTPATIENT
Start: 2025-07-23 | End: 2025-07-23

## 2025-07-23 RX ORDER — MAGNESIUM, ALUMINUM HYDROXIDE 200-200 MG
30 TABLET,CHEWABLE ORAL EVERY 4 HOURS
Refills: 0 | Status: DISCONTINUED | OUTPATIENT
Start: 2025-07-23 | End: 2025-07-28

## 2025-07-23 RX ORDER — VANCOMYCIN HCL IN 5 % DEXTROSE 1.5G/250ML
500 PLASTIC BAG, INJECTION (ML) INTRAVENOUS EVERY 24 HOURS
Refills: 0 | Status: DISCONTINUED | OUTPATIENT
Start: 2025-07-24 | End: 2025-07-24

## 2025-07-23 RX ORDER — VANCOMYCIN HCL IN 5 % DEXTROSE 1.5G/250ML
1000 PLASTIC BAG, INJECTION (ML) INTRAVENOUS ONCE
Refills: 0 | Status: COMPLETED | OUTPATIENT
Start: 2025-07-23 | End: 2025-07-23

## 2025-07-23 RX ORDER — PIPERACILLIN-TAZO-DEXTROSE,ISO 3.375G/5
3.38 IV SOLUTION, PIGGYBACK PREMIX FROZEN(ML) INTRAVENOUS ONCE
Refills: 0 | Status: COMPLETED | OUTPATIENT
Start: 2025-07-23 | End: 2025-07-23

## 2025-07-23 RX ORDER — CLOPIDOGREL BISULFATE 75 MG/1
75 TABLET, FILM COATED ORAL DAILY
Refills: 0 | Status: DISCONTINUED | OUTPATIENT
Start: 2025-07-23 | End: 2025-07-28

## 2025-07-23 RX ORDER — AMPICILLIN SODIUM AND SULBACTAM SODIUM 1; .5 G/1; G/1
3 INJECTION, POWDER, FOR SOLUTION INTRAMUSCULAR; INTRAVENOUS EVERY 12 HOURS
Refills: 0 | Status: DISCONTINUED | OUTPATIENT
Start: 2025-07-23 | End: 2025-07-24

## 2025-07-23 RX ORDER — LATANOPROST PF 0.05 MG/ML
1 SOLUTION/ DROPS OPHTHALMIC AT BEDTIME
Refills: 0 | Status: DISCONTINUED | OUTPATIENT
Start: 2025-07-23 | End: 2025-07-28

## 2025-07-23 RX ORDER — CYANOCOBALAMIN 1000 UG/ML
100 INJECTION INTRAMUSCULAR; SUBCUTANEOUS DAILY
Refills: 0 | Status: DISCONTINUED | OUTPATIENT
Start: 2025-07-23 | End: 2025-07-23

## 2025-07-23 RX ADMIN — Medication 250 MILLIGRAM(S): at 19:36

## 2025-07-23 RX ADMIN — Medication 1000 MILLILITER(S): at 18:40

## 2025-07-23 RX ADMIN — Medication 200 GRAM(S): at 18:40

## 2025-07-24 DIAGNOSIS — L03.116 CELLULITIS OF LEFT LOWER LIMB: ICD-10-CM

## 2025-07-24 LAB
ANION GAP SERPL CALC-SCNC: 2 MMOL/L — LOW (ref 5–17)
BASOPHILS # BLD AUTO: 0.01 K/UL — SIGNIFICANT CHANGE UP (ref 0–0.2)
BASOPHILS NFR BLD AUTO: 0.1 % — SIGNIFICANT CHANGE UP (ref 0–2)
BUN SERPL-MCNC: 57 MG/DL — HIGH (ref 7–23)
CALCIUM SERPL-MCNC: 8.5 MG/DL — SIGNIFICANT CHANGE UP (ref 8.5–10.1)
CHLORIDE SERPL-SCNC: 120 MMOL/L — HIGH (ref 96–108)
CO2 SERPL-SCNC: 28 MMOL/L — SIGNIFICANT CHANGE UP (ref 22–31)
CREAT SERPL-MCNC: 2 MG/DL — HIGH (ref 0.5–1.3)
EGFR: 32 ML/MIN/1.73M2 — LOW
EGFR: 32 ML/MIN/1.73M2 — LOW
EOSINOPHIL # BLD AUTO: 0.03 K/UL — SIGNIFICANT CHANGE UP (ref 0–0.5)
EOSINOPHIL NFR BLD AUTO: 0.2 % — SIGNIFICANT CHANGE UP (ref 0–6)
GLUCOSE SERPL-MCNC: 93 MG/DL — SIGNIFICANT CHANGE UP (ref 70–99)
HCT VFR BLD CALC: 25.4 % — LOW (ref 39–50)
HGB BLD-MCNC: 7.6 G/DL — LOW (ref 13–17)
IMM GRANULOCYTES # BLD AUTO: 0.07 K/UL — SIGNIFICANT CHANGE UP (ref 0–0.07)
IMM GRANULOCYTES NFR BLD AUTO: 0.5 % — SIGNIFICANT CHANGE UP (ref 0–0.9)
IMMATURE PLATELET FRACTION #: 2.9 K/UL — LOW (ref 3.9–12.5)
IMMATURE PLATELET FRACTION %: 3.5 % — SIGNIFICANT CHANGE UP (ref 1.6–7.1)
INR BLD: 1.06 RATIO — SIGNIFICANT CHANGE UP (ref 0.85–1.16)
LYMPHOCYTES # BLD AUTO: 0.67 K/UL — LOW (ref 1–3.3)
LYMPHOCYTES NFR BLD AUTO: 5.2 % — LOW (ref 13–44)
MAGNESIUM SERPL-MCNC: 1.9 MG/DL — SIGNIFICANT CHANGE UP (ref 1.6–2.6)
MCHC RBC-ENTMCNC: 29.2 PG — SIGNIFICANT CHANGE UP (ref 27–34)
MCHC RBC-ENTMCNC: 29.9 G/DL — LOW (ref 32–36)
MCV RBC AUTO: 97.7 FL — SIGNIFICANT CHANGE UP (ref 80–100)
MONOCYTES # BLD AUTO: 0.9 K/UL — SIGNIFICANT CHANGE UP (ref 0–0.9)
MONOCYTES NFR BLD AUTO: 7 % — SIGNIFICANT CHANGE UP (ref 2–14)
NEUTROPHILS # BLD AUTO: 11.14 K/UL — HIGH (ref 1.8–7.4)
NEUTROPHILS NFR BLD AUTO: 87 % — HIGH (ref 43–77)
NRBC # BLD AUTO: 0 K/UL — SIGNIFICANT CHANGE UP (ref 0–0)
NRBC # FLD: 0 K/UL — SIGNIFICANT CHANGE UP (ref 0–0)
NRBC BLD AUTO-RTO: 0 /100 WBCS — SIGNIFICANT CHANGE UP (ref 0–0)
PHOSPHATE SERPL-MCNC: 3.2 MG/DL — SIGNIFICANT CHANGE UP (ref 2.5–4.5)
PLATELET # BLD AUTO: 83 K/UL — LOW (ref 150–400)
PMV BLD: 11.5 FL — SIGNIFICANT CHANGE UP (ref 7–13)
POTASSIUM SERPL-MCNC: 3.3 MMOL/L — LOW (ref 3.5–5.3)
POTASSIUM SERPL-SCNC: 3.3 MMOL/L — LOW (ref 3.5–5.3)
PROTHROM AB SERPL-ACNC: 12.5 SEC — SIGNIFICANT CHANGE UP (ref 9.9–13.4)
RBC # BLD: 2.6 M/UL — LOW (ref 4.2–5.8)
RBC # FLD: 15.5 % — HIGH (ref 10.3–14.5)
SODIUM SERPL-SCNC: 150 MMOL/L — HIGH (ref 135–145)
WBC # BLD: 12.82 K/UL — HIGH (ref 3.8–10.5)
WBC # FLD AUTO: 12.82 K/UL — HIGH (ref 3.8–10.5)

## 2025-07-24 PROCEDURE — 85730 THROMBOPLASTIN TIME PARTIAL: CPT

## 2025-07-24 PROCEDURE — 87040 BLOOD CULTURE FOR BACTERIA: CPT

## 2025-07-24 PROCEDURE — 85610 PROTHROMBIN TIME: CPT

## 2025-07-24 PROCEDURE — 84100 ASSAY OF PHOSPHORUS: CPT

## 2025-07-24 PROCEDURE — 83605 ASSAY OF LACTIC ACID: CPT

## 2025-07-24 PROCEDURE — 36415 COLL VENOUS BLD VENIPUNCTURE: CPT

## 2025-07-24 PROCEDURE — 71045 X-RAY EXAM CHEST 1 VIEW: CPT

## 2025-07-24 PROCEDURE — 74176 CT ABD & PELVIS W/O CONTRAST: CPT

## 2025-07-24 PROCEDURE — 74176 CT ABD & PELVIS W/O CONTRAST: CPT | Mod: 26

## 2025-07-24 PROCEDURE — 85025 COMPLETE CBC W/AUTO DIFF WBC: CPT

## 2025-07-24 PROCEDURE — 93005 ELECTROCARDIOGRAM TRACING: CPT

## 2025-07-24 PROCEDURE — 83735 ASSAY OF MAGNESIUM: CPT

## 2025-07-24 PROCEDURE — 93010 ELECTROCARDIOGRAM REPORT: CPT

## 2025-07-24 PROCEDURE — 80053 COMPREHEN METABOLIC PANEL: CPT

## 2025-07-24 PROCEDURE — 80048 BASIC METABOLIC PNL TOTAL CA: CPT

## 2025-07-24 PROCEDURE — 93970 EXTREMITY STUDY: CPT

## 2025-07-24 RX ORDER — POTASSIUM CHLORIDE, DEXTROSE MONOHYDRATE AND SODIUM CHLORIDE 150; 5; 900 MG/100ML; G/100ML; MG/100ML
1000 INJECTION, SOLUTION INTRAVENOUS
Refills: 0 | Status: DISCONTINUED | OUTPATIENT
Start: 2025-07-24 | End: 2025-07-26

## 2025-07-24 RX ORDER — PIPERACILLIN-TAZO-DEXTROSE,ISO 3.375G/5
3.38 IV SOLUTION, PIGGYBACK PREMIX FROZEN(ML) INTRAVENOUS EVERY 8 HOURS
Refills: 0 | Status: DISCONTINUED | OUTPATIENT
Start: 2025-07-24 | End: 2025-07-28

## 2025-07-24 RX ORDER — SODIUM CHLORIDE 9 G/1000ML
1000 INJECTION, SOLUTION INTRAVENOUS
Refills: 0 | Status: DISCONTINUED | OUTPATIENT
Start: 2025-07-24 | End: 2025-07-25

## 2025-07-24 RX ADMIN — ROSUVASTATIN CALCIUM 20 MILLIGRAM(S): 20 TABLET, FILM COATED ORAL at 00:18

## 2025-07-24 RX ADMIN — LATANOPROST PF 1 DROP(S): 0.05 SOLUTION/ DROPS OPHTHALMIC at 23:18

## 2025-07-24 RX ADMIN — AMPICILLIN SODIUM AND SULBACTAM SODIUM 200 GRAM(S): 1; .5 INJECTION, POWDER, FOR SOLUTION INTRAMUSCULAR; INTRAVENOUS at 06:46

## 2025-07-24 RX ADMIN — HEPARIN SODIUM 5000 UNIT(S): 1000 INJECTION INTRAVENOUS; SUBCUTANEOUS at 17:22

## 2025-07-24 RX ADMIN — Medication 100 MILLILITER(S): at 00:17

## 2025-07-24 RX ADMIN — ESCITALOPRAM OXALATE 10 MILLIGRAM(S): 20 TABLET ORAL at 11:54

## 2025-07-24 RX ADMIN — TAMOXIFEN CITRATE 20 MILLIGRAM(S): 10 TABLET, FILM COATED ORAL at 11:54

## 2025-07-24 RX ADMIN — Medication 325 MILLIGRAM(S): at 11:55

## 2025-07-24 RX ADMIN — FOLIC ACID 1 MILLIGRAM(S): 1 TABLET ORAL at 11:55

## 2025-07-24 RX ADMIN — Medication 81 MILLIGRAM(S): at 11:54

## 2025-07-24 RX ADMIN — HEPARIN SODIUM 5000 UNIT(S): 1000 INJECTION INTRAVENOUS; SUBCUTANEOUS at 06:46

## 2025-07-24 RX ADMIN — LATANOPROST PF 1 DROP(S): 0.05 SOLUTION/ DROPS OPHTHALMIC at 00:17

## 2025-07-24 RX ADMIN — CLOPIDOGREL BISULFATE 75 MILLIGRAM(S): 75 TABLET, FILM COATED ORAL at 11:54

## 2025-07-24 RX ADMIN — Medication 40 MILLIEQUIVALENT(S): at 11:53

## 2025-07-24 RX ADMIN — ROSUVASTATIN CALCIUM 20 MILLIGRAM(S): 20 TABLET, FILM COATED ORAL at 21:54

## 2025-07-24 RX ADMIN — Medication 25 GRAM(S): at 16:13

## 2025-07-24 RX ADMIN — SODIUM CHLORIDE 100 MILLILITER(S): 9 INJECTION, SOLUTION INTRAVENOUS at 11:55

## 2025-07-24 RX ADMIN — METOPROLOL SUCCINATE 25 MILLIGRAM(S): 50 TABLET, EXTENDED RELEASE ORAL at 06:46

## 2025-07-24 RX ADMIN — POTASSIUM CHLORIDE, DEXTROSE MONOHYDRATE AND SODIUM CHLORIDE 100 MILLILITER(S): 150; 5; 900 INJECTION, SOLUTION INTRAVENOUS at 16:13

## 2025-07-24 RX ADMIN — Medication 25 GRAM(S): at 21:54

## 2025-07-25 DIAGNOSIS — D64.9 ANEMIA, UNSPECIFIED: ICD-10-CM

## 2025-07-25 DIAGNOSIS — N18.9 CHRONIC KIDNEY DISEASE, UNSPECIFIED: ICD-10-CM

## 2025-07-25 LAB
ANION GAP SERPL CALC-SCNC: 1 MMOL/L — LOW (ref 5–17)
BUN SERPL-MCNC: 47 MG/DL — HIGH (ref 7–23)
CALCIUM SERPL-MCNC: 8.2 MG/DL — LOW (ref 8.5–10.1)
CHLORIDE SERPL-SCNC: 119 MMOL/L — HIGH (ref 96–108)
CO2 SERPL-SCNC: 28 MMOL/L — SIGNIFICANT CHANGE UP (ref 22–31)
CREAT SERPL-MCNC: 1.8 MG/DL — HIGH (ref 0.5–1.3)
EGFR: 36 ML/MIN/1.73M2 — LOW
EGFR: 36 ML/MIN/1.73M2 — LOW
GLUCOSE SERPL-MCNC: 99 MG/DL — SIGNIFICANT CHANGE UP (ref 70–99)
HCT VFR BLD CALC: 25.1 % — LOW (ref 39–50)
HGB BLD-MCNC: 7.8 G/DL — LOW (ref 13–17)
IMMATURE PLATELET FRACTION #: 2.8 K/UL — LOW (ref 3.9–12.5)
IMMATURE PLATELET FRACTION %: 3.4 % — SIGNIFICANT CHANGE UP (ref 1.6–7.1)
MCHC RBC-ENTMCNC: 30.2 PG — SIGNIFICANT CHANGE UP (ref 27–34)
MCHC RBC-ENTMCNC: 31.1 G/DL — LOW (ref 32–36)
MCV RBC AUTO: 97.3 FL — SIGNIFICANT CHANGE UP (ref 80–100)
NRBC # BLD AUTO: 0 K/UL — SIGNIFICANT CHANGE UP (ref 0–0)
NRBC # FLD: 0 K/UL — SIGNIFICANT CHANGE UP (ref 0–0)
NRBC BLD AUTO-RTO: 0 /100 WBCS — SIGNIFICANT CHANGE UP (ref 0–0)
PLATELET # BLD AUTO: 82 K/UL — LOW (ref 150–400)
PMV BLD: 12.1 FL — SIGNIFICANT CHANGE UP (ref 7–13)
POTASSIUM SERPL-MCNC: 4.3 MMOL/L — SIGNIFICANT CHANGE UP (ref 3.5–5.3)
POTASSIUM SERPL-SCNC: 4.3 MMOL/L — SIGNIFICANT CHANGE UP (ref 3.5–5.3)
RBC # BLD: 2.58 M/UL — LOW (ref 4.2–5.8)
RBC # FLD: 15.9 % — HIGH (ref 10.3–14.5)
SODIUM SERPL-SCNC: 148 MMOL/L — HIGH (ref 135–145)
WBC # BLD: 5.78 K/UL — SIGNIFICANT CHANGE UP (ref 3.8–10.5)
WBC # FLD AUTO: 5.78 K/UL — SIGNIFICANT CHANGE UP (ref 3.8–10.5)

## 2025-07-25 RX ADMIN — Medication 325 MILLIGRAM(S): at 11:18

## 2025-07-25 RX ADMIN — Medication 25 GRAM(S): at 14:11

## 2025-07-25 RX ADMIN — FOLIC ACID 1 MILLIGRAM(S): 1 TABLET ORAL at 11:17

## 2025-07-25 RX ADMIN — METOPROLOL SUCCINATE 25 MILLIGRAM(S): 50 TABLET, EXTENDED RELEASE ORAL at 05:03

## 2025-07-25 RX ADMIN — Medication 25 GRAM(S): at 05:03

## 2025-07-25 RX ADMIN — TAMOXIFEN CITRATE 20 MILLIGRAM(S): 10 TABLET, FILM COATED ORAL at 11:17

## 2025-07-25 RX ADMIN — POTASSIUM CHLORIDE, DEXTROSE MONOHYDRATE AND SODIUM CHLORIDE 100 MILLILITER(S): 150; 5; 900 INJECTION, SOLUTION INTRAVENOUS at 03:01

## 2025-07-25 RX ADMIN — ESCITALOPRAM OXALATE 10 MILLIGRAM(S): 20 TABLET ORAL at 11:18

## 2025-07-25 RX ADMIN — LATANOPROST PF 1 DROP(S): 0.05 SOLUTION/ DROPS OPHTHALMIC at 21:12

## 2025-07-25 RX ADMIN — ROSUVASTATIN CALCIUM 20 MILLIGRAM(S): 20 TABLET, FILM COATED ORAL at 21:12

## 2025-07-25 RX ADMIN — Medication 25 GRAM(S): at 21:12

## 2025-07-26 LAB
ALBUMIN SERPL ELPH-MCNC: 1.9 G/DL — LOW (ref 3.3–5)
ALP SERPL-CCNC: 50 U/L — SIGNIFICANT CHANGE UP (ref 40–120)
ALT FLD-CCNC: 18 U/L — SIGNIFICANT CHANGE UP (ref 12–78)
ANION GAP SERPL CALC-SCNC: 2 MMOL/L — LOW (ref 5–17)
AST SERPL-CCNC: 15 U/L — SIGNIFICANT CHANGE UP (ref 15–37)
BILIRUB SERPL-MCNC: 0.3 MG/DL — SIGNIFICANT CHANGE UP (ref 0.2–1.2)
BUN SERPL-MCNC: 40 MG/DL — HIGH (ref 7–23)
CALCIUM SERPL-MCNC: 8.3 MG/DL — LOW (ref 8.5–10.1)
CHLORIDE SERPL-SCNC: 120 MMOL/L — HIGH (ref 96–108)
CO2 SERPL-SCNC: 27 MMOL/L — SIGNIFICANT CHANGE UP (ref 22–31)
CREAT SERPL-MCNC: 2 MG/DL — HIGH (ref 0.5–1.3)
EGFR: 32 ML/MIN/1.73M2 — LOW
EGFR: 32 ML/MIN/1.73M2 — LOW
FERRITIN SERPL-MCNC: 474 NG/ML — HIGH (ref 30–400)
GLUCOSE SERPL-MCNC: 85 MG/DL — SIGNIFICANT CHANGE UP (ref 70–99)
HCT VFR BLD CALC: 25.9 % — LOW (ref 39–50)
HGB BLD-MCNC: 8 G/DL — LOW (ref 13–17)
IMMATURE PLATELET FRACTION #: 2.7 K/UL — LOW (ref 3.9–12.5)
IMMATURE PLATELET FRACTION %: 2.9 % — SIGNIFICANT CHANGE UP (ref 1.6–7.1)
IRON SATN MFR SERPL: 18 % — SIGNIFICANT CHANGE UP (ref 16–55)
IRON SATN MFR SERPL: 32 UG/DL — LOW (ref 45–165)
MCHC RBC-ENTMCNC: 30.2 PG — SIGNIFICANT CHANGE UP (ref 27–34)
MCHC RBC-ENTMCNC: 30.9 G/DL — LOW (ref 32–36)
MCV RBC AUTO: 97.7 FL — SIGNIFICANT CHANGE UP (ref 80–100)
NRBC # BLD AUTO: 0 K/UL — SIGNIFICANT CHANGE UP (ref 0–0)
NRBC # FLD: 0 K/UL — SIGNIFICANT CHANGE UP (ref 0–0)
NRBC BLD AUTO-RTO: 0 /100 WBCS — SIGNIFICANT CHANGE UP (ref 0–0)
PLATELET # BLD AUTO: 92 K/UL — LOW (ref 150–400)
PMV BLD: 11.8 FL — SIGNIFICANT CHANGE UP (ref 7–13)
POTASSIUM SERPL-MCNC: 4 MMOL/L — SIGNIFICANT CHANGE UP (ref 3.5–5.3)
POTASSIUM SERPL-SCNC: 4 MMOL/L — SIGNIFICANT CHANGE UP (ref 3.5–5.3)
PROT SERPL-MCNC: 5.2 G/DL — LOW (ref 6–8.3)
RBC # BLD: 2.65 M/UL — LOW (ref 4.2–5.8)
RBC # FLD: 16.1 % — HIGH (ref 10.3–14.5)
SODIUM SERPL-SCNC: 149 MMOL/L — HIGH (ref 135–145)
TIBC SERPL-MCNC: 173 UG/DL — LOW (ref 220–430)
UIBC SERPL-MCNC: 141 UG/DL — SIGNIFICANT CHANGE UP (ref 110–370)
WBC # BLD: 5.42 K/UL — SIGNIFICANT CHANGE UP (ref 3.8–10.5)
WBC # FLD AUTO: 5.42 K/UL — SIGNIFICANT CHANGE UP (ref 3.8–10.5)

## 2025-07-26 RX ORDER — SODIUM CHLORIDE 9 G/1000ML
1000 INJECTION, SOLUTION INTRAVENOUS
Refills: 0 | Status: DISCONTINUED | OUTPATIENT
Start: 2025-07-26 | End: 2025-07-28

## 2025-07-26 RX ADMIN — ROSUVASTATIN CALCIUM 20 MILLIGRAM(S): 20 TABLET, FILM COATED ORAL at 21:19

## 2025-07-26 RX ADMIN — LATANOPROST PF 1 DROP(S): 0.05 SOLUTION/ DROPS OPHTHALMIC at 21:21

## 2025-07-26 RX ADMIN — Medication 25 GRAM(S): at 05:17

## 2025-07-26 RX ADMIN — Medication 81 MILLIGRAM(S): at 12:43

## 2025-07-26 RX ADMIN — SODIUM CHLORIDE 75 MILLILITER(S): 9 INJECTION, SOLUTION INTRAVENOUS at 14:20

## 2025-07-26 RX ADMIN — Medication 325 MILLIGRAM(S): at 12:43

## 2025-07-26 RX ADMIN — Medication 25 GRAM(S): at 14:21

## 2025-07-26 RX ADMIN — SODIUM CHLORIDE 75 MILLILITER(S): 9 INJECTION, SOLUTION INTRAVENOUS at 21:19

## 2025-07-26 RX ADMIN — METOPROLOL SUCCINATE 25 MILLIGRAM(S): 50 TABLET, EXTENDED RELEASE ORAL at 05:31

## 2025-07-26 RX ADMIN — TAMOXIFEN CITRATE 20 MILLIGRAM(S): 10 TABLET, FILM COATED ORAL at 12:43

## 2025-07-26 RX ADMIN — FOLIC ACID 1 MILLIGRAM(S): 1 TABLET ORAL at 12:43

## 2025-07-26 RX ADMIN — ESCITALOPRAM OXALATE 10 MILLIGRAM(S): 20 TABLET ORAL at 12:44

## 2025-07-26 RX ADMIN — POTASSIUM CHLORIDE, DEXTROSE MONOHYDRATE AND SODIUM CHLORIDE 100 MILLILITER(S): 150; 5; 900 INJECTION, SOLUTION INTRAVENOUS at 02:16

## 2025-07-26 RX ADMIN — CLOPIDOGREL BISULFATE 75 MILLIGRAM(S): 75 TABLET, FILM COATED ORAL at 12:43

## 2025-07-26 RX ADMIN — Medication 25 GRAM(S): at 21:19

## 2025-07-27 LAB
ANION GAP SERPL CALC-SCNC: 4 MMOL/L — LOW (ref 5–17)
BUN SERPL-MCNC: 34 MG/DL — HIGH (ref 7–23)
CALCIUM SERPL-MCNC: 8 MG/DL — LOW (ref 8.5–10.1)
CHLORIDE SERPL-SCNC: 115 MMOL/L — HIGH (ref 96–108)
CO2 SERPL-SCNC: 24 MMOL/L — SIGNIFICANT CHANGE UP (ref 22–31)
CREAT SERPL-MCNC: 1.9 MG/DL — HIGH (ref 0.5–1.3)
EGFR: 34 ML/MIN/1.73M2 — LOW
EGFR: 34 ML/MIN/1.73M2 — LOW
GLUCOSE SERPL-MCNC: 93 MG/DL — SIGNIFICANT CHANGE UP (ref 70–99)
HCT VFR BLD CALC: 23.4 % — LOW (ref 39–50)
HGB BLD-MCNC: 7.2 G/DL — LOW (ref 13–17)
MCHC RBC-ENTMCNC: 30 PG — SIGNIFICANT CHANGE UP (ref 27–34)
MCHC RBC-ENTMCNC: 30.8 G/DL — LOW (ref 32–36)
MCV RBC AUTO: 97.5 FL — SIGNIFICANT CHANGE UP (ref 80–100)
NRBC # BLD AUTO: 0 K/UL — SIGNIFICANT CHANGE UP (ref 0–0)
NRBC # FLD: 0 K/UL — SIGNIFICANT CHANGE UP (ref 0–0)
NRBC BLD AUTO-RTO: 0 /100 WBCS — SIGNIFICANT CHANGE UP (ref 0–0)
PLATELET # BLD AUTO: 102 K/UL — LOW (ref 150–400)
PMV BLD: 11.4 FL — SIGNIFICANT CHANGE UP (ref 7–13)
POTASSIUM SERPL-MCNC: 3.9 MMOL/L — SIGNIFICANT CHANGE UP (ref 3.5–5.3)
POTASSIUM SERPL-SCNC: 3.9 MMOL/L — SIGNIFICANT CHANGE UP (ref 3.5–5.3)
RBC # BLD: 2.4 M/UL — LOW (ref 4.2–5.8)
RBC # FLD: 15.9 % — HIGH (ref 10.3–14.5)
SODIUM SERPL-SCNC: 143 MMOL/L — SIGNIFICANT CHANGE UP (ref 135–145)
WBC # BLD: 6.14 K/UL — SIGNIFICANT CHANGE UP (ref 3.8–10.5)
WBC # FLD AUTO: 6.14 K/UL — SIGNIFICANT CHANGE UP (ref 3.8–10.5)

## 2025-07-27 RX ORDER — HEPARIN SODIUM 1000 [USP'U]/ML
5000 INJECTION INTRAVENOUS; SUBCUTANEOUS EVERY 8 HOURS
Refills: 0 | Status: DISCONTINUED | OUTPATIENT
Start: 2025-07-27 | End: 2025-07-28

## 2025-07-27 RX ADMIN — LATANOPROST PF 1 DROP(S): 0.05 SOLUTION/ DROPS OPHTHALMIC at 21:06

## 2025-07-27 RX ADMIN — METOPROLOL SUCCINATE 25 MILLIGRAM(S): 50 TABLET, EXTENDED RELEASE ORAL at 05:02

## 2025-07-27 RX ADMIN — ESCITALOPRAM OXALATE 10 MILLIGRAM(S): 20 TABLET ORAL at 11:49

## 2025-07-27 RX ADMIN — SODIUM CHLORIDE 75 MILLILITER(S): 9 INJECTION, SOLUTION INTRAVENOUS at 05:02

## 2025-07-27 RX ADMIN — TAMOXIFEN CITRATE 20 MILLIGRAM(S): 10 TABLET, FILM COATED ORAL at 11:49

## 2025-07-27 RX ADMIN — Medication 25 GRAM(S): at 21:06

## 2025-07-27 RX ADMIN — Medication 25 GRAM(S): at 13:49

## 2025-07-27 RX ADMIN — Medication 25 GRAM(S): at 05:01

## 2025-07-27 RX ADMIN — CLOPIDOGREL BISULFATE 75 MILLIGRAM(S): 75 TABLET, FILM COATED ORAL at 11:49

## 2025-07-27 RX ADMIN — HEPARIN SODIUM 5000 UNIT(S): 1000 INJECTION INTRAVENOUS; SUBCUTANEOUS at 21:07

## 2025-07-27 RX ADMIN — Medication 325 MILLIGRAM(S): at 11:49

## 2025-07-27 RX ADMIN — ROSUVASTATIN CALCIUM 20 MILLIGRAM(S): 20 TABLET, FILM COATED ORAL at 21:06

## 2025-07-27 RX ADMIN — HEPARIN SODIUM 5000 UNIT(S): 1000 INJECTION INTRAVENOUS; SUBCUTANEOUS at 13:54

## 2025-07-27 RX ADMIN — SODIUM CHLORIDE 75 MILLILITER(S): 9 INJECTION, SOLUTION INTRAVENOUS at 21:06

## 2025-07-27 RX ADMIN — FOLIC ACID 1 MILLIGRAM(S): 1 TABLET ORAL at 11:50

## 2025-07-28 ENCOUNTER — TRANSCRIPTION ENCOUNTER (OUTPATIENT)
Age: 85
End: 2025-07-28

## 2025-07-28 VITALS
SYSTOLIC BLOOD PRESSURE: 126 MMHG | DIASTOLIC BLOOD PRESSURE: 54 MMHG | OXYGEN SATURATION: 94 % | TEMPERATURE: 97 F | RESPIRATION RATE: 18 BRPM | HEART RATE: 54 BPM

## 2025-07-28 LAB
ANION GAP SERPL CALC-SCNC: 4 MMOL/L — LOW (ref 5–17)
BUN SERPL-MCNC: 27 MG/DL — HIGH (ref 7–23)
CALCIUM SERPL-MCNC: 8 MG/DL — LOW (ref 8.5–10.1)
CHLORIDE SERPL-SCNC: 113 MMOL/L — HIGH (ref 96–108)
CO2 SERPL-SCNC: 25 MMOL/L — SIGNIFICANT CHANGE UP (ref 22–31)
CREAT SERPL-MCNC: 1.9 MG/DL — HIGH (ref 0.5–1.3)
EGFR: 34 ML/MIN/1.73M2 — LOW
EGFR: 34 ML/MIN/1.73M2 — LOW
GLUCOSE SERPL-MCNC: 93 MG/DL — SIGNIFICANT CHANGE UP (ref 70–99)
HCT VFR BLD CALC: 27.8 % — LOW (ref 39–50)
HGB BLD-MCNC: 8.9 G/DL — LOW (ref 13–17)
MCHC RBC-ENTMCNC: 30.6 PG — SIGNIFICANT CHANGE UP (ref 27–34)
MCHC RBC-ENTMCNC: 32 G/DL — SIGNIFICANT CHANGE UP (ref 32–36)
MCV RBC AUTO: 95.5 FL — SIGNIFICANT CHANGE UP (ref 80–100)
NRBC # BLD AUTO: 0.02 K/UL — HIGH (ref 0–0)
NRBC # FLD: 0.02 K/UL — HIGH (ref 0–0)
NRBC BLD AUTO-RTO: 0 /100 WBCS — SIGNIFICANT CHANGE UP (ref 0–0)
PLATELET # BLD AUTO: 114 K/UL — LOW (ref 150–400)
PMV BLD: 11.2 FL — SIGNIFICANT CHANGE UP (ref 7–13)
POTASSIUM SERPL-MCNC: 3.8 MMOL/L — SIGNIFICANT CHANGE UP (ref 3.5–5.3)
POTASSIUM SERPL-SCNC: 3.8 MMOL/L — SIGNIFICANT CHANGE UP (ref 3.5–5.3)
RBC # BLD: 2.91 M/UL — LOW (ref 4.2–5.8)
RBC # FLD: 15.5 % — HIGH (ref 10.3–14.5)
SODIUM SERPL-SCNC: 142 MMOL/L — SIGNIFICANT CHANGE UP (ref 135–145)
WBC # BLD: 5.93 K/UL — SIGNIFICANT CHANGE UP (ref 3.8–10.5)
WBC # FLD AUTO: 5.93 K/UL — SIGNIFICANT CHANGE UP (ref 3.8–10.5)

## 2025-07-28 PROCEDURE — P9016: CPT

## 2025-07-28 PROCEDURE — 36415 COLL VENOUS BLD VENIPUNCTURE: CPT

## 2025-07-28 PROCEDURE — 86923 COMPATIBILITY TEST ELECTRIC: CPT

## 2025-07-28 PROCEDURE — 99285 EMERGENCY DEPT VISIT HI MDM: CPT

## 2025-07-28 PROCEDURE — 82728 ASSAY OF FERRITIN: CPT

## 2025-07-28 PROCEDURE — 83550 IRON BINDING TEST: CPT

## 2025-07-28 PROCEDURE — 84100 ASSAY OF PHOSPHORUS: CPT

## 2025-07-28 PROCEDURE — 93005 ELECTROCARDIOGRAM TRACING: CPT

## 2025-07-28 PROCEDURE — 85730 THROMBOPLASTIN TIME PARTIAL: CPT

## 2025-07-28 PROCEDURE — 83605 ASSAY OF LACTIC ACID: CPT

## 2025-07-28 PROCEDURE — 83735 ASSAY OF MAGNESIUM: CPT

## 2025-07-28 PROCEDURE — 85610 PROTHROMBIN TIME: CPT

## 2025-07-28 PROCEDURE — 97162 PT EVAL MOD COMPLEX 30 MIN: CPT

## 2025-07-28 PROCEDURE — 85027 COMPLETE CBC AUTOMATED: CPT

## 2025-07-28 PROCEDURE — 85025 COMPLETE CBC W/AUTO DIFF WBC: CPT

## 2025-07-28 PROCEDURE — 93970 EXTREMITY STUDY: CPT

## 2025-07-28 PROCEDURE — 74176 CT ABD & PELVIS W/O CONTRAST: CPT

## 2025-07-28 PROCEDURE — 86850 RBC ANTIBODY SCREEN: CPT

## 2025-07-28 PROCEDURE — 71045 X-RAY EXAM CHEST 1 VIEW: CPT

## 2025-07-28 PROCEDURE — 86901 BLOOD TYPING SEROLOGIC RH(D): CPT

## 2025-07-28 PROCEDURE — 83540 ASSAY OF IRON: CPT

## 2025-07-28 PROCEDURE — 87040 BLOOD CULTURE FOR BACTERIA: CPT

## 2025-07-28 PROCEDURE — 80048 BASIC METABOLIC PNL TOTAL CA: CPT

## 2025-07-28 PROCEDURE — 80053 COMPREHEN METABOLIC PANEL: CPT

## 2025-07-28 PROCEDURE — 86900 BLOOD TYPING SEROLOGIC ABO: CPT

## 2025-07-28 PROCEDURE — 36430 TRANSFUSION BLD/BLD COMPNT: CPT

## 2025-07-28 PROCEDURE — 96374 THER/PROPH/DIAG INJ IV PUSH: CPT

## 2025-07-28 PROCEDURE — 96375 TX/PRO/DX INJ NEW DRUG ADDON: CPT

## 2025-07-28 RX ORDER — METOPROLOL SUCCINATE 50 MG/1
25 TABLET, EXTENDED RELEASE ORAL DAILY
Refills: 0 | Status: DISCONTINUED | OUTPATIENT
Start: 2025-07-28 | End: 2025-07-28

## 2025-07-28 RX ADMIN — HEPARIN SODIUM 5000 UNIT(S): 1000 INJECTION INTRAVENOUS; SUBCUTANEOUS at 13:18

## 2025-07-28 RX ADMIN — Medication 25 GRAM(S): at 05:23

## 2025-07-28 RX ADMIN — HEPARIN SODIUM 5000 UNIT(S): 1000 INJECTION INTRAVENOUS; SUBCUTANEOUS at 05:23

## 2025-07-28 RX ADMIN — SODIUM CHLORIDE 75 MILLILITER(S): 9 INJECTION, SOLUTION INTRAVENOUS at 12:16

## 2025-07-28 RX ADMIN — Medication 81 MILLIGRAM(S): at 12:14

## 2025-07-28 RX ADMIN — Medication 25 GRAM(S): at 13:18

## 2025-07-28 RX ADMIN — ESCITALOPRAM OXALATE 10 MILLIGRAM(S): 20 TABLET ORAL at 12:13

## 2025-07-28 RX ADMIN — CLOPIDOGREL BISULFATE 75 MILLIGRAM(S): 75 TABLET, FILM COATED ORAL at 12:13

## 2025-07-28 RX ADMIN — TAMOXIFEN CITRATE 20 MILLIGRAM(S): 10 TABLET, FILM COATED ORAL at 12:13

## 2025-07-28 RX ADMIN — FOLIC ACID 1 MILLIGRAM(S): 1 TABLET ORAL at 12:13

## 2025-07-28 RX ADMIN — Medication 325 MILLIGRAM(S): at 12:13

## 2025-07-29 LAB
CULTURE RESULTS: SIGNIFICANT CHANGE UP
CULTURE RESULTS: SIGNIFICANT CHANGE UP
SPECIMEN SOURCE: SIGNIFICANT CHANGE UP
SPECIMEN SOURCE: SIGNIFICANT CHANGE UP

## (undated) DEVICE — PACKING GAUZE PLAIN 0.5"

## (undated) DEVICE — DRAPE FEMORAL ANGIOGRAPHY W TROUGH

## (undated) DEVICE — SYR LUER LOK 10CC

## (undated) DEVICE — VENODYNE/SCD SLEEVE CALF LARGE

## (undated) DEVICE — FLOORMAT SURGISAFE ABSORBANT WHITE 36" X 72"

## (undated) DEVICE — TORQUE DEVICE FOR GUIDEWIRE 0.0100.038"

## (undated) DEVICE — PREP BETADINE KIT

## (undated) DEVICE — SYR LUER LOK 3CC

## (undated) DEVICE — GLV 7.5 PROTEXIS (WHITE)

## (undated) DEVICE — BLADE SCALPEL SAFETYLOCK #15

## (undated) DEVICE — Device

## (undated) DEVICE — VENODYNE/SCD SLEEVE CALF MEDIUM

## (undated) DEVICE — SYR LUER LOK 30CC

## (undated) DEVICE — PLV/PSP-TOURNIQUET #2 3006KAAL: Type: DURABLE MEDICAL EQUIPMENT

## (undated) DEVICE — CATH IV SAFE INSYTE 14G X 1.75" (ORANGE)

## (undated) DEVICE — TOURNIQUET CUFF 18" DUAL PORT SINGLE BLADDER W PLC  (BLACK)

## (undated) DEVICE — DRAPE 3/4 SHEET W REINFORCEMENT 56X77"

## (undated) DEVICE — SAW BLADE LINVATEC SAGITTAL MIC 9.5X25.5X0.4MM

## (undated) DEVICE — PACKING GAUZE PLAIN 0.25"

## (undated) DEVICE — ELCTR BOVIE PENCIL SMOKE EVACUATION

## (undated) DEVICE — WOUND IRR IRRISEPT W 0.5 CHG

## (undated) DEVICE — VALVE CONTROL COPILOT BLEEDBACK

## (undated) DEVICE — SPECIMEN CONTAINER 4OZ

## (undated) DEVICE — DRAPE TOWEL BLUE 17" X 24"

## (undated) DEVICE — DRSG ACE BANDAGE 4"

## (undated) DEVICE — SPECIMEN CONTAINER 100ML

## (undated) DEVICE — WARMING BLANKET UPPER ADULT

## (undated) DEVICE — VISITEC 4X4

## (undated) DEVICE — DRSG TELFA 3 X 8

## (undated) DEVICE — PLV/PSP-ESU FORCEFX F8I7418A: Type: DURABLE MEDICAL EQUIPMENT

## (undated) DEVICE — BAG DECANTER IV STERILE

## (undated) DEVICE — DRSG TEGADERM 4 X 4.75"

## (undated) DEVICE — PACK LOWER EXTREMITY NS PLAINVI

## (undated) DEVICE — SUT MONOSOF 3-0 18" P-14

## (undated) DEVICE — SYR LUER LOK 20CC

## (undated) DEVICE — STOPCOCK HIGH PRESS 3 WAY

## (undated) DEVICE — ULTRASOUND COVER PEELSAFE 5 X 58"

## (undated) DEVICE — DRSG ADAPTIC 3 X 3"

## (undated) DEVICE — SOL INJ NS 0.9% 1000ML

## (undated) DEVICE — PLV-SCD MACHINE: Type: DURABLE MEDICAL EQUIPMENT

## (undated) DEVICE — TUBING HI PRESURE NONBRAID M/F 72"

## (undated) DEVICE — MEDICATION LABELS W MARKER

## (undated) DEVICE — BLADE SCALPEL SAFETY #11 WITH PLASTIC GREEN HANDLE

## (undated) DEVICE — PREP CHLORAPREP HI-LITE ORANGE 26ML

## (undated) DEVICE — TAPE GLO-N-TELL RADIOPAQUE 20 STRIPS

## (undated) DEVICE — STAPLER SKIN PROXIMATE

## (undated) DEVICE — DRSG CURITY GAUZE SPONGE 4 X 4" 12-PLY

## (undated) DEVICE — MARKING PEN W RULER

## (undated) DEVICE — GLV 6 PROTEXIS (WHITE)

## (undated) DEVICE — INFLATOR ENCORE 26

## (undated) DEVICE — DRSG DERMABOND 0.7ML

## (undated) DEVICE — PACK CARD CATH CUSTOM